# Patient Record
Sex: MALE | Race: WHITE | NOT HISPANIC OR LATINO | Employment: UNEMPLOYED | URBAN - METROPOLITAN AREA
[De-identification: names, ages, dates, MRNs, and addresses within clinical notes are randomized per-mention and may not be internally consistent; named-entity substitution may affect disease eponyms.]

---

## 2019-04-24 ENCOUNTER — APPOINTMENT (OUTPATIENT)
Dept: CT IMAGING | Facility: CLINIC | Age: 34
End: 2019-04-24
Attending: EMERGENCY MEDICINE
Payer: MEDICAID

## 2019-04-24 ENCOUNTER — APPOINTMENT (OUTPATIENT)
Dept: ULTRASOUND IMAGING | Facility: CLINIC | Age: 34
End: 2019-04-24
Attending: EMERGENCY MEDICINE
Payer: MEDICAID

## 2019-04-24 ENCOUNTER — APPOINTMENT (OUTPATIENT)
Dept: CARDIOLOGY | Facility: CLINIC | Age: 34
End: 2019-04-24
Attending: INTERNAL MEDICINE
Payer: MEDICAID

## 2019-04-24 ENCOUNTER — APPOINTMENT (OUTPATIENT)
Dept: GENERAL RADIOLOGY | Facility: CLINIC | Age: 34
End: 2019-04-24
Attending: EMERGENCY MEDICINE
Payer: MEDICAID

## 2019-04-24 ENCOUNTER — OFFICE VISIT (OUTPATIENT)
Dept: URGENT CARE | Facility: URGENT CARE | Age: 34
End: 2019-04-24
Payer: MEDICAID

## 2019-04-24 ENCOUNTER — HOSPITAL ENCOUNTER (INPATIENT)
Facility: CLINIC | Age: 34
LOS: 1 days | Discharge: ANOTHER HOSPITAL | End: 2019-04-25
Attending: EMERGENCY MEDICINE | Admitting: INTERNAL MEDICINE
Payer: MEDICAID

## 2019-04-24 VITALS
OXYGEN SATURATION: 82 % | WEIGHT: 241.4 LBS | DIASTOLIC BLOOD PRESSURE: 97 MMHG | SYSTOLIC BLOOD PRESSURE: 143 MMHG | HEART RATE: 128 BPM | TEMPERATURE: 97 F

## 2019-04-24 DIAGNOSIS — R09.02 HYPOXIA: Primary | ICD-10-CM

## 2019-04-24 DIAGNOSIS — I50.21 ACUTE SYSTOLIC CONGESTIVE HEART FAILURE (H): ICD-10-CM

## 2019-04-24 DIAGNOSIS — R63.5 ABNORMAL WEIGHT GAIN: ICD-10-CM

## 2019-04-24 DIAGNOSIS — J18.9 PNEUMONIA DUE TO INFECTIOUS ORGANISM, UNSPECIFIED LATERALITY, UNSPECIFIED PART OF LUNG: ICD-10-CM

## 2019-04-24 DIAGNOSIS — R63.5 WEIGHT GAIN: ICD-10-CM

## 2019-04-24 DIAGNOSIS — R60.0 PERIPHERAL EDEMA: ICD-10-CM

## 2019-04-24 DIAGNOSIS — R79.89 ELEVATED TROPONIN: ICD-10-CM

## 2019-04-24 DIAGNOSIS — I51.3 LV (LEFT VENTRICULAR) MURAL THROMBUS: Primary | ICD-10-CM

## 2019-04-24 DIAGNOSIS — R60.9 EDEMA, UNSPECIFIED TYPE: ICD-10-CM

## 2019-04-24 DIAGNOSIS — I50.9 CONGESTIVE HEART FAILURE, UNSPECIFIED HF CHRONICITY, UNSPECIFIED HEART FAILURE TYPE (H): ICD-10-CM

## 2019-04-24 PROBLEM — F15.10 METHAMPHETAMINE USE (H): Status: ACTIVE | Noted: 2019-04-24

## 2019-04-24 PROBLEM — F14.90 COCAINE USE: Status: ACTIVE | Noted: 2019-04-24

## 2019-04-24 PROBLEM — J20.9 ACUTE BRONCHITIS: Status: ACTIVE | Noted: 2019-04-24

## 2019-04-24 PROBLEM — R74.8 ELEVATION OF CARDIAC ENZYMES: Status: ACTIVE | Noted: 2019-04-24

## 2019-04-24 LAB
ALBUMIN SERPL-MCNC: 2.7 G/DL (ref 3.4–5)
ALP SERPL-CCNC: 98 U/L (ref 40–150)
ALT SERPL W P-5'-P-CCNC: 95 U/L (ref 0–70)
ANION GAP SERPL CALCULATED.3IONS-SCNC: 7 MMOL/L (ref 3–14)
AST SERPL W P-5'-P-CCNC: 68 U/L (ref 0–45)
BASOPHILS # BLD AUTO: 0 10E9/L (ref 0–0.2)
BASOPHILS NFR BLD AUTO: 0.3 %
BILIRUB DIRECT SERPL-MCNC: 0.3 MG/DL (ref 0–0.2)
BILIRUB SERPL-MCNC: 0.9 MG/DL (ref 0.2–1.3)
BUN SERPL-MCNC: 19 MG/DL (ref 7–30)
CALCIUM SERPL-MCNC: 8.7 MG/DL (ref 8.5–10.1)
CHLORIDE SERPL-SCNC: 110 MMOL/L (ref 94–109)
CO2 SERPL-SCNC: 24 MMOL/L (ref 20–32)
CREAT SERPL-MCNC: 1 MG/DL (ref 0.66–1.25)
D DIMER PPP FEU-MCNC: 1.9 UG/ML FEU (ref 0–0.5)
DIFFERENTIAL METHOD BLD: ABNORMAL
EOSINOPHIL # BLD AUTO: 0.1 10E9/L (ref 0–0.7)
EOSINOPHIL NFR BLD AUTO: 0.7 %
ERYTHROCYTE [DISTWIDTH] IN BLOOD BY AUTOMATED COUNT: 14.7 % (ref 10–15)
ERYTHROCYTE [DISTWIDTH] IN BLOOD BY AUTOMATED COUNT: 14.9 % (ref 10–15)
GFR SERPL CREATININE-BSD FRML MDRD: >90 ML/MIN/{1.73_M2}
GLUCOSE SERPL-MCNC: 109 MG/DL (ref 70–99)
HCT VFR BLD AUTO: 48.9 % (ref 40–53)
HCT VFR BLD AUTO: 49 % (ref 40–53)
HGB BLD-MCNC: 16.1 G/DL (ref 13.3–17.7)
HGB BLD-MCNC: 16.2 G/DL (ref 13.3–17.7)
IMM GRANULOCYTES # BLD: 0.1 10E9/L (ref 0–0.4)
IMM GRANULOCYTES NFR BLD: 0.4 %
INTERPRETATION ECG - MUSE: NORMAL
LACTATE BLD-SCNC: 1.5 MMOL/L (ref 0.7–2)
LACTATE BLD-SCNC: 1.5 MMOL/L (ref 0.7–2)
LMWH PPP CHRO-ACNC: <0.1 IU/ML
LYMPHOCYTES # BLD AUTO: 2.5 10E9/L (ref 0.8–5.3)
LYMPHOCYTES NFR BLD AUTO: 17.8 %
MCH RBC QN AUTO: 31.8 PG (ref 26.5–33)
MCH RBC QN AUTO: 31.8 PG (ref 26.5–33)
MCHC RBC AUTO-ENTMCNC: 32.9 G/DL (ref 31.5–36.5)
MCHC RBC AUTO-ENTMCNC: 33.1 G/DL (ref 31.5–36.5)
MCV RBC AUTO: 96 FL (ref 78–100)
MCV RBC AUTO: 97 FL (ref 78–100)
MONOCYTES # BLD AUTO: 1 10E9/L (ref 0–1.3)
MONOCYTES NFR BLD AUTO: 7 %
NEUTROPHILS # BLD AUTO: 10.2 10E9/L (ref 1.6–8.3)
NEUTROPHILS NFR BLD AUTO: 73.8 %
NRBC # BLD AUTO: 0 10*3/UL
NRBC BLD AUTO-RTO: 0 /100
NT-PROBNP SERPL-MCNC: 4655 PG/ML (ref 0–450)
PLATELET # BLD AUTO: 204 10E9/L (ref 150–450)
PLATELET # BLD AUTO: 215 10E9/L (ref 150–450)
POTASSIUM SERPL-SCNC: 4.9 MMOL/L (ref 3.4–5.3)
PROT SERPL-MCNC: 6.2 G/DL (ref 6.8–8.8)
RBC # BLD AUTO: 5.06 10E12/L (ref 4.4–5.9)
RBC # BLD AUTO: 5.09 10E12/L (ref 4.4–5.9)
SODIUM SERPL-SCNC: 141 MMOL/L (ref 133–144)
TROPONIN I SERPL-MCNC: 2.8 UG/L (ref 0–0.04)
WBC # BLD AUTO: 13.9 10E9/L (ref 4–11)
WBC # BLD AUTO: 15.1 10E9/L (ref 4–11)

## 2019-04-24 PROCEDURE — 25000125 ZZHC RX 250: Performed by: EMERGENCY MEDICINE

## 2019-04-24 PROCEDURE — 25000128 H RX IP 250 OP 636: Performed by: EMERGENCY MEDICINE

## 2019-04-24 PROCEDURE — 85520 HEPARIN ASSAY: CPT | Performed by: INTERNAL MEDICINE

## 2019-04-24 PROCEDURE — 83605 ASSAY OF LACTIC ACID: CPT | Performed by: INTERNAL MEDICINE

## 2019-04-24 PROCEDURE — 25000128 H RX IP 250 OP 636: Performed by: INTERNAL MEDICINE

## 2019-04-24 PROCEDURE — 71046 X-RAY EXAM CHEST 2 VIEWS: CPT

## 2019-04-24 PROCEDURE — 99223 1ST HOSP IP/OBS HIGH 75: CPT | Mod: AI | Performed by: INTERNAL MEDICINE

## 2019-04-24 PROCEDURE — 80048 BASIC METABOLIC PNL TOTAL CA: CPT | Performed by: EMERGENCY MEDICINE

## 2019-04-24 PROCEDURE — 84484 ASSAY OF TROPONIN QUANT: CPT | Performed by: EMERGENCY MEDICINE

## 2019-04-24 PROCEDURE — 85025 COMPLETE CBC W/AUTO DIFF WBC: CPT | Performed by: EMERGENCY MEDICINE

## 2019-04-24 PROCEDURE — 93306 TTE W/DOPPLER COMPLETE: CPT | Mod: 26 | Performed by: INTERNAL MEDICINE

## 2019-04-24 PROCEDURE — 96361 HYDRATE IV INFUSION ADD-ON: CPT

## 2019-04-24 PROCEDURE — 83880 ASSAY OF NATRIURETIC PEPTIDE: CPT | Performed by: EMERGENCY MEDICINE

## 2019-04-24 PROCEDURE — 25000132 ZZH RX MED GY IP 250 OP 250 PS 637: Performed by: EMERGENCY MEDICINE

## 2019-04-24 PROCEDURE — 93005 ELECTROCARDIOGRAM TRACING: CPT

## 2019-04-24 PROCEDURE — 25000128 H RX IP 250 OP 636

## 2019-04-24 PROCEDURE — 83605 ASSAY OF LACTIC ACID: CPT | Performed by: EMERGENCY MEDICINE

## 2019-04-24 PROCEDURE — 85379 FIBRIN DEGRADATION QUANT: CPT | Performed by: EMERGENCY MEDICINE

## 2019-04-24 PROCEDURE — 85027 COMPLETE CBC AUTOMATED: CPT | Performed by: INTERNAL MEDICINE

## 2019-04-24 PROCEDURE — 99285 EMERGENCY DEPT VISIT HI MDM: CPT | Mod: 25

## 2019-04-24 PROCEDURE — 96376 TX/PRO/DX INJ SAME DRUG ADON: CPT

## 2019-04-24 PROCEDURE — 71260 CT THORAX DX C+: CPT

## 2019-04-24 PROCEDURE — 80076 HEPATIC FUNCTION PANEL: CPT | Performed by: EMERGENCY MEDICINE

## 2019-04-24 PROCEDURE — 36415 COLL VENOUS BLD VENIPUNCTURE: CPT | Performed by: INTERNAL MEDICINE

## 2019-04-24 PROCEDURE — 25000132 ZZH RX MED GY IP 250 OP 250 PS 637: Performed by: INTERNAL MEDICINE

## 2019-04-24 PROCEDURE — 93306 TTE W/DOPPLER COMPLETE: CPT

## 2019-04-24 PROCEDURE — 21000001 ZZH R&B HEART CARE

## 2019-04-24 PROCEDURE — 93970 EXTREMITY STUDY: CPT

## 2019-04-24 PROCEDURE — 96365 THER/PROPH/DIAG IV INF INIT: CPT | Mod: 76

## 2019-04-24 PROCEDURE — 87040 BLOOD CULTURE FOR BACTERIA: CPT | Performed by: EMERGENCY MEDICINE

## 2019-04-24 PROCEDURE — 84484 ASSAY OF TROPONIN QUANT: CPT | Performed by: INTERNAL MEDICINE

## 2019-04-24 PROCEDURE — 99221 1ST HOSP IP/OBS SF/LOW 40: CPT | Mod: 25 | Performed by: INTERNAL MEDICINE

## 2019-04-24 RX ORDER — LISINOPRIL 5 MG/1
5 TABLET ORAL DAILY
Status: DISCONTINUED | OUTPATIENT
Start: 2019-04-25 | End: 2019-04-24

## 2019-04-24 RX ORDER — MULTIVITAMIN,THERAPEUTIC
1 TABLET ORAL DAILY
Status: ON HOLD | COMMUNITY
End: 2019-04-25

## 2019-04-24 RX ORDER — CARVEDILOL 3.12 MG/1
3.12 TABLET ORAL 2 TIMES DAILY WITH MEALS
Status: DISCONTINUED | OUTPATIENT
Start: 2019-04-24 | End: 2019-04-24

## 2019-04-24 RX ORDER — LISINOPRIL 5 MG/1
5 TABLET ORAL 2 TIMES DAILY
Status: DISCONTINUED | OUTPATIENT
Start: 2019-04-24 | End: 2019-04-25

## 2019-04-24 RX ORDER — DOCUSATE SODIUM 100 MG/1
100 CAPSULE, LIQUID FILLED ORAL 2 TIMES DAILY
Status: DISCONTINUED | OUTPATIENT
Start: 2019-04-24 | End: 2019-04-25 | Stop reason: HOSPADM

## 2019-04-24 RX ORDER — IOPAMIDOL 755 MG/ML
79 INJECTION, SOLUTION INTRAVASCULAR ONCE
Status: COMPLETED | OUTPATIENT
Start: 2019-04-24 | End: 2019-04-24

## 2019-04-24 RX ORDER — POLYETHYLENE GLYCOL 3350 17 G/17G
17 POWDER, FOR SOLUTION ORAL DAILY PRN
Status: DISCONTINUED | OUTPATIENT
Start: 2019-04-24 | End: 2019-04-25 | Stop reason: HOSPADM

## 2019-04-24 RX ORDER — LORAZEPAM 2 MG/ML
0.5 INJECTION INTRAMUSCULAR EVERY 4 HOURS PRN
Status: DISCONTINUED | OUTPATIENT
Start: 2019-04-24 | End: 2019-04-25 | Stop reason: HOSPADM

## 2019-04-24 RX ORDER — ONDANSETRON 2 MG/ML
4 INJECTION INTRAMUSCULAR; INTRAVENOUS EVERY 6 HOURS PRN
Status: DISCONTINUED | OUTPATIENT
Start: 2019-04-24 | End: 2019-04-25 | Stop reason: HOSPADM

## 2019-04-24 RX ORDER — AZITHROMYCIN 250 MG/1
250 TABLET, FILM COATED ORAL DAILY
Status: ON HOLD | COMMUNITY
End: 2019-04-25

## 2019-04-24 RX ORDER — NALOXONE HYDROCHLORIDE 0.4 MG/ML
.1-.4 INJECTION, SOLUTION INTRAMUSCULAR; INTRAVENOUS; SUBCUTANEOUS
Status: DISCONTINUED | OUTPATIENT
Start: 2019-04-24 | End: 2019-04-25 | Stop reason: HOSPADM

## 2019-04-24 RX ORDER — LISINOPRIL 2.5 MG/1
2.5 TABLET ORAL ONCE
Status: COMPLETED | OUTPATIENT
Start: 2019-04-24 | End: 2019-04-24

## 2019-04-24 RX ORDER — MORPHINE SULFATE 2 MG/ML
1-2 INJECTION, SOLUTION INTRAMUSCULAR; INTRAVENOUS
Status: DISCONTINUED | OUTPATIENT
Start: 2019-04-24 | End: 2019-04-25 | Stop reason: HOSPADM

## 2019-04-24 RX ORDER — CARVEDILOL 3.12 MG/1
3.12 TABLET ORAL 2 TIMES DAILY WITH MEALS
Status: DISCONTINUED | OUTPATIENT
Start: 2019-04-25 | End: 2019-04-24

## 2019-04-24 RX ORDER — CARVEDILOL 6.25 MG/1
6.25 TABLET ORAL 2 TIMES DAILY WITH MEALS
Status: DISCONTINUED | OUTPATIENT
Start: 2019-04-24 | End: 2019-04-25

## 2019-04-24 RX ORDER — ASPIRIN 325 MG
325 TABLET ORAL ONCE
Status: COMPLETED | OUTPATIENT
Start: 2019-04-24 | End: 2019-04-24

## 2019-04-24 RX ORDER — FUROSEMIDE 10 MG/ML
20 INJECTION INTRAMUSCULAR; INTRAVENOUS
Status: DISCONTINUED | OUTPATIENT
Start: 2019-04-24 | End: 2019-04-25 | Stop reason: HOSPADM

## 2019-04-24 RX ORDER — ONDANSETRON 4 MG/1
4 TABLET, ORALLY DISINTEGRATING ORAL EVERY 6 HOURS PRN
Status: DISCONTINUED | OUTPATIENT
Start: 2019-04-24 | End: 2019-04-25 | Stop reason: HOSPADM

## 2019-04-24 RX ADMIN — Medication 5600 UNITS: at 16:27

## 2019-04-24 RX ADMIN — Medication 4600 UNITS: at 23:50

## 2019-04-24 RX ADMIN — ASPIRIN 325 MG ORAL TABLET 325 MG: 325 PILL ORAL at 15:29

## 2019-04-24 RX ADMIN — HEPARIN SODIUM 1100 UNITS/HR: 10000 INJECTION, SOLUTION INTRAVENOUS at 16:27

## 2019-04-24 RX ADMIN — FUROSEMIDE 20 MG: 10 INJECTION, SOLUTION INTRAVENOUS at 17:52

## 2019-04-24 RX ADMIN — LISINOPRIL 2.5 MG: 2.5 TABLET ORAL at 17:52

## 2019-04-24 RX ADMIN — IOPAMIDOL 79 ML: 755 INJECTION, SOLUTION INTRAVENOUS at 14:43

## 2019-04-24 RX ADMIN — Medication 1 MG: at 21:01

## 2019-04-24 RX ADMIN — CARVEDILOL 6.25 MG: 6.25 TABLET, FILM COATED ORAL at 22:56

## 2019-04-24 RX ADMIN — SODIUM CHLORIDE 100 ML: 9 INJECTION, SOLUTION INTRAVENOUS at 14:43

## 2019-04-24 RX ADMIN — CARVEDILOL 3.12 MG: 3.12 TABLET, FILM COATED ORAL at 17:52

## 2019-04-24 RX ADMIN — LISINOPRIL 5 MG: 5 TABLET ORAL at 21:43

## 2019-04-24 RX ADMIN — LORAZEPAM 0.5 MG: 2 INJECTION INTRAMUSCULAR; INTRAVENOUS at 22:51

## 2019-04-24 RX ADMIN — SODIUM CHLORIDE 1000 ML: 9 INJECTION, SOLUTION INTRAVENOUS at 13:32

## 2019-04-24 ASSESSMENT — MIFFLIN-ST. JEOR
SCORE: 2102.92
SCORE: 2102.92
SCORE: 2092.94

## 2019-04-24 ASSESSMENT — ACTIVITIES OF DAILY LIVING (ADL)
RETIRED_EATING: 0-->INDEPENDENT
SWALLOWING: 0-->SWALLOWS FOODS/LIQUIDS WITHOUT DIFFICULTY
BATHING: 0-->INDEPENDENT
TOILETING: 0-->INDEPENDENT
ADLS_ACUITY_SCORE: 10
COGNITION: 0 - NO COGNITION ISSUES REPORTED
TRANSFERRING: 0-->INDEPENDENT
DRESS: 0-->INDEPENDENT
AMBULATION: 0-->INDEPENDENT
RETIRED_COMMUNICATION: 0-->UNDERSTANDS/COMMUNICATES WITHOUT DIFFICULTY
FALL_HISTORY_WITHIN_LAST_SIX_MONTHS: NO

## 2019-04-24 ASSESSMENT — ENCOUNTER SYMPTOMS
SHORTNESS OF BREATH: 1
COUGH: 1

## 2019-04-24 NOTE — ED NOTES
DATE:  4/24/2019   TIME OF RECEIPT FROM LAB: 4632  LAB TEST:  troponin  LAB VALUE:  2.801  RESULTS GIVEN WITH READ-BACK TO (PROVIDER): Dr Monsalve  TIME LAB VALUE REPORTED TO PROVIDER:   4280

## 2019-04-24 NOTE — PHARMACY-ADMISSION MEDICATION HISTORY
Admission medication history interview status for the 4/24/2019  admission is complete. See EPIC admission navigator for prior to admission medications     Medication history source reliability:Good    Actions taken by pharmacist (provider contacted, etc):None     Additional medication history information not noted on PTA med list :None    Medication reconciliation/reorder completed by provider prior to medication history? No    Time spent in this activity: 5 min    Prior to Admission medications    Medication Sig Last Dose Taking? Auth Provider   azithromycin (ZITHROMAX) 250 MG tablet Take 250 mg by mouth daily Last dose dose today . Therapy completed. 4/24/2019 at last dose Yes Unknown, Entered By History   multivitamin, therapeutic (THERA-VIT) TABS tablet Take 1 tablet by mouth daily  Yes Unknown, Entered By History

## 2019-04-24 NOTE — ED NOTES
"[facility]  ED Nurse Handoff Report    ED Chief complaint: Leg Swelling (Cough, SOB, and swelling in his legs/abd. [facility]. Took abx, worsening. )      ED Diagnosis:   Final diagnoses:   Congestive heart failure, unspecified HF chronicity, unspecified heart failure type (H)   Abnormal weight gain - 20 lb   Elevated troponin   Peripheral edema       Code Status: Full Code    Allergies:   Allergies   Allergen Reactions     Amoxicillin        Activity level - Baseline/Home:  Independent    Activity Level - Current:   Independent     Needed?: No    Isolation: No  Infection: Not Applicable  Bariatric?: No    Vital Signs:   Vitals:    04/24/19 1415 04/24/19 1430 04/24/19 1500 04/24/19 1554   BP: (!) 117/93 99/74 (!) 118/91    Pulse: 124 129 122    Resp: 16 18 15    Temp:       TempSrc:       SpO2: 96% 97% 98%    Weight:    109.3 kg (241 lb)   Height:           Cardiac Rhythm: ,   Cardiac  Cardiac Rhythm: Sinus tachycardia    Pain level: 0-10 Pain Scale: 0    Is this patient confused?: No   Does this patient have a guardian?  No         If yes, is there guardianship documents in the Epic \"Code/ACP\" activity?  N/A         Guardian Notified?  N/A  [place] - Suicide Severity Rating Scale Completed?  Yes  If yes, what color did the patient score?  White    Patient Report: Initial Complaint: Ubaldo Velez is a 33 year old male who presents to the emergency department for evaluation of leg swelling.    Focused Assessment: Cardiac: ST  Resp: SOB, cough  Neuro: A&Ox4  Musculoskeletal: Rib pain   Tests Performed: Labs and imaging  Abnormal Results: Ddimer 1.9, Troponin 2.801, see rest of labs and imaging.   Treatments provided: NS 1000ml bolus, ASA 325mg,     Family Comments: wife at bedside    OBS brochure/video discussed/provided to patient/family: No              Name of person given brochure if not patient: na              Relationship to patient: na    ED " Medications:   Medications   0.9% sodium chloride BOLUS (0 mLs Intravenous Stopped 4/24/19 1445)   Saline Flush (100 mLs Intravenous Given 4/24/19 1443)   iopamidol (ISOVUE-370) solution 79 mL (79 mLs Intravenous Given 4/24/19 1443)   aspirin (ASA) tablet 325 mg (325 mg Oral Given 4/24/19 1529)       Drips infusing?:  No    For the majority of the shift this patient was Green.   Interventions performed were meds, testing.    Severe Sepsis OR Septic Shock Diagnosis Present: No    To be done/followed up on inpatient unit:  continue care, give meds internal MD prescribed.     ED NURSE PHONE NUMBER: *[phone]

## 2019-04-24 NOTE — ED PROVIDER NOTES
History     Chief Complaint:  Leg Swelling    HPI   Ubaldo Velez is a 33 year old male who presents to the emergency department for evaluation of leg swelling. The patient reports he has experienced shortness of breath for around 6 weeks, and he further developed a cough that had been interfering with his sleep around 2 weeks ago. He notes he also developed sudden onset chills and diaphoresis (at the same time 2 weeks ago), worse at night, around the same time, and these symptoms have persisted to today which is why he presented. He indicates was [facility], diagnosed with pneumonia, and discharged home on antibiotics. He states he had one episode of back pain in the left mid back after coughing with a severe episode of coughing, and he does have chest pain associated with coughing only. No chest pain now at rest. No exertional chest pain, but he does have exertional dyspnea. The patient reports he presents today after new onset leg swelling several days ago, accompanied by a 20 pound increase (baseline 220, today 240), as well as worsening of his cough despite the antibiotics. The patient denies any recorded fever. He notes he previously drank heavily, around 10 beers per day, for several years, but the past 6-8 months has maintained himself at around 5 beers per week. No hard liquor. He confirms intermittent cocaine and methamphetamine use (nothing IV), as well as daily unprescribed Adderall use; he denies any IV drug use. The significant other remarks both she and her son have been ill with cough for the last month or so, but are improved. The patient denies any personal or family history of heart disease or blood clotting, as well as any recent surgery, travel, or hormone use.    PE/DVT Risk Factors:  The patient denies a personal or family history of PE, DVT, or other clotting disorders. The patient denies any recent travel, surgery, or other prolonged immobilization. The patient  "denies tobacco use or hormone use.    Cardiac Risk Factors:  The patient denies a history of diabetes, hypertension, high cholesterol, known cardiac disease or current smoking.    Allergies:  Amoxicillin     Medications:    Adderall    Past Medical History:    ADHD    Past Surgical History:    Keloid shoulder, right  Hand surgery, left  [place] teeth extraction    Family History:    COPD    Social History:  Presents with significant other.  Former smoker.  Positive for alcohol use.   Positive for cocaine use.  Positive for methamphetamine use.  Marital Status:  Single [1]    Review of Systems   Respiratory: Positive for cough and shortness of breath.    Cardiovascular: Positive for leg swelling. Negative for chest pain.   All other systems reviewed and are negative.    Physical Exam     Patient Vitals for the past 24 hrs:   BP Temp Temp src Pulse Heart Rate Resp SpO2 Height Weight   04/24/19 1554 -- -- -- -- -- -- -- -- 109.3 kg (241 lb)   04/24/19 1500 (!) 118/91 -- -- 122 122 15 98 % -- --   04/24/19 1430 99/74 -- -- 129 -- 18 97 % -- --   04/24/19 1415 (!) 117/93 -- -- 124 -- 16 96 % -- --   04/24/19 1330 (!) 113/97 -- -- 121 -- 18 97 % -- --   04/24/19 1218 130/90 97.6  F (36.4  C) Oral 125 -- 16 99 % 1.88 m (6' 2\") 109.3 kg (241 lb)     Physical Exam  Physical Exam   General:  Sitting on bed with significant other at bedside.   HENT:  No obvious trauma to head  Right Ear:  External ear normal.   Left Ear:  External ear normal.   Nose:  Nose normal.   Eyes:  Conjunctivae and EOM are normal. Pupils are equal, round, and reactive.   Neck: Normal range of motion. Neck supple. No tracheal deviation present.   CV:  Normal heart sounds. No murmur heard.  Pulm/Chest: Effort normal and breath sounds normal. Bibasilar coarse breath sounds.  Abd: Soft. No distension. There is no tenderness. There is no rigidity, no rebound and no guarding.   M/S: Normal range of motion. Evidence of peripheral edema, +2 B/L LE.  Neuro: " Alert. GCS 15.  Skin: Skin is warm and dry. No rash noted. Not diaphoretic.   Psych: Normal mood and affect. Behavior is normal.     Emergency Department Course   ECG:  Time: 1229  Vent. Rate 125 bpm. MN interval 122. QRS duration 94. QT/QTc 328/473. P-R-T axis 50 -30 50.  Sinus tachycardia.  Possible left atrial enlargement.  Left axis deviation.  Possible anterior infarct, age undetermined.  Abnormal ECG.  Read time: 1410    Imaging:  Radiographic findings were communicated with the patient who voiced understanding of the findings.    XR Chest 2 views:   Cardiomegaly, probable pleural effusions, extensive bilateral interstitial and alveolar infiltrate.   As per radiology.     US Lower Extremity Venous Duplex Bilateral:  No evidence of  right or left lower extremity deep venous thrombosis. As per radiology.    CT Chest w/ IV contrast - PE protocol:   1. No CT evidence of pulmonary embolism.  2. Bilateral ill-defined, somewhat nodular upper and lower lobe perihilar pulmonary opacities. These are nonspecific but may represent  inflammatory disease.  3. Mediastinal and bilateral hilar adenopathy.  4. Small bilateral pleural effusions, right greater than left.  5. Hepatic fatty infiltration. As per radiology.     Laboratory:  CBC: WBC: 13.9 (H), HGB: 16.2, PLT: 204  BMP: Glucose 109 (H), Chloride 110 (H), o/w WNL (Creatinine: 1.00)  Hepatic Panel: Bilirubin Direct 0.3 (H), Albumin 2.7 (L), Protein Total 6.2 (L), ALT 95 (H), AST 68 (H), o/w WNL    BNP: 4655 (H)    D dimer: 1.9 (H)    Lactic acid: 1.5    1328 Troponin I: 2.801 (HH)    Blood culture x2 pending.    Interventions:  1332 NS 1L IV BOLUS  1529 Aspirin 325 mg PO  Heparin bolus and drip, pharmacy to dose, IV    Emergency Department Course:  Nursing notes and vitals reviewed. 1424 I performed an exam of the patient as documented above.     EKG obtained in the ED, see results above.     IV inserted. Medicine administered as documented above. Blood drawn. This was  sent to the lab for further testing, results above.    The patient was sent for a XR Chest, CT Chest, US LE while in the emergency department, findings above.     1545 I spoke with Dr. Abbott, hospitalist, who agreed to admit the patient.    1548 I rechecked the patient and discussed the results of his workup thus far.     Findings and plan explained to the Patient who consents to admission. Discussed the patient with Dr. Abbott, who will admit the patient to an inpatient [facility] bed for further monitoring, evaluation, and treatment.    I personally reviewed the laboratory results with the Patient and answered all related questions prior to admission.    Impression & Plan    Medical Decision Making:  Ubaldo Velez is a very pleasant 33 year old year old patient who presents to the emergency department with concern of shortness of breath essentially for the past 6 weeks.  Over the past 2 weeks he felt his symptoms worsen.  [facility] and told he had pneumonia last week.  He was placed on a Z-Tino, but that did not help his symptoms.  The patient never measured his temperature at home but does report he did have some dyspnea initially.  He is afebrile here.  I considered influenza but this is unlikely at this time. He did not get the flu shot this year.    The screening EKG does show normal sinus rhythm.  There is no significant ST elevation to suggest STEMI.  The patient has had significant weight gain of 20 pounds, unintentional, notable in the peripheral lower extremities and abdomen.  Screening chest x-ray showed evidence of failure and cardiomegaly.  The patient has no history of this nor any known cardiac disease.  Additional labs were obtained and the troponin is found to be elevated.  Serial troponins will be of benefit to determine if this is trending up/down or his baseline.  D-dimer is high so bilateral lower extremity ultrasounds and a CT of the chest performed.  There is no evidence of  DVT or pulmonary embolism.  I reviewed all the imaging studies with the patient including the lymphadenopathy and this could represent an infectious reactive source or lymphoma.  There is notable hilar adenopathy and evidence of bilateral infiltrates.  Lactate is normal, but he does have a trace leukocytosis.  2 blood cultures were obtained.  I considered initiating antibiotics and reviewed this with the hospitalist and after doing so he agreed against initiating antibiotics at this time since patient is afebrile.  The 2 blood cultures are pending.    The patient requires continued evaluation and treatment in the hospital, cardiac monitoring and cardiology consultation for likely diagnosis of new cardiomyopathy.  The differential of this is broad and will require additional work-up.  An echocardiogram would certainly be of benefit.  The patient was provided one aspirin in case this represents a non-STEMI.  I considered initiating anticoagulation and reviewed this with the hospitalist and after doing so he agreed with initiating anticoagulation.  He has no chest pain.  I spoke to the hospitalist,  who has agreed to admit the patient for continued evaluation and treatment.    Diagnosis:    ICD-10-CM   1. Congestive heart failure, unspecified HF chronicity, unspecified heart failure type (H) I50.9   2. Abnormal weight gain R63.5   3. Elevated troponin R74.8   4. Peripheral edema R60.9       Disposition:  Admitted to Dr. Abbott.    I, Chino Mayers, am serving as a scribe on 4/24/2019 at 1:57 PM to personally document services performed by Reggie Monsalve, * based on my observations and the provider's statements to me.     Chino Mayers  4/24/2019    EMERGENCY DEPARTMENT       Reggie Monsalve,   04/24/19 1608

## 2019-04-24 NOTE — PROVIDER NOTIFICATION
Echo tech did echo in ER. Not read by cardiology and probably wont be read tonight because of timing. Echo tech relayed concerns to RN that the EF appeared very low and that he thought he saw clot material inside the heart. Text page sent to Dr Abbott to make him aware.

## 2019-04-24 NOTE — PLAN OF CARE
Pt A/Ox4, IV has hep infusing, VSS ex heart rate is tachy. No c/o pain. Up SBA-ind to the bathroom. Strict I&O. PT is ST. On 2L O2 regardless of O2 sats per order.    Regi Lemus, RN on 4/24/2019 at 6:56 PM

## 2019-04-24 NOTE — H&P
[facility]    History and Physical  Hospitalist       Date of Admission:  4/24/2019    Assessment & Plan   Ubaldo Velez is a 33 year old male who smokes and may have borderline HTN, and ADHC and substance abuse, presents with cough for 8 weeks, and SOB for 2 weeks:    Summary:    Active Problems:    Acute systolic congestive heart failure -- EKG with no anterior R wave, Cardiomegaly on CXR, Pro-BNP 4655 -- may have had an MI 8 weeks ago with onset of symptoms, ?possible coronary spasm from Meth use then.     -- echo   -- start Metoprolol       Elevation of cardiac enzymes   -- will start IV heparin for possible ongoing cardiac ischemia      Methamphetamine use (H)-- last used 8 weeks ago, which is when his symptoms started      Cocaine use -- remote      ADHD (attention deficit hyperactivity disorder)   -- was on Addera      Acute bronchitis -- green sputum, improved with Azithromycin   -- took Azithromycin from 4/20 to 4/24 (finished today)        Elevated LFT's -- possibly from ETOH    Plan:  Admit to [facility] (cardiac telemetry)     Addendum -- Echo done urgently at 4:30 PM, not to be read until tomorrow, but tech mentioned preliminary EF about 10% and thrombus present.  Is on IV heparin already, and aggressively treating Systolic CHF as well -- will wait for the final cardiology read tomorrow.     DVT Prophylaxis: IV heparin  Code Status: Full Code    Disposition: Expected discharge in 2-3 days    Christophe Valladares MD  Pager: [phone]  Cell Phone:  [phone]       Primary Care Physician   Physician No Ref-Primary    Chief Complaint   Cough, SOB    History is obtained from Patient    History of Present Illness   33 year old male who smokes and may have borderline HTN, and ADHC and substance abuse -- used Cocaine in the remote past, does meth -- last used 8 weeks ago and symptoms seemed to start then, drinks 8 to 10 beers a day but about 6 weeks ago cut way back to about 1 beer a week  "-- who noticed a cough 8 weeks ago, gradually got worse, and then SOB starting about 2 weeks ago, noticed legs swelling.  Went to clinic on 4/20 and was prescribed a Z-pack for pneumonia, and reports his green sputum at that time as turned clear since then, but continued SBO, and orthopnea.  He denies any hx of heart disease, and no chest pain.  He stopped smoking cigarettes 1.5 weeks ago.      In ER today initial Heart rate is 128, /97, O2 sat 82 but then up to 98 without oxygen -- but felt much better with 2 LPM O2, and CXR with Cardiomegaly with bilateral effusion, and interstial infiltrates.  Did have bilateral leg ultrasound with no DVT, and D-dimer was 1.9 so had Chest CT small bilateral pleural effusions -- Right > left, and some \"ill-defined nodularity\", but no Pulm Emboli.  Pro-BNP is 4655, Trop is 2.801, and AST 68 and ALT 95.  WBC 15.1 with hgb 16.1.        Past Medical History    I have reviewed this patient's medical history and updated it with pertinent information if needed.   Past Medical History:   Diagnosis Date     ADHD      Cocaine use      Methamphetamine use (H)        Past Surgical History   I have reviewed this patient's surgical history and updated it with pertinent information if needed.  Past Surgical History:   Procedure Laterality Date     EXTRACTION(S) DENTAL      [place] teeth     HAND SURGERY Left     Laceration left index finger       Prior to Admission Medications   None     Allergies   Allergies   Allergen Reactions     Amoxicillin        Social History   I have reviewed this patient's social history and updated it with pertinent information if needed. Ubaldo Velez  reports that he has been smoking.  He has a 15.00 pack-year smoking history. He has never used smokeless tobacco. He reports that he drinks alcohol. He reports that he has current or past drug history. Drugs: Methamphetamines and Cocaine.    Family History   I have reviewed this patient's family history and " updated it with pertinent information if needed.   Family History   Problem Relation Age of Onset     Chronic Obstructive Pulmonary Disease Father      Multiple Sclerosis Maternal Aunt        Review of Systems   The 10 point Review of Systems is negative other than noted in the HPI or here.     # Pain Assessment:  Current Pain Score 4/24/2019   Patient currently in pain? -   Pain score (0-10) 0   Ubaldo dyer pain level was assessed and he currently denies pain.        Physical Exam   Temp: 97.6  F (36.4  C) Temp src: Oral BP: (!) 118/91 Pulse: 122 Heart Rate: 121 Resp: 23 SpO2: 95 % O2 Device: None (Room air)    Vital Signs with Ranges  Temp:  [97  F (36.1  C)-97.6  F (36.4  C)] 97.6  F (36.4  C)  Pulse:  [121-129] 122  Heart Rate:  [117-122] 121  Resp:  [12-24] 23  BP: ()/(74-97) 118/91  SpO2:  [82 %-99 %] 95 %  241 lbs 0 oz    Constitutional: Awake, alert, cooperative, no apparent distress.  Eyes: Conjunctiva and pupils examined and normal.  HEENT: Moist mucous membranes, normal dentition.  Respiratory: Crackles at base bilaterally, no wheezing or rhonchi, and breathing more comfortably on 2 LPM nasal canula O2.    Cardiovascular: Regular rate and rhythm, normal S1 and S2, and no murmur noted, no carotid bruits.  1+ bilateral ankle edema.   GI: Soft, non-distended, non-tender, normal bowel sounds.  Lymph/Hematologic: No anterior cervical, supraclavicular or axillary adenopathy.  Skin: No rashes, no cyanosis.   Musculoskeletal: No joint swelling, erythema or tenderness.  Neurologic: Cranial nerves 2-12 intact, no focal weakness or numbness  Psychiatric: Alert, Ox3, no obvious anxiety or depression.    Data   Data reviewed today:  I personally reviewed the EKG tracing showing Sinus tach with rate of 125, no R wave in V1 to V4, inverted Tin V6.  Recent Labs   Lab 04/24/19  1328   WBC 13.9*   HGB 16.2   MCV 96         POTASSIUM 4.9   CHLORIDE 110*   CO2 24   BUN 19   CR 1.00   ANIONGAP 7   ARUN 8.7   GLC  109*   ALBUMIN 2.7*   PROTTOTAL 6.2*   BILITOTAL 0.9   ALKPHOS 98   ALT 95*   AST 68*   TROPI 2.801*       Imaging:  Recent Results (from the past 24 hour(s))   XR Chest 2 Views    Narrative    XR CHEST 2 VW   4/24/2019 1:50 PM     HISTORY: Shortness of breath    COMPARISON: None.    FINDINGS: The heart is enlarged. There is blunting of the costophrenic  angles. There are areas of patchy opacity in both lungs compatible  with interstitial and alveolar infiltrate. . The pulmonary vasculature  is normal.  The bones and soft tissues are unremarkable.      Impression    IMPRESSION: Cardiomegaly, probable pleural effusions, extensive  bilateral interstitial and alveolar infiltrate.        JOAN KRISHNAN MD   US Lower Extremity Venous Duplex Bilateral    Narrative    US LOWER EXTREMITY VENOUS DUPLEX BILATERAL  4/24/2019 2:18 PM    HISTORY:  leg swelling    TECHNIQUE:  Venous Doppler US including color flow and Doppler  waveform analysis.    FINDINGS: No thrombus was observed and there was normal  compressibility, phasic flow, and augmentation.       Impression    IMPRESSION: No evidence of  right or left lower extremity deep venous  thrombosis.    RADU DELUNA MD   Chest CT, IV contrast only - PE protocol    Narrative    CT CHEST PULMONARY EMBOLISM WITH CONTRAST April 24, 2019 2:56 PM     HISTORY: Pulmonary embolus suspected, intermediate probability,  positive D-dimer. Dyspnea, possible cardiomyopathy, no history.  Elevated D-dimer. Hypoxia.    CONTRAST DOSE:  79mL Isovue-370.     TECHNIQUE: Radiation dose for this scan was reduced using automated  exposure control, adjustment of the mA and/or kV according to patient  size, or iterative reconstruction technique.    FINDINGS: There is a good contrast bolus within the pulmonary  arteries. No pulmonary arterial filling defect is demonstrated to  indicate pulmonary embolism. There is no contrast within the aorta  precluding evaluation for aortic dissection. Mediastinal and  bilateral  hilar mild adenopathy is noted. Right cardiac enlargement is noted.  Patchy somewhat nodular infiltrates are noted in perihilar  distribution, greatest bilaterally in the lower lobes and right  middle/upper lobes. Small bilateral pleural effusions are noted, right  greater than left. Hepatic fatty infiltration is noted. Deformity  related to right rib healed posterior fractures.      Impression    IMPRESSION:  1. No CT evidence of pulmonary embolism.  2. Bilateral ill-defined, somewhat nodular upper and lower lobe  perihilar pulmonary opacities. These are nonspecific but may represent  inflammatory disease.  3. Mediastinal and bilateral hilar adenopathy.  4. Small bilateral pleural effusions, right greater than left.  5. Hepatic fatty infiltration.    RADU DELUNA MD

## 2019-04-25 ENCOUNTER — HOSPITAL ENCOUNTER (INPATIENT)
Facility: CLINIC | Age: 34
LOS: 12 days | Discharge: HOME | End: 2019-05-07
Attending: INTERNAL MEDICINE | Admitting: INTERNAL MEDICINE
Payer: MEDICAID

## 2019-04-25 ENCOUNTER — APPOINTMENT (OUTPATIENT)
Dept: GENERAL RADIOLOGY | Facility: CLINIC | Age: 34
End: 2019-04-25
Attending: INTERNAL MEDICINE
Payer: MEDICAID

## 2019-04-25 VITALS
SYSTOLIC BLOOD PRESSURE: 125 MMHG | HEIGHT: 74 IN | TEMPERATURE: 98.7 F | WEIGHT: 236.2 LBS | OXYGEN SATURATION: 97 % | RESPIRATION RATE: 12 BRPM | BODY MASS INDEX: 30.31 KG/M2 | HEART RATE: 122 BPM | DIASTOLIC BLOOD PRESSURE: 90 MMHG

## 2019-04-25 DIAGNOSIS — I51.3 LV (LEFT VENTRICULAR) MURAL THROMBUS: ICD-10-CM

## 2019-04-25 DIAGNOSIS — I50.21 ACUTE SYSTOLIC CONGESTIVE HEART FAILURE (H): ICD-10-CM

## 2019-04-25 DIAGNOSIS — I50.23 ACUTE ON CHRONIC HFREF (HEART FAILURE WITH REDUCED EJECTION FRACTION) (H): ICD-10-CM

## 2019-04-25 DIAGNOSIS — Z98.890 S/P CORONARY ANGIOGRAM: Primary | ICD-10-CM

## 2019-04-25 LAB
ALBUMIN UR-MCNC: NEGATIVE MG/DL
AMPHETAMINES UR QL SCN: NEGATIVE
ANION GAP SERPL CALCULATED.3IONS-SCNC: 10 MMOL/L (ref 3–14)
ANION GAP SERPL CALCULATED.3IONS-SCNC: 11 MMOL/L (ref 3–14)
ANION GAP SERPL CALCULATED.3IONS-SCNC: 8 MMOL/L (ref 3–14)
APPEARANCE UR: CLEAR
BARBITURATES UR QL: NEGATIVE
BASE EXCESS BLDV CALC-SCNC: 6.7 MMOL/L
BASOPHILS # BLD AUTO: 0.1 10E9/L (ref 0–0.2)
BASOPHILS NFR BLD AUTO: 0.4 %
BENZODIAZ UR QL: NEGATIVE
BILIRUB UR QL STRIP: NEGATIVE
BUN SERPL-MCNC: 21 MG/DL (ref 7–30)
BUN SERPL-MCNC: 21 MG/DL (ref 7–30)
BUN SERPL-MCNC: 25 MG/DL (ref 7–30)
CALCIUM SERPL-MCNC: 8 MG/DL (ref 8.5–10.1)
CALCIUM SERPL-MCNC: 8.7 MG/DL (ref 8.5–10.1)
CALCIUM SERPL-MCNC: 8.7 MG/DL (ref 8.5–10.1)
CANNABINOIDS UR QL SCN: NEGATIVE
CHLORIDE SERPL-SCNC: 103 MMOL/L (ref 94–109)
CHLORIDE SERPL-SCNC: 105 MMOL/L (ref 94–109)
CHLORIDE SERPL-SCNC: 97 MMOL/L (ref 94–109)
CO2 SERPL-SCNC: 25 MMOL/L (ref 20–32)
CO2 SERPL-SCNC: 26 MMOL/L (ref 20–32)
CO2 SERPL-SCNC: 30 MMOL/L (ref 20–32)
COCAINE UR QL: NEGATIVE
COLOR UR AUTO: ABNORMAL
CREAT SERPL-MCNC: 1.05 MG/DL (ref 0.66–1.25)
CREAT SERPL-MCNC: 1.13 MG/DL (ref 0.66–1.25)
CREAT SERPL-MCNC: 1.36 MG/DL (ref 0.66–1.25)
DIFFERENTIAL METHOD BLD: ABNORMAL
EOSINOPHIL # BLD AUTO: 0.1 10E9/L (ref 0–0.7)
EOSINOPHIL NFR BLD AUTO: 0.4 %
ERYTHROCYTE [DISTWIDTH] IN BLOOD BY AUTOMATED COUNT: 14.7 % (ref 10–15)
ERYTHROCYTE [DISTWIDTH] IN BLOOD BY AUTOMATED COUNT: 14.7 % (ref 10–15)
ETHANOL UR QL SCN: NEGATIVE
FLUAV+FLUBV RNA SPEC QL NAA+PROBE: NEGATIVE
FLUAV+FLUBV RNA SPEC QL NAA+PROBE: NEGATIVE
GFR SERPL CREATININE-BSD FRML MDRD: 67 ML/MIN/{1.73_M2}
GFR SERPL CREATININE-BSD FRML MDRD: 84 ML/MIN/{1.73_M2}
GFR SERPL CREATININE-BSD FRML MDRD: >90 ML/MIN/{1.73_M2}
GLUCOSE BLDC GLUCOMTR-MCNC: 267 MG/DL (ref 70–99)
GLUCOSE SERPL-MCNC: 111 MG/DL (ref 70–99)
GLUCOSE SERPL-MCNC: 250 MG/DL (ref 70–99)
GLUCOSE SERPL-MCNC: 97 MG/DL (ref 70–99)
GLUCOSE UR STRIP-MCNC: NEGATIVE MG/DL
HBA1C MFR BLD: 5.5 % (ref 0–5.6)
HCO3 BLDV-SCNC: 32 MMOL/L (ref 21–28)
HCT VFR BLD AUTO: 47.6 % (ref 40–53)
HCT VFR BLD AUTO: 51.1 % (ref 40–53)
HGB BLD-MCNC: 15.6 G/DL (ref 13.3–17.7)
HGB BLD-MCNC: 16.1 G/DL (ref 13.3–17.7)
HGB UR QL STRIP: NEGATIVE
IMM GRANULOCYTES # BLD: 0.1 10E9/L (ref 0–0.4)
IMM GRANULOCYTES NFR BLD: 0.8 %
KETONES UR STRIP-MCNC: NEGATIVE MG/DL
LACTATE BLD-SCNC: 2 MMOL/L (ref 0.7–2)
LEUKOCYTE ESTERASE UR QL STRIP: ABNORMAL
LMWH PPP CHRO-ACNC: 0.19 IU/ML
LMWH PPP CHRO-ACNC: <0.1 IU/ML
LYMPHOCYTES # BLD AUTO: 2.9 10E9/L (ref 0.8–5.3)
LYMPHOCYTES NFR BLD AUTO: 16.8 %
MAGNESIUM SERPL-MCNC: 1.5 MG/DL (ref 1.6–2.3)
MAGNESIUM SERPL-MCNC: 1.6 MG/DL (ref 1.6–2.3)
MCH RBC QN AUTO: 30.7 PG (ref 26.5–33)
MCH RBC QN AUTO: 31.3 PG (ref 26.5–33)
MCHC RBC AUTO-ENTMCNC: 31.5 G/DL (ref 31.5–36.5)
MCHC RBC AUTO-ENTMCNC: 32.8 G/DL (ref 31.5–36.5)
MCV RBC AUTO: 95 FL (ref 78–100)
MCV RBC AUTO: 97 FL (ref 78–100)
MONOCYTES # BLD AUTO: 1.2 10E9/L (ref 0–1.3)
MONOCYTES NFR BLD AUTO: 6.7 %
MRSA DNA SPEC QL NAA+PROBE: NEGATIVE
NEUTROPHILS # BLD AUTO: 12.9 10E9/L (ref 1.6–8.3)
NEUTROPHILS NFR BLD AUTO: 74.9 %
NITRATE UR QL: NEGATIVE
NRBC # BLD AUTO: 0 10*3/UL
NRBC BLD AUTO-RTO: 0 /100
O2/TOTAL GAS SETTING VFR VENT: ABNORMAL %
OPIATES UR QL SCN: NEGATIVE
OXYHGB MFR BLDV: 54 %
PCO2 BLDV: 45 MM HG (ref 40–50)
PCP UR QL SCN: NEGATIVE
PH BLDV: 7.46 PH (ref 7.32–7.43)
PH UR STRIP: 7 PH (ref 5–7)
PHOSPHATE SERPL-MCNC: 4.8 MG/DL (ref 2.5–4.5)
PLATELET # BLD AUTO: 205 10E9/L (ref 150–450)
PLATELET # BLD AUTO: 243 10E9/L (ref 150–450)
PO2 BLDV: 31 MM HG (ref 25–47)
POTASSIUM SERPL-SCNC: 3.8 MMOL/L (ref 3.4–5.3)
POTASSIUM SERPL-SCNC: 4.4 MMOL/L (ref 3.4–5.3)
POTASSIUM SERPL-SCNC: 5.5 MMOL/L (ref 3.4–5.3)
PROCALCITONIN SERPL-MCNC: 0.05 NG/ML
RBC # BLD AUTO: 4.99 10E12/L (ref 4.4–5.9)
RBC # BLD AUTO: 5.25 10E12/L (ref 4.4–5.9)
RBC #/AREA URNS AUTO: 4 /HPF (ref 0–2)
RSV RNA SPEC NAA+PROBE: NEGATIVE
SODIUM SERPL-SCNC: 138 MMOL/L (ref 133–144)
SODIUM SERPL-SCNC: 138 MMOL/L (ref 133–144)
SODIUM SERPL-SCNC: 139 MMOL/L (ref 133–144)
SOURCE: ABNORMAL
SP GR UR STRIP: 1 (ref 1–1.03)
SPECIMEN SOURCE: NORMAL
SPECIMEN SOURCE: NORMAL
TROPONIN I SERPL-MCNC: 2.17 UG/L (ref 0–0.04)
TROPONIN I SERPL-MCNC: 2.36 UG/L (ref 0–0.04)
TSH SERPL DL<=0.005 MIU/L-ACNC: 1.55 MU/L (ref 0.4–4)
UROBILINOGEN UR STRIP-MCNC: NORMAL MG/DL (ref 0–2)
WBC # BLD AUTO: 17.3 10E9/L (ref 4–11)
WBC # BLD AUTO: 17.5 10E9/L (ref 4–11)
WBC #/AREA URNS AUTO: 114 /HPF (ref 0–5)

## 2019-04-25 PROCEDURE — 20000004 ZZH R&B ICU UMMC

## 2019-04-25 PROCEDURE — 80048 BASIC METABOLIC PNL TOTAL CA: CPT | Performed by: STUDENT IN AN ORGANIZED HEALTH CARE EDUCATION/TRAINING PROGRAM

## 2019-04-25 PROCEDURE — 36415 COLL VENOUS BLD VENIPUNCTURE: CPT | Performed by: STUDENT IN AN ORGANIZED HEALTH CARE EDUCATION/TRAINING PROGRAM

## 2019-04-25 PROCEDURE — 87631 RESP VIRUS 3-5 TARGETS: CPT | Performed by: STUDENT IN AN ORGANIZED HEALTH CARE EDUCATION/TRAINING PROGRAM

## 2019-04-25 PROCEDURE — 85027 COMPLETE CBC AUTOMATED: CPT | Performed by: STUDENT IN AN ORGANIZED HEALTH CARE EDUCATION/TRAINING PROGRAM

## 2019-04-25 PROCEDURE — 71045 X-RAY EXAM CHEST 1 VIEW: CPT

## 2019-04-25 PROCEDURE — 85520 HEPARIN ASSAY: CPT | Performed by: INTERNAL MEDICINE

## 2019-04-25 PROCEDURE — 25000128 H RX IP 250 OP 636: Performed by: INTERNAL MEDICINE

## 2019-04-25 PROCEDURE — 80320 DRUG SCREEN QUANTALCOHOLS: CPT | Performed by: STUDENT IN AN ORGANIZED HEALTH CARE EDUCATION/TRAINING PROGRAM

## 2019-04-25 PROCEDURE — 83735 ASSAY OF MAGNESIUM: CPT | Performed by: INTERNAL MEDICINE

## 2019-04-25 PROCEDURE — 80048 BASIC METABOLIC PNL TOTAL CA: CPT | Performed by: INTERNAL MEDICINE

## 2019-04-25 PROCEDURE — 84443 ASSAY THYROID STIM HORMONE: CPT | Performed by: STUDENT IN AN ORGANIZED HEALTH CARE EDUCATION/TRAINING PROGRAM

## 2019-04-25 PROCEDURE — 93005 ELECTROCARDIOGRAM TRACING: CPT

## 2019-04-25 PROCEDURE — 80307 DRUG TEST PRSMV CHEM ANLYZR: CPT | Performed by: STUDENT IN AN ORGANIZED HEALTH CARE EDUCATION/TRAINING PROGRAM

## 2019-04-25 PROCEDURE — 84145 PROCALCITONIN (PCT): CPT | Performed by: STUDENT IN AN ORGANIZED HEALTH CARE EDUCATION/TRAINING PROGRAM

## 2019-04-25 PROCEDURE — C1751 CATH, INF, PER/CENT/MIDLINE: HCPCS

## 2019-04-25 PROCEDURE — 99233 SBSQ HOSP IP/OBS HIGH 50: CPT | Performed by: INTERNAL MEDICINE

## 2019-04-25 PROCEDURE — 83735 ASSAY OF MAGNESIUM: CPT | Performed by: SURGERY

## 2019-04-25 PROCEDURE — 87086 URINE CULTURE/COLONY COUNT: CPT | Performed by: INTERNAL MEDICINE

## 2019-04-25 PROCEDURE — 25000125 ZZHC RX 250: Performed by: INTERNAL MEDICINE

## 2019-04-25 PROCEDURE — 25000125 ZZHC RX 250: Performed by: STUDENT IN AN ORGANIZED HEALTH CARE EDUCATION/TRAINING PROGRAM

## 2019-04-25 PROCEDURE — 99239 HOSP IP/OBS DSCHRG MGMT >30: CPT | Performed by: INTERNAL MEDICINE

## 2019-04-25 PROCEDURE — 25000132 ZZH RX MED GY IP 250 OP 250 PS 637: Performed by: STUDENT IN AN ORGANIZED HEALTH CARE EDUCATION/TRAINING PROGRAM

## 2019-04-25 PROCEDURE — 84100 ASSAY OF PHOSPHORUS: CPT | Performed by: INTERNAL MEDICINE

## 2019-04-25 PROCEDURE — 25000128 H RX IP 250 OP 636: Performed by: STUDENT IN AN ORGANIZED HEALTH CARE EDUCATION/TRAINING PROGRAM

## 2019-04-25 PROCEDURE — 85027 COMPLETE CBC AUTOMATED: CPT | Performed by: INTERNAL MEDICINE

## 2019-04-25 PROCEDURE — 87640 STAPH A DNA AMP PROBE: CPT | Performed by: STUDENT IN AN ORGANIZED HEALTH CARE EDUCATION/TRAINING PROGRAM

## 2019-04-25 PROCEDURE — 40000196 ZZH STATISTIC RAPCV CVP MONITORING

## 2019-04-25 PROCEDURE — 82805 BLOOD GASES W/O2 SATURATION: CPT | Performed by: STUDENT IN AN ORGANIZED HEALTH CARE EDUCATION/TRAINING PROGRAM

## 2019-04-25 PROCEDURE — 85025 COMPLETE CBC W/AUTO DIFF WBC: CPT | Performed by: STUDENT IN AN ORGANIZED HEALTH CARE EDUCATION/TRAINING PROGRAM

## 2019-04-25 PROCEDURE — 93010 ELECTROCARDIOGRAM REPORT: CPT | Performed by: INTERNAL MEDICINE

## 2019-04-25 PROCEDURE — 83605 ASSAY OF LACTIC ACID: CPT | Performed by: STUDENT IN AN ORGANIZED HEALTH CARE EDUCATION/TRAINING PROGRAM

## 2019-04-25 PROCEDURE — 36415 COLL VENOUS BLD VENIPUNCTURE: CPT | Performed by: INTERNAL MEDICINE

## 2019-04-25 PROCEDURE — 84484 ASSAY OF TROPONIN QUANT: CPT | Performed by: INTERNAL MEDICINE

## 2019-04-25 PROCEDURE — 25000128 H RX IP 250 OP 636

## 2019-04-25 PROCEDURE — 00000146 ZZHCL STATISTIC GLUCOSE BY METER IP

## 2019-04-25 PROCEDURE — 83036 HEMOGLOBIN GLYCOSYLATED A1C: CPT | Performed by: INTERNAL MEDICINE

## 2019-04-25 PROCEDURE — 87641 MR-STAPH DNA AMP PROBE: CPT | Performed by: STUDENT IN AN ORGANIZED HEALTH CARE EDUCATION/TRAINING PROGRAM

## 2019-04-25 PROCEDURE — 99291 CRITICAL CARE FIRST HOUR: CPT | Mod: 25 | Performed by: INTERNAL MEDICINE

## 2019-04-25 PROCEDURE — 27211417 ZZ H KIT, VPS RHYTHM STYLET

## 2019-04-25 PROCEDURE — 27211389 ZZ H KIT, 5 FR DL BIOFLO OPEN ENDED PICC

## 2019-04-25 PROCEDURE — 40000986 XR CHEST PORT 1 VW

## 2019-04-25 PROCEDURE — 81001 URINALYSIS AUTO W/SCOPE: CPT | Performed by: STUDENT IN AN ORGANIZED HEALTH CARE EDUCATION/TRAINING PROGRAM

## 2019-04-25 PROCEDURE — 83735 ASSAY OF MAGNESIUM: CPT | Performed by: STUDENT IN AN ORGANIZED HEALTH CARE EDUCATION/TRAINING PROGRAM

## 2019-04-25 PROCEDURE — 25800030 ZZH RX IP 258 OP 636: Performed by: STUDENT IN AN ORGANIZED HEALTH CARE EDUCATION/TRAINING PROGRAM

## 2019-04-25 RX ORDER — FUROSEMIDE 10 MG/ML
40 INJECTION INTRAMUSCULAR; INTRAVENOUS
Status: DISCONTINUED | OUTPATIENT
Start: 2019-04-25 | End: 2019-04-25

## 2019-04-25 RX ORDER — NITROGLYCERIN 20 MG/100ML
.07-1.83 INJECTION INTRAVENOUS CONTINUOUS
Status: DISCONTINUED | OUTPATIENT
Start: 2019-04-25 | End: 2019-04-25 | Stop reason: HOSPADM

## 2019-04-25 RX ORDER — NITROGLYCERIN 20 MG/100ML
.07-1.83 INJECTION INTRAVENOUS CONTINUOUS
Status: ON HOLD | DISCHARGE
Start: 2019-04-25 | End: 2019-05-07

## 2019-04-25 RX ORDER — PIPERACILLIN SODIUM, TAZOBACTAM SODIUM 4; .5 G/20ML; G/20ML
4.5 INJECTION, POWDER, LYOPHILIZED, FOR SOLUTION INTRAVENOUS EVERY 6 HOURS
Status: DISCONTINUED | OUTPATIENT
Start: 2019-04-25 | End: 2019-04-27

## 2019-04-25 RX ORDER — HEPARIN SODIUM,PORCINE 10 UNIT/ML
5-10 VIAL (ML) INTRAVENOUS
Status: DISCONTINUED | OUTPATIENT
Start: 2019-04-25 | End: 2019-05-07 | Stop reason: HOSPADM

## 2019-04-25 RX ORDER — POTASSIUM CHLORIDE 29.8 MG/ML
20 INJECTION INTRAVENOUS
Status: DISCONTINUED | OUTPATIENT
Start: 2019-04-25 | End: 2019-05-07 | Stop reason: HOSPADM

## 2019-04-25 RX ORDER — LIDOCAINE 40 MG/G
CREAM TOPICAL
Status: CANCELLED | OUTPATIENT
Start: 2019-04-25

## 2019-04-25 RX ORDER — LIDOCAINE 40 MG/G
CREAM TOPICAL
Status: DISCONTINUED | OUTPATIENT
Start: 2019-04-25 | End: 2019-04-29

## 2019-04-25 RX ORDER — POTASSIUM CHLORIDE 1.5 G/1.58G
20-40 POWDER, FOR SOLUTION ORAL
Status: DISCONTINUED | OUTPATIENT
Start: 2019-04-25 | End: 2019-05-07 | Stop reason: HOSPADM

## 2019-04-25 RX ORDER — LEVOFLOXACIN 5 MG/ML
500 INJECTION, SOLUTION INTRAVENOUS EVERY 24 HOURS
Status: DISCONTINUED | OUTPATIENT
Start: 2019-04-25 | End: 2019-04-25 | Stop reason: HOSPADM

## 2019-04-25 RX ORDER — HEPARIN SODIUM,PORCINE 10 UNIT/ML
2-5 VIAL (ML) INTRAVENOUS
Status: COMPLETED | OUTPATIENT
Start: 2019-04-25 | End: 2019-05-03

## 2019-04-25 RX ORDER — FUROSEMIDE 10 MG/ML
20 INJECTION INTRAMUSCULAR; INTRAVENOUS
Status: ON HOLD | DISCHARGE
Start: 2019-04-25 | End: 2019-05-07

## 2019-04-25 RX ORDER — HEPARIN SODIUM 10000 [USP'U]/100ML
0-3500 INJECTION, SOLUTION INTRAVENOUS CONTINUOUS
Status: DISPENSED | OUTPATIENT
Start: 2019-04-25 | End: 2019-04-28

## 2019-04-25 RX ORDER — LEVOFLOXACIN 5 MG/ML
500 INJECTION, SOLUTION INTRAVENOUS EVERY 24 HOURS
Status: ON HOLD | DISCHARGE
Start: 2019-04-25 | End: 2019-05-07

## 2019-04-25 RX ORDER — POTASSIUM CHLORIDE 750 MG/1
20-40 TABLET, EXTENDED RELEASE ORAL
Status: DISCONTINUED | OUTPATIENT
Start: 2019-04-25 | End: 2019-05-07 | Stop reason: HOSPADM

## 2019-04-25 RX ORDER — ACETAMINOPHEN 325 MG/1
325 TABLET ORAL EVERY 4 HOURS PRN
Status: DISCONTINUED | OUTPATIENT
Start: 2019-04-25 | End: 2019-05-07 | Stop reason: HOSPADM

## 2019-04-25 RX ORDER — NALOXONE HYDROCHLORIDE 0.4 MG/ML
.1-.4 INJECTION, SOLUTION INTRAMUSCULAR; INTRAVENOUS; SUBCUTANEOUS
Status: DISCONTINUED | OUTPATIENT
Start: 2019-04-25 | End: 2019-04-29

## 2019-04-25 RX ORDER — NITROGLYCERIN 20 MG/100ML
.07-1.9 INJECTION INTRAVENOUS CONTINUOUS
Status: DISCONTINUED | OUTPATIENT
Start: 2019-04-25 | End: 2019-04-26

## 2019-04-25 RX ORDER — HEPARIN SODIUM,PORCINE 10 UNIT/ML
5-10 VIAL (ML) INTRAVENOUS EVERY 24 HOURS
Status: DISCONTINUED | OUTPATIENT
Start: 2019-04-25 | End: 2019-05-07 | Stop reason: HOSPADM

## 2019-04-25 RX ORDER — MAGNESIUM SULFATE HEPTAHYDRATE 40 MG/ML
4 INJECTION, SOLUTION INTRAVENOUS EVERY 4 HOURS PRN
Status: DISCONTINUED | OUTPATIENT
Start: 2019-04-25 | End: 2019-05-07 | Stop reason: HOSPADM

## 2019-04-25 RX ORDER — NITROGLYCERIN 20 MG/100ML
INJECTION INTRAVENOUS
Status: COMPLETED
Start: 2019-04-25 | End: 2019-04-25

## 2019-04-25 RX ORDER — POTASSIUM CL/LIDO/0.9 % NACL 10MEQ/0.1L
10 INTRAVENOUS SOLUTION, PIGGYBACK (ML) INTRAVENOUS
Status: DISCONTINUED | OUTPATIENT
Start: 2019-04-25 | End: 2019-05-07 | Stop reason: HOSPADM

## 2019-04-25 RX ORDER — FUROSEMIDE 10 MG/ML
40 INJECTION INTRAMUSCULAR; INTRAVENOUS ONCE
Status: COMPLETED | OUTPATIENT
Start: 2019-04-25 | End: 2019-04-25

## 2019-04-25 RX ORDER — LIDOCAINE 40 MG/G
CREAM TOPICAL
Status: DISCONTINUED | OUTPATIENT
Start: 2019-04-25 | End: 2019-05-04

## 2019-04-25 RX ORDER — POTASSIUM CHLORIDE 7.45 MG/ML
10 INJECTION INTRAVENOUS
Status: DISCONTINUED | OUTPATIENT
Start: 2019-04-25 | End: 2019-05-07 | Stop reason: HOSPADM

## 2019-04-25 RX ADMIN — HEPARIN SODIUM 1350 UNITS/HR: 10000 INJECTION, SOLUTION INTRAVENOUS at 05:20

## 2019-04-25 RX ADMIN — PIPERACILLIN SODIUM,TAZOBACTAM SODIUM 4.5 G: 4; .5 INJECTION, POWDER, FOR SOLUTION INTRAVENOUS at 23:00

## 2019-04-25 RX ADMIN — HEPARIN SODIUM 1800 UNITS/HR: 10000 INJECTION, SOLUTION INTRAVENOUS at 18:42

## 2019-04-25 RX ADMIN — MAGNESIUM SULFATE HEPTAHYDRATE 4 G: 40 INJECTION, SOLUTION INTRAVENOUS at 21:03

## 2019-04-25 RX ADMIN — Medication 4600 UNITS: at 06:39

## 2019-04-25 RX ADMIN — FUROSEMIDE 40 MG: 10 INJECTION, SOLUTION INTRAVENOUS at 17:14

## 2019-04-25 RX ADMIN — LEVOFLOXACIN 500 MG: 5 INJECTION, SOLUTION INTRAVENOUS at 11:32

## 2019-04-25 RX ADMIN — FUROSEMIDE 10 MG/HR: 10 INJECTION, SOLUTION INTRAVENOUS at 17:26

## 2019-04-25 RX ADMIN — LIDOCAINE HYDROCHLORIDE 3.5 ML: 10 INJECTION, SOLUTION EPIDURAL; INFILTRATION; INTRACAUDAL; PERINEURAL at 16:35

## 2019-04-25 RX ADMIN — POTASSIUM CHLORIDE 20 MEQ: 750 TABLET, EXTENDED RELEASE ORAL at 21:03

## 2019-04-25 RX ADMIN — NITROGLYCERIN 0.07 MCG/KG/MIN: 20 INJECTION INTRAVENOUS at 09:07

## 2019-04-25 RX ADMIN — FUROSEMIDE 40 MG: 10 INJECTION, SOLUTION INTRAVENOUS at 08:56

## 2019-04-25 RX ADMIN — PIPERACILLIN SODIUM,TAZOBACTAM SODIUM 4.5 G: 4; .5 INJECTION, POWDER, FOR SOLUTION INTRAVENOUS at 17:11

## 2019-04-25 RX ADMIN — FUROSEMIDE 40 MG: 10 INJECTION, SOLUTION INTRAVENOUS at 15:50

## 2019-04-25 RX ADMIN — SODIUM NITROPRUSSIDE 0.25 MCG/KG/MIN: 25 INJECTION INTRAVENOUS at 17:28

## 2019-04-25 RX ADMIN — SODIUM NITROPRUSSIDE 1.5 MCG/KG/MIN: 25 INJECTION INTRAVENOUS at 23:02

## 2019-04-25 ASSESSMENT — ACTIVITIES OF DAILY LIVING (ADL)
ADLS_ACUITY_SCORE: 10
COGNITION: 0 - NO COGNITION ISSUES REPORTED
BATHING: 0-->INDEPENDENT
DRESS: 0-->INDEPENDENT
TOILETING: 0-->INDEPENDENT
FALL_HISTORY_WITHIN_LAST_SIX_MONTHS: NO
SWALLOWING: 0-->SWALLOWS FOODS/LIQUIDS WITHOUT DIFFICULTY
ADLS_ACUITY_SCORE: 10
ADLS_ACUITY_SCORE: 10
AMBULATION: 0-->INDEPENDENT
RETIRED_COMMUNICATION: 0-->UNDERSTANDS/COMMUNICATES WITHOUT DIFFICULTY
ADLS_ACUITY_SCORE: 10
ADLS_ACUITY_SCORE: 10
TRANSFERRING: 0-->INDEPENDENT
RETIRED_EATING: 0-->INDEPENDENT

## 2019-04-25 ASSESSMENT — MIFFLIN-ST. JEOR
SCORE: 2081.15
SCORE: 2071.69

## 2019-04-25 NOTE — Clinical Note
Potential access sites were evaluated for patency using ultrasound.   The right radial artery and right jugular vein were selected. Access was obtained under with Sonosite guidance using a standard 18 guage needle and Micro Radial  with direct visualization of needle entry.

## 2019-04-25 NOTE — ACP (ADVANCE CARE PLANNING)
SPIRITUAL HEALTH SERVICES Progress Note  [facility]    Initial visit per Advance Directive consult order.  SH gave pt and his S.O. Belleca a copy of the Standard [employer] Choices HCD form and explained it's content and intent.  SH answered questions, and pt voices intent to complete this HCD at a later time (likely when he is not hospitalized).                                                                                                                                           Andrea Headley M.Div., [facility]  Staff   Pager [phone]

## 2019-04-25 NOTE — PROVIDER NOTIFICATION
MD Notification    Notified Person: MD    Notified Person Name: Dr. Abbott     Notification Date/Time: 4/24 1032    Notification Interaction: paged and phone call     Purpose of Notification: Pt feeling very anxious.  He states that as soon as he's about to fall asleep he wakes up abruptly and can't catch his breath. Pt requesting sleep aid, states he hasn't slept in three days. (Melatonin given with no results).     Pt 's, did jump up to 160's but did not sustain.     Orders Received: Order for ativan, morphine, and coreg placed. Start with ativan then proceed to morphine. Give coreg now.    Comments:

## 2019-04-25 NOTE — PROGRESS NOTES
A/O, VSS, O2sats 98% on 2 L nc. Tele ST 100s-120s. BP mostly 120s-130s/80s-90s, nitroglycerin titrated up to 0.37 mcg/kg/min. Denies pain ex mild L rib pain with coughing. Heparin at 1600 units/hr, 10A at 12:30; K 5.5 this AM, given 40 mg IV lasix with very good output, recheck 11:45; receiving RN aware of labs due. Levaquin started. All questions answered. Pt transferred to [facility] via [facility] transport.

## 2019-04-25 NOTE — PROGRESS NOTES
Assessment and Plan:     This is a 33-year-old male who denies any prior cardiac history.  The patient has been admitted with symptoms suggestive of pneumonia for the last 3 to 4 weeks. Positive troponin. Intermittent chest pain.  He has a prior history of cocaine and drug use.  He says he last used methamphetamine about 8 weeks ago.  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  He says now he is gone to completely quit drug use and smoking.  Also has alcohol which he says he is going to quit.      Echocardiogram last night showed severely reduced LV function with EF of 10%.  There is LV clots.  There is also a potential thrombus in the left atrium abutting the interatrial septum although I am not sure that an artifact.  In either case he has a low pulse pressure although systolic pressure is fairly well maintained.  I think he is clamped down with a high SVR state and low stroke volume.  He is tachycardic in the 120s which is likely preserving his endorgan perfusion.  No beta-blockers at this time.  He needs vasodilation.  Given his acuity I prefer intravenous vasodilation rather than oral lisinopril.  As such I have elected to stop both carvedilol and lisinopril this morning.  We will put him on intravenous nitroglycerin, will try to avoid nipride given his NSTEMI just in case he has coronary disease although I doubt, but would have to switch to nitroprusside if I do not have good response with nitroglycerin.  Transfer to CICU.  Intravenous diuretics.      Spoke with the [facility] and will plan on transfer there for long-term care given his profound LV dysfunction.  He will need a coronary angiography and right heart catheterization today with leave in [place].  I explained to patient that he is very sick overall from a cardiac standpoint and could have a poor outcome.  He is okay with getting transferred to [facility].  We are working with the transfer center. Continue IV  "heparin.     Joseph Serna MD        Interval History:   Continues to have shortness of breath this morning.  Patient is restless this morning and continues to remain tachycardic in the 120s.  He says he feels fine but is orthopneic.          Medications:       nitroGLYcerin 50 mg in D5W 250 mL (adult std)         docusate sodium  100 mg Oral BID     furosemide  20 mg Intravenous BID     furosemide  40 mg Intravenous Once            Physical Exam:     Patient Vitals for the past 24 hrs:   BP Temp Temp src Pulse Heart Rate Resp SpO2 Height Weight   04/25/19 0500 (!) 128/93 98.7  F (37.1  C) Oral -- 121 20 94 % -- 107.1 kg (236 lb 3.2 oz)   04/25/19 0131 -- -- -- -- -- 20 -- -- --   04/24/19 2330 -- -- -- -- -- 20 -- -- --   04/24/19 2300 -- -- -- -- -- 20 -- -- --   04/24/19 2252 (!) 129/96 98.2  F (36.8  C) Oral -- 123 20 96 % -- --   04/24/19 2230 128/84 -- -- -- -- 20 -- -- --   04/24/19 2200 132/87 97.8  F (36.6  C) Oral -- -- 20 -- -- --   04/24/19 1936 136/90 97.4  F (36.3  C) Oral -- 120 18 96 % -- --   04/24/19 1750 (!) 142/93 -- -- -- 124 18 96 % -- --   04/24/19 1722 -- -- -- -- -- -- -- 1.88 m (6' 2\") 108.3 kg (238 lb 12.8 oz)   04/24/19 1717 (!) 134/94 -- -- -- 123 20 98 % -- --   04/24/19 1649 -- -- -- -- 121 23 95 % -- --   04/24/19 1642 -- -- -- -- 117 21 98 % -- --   04/24/19 1623 -- -- -- -- 121 19 96 % -- --   04/24/19 1622 -- -- -- -- 120 24 98 % -- --   04/24/19 1602 -- -- -- -- 121 12 98 % -- --   04/24/19 1554 -- -- -- -- -- -- -- -- 109.3 kg (241 lb)   04/24/19 1500 (!) 118/91 -- -- 122 122 15 98 % -- --   04/24/19 1430 99/74 -- -- 129 -- 18 97 % -- --   04/24/19 1415 (!) 117/93 -- -- 124 -- 16 96 % -- --   04/24/19 1330 (!) 113/97 -- -- 121 -- 18 97 % -- --   04/24/19 1218 130/90 97.6  F (36.4  C) Oral 125 -- 16 99 % 1.88 m (6' 2\") 109.3 kg (241 lb)     Vitals:    04/24/19 1218 04/24/19 1554 04/24/19 1722 04/25/19 0500   Weight: 109.3 kg (241 lb) 109.3 kg (241 lb) 108.3 kg (238 lb 12.8 oz) " 107.1 kg (236 lb 3.2 oz)         Intake/Output Summary (Last 24 hours) at 4/25/2019 0827  Last data filed at 4/25/2019 0500  Gross per 24 hour   Intake 740 ml   Output 1700 ml   Net -960 ml       04/20 0700 - 04/25 0659  In: 740 [P.O.:740]  Out: 1700 [Urine:1700]  Net: -960    Exam:  GENERAL APPEARANCE ADULT: Alert, in moderate distress  RESP: lungs clear to auscultation , crackles at both bases  CV: Cardiac sounds are regular but tachycardic with a pansystolic murmur.  There is an S3 gallop.  ABDOMEN: no guarding or rebound  EXTREMITIES: Edema 2+, warm          Data:   LABS (Last four results)  CMP  Recent Labs   Lab 04/25/19  0554 04/24/19  1328    141   POTASSIUM 5.5* 4.9   CHLORIDE 105 110*   CO2 25 24   ANIONGAP 8 7   * 109*   BUN 21 19   CR 1.05 1.00   GFRESTIMATED >90 >90   GFRESTBLACK >90 >90   ARUN 8.7 8.7   PROTTOTAL  --  6.2*   ALBUMIN  --  2.7*   BILITOTAL  --  0.9   ALKPHOS  --  98   AST  --  68*   ALT  --  95*     CBC  Recent Labs   Lab 04/25/19  0554 04/24/19  1758 04/24/19  1328   WBC 17.5* 15.1* 13.9*   RBC 4.99 5.06 5.09   HGB 15.6 16.1 16.2   HCT 47.6 48.9 49.0   MCV 95 97 96   MCH 31.3 31.8 31.8   MCHC 32.8 32.9 33.1   RDW 14.7 14.9 14.7    215 204     INRNo lab results found in last 7 days.  TROPONINS   Lab Results   Component Value Date    TROPI 2.169 () 04/25/2019    TROPI 2.364 () 04/24/2019    TROPI 2.801 () 04/24/2019                                                                                                               Joseph Serna MD

## 2019-04-25 NOTE — CONSULTS
Consult Date:  04/24/2019      REASON FOR CONSULTATION:  I had the opportunity of seeing Mr. Agustin matias at the request of Dr. Christophe Abbott.  Dr. Abbott asked me to see him on an urgent basis due to the new diagnosis of cardiomyopathy with severe left ventricular systolic dysfunction.      My colleague, Dr. Abebe Womack, read this gentleman's echocardiogram tonight.  I have personally reviewed his echocardiogram as well.  I would certainly agree completely with Dr. Womack's reading.  Dr. Womack then asked Dr. Abbott to transfer this patient to [facility].  I am now seeing this patient as a stat consult.      HISTORY OF PRESENT ILLNESS:  Mr. Velez is 33 years old.  He works as a cobb.  Previously he had worked as a  in the [employer] and has been deployed to [place].      About 8-9 weeks ago he started having flu-like symptoms.  He lives with his girlfriend and his young son and both of them have coughs and colds.  This is what he thought he had.  He had a persistent cough with mild shortness of breath.  He had a runny nose as well.  As he smokes at least a pack of cigarettes a day, he initially thought that this was due to a smoker's cough.  However, the cough worsened and it is especially worse at night.  He found he could not lay flat at night.  A few weeks after that both lower limbs started to swell as well.  He denies exertional chest discomfort, but he has left-sided chest pain when he coughs.  Five days ago he went to an urgent care clinic and was prescribed a course of antibiotics as they thought he might have had bronchitis.  However, this resulted in no improvement.  In fact, his symptoms got worse and he presented to the hospital for further evaluation and treatment.      PAST MEDICAL HISTORY:  Negative for diabetes or hypertension.  There is, however, an extensive history of polysubstance abuse.  He tells me that until last year he had been drinking 8-10 beers a night.  He had been doing  this almost his whole life.  He has also been using methamphetamine for a long time; however, he last used it about 8 weeks ago.  Similarly with cocaine, he has been using it for a long time as well, but has not been using it on a regular basis recently.  He does have ADHD and he takes 30 mg of Adderall on a somewhat regular basis.      FAMILY HISTORY:  There is no family history of any heart disease.  The father has COPD.      PHYSICAL EXAMINATION:   GENERAL:  He is in absolutely no acute distress.   VITAL SIGNS:  Heart rate is around 120 and regular.  Blood pressure 142/93.  He is afebrile.   HEENT:  Examination unremarkable.  No clubbing.  No skin lesions seen.  His external jugular vein is distended.   CARDIAC:  His cardiac apex is displaced downwards and laterally.  Auscultation reveals a third heart sound and gallop rhythm at the cardiac apex.   CHEST:  Examination reveals symmetrical expansion.  Breath sounds are bronchovesicular with bilateral soft inspiratory basal crackles.   ABDOMEN:  Soft and nontender.  No hepatosplenomegaly.   EXTREMITIES:  He has bilateral 2+ lower limb edema.  Foot pulses are mildly diminished due to the presence of edema.   CENTRAL NERVOUS SYSTEM EXAMINATION:  He is alert and oriented to time, place and person.      LABORATORY INVESTIGATIONS:  He had a CT of his chest which shows no evidence of pulmonary embolism.  He has small bilateral pleural effusions.  There is mediastinal and bilateral hilar lymphadenopathy and hepatic fatty infiltration.        Ultrasound of his lower limb showed no evidence of a DVT.        His albumin is 2.7.  NT proBNP is 4655.  Troponin is 2.801.  Potassium 4.9, creatinine 1.0.  White cell count is 15.1, otherwise, hemoglobin and platelet count within normal limits.        EKG shows a sinus tachycardia with left atrial enlargement, nonspecific ST-T wave changes and borderline left axis deviation.      Echocardiogram report as per Dr. Womack's excellent  report.      ASSESSMENT:  This gentleman presents with very typical history of systolic heart failure.  This has been developing for several weeks prior.  His echocardiographic findings are definitely against anything of acute onset.  Most likely etiology is polysubstance abuse.      He has responded well to administration of intravenous Lasix.  He is in absolutely no acute distress right now.  His blood pressure is around 140/90.  There is certainly no indication for any urgent transfer to [facility].      He has already been started on intravenous heparin for his left ventricular apical thrombi.  He has been started on diuretics as well and looks like he is having a good response.  In addition, he is tolerating low doses of coreg and lisinopril which have just been started.  If his blood pressure falls precipitously, these latter meds can be held.  I do not think there is anything urgent we need to do tonight.  Obviously in the near future he should undergo cardiac catheterization and perhaps cardiac MR as well.  I am not sure what to make of his bilateral hilar lymphadenopathy and perhaps a sarcoid may be a possible consideration.      His troponin elevation is likely due to his heart failure presentation.  There is no need for urgent coronary angiography tonight.  I suspect Dr. Womack will assume care tomorrow.         JASMIN FLOR MD, FACC             D: 2019   T: 2019   MT: [place]      Name:     CHRISTINE BARRAGAN   MRN:      [id number]        Account:       [id number]   :      1985           Consult Date:  2019      Document: [id number]       cc: Jasmin Flor MD, FACC

## 2019-04-25 NOTE — CONSULTS
SW:  D:  Reviewed chart.  Patient has no insurance.  Call placed and message left for Alex in the financial office to follow up with patient directly.  Referral made to the discharge pharmacy liaison for discharge medication assistance.  P:  Will continue to follow.

## 2019-04-25 NOTE — H&P
Cardiology History and Physical    Patient Name: Ubaldo Velez MRN# [id number]   Age: 33 year old YOB: 1985     Date of Admission: 4/25/2019  Primary care provider: No Ref-Primary, Physician  Date of Service: 4/25/2019  Admitting Team: Kaylie Dhillon         Chief Complaint:   Transfer from OSH for CHF, concern for cardiogenic shock          HPI:   Ubaldo Velez is a 32yo male with PMHx polysubstance abuse (tobacco, alcohol, cocaine, methamphetamines), ADHD who presents as a transfer from OSH for new CHF dx and concern for cardiogenic shock.     He reports flu like illness that started 6-8wks ago: chills, sore throat, shortness of breath, productive cough. His daughter and girlfriend had similar sxs, he thinks his daughter may have picked it up at her pre-school. He increased his fluid consumption due to his sxs and then started developing increased swelling in his legs. He went to an urgent care clinic where he was diagnosed with pneumonia and given a Z-pack. His URI sxs improved but he continued to have swelling in his legs, weight gain ~20 lbs, shortness of breath. Also has noticed cold hands and feet over the past few weeks. Went to [facility] yesterday due to his sxs and was admitted, see below for hospital course.     He currently denies fevers, chills, chest pain, shortness of breath, abdominal pain, n/v, bowel or urinary changes. Did not receive his flu shot this year.     Cardiac hx: no hx of MIs or CAD. No prior angiogram or stress test. No family hx of CAD.     OSH course:  - Afebrile, HR 120s (sinus), /97, SpO2 82% on RA   - Labs: BNP 4655, Troponin 2.8- > 2.169, Lactate 1.5, WBC 13.9, AST 68, ALT 95  - Echo: EF 10% with severe global hypokinesia of LV, multiple (3+) apical mass-like clots in LV, severely decreased RV function, left atrium is severely dilated, 2-3+ MR, 3+ TR, IVC > 2.1cm  - CXR with cardiomegaly, probable pleural effusions, extensive bilateral interstitial and alveolar  infiltrates   - LE doppler negative for DVT  - Chest CT: negative for PE, bilateral ill-defined nodular upper and lower lobe perihilar pulmonary opacities, possible inflammatory disease  - Meds given:   - 4/24: 1L IVF, Aspirin 325mg, Carvedilol 3.125mg and 6.25mg, Lasix 20mg, Heparin gtt, Lisinopril 2.5 and 5mg  - 4/25: Lasix 40mg IV, Levofloxacin IV, Nitroglycerin gtt            Past Medical History:     Past Medical History:   Diagnosis Date     ADHD      Cocaine use      Methamphetamine use (H)             Past Surgical History:     Past Surgical History:   Procedure Laterality Date     EXTRACTION(S) DENTAL      [place] teeth     HAND SURGERY Left     Laceration left index finger            Social History:   - Smokes 1PPD, quit 2wks ago because he wasn't feeling well  - Denied alcohol use to me but chart reviewed notable for 8-10 drinks a day, decreased to 1-2 in recent plast due his sxs  - Hx of meth and cocaine use, last used a few weeks ago          Family History:     Family History   Problem Relation Age of Onset     Chronic Obstructive Pulmonary Disease Father      Multiple Sclerosis Maternal Aunt             Allergies:    No Known Allergies         Medications:     Prior to Admission Medications   Prescriptions Last Dose Informant Patient Reported? Taking?   Nitroglycerin in D5W (NITROGLYCERIN 50 MG IN D5W 250 ML, ADULT STD,) 200-5 MCG/ML-% infusion   No No   Sig: Inject 7.651-200.019 mcg/min into the vein continuous   furosemide (LASIX) 10 MG/ML injection   No No   Sig: Inject 2 mLs (20 mg) into the vein 2 times daily   heparin infusion 25,000 units in 0.45% NaCl 250 mL   No No   Sig: Inject 1,600 Units/hr into the vein continuous   levofloxacin (LEVAQUIN) 500 MG/100ML infusion   No No   Sig: Inject 100 mLs (500 mg) into the vein every 24 hours      Facility-Administered Medications: None             Review of Systems:   A complete, 10 point ROS was performed and is negative other than what is stated in  "the HPI.         Physical Exam:   Blood pressure 129/88, pulse 112, temperature 97.1  F (36.2  C), temperature source Axillary, resp. rate 16, height 1.892 m (6' 2.5\"), weight 105.4 kg (232 lb 5.8 oz), SpO2 99 %.  Estimated Dry Weight - unknown     Gen: NAD, laying in bed comfortably, does get short of breath with speaking   HEENT: EOMI, PERRl, anicteric sclera   NECK: JVD to ear lobes   CV: Tachycardic, S1 S2 nl, no m/r/g  Resp: CTAB, no crackles, wheezing, or rhonchi   Abdomen: soft, non-distended, non-tender, normoactive bowel sounds   Extremities: warm, 2+ edema to knees   Skin: no rashes or bruises on exposed skin, tattoos on arms   Neuro: AAOx3, no focal deficits   Psych: pleasant, cooperative          Data:   Labs and Imaging Reviewed in Chart          Assessment and Plan:   Ubaldo Velez is a 34yo male with PMHx polysubstance abuse (tobacco, alcohol, cocaine, methamphetamines), ADHD who presents as a transfer from OSH for new CHF dx and concern for cardiogenic shock.     # New HFrEF (< 10%)  Presented to OSH with several weeks of URI sxs, LE edema, 20 lbs weight gain. BNP 4.6k, lactate 1.5, troponin 2.8 -> 2.169 (no chest pain, EKG sinus tach). Echo with EF 10%, severe global hypokinesia of LV, multiple (3+) apical mass-like clots in LV, severely decreased RV function, left atrium is severely dilated, 2-3+ MR, 3+ TR. No known CAD hx. No prior angiogram or stress test. Etiology of his heart failure is unclear at this time, he will eventually need workup evaluate for coronary disease. Warm on exam, mentating well, Lactate 2.0, HD stable.   - PICC this afternoon, plan to get VBG w/ SvO2 and CVP monitoring   - Transition from Nitroglycerin to Nitroprusside after PICC placed   - Diuretics: Lasix 80mg IV x1 followed by gtt @ 10mg/hr  - Hold beta blockers or ACE-I/ARB  - Lipid panel, HIV, TSH    # LV Thrombus  - Heparin gtt (high intensity)    # Leukocytosis  WBC 17.5, afebrile. Recent URI sxs improving. Given one " dose of Levaquin at OSH prior to transfer. No clear infectious source identified.  - Zosyn (4/25 -> )  - Added on pro-calcitonin   - Influenza/RSV swab, UA  - BCxs at OSH NGTD, continue to monitor     # Polysubstance abuse (tobacco, alcohol, cocaine, methamphetamines)  - [facility] protocol   - Urine toxicology     Access: PIV, PICC ordered   Diet/IVF: Cardiac diet, 2L fluid restriction  DVT ppx: Heparin gtt  Disposition/Admission Status: Cards 2, ICU  CODE: FULL     Patient was seen and discussed with Dr. Choi.    Tono Tirado MD  Internal Medicine, PGY-2  Cardiology Heart Failure Service

## 2019-04-25 NOTE — DISCHARGE SUMMARY
[facility]    Discharge Summary  Hospitalist    Date of Admission:  4/24/2019  Date of Discharge:  4/25/2019  Discharging Provider: Lizy Lee MD  Date of Service (when I saw the patient): 04/25/19    Discharge Diagnoses   Acute systolic biventricular congestive heart failure  NSTEMI  LV thrombus  Mod- Severe MR  Mod- Severe TR  Mod- Severe Pulmonary hypertension  Possible CAP  Alcohol use  Methamphetamine use  ADHD  Fatty liver disease  Hyperkalemia    History of Present Illness    As per H&P done by Dr Abbott on 04/24/2019:    33 year old male who smokes and may have borderline HTN, and ADHC and substance abuse -- used Cocaine in the remote past, does meth -- last used 8 weeks ago and symptoms seemed to start then, drinks 8 to 10 beers a day but about 6 weeks ago cut way back to about 1 beer a week -- who noticed a cough 8 weeks ago, gradually got worse, and then SOB starting about 2 weeks ago, noticed legs swelling.  Went to clinic on 4/20 and was prescribed a Z-pack for pneumonia, and reports his green sputum at that time as turned clear since then, but continued SBO, and orthopnea.  He denies any hx of heart disease, and no chest pain.  He stopped smoking cigarettes 1.5 weeks ago.     Hospital Course   Ubaldo Velez was admitted on 4/24/2019.  The following problems were addressed during his hospitalization:    He was admitted for evaluation of shortness of breath, and bilateral LE edema; he started having cough and SOB 8 weeks ago, that got progressively worse; he was put on Z-pack for possible PNA; his symptoms persisted and few days ago- he started having lower extremity edema and weight gain of 20 pounds; his CXR showed cardiomegaly, probable pleural effusions, extensive bilateral interstitial and alveolar infiltrate; proBNP elevated at 4655, initial troponin 2.8; EKG showed sinus tachycardia, no new ischemic changes; his CT chest was negative for PE but showed bilateral  ill-defined, somewhat nodular upper and lower lobe perihilar pulmonary opacities that are nonspecific but may represent  inflammatory disease; also mediastinal and bilateral hilar adenopathy and small bilateral pleural effusions, right greater than; US LE - neg for DVT.  An Echo done showed EF of 10% with severe global hypokinesia of the left ventricle.; there are multiple (3+) apical mass-likely clots in LV; severely decreased RVSF; LA severely dilated;  moderate to mod-severe MR; moderately severe TR;  moderate to severe pulmonary hypertension.  He was started on Lasix iv and heparin drip and admitted to [facility]; serial troponins trending down 2.364--2.169; he was initially started on Coreg and Lisinopril; he had good response to iv diuretics -3200cc overnight; he was seen by Cardiology who recommended iv NTG as vasodilator. Given his CT findings concerning for inflammatory disease and leucocytosis- he was started empirically on Levaquin although no fever and his symptoms might be related to his cardiac issue.The cause of his new cardiomyopathy is not clear- drug-induced (meth use recently) vs ischemic vs viral myocarditis vs idiopathic. Cardiology recommended him to be transferred to [facility] for further evaluation and management.         Lizy Lee MD    Significant Results and Procedures   Echocardiogram 04/24/2019    The left ventricle is severely dilated.  There is mild concentric left ventricular hypertrophy.  The visual ejection fraction is estimated at 10%.  There is severe global hypokinesia of the left ventricle.  Multiple (3+) apical mass-likely clots in LV  Severely decreased right ventricular systolic function  The left atrium is severely dilated.  There is moderate to mod-severe (2-3+) mitral regurgitation.  Visually moderate MR by PISA quantification it is moderately severe  There is moderately severe (3+) tricuspid regurgitation.  Right ventricular systolic pressure is elevated, consistent with  moderate to  severe pulmonary hypertension.  IVC diameter >2.1 cm collapsing <50% with sniff suggests a high RA pressure  estimated at 15 mmHg or greater.  Trivial pericardial effusion      Pending Results   These results will be followed up by [facility]  Unresulted Labs Ordered in the Past 30 Days of this Admission     Date and Time Order Name Status Description    4/24/2019 1318 Blood culture Preliminary     4/24/2019 1318 Blood culture Preliminary           Code Status   Full Code       Primary Care Physician   Physician No Ref-Primary    Physical Exam   Temp: 98.7  F (37.1  C) Temp src: Oral BP: (!) 137/91 Pulse: 122 Heart Rate: 111 Resp: 16 SpO2: 97 % O2 Device: Nasal cannula Oxygen Delivery: 2 LPM  Vitals:    04/24/19 1554 04/24/19 1722 04/25/19 0500   Weight: 109.3 kg (241 lb) 108.3 kg (238 lb 12.8 oz) 107.1 kg (236 lb 3.2 oz)     Vital Signs with Ranges  Temp:  [97  F (36.1  C)-98.7  F (37.1  C)] 98.7  F (37.1  C)  Pulse:  [121-129] 122  Heart Rate:  [111-124] 111  Resp:  [12-24] 16  BP: ()/(74-99) 137/91  SpO2:  [82 %-99 %] 97 %  I/O last 3 completed shifts:  In: 740 [P.O.:740]  Out: 1700 [Urine:1700]    Constitutional: Awake, alert, cooperative, no apparent distress.  Eyes: Conjunctiva and pupils examined and normal.  HEENT: Moist mucous membranes, normal dentition.  Respiratory: bilateral air entry, bibasilar crackles, no wheezing  Cardiovascular: S1S2, tachycardia, no murmur noted, no carotid bruits.  1+ bilateral ankle edema.   GI: Soft, non-distended, non-tender, normal bowel sounds.  Lymph/Hematologic: No anterior cervical, supraclavicular or axillary adenopathy.  Skin: No rashes, no cyanosis.   Musculoskeletal: No joint swelling, erythema or tenderness.  Neurologic: Cranial nerves 2-12 intact, no focal weakness or numbness  Psychiatric: Alert, Ox3, no obvious anxiety or depression.      Discharge Disposition   Transferred to [facility]  Condition at discharge: Stable    Consultations This Hospital Stay    PHARMACY TO DOSE HEPARIN  ADVANCE DIRECTIVE IP CONSULT  CORE CLINIC EVALUATION IP CONSULT  CARDIAC REHAB IP CONSULT  CARE COORDINATOR IP CONSULT  NUTRITION SERVICES ADULT IP CONSULT  CARDIOLOGY IP CONSULT  CARDIOLOGY IP CONSULT  CARE TRANSITION RN/SW IP CONSULT  PHARMACY LIAISON FOR MEDICATION COVERAGE CONSULT    Time Spent on this Encounter   I, Lizy Lee, personally saw the patient today and spent greater than 30 minutes discharging this patient.    Discharge Orders      Reason for your hospital stay    New severe systolic CHF  LV thrombus     Additional Discharge Instructions    Transfer to [facility]     Full Code     Oxygen - Nasal cannula    2 Lpm by nasal cannula to keep O2 sats 92% or greater.     Discharge Medications   Current Discharge Medication List      START taking these medications    Details   furosemide (LASIX) 10 MG/ML injection Inject 2 mLs (20 mg) into the vein 2 times daily    Associated Diagnoses: Acute systolic congestive heart failure (H)      heparin infusion 25,000 units in 0.45% NaCl 250 mL Inject 1,600 Units/hr into the vein continuous    Associated Diagnoses: LV (left ventricular) mural thrombus      Nitroglycerin in D5W (NITROGLYCERIN 50 MG IN D5W 250 ML, ADULT STD,) 200-5 MCG/ML-% infusion Inject 7.651-200.019 mcg/min into the vein continuous    Associated Diagnoses: Acute systolic congestive heart failure (H)         STOP taking these medications       azithromycin (ZITHROMAX) 250 MG tablet Comments:   Reason for Stopping:         multivitamin, therapeutic (THERA-VIT) TABS tablet Comments:   Reason for Stopping:             Allergies   No Active Allergies  Data   Most Recent 3 CBC's:  Recent Labs   Lab Test 04/25/19  0554 04/24/19  1758 04/24/19  1328   WBC 17.5* 15.1* 13.9*   HGB 15.6 16.1 16.2   MCV 95 97 96    215 204      Most Recent 3 BMP's:  Recent Labs   Lab Test 04/25/19  0554 04/24/19  1328    141   POTASSIUM 5.5* 4.9   CHLORIDE 105 110*   CO2 25 24   BUN  21 19   CR 1.05 1.00   ANIONGAP 8 7   ARUN 8.7 8.7   * 109*     Most Recent 2 LFT's:  Recent Labs   Lab Test 04/24/19  1328   AST 68*   ALT 95*   ALKPHOS 98   BILITOTAL 0.9     Most Recent INR's and Anticoagulation Dosing History:  Anticoagulation Dose History     There is no flowsheet data to display.        Most Recent 3 Troponin's:  Recent Labs   Lab Test 04/25/19  0554 04/24/19  2221 04/24/19  1328   TROPI 2.169* 2.364* 2.801*     Most Recent Cholesterol Panel:No lab results found.  Most Recent 6 Bacteria Isolates From Any Culture (See EPIC Reports for Culture Details):  Recent Labs   Lab Test 04/24/19  1423 04/24/19  1328   CULT No growth after 14 hours No growth after 15 hours     Most Recent TSH, T4 and A1c Labs:No lab results found.  Results for orders placed or performed during the hospital encounter of 04/24/19   XR Chest 2 Views    Narrative    XR CHEST 2 VW   4/24/2019 1:50 PM     HISTORY: Shortness of breath    COMPARISON: None.    FINDINGS: The heart is enlarged. There is blunting of the costophrenic  angles. There are areas of patchy opacity in both lungs compatible  with interstitial and alveolar infiltrate. . The pulmonary vasculature  is normal.  The bones and soft tissues are unremarkable.      Impression    IMPRESSION: Cardiomegaly, probable pleural effusions, extensive  bilateral interstitial and alveolar infiltrate.        JOAN KRISHNAN MD   US Lower Extremity Venous Duplex Bilateral    Narrative    US LOWER EXTREMITY VENOUS DUPLEX BILATERAL  4/24/2019 2:18 PM    HISTORY:  leg swelling    TECHNIQUE:  Venous Doppler US including color flow and Doppler  waveform analysis.    FINDINGS: No thrombus was observed and there was normal  compressibility, phasic flow, and augmentation.       Impression    IMPRESSION: No evidence of  right or left lower extremity deep venous  thrombosis.    RADU DELUNA MD   Chest CT, IV contrast only - PE protocol    Narrative    CT CHEST PULMONARY EMBOLISM WITH  CONTRAST April 24, 2019 2:56 PM     HISTORY: Pulmonary embolus suspected, intermediate probability,  positive D-dimer. Dyspnea, possible cardiomyopathy, no history.  Elevated D-dimer. Hypoxia.    CONTRAST DOSE:  79mL Isovue-370.     TECHNIQUE: Radiation dose for this scan was reduced using automated  exposure control, adjustment of the mA and/or kV according to patient  size, or iterative reconstruction technique.    FINDINGS: There is a good contrast bolus within the pulmonary  arteries. No pulmonary arterial filling defect is demonstrated to  indicate pulmonary embolism. There is no contrast within the aorta  precluding evaluation for aortic dissection. Mediastinal and bilateral  hilar mild adenopathy is noted. Right cardiac enlargement is noted.  Patchy somewhat nodular infiltrates are noted in perihilar  distribution, greatest bilaterally in the lower lobes and right  middle/upper lobes. Small bilateral pleural effusions are noted, right  greater than left. Hepatic fatty infiltration is noted. Deformity  related to right rib healed posterior fractures.      Impression    IMPRESSION:  1. No CT evidence of pulmonary embolism.  2. Bilateral ill-defined, somewhat nodular upper and lower lobe  perihilar pulmonary opacities. These are nonspecific but may represent  inflammatory disease.  3. Mediastinal and bilateral hilar adenopathy.  4. Small bilateral pleural effusions, right greater than left.  5. Hepatic fatty infiltration.    RADU DELUNA MD

## 2019-04-25 NOTE — PROVIDER NOTIFICATION
MD Notification    Notified Person: MD    Notified Person Name: Dr. Sparks  Notification Date/Time: 4/24 1026     Notification Interaction: text page     Purpose of Notification: Pt feeling very anxious.  He states that as soon as he's about to fall asleep he wakes up abruptly and can't catch his breath. Pt requesting sleep aid, states he hasn't slept in three days. (Melatonin given with no results).     Orders Received: Dr. Abbott (admitting provider) still available to page. Please contact him.     Comments:

## 2019-04-25 NOTE — LETTER
Transition Communication Hand-off for Care Transitions to Next Level of Care Provider    Name: Ubaldo Velez  : 1985  MRN #: [id number]  Primary Care Provider: Physician No Ref-Primary     Primary Clinic: No address on file - New PCP appt on 19 at 4:30 PM.  (see DC orders)     Reason for Hospitalization:  cardiogenic shock  Acute on chronic HFrEF (heart failure with reduced ejection fraction) (H)  CHF (congestive heart failure) (H)  Admit Date/Time: 2019 12:13 PM  Discharge Date: 19  Payor Source: Payor[place] / Plan: [place] / Product Type: Medicaid   Readmission Assessment Measure (BRITTNEY) Risk Score/category: elevated.   Reason for Communication Hand-off Referral: Multiple providers/specialties  Discharge Plan:  Discharge Needs Assessment:  Follow-up specialty is recommended: Yes    Follow-up plan:    Future Appointments   Date Time Provider Department Center   2019 12:30 PM  LAB [facility]   2019  1:00 PM Kristin Morales NP [facility]   Any outstanding tests or procedures:        Referrals     Future Labs/Procedures    Follow-Up with Cardiac Advanced Practice Provider     Comments:    Follow up appointment should be made in 2 weeks after discharge    Medication Therapy Management Referral     Comments:    MTM referral reason            Patient has 5 PTA or Discharge Medications AND one of the following   diagnoses: DM,HF,COPD,AMI DX,PULM HTN       This service is designed to help you get the most from your medications.  A specially trained pharmacist will work closely with you and your doctors  to solve any problems related to your medications and to help you get the   best results from taking them.      The Medication Therapy Management staff will call you to schedule an appointment.        Key Recommendations:  Post hospitalization follow up.   SANGEETHA YOUNGBLOOD RN BSN  Care Coordinator Unit [address] [place] [address]  Phone: [phone]

## 2019-04-25 NOTE — CONSULTS
"Standard CHF consult received for 2gm Na diet education  Chart reviewed  Diet: 2400 mg Na, LSF   Per Cardiology - \"pt is very sick overall from a cardiac standpoint and could have a poor outcome.  He is okay with getting transferred to [facility].  We are working with the transfer center\"  Will hold on diet teaching today - plan to see prior to discharge if pt still here, otherwise, will receive education at [facility]    Anuja Cates RD, LD  Clinical Dietitian - [facility]  Pager - [phone]        "

## 2019-04-25 NOTE — PLAN OF CARE
A&Ox4. VSS on 2L O2. (orders to keep O2 on at 2L at all times) Denies chest pain. Up SBA . Tele is ST, 's, MD notified, coreg ordered. Pt very anxious overnight, ativan and morphine given with little relief. Heparin gtt 1350 units, recheck at 0600. Pt NPO at 0200 for possible angiogram. Will continue to monitor

## 2019-04-26 ENCOUNTER — APPOINTMENT (OUTPATIENT)
Dept: CT IMAGING | Facility: CLINIC | Age: 34
End: 2019-04-26
Attending: INTERNAL MEDICINE
Payer: MEDICAID

## 2019-04-26 LAB
ABO + RH BLD: NORMAL
ABO + RH BLD: NORMAL
ALBUMIN SERPL-MCNC: 2.4 G/DL (ref 3.4–5)
ALBUMIN SERPL-MCNC: 2.6 G/DL (ref 3.4–5)
ALP SERPL-CCNC: 81 U/L (ref 40–150)
ALP SERPL-CCNC: 84 U/L (ref 40–150)
ALT SERPL W P-5'-P-CCNC: 54 U/L (ref 0–70)
ALT SERPL W P-5'-P-CCNC: 60 U/L (ref 0–70)
ANION GAP SERPL CALCULATED.3IONS-SCNC: 10 MMOL/L (ref 3–14)
ANION GAP SERPL CALCULATED.3IONS-SCNC: 14 MMOL/L (ref 3–14)
ANION GAP SERPL CALCULATED.3IONS-SCNC: 6 MMOL/L (ref 3–14)
AST SERPL W P-5'-P-CCNC: 48 U/L (ref 0–45)
AST SERPL W P-5'-P-CCNC: 50 U/L (ref 0–45)
BACTERIA SPEC CULT: NO GROWTH
BASE EXCESS BLDV CALC-SCNC: 7 MMOL/L
BASE EXCESS BLDV CALC-SCNC: 7.2 MMOL/L
BASE EXCESS BLDV CALC-SCNC: 9.1 MMOL/L
BASE EXCESS BLDV CALC-SCNC: 9.2 MMOL/L
BILIRUB SERPL-MCNC: 0.6 MG/DL (ref 0.2–1.3)
BILIRUB SERPL-MCNC: 0.7 MG/DL (ref 0.2–1.3)
BLD GP AB SCN SERPL QL: NORMAL
BLOOD BANK CMNT PATIENT-IMP: NORMAL
BUN SERPL-MCNC: 24 MG/DL (ref 7–30)
BUN SERPL-MCNC: 27 MG/DL (ref 7–30)
BUN SERPL-MCNC: 30 MG/DL (ref 7–30)
CALCIUM SERPL-MCNC: 8.4 MG/DL (ref 8.5–10.1)
CALCIUM SERPL-MCNC: 8.5 MG/DL (ref 8.5–10.1)
CALCIUM SERPL-MCNC: 8.6 MG/DL (ref 8.5–10.1)
CHLORIDE SERPL-SCNC: 97 MMOL/L (ref 94–109)
CHLORIDE SERPL-SCNC: 97 MMOL/L (ref 94–109)
CHLORIDE SERPL-SCNC: 98 MMOL/L (ref 94–109)
CHOLEST SERPL-MCNC: 80 MG/DL
CO2 SERPL-SCNC: 26 MMOL/L (ref 20–32)
CO2 SERPL-SCNC: 29 MMOL/L (ref 20–32)
CO2 SERPL-SCNC: 31 MMOL/L (ref 20–32)
CREAT SERPL-MCNC: 1.28 MG/DL (ref 0.66–1.25)
CREAT SERPL-MCNC: 1.35 MG/DL (ref 0.66–1.25)
CREAT SERPL-MCNC: 1.45 MG/DL (ref 0.66–1.25)
ERYTHROCYTE [DISTWIDTH] IN BLOOD BY AUTOMATED COUNT: 14.5 % (ref 10–15)
FERRITIN SERPL-MCNC: 141 NG/ML (ref 26–388)
GFR SERPL CREATININE-BSD FRML MDRD: 62 ML/MIN/{1.73_M2}
GFR SERPL CREATININE-BSD FRML MDRD: 68 ML/MIN/{1.73_M2}
GFR SERPL CREATININE-BSD FRML MDRD: 72 ML/MIN/{1.73_M2}
GLUCOSE BLDC GLUCOMTR-MCNC: 123 MG/DL (ref 70–99)
GLUCOSE SERPL-MCNC: 110 MG/DL (ref 70–99)
GLUCOSE SERPL-MCNC: 123 MG/DL (ref 70–99)
GLUCOSE SERPL-MCNC: 153 MG/DL (ref 70–99)
HCO3 BLDV-SCNC: 31 MMOL/L (ref 21–28)
HCO3 BLDV-SCNC: 33 MMOL/L (ref 21–28)
HCO3 BLDV-SCNC: 35 MMOL/L (ref 21–28)
HCO3 BLDV-SCNC: 36 MMOL/L (ref 21–28)
HCT VFR BLD AUTO: 50.9 % (ref 40–53)
HDLC SERPL-MCNC: 38 MG/DL
HGB BLD-MCNC: 16.5 G/DL (ref 13.3–17.7)
INTERPRETATION ECG - MUSE: NORMAL
IRON SATN MFR SERPL: 14 % (ref 15–46)
IRON SERPL-MCNC: 62 UG/DL (ref 35–180)
LACTATE BLD-SCNC: 0.9 MMOL/L (ref 0.7–2)
LACTATE BLD-SCNC: 2 MMOL/L (ref 0.7–2)
LDLC SERPL CALC-MCNC: 29 MG/DL
LMWH PPP CHRO-ACNC: 0.23 IU/ML
LMWH PPP CHRO-ACNC: 0.53 IU/ML
Lab: NORMAL
MAGNESIUM SERPL-MCNC: 2.2 MG/DL (ref 1.6–2.3)
MAGNESIUM SERPL-MCNC: 2.4 MG/DL (ref 1.6–2.3)
MAGNESIUM SERPL-MCNC: 4.1 MG/DL (ref 1.6–2.3)
MCH RBC QN AUTO: 30.4 PG (ref 26.5–33)
MCHC RBC AUTO-ENTMCNC: 32.4 G/DL (ref 31.5–36.5)
MCV RBC AUTO: 94 FL (ref 78–100)
NONHDLC SERPL-MCNC: 43 MG/DL
O2/TOTAL GAS SETTING VFR VENT: 21 %
O2/TOTAL GAS SETTING VFR VENT: ABNORMAL %
OXYHGB MFR BLDV: 55 %
OXYHGB MFR BLDV: 62 %
OXYHGB MFR BLDV: 70 %
OXYHGB MFR BLDV: 90 %
PCO2 BLDV: 42 MM HG (ref 40–50)
PCO2 BLDV: 47 MM HG (ref 40–50)
PCO2 BLDV: 50 MM HG (ref 40–50)
PCO2 BLDV: 52 MM HG (ref 40–50)
PH BLDV: 7.44 PH (ref 7.32–7.43)
PH BLDV: 7.45 PH (ref 7.32–7.43)
PH BLDV: 7.46 PH (ref 7.32–7.43)
PH BLDV: 7.48 PH (ref 7.32–7.43)
PHOSPHATE SERPL-MCNC: 4.3 MG/DL (ref 2.5–4.5)
PLATELET # BLD AUTO: 207 10E9/L (ref 150–450)
PO2 BLDV: 31 MM HG (ref 25–47)
PO2 BLDV: 35 MM HG (ref 25–47)
PO2 BLDV: 40 MM HG (ref 25–47)
PO2 BLDV: 60 MM HG (ref 25–47)
POTASSIUM SERPL-SCNC: 3.6 MMOL/L (ref 3.4–5.3)
POTASSIUM SERPL-SCNC: 3.7 MMOL/L (ref 3.4–5.3)
POTASSIUM SERPL-SCNC: 3.8 MMOL/L (ref 3.4–5.3)
POTASSIUM SERPL-SCNC: 3.8 MMOL/L (ref 3.4–5.3)
PROT SERPL-MCNC: 5.9 G/DL (ref 6.8–8.8)
PROT SERPL-MCNC: 6 G/DL (ref 6.8–8.8)
RBC # BLD AUTO: 5.43 10E12/L (ref 4.4–5.9)
SODIUM SERPL-SCNC: 136 MMOL/L (ref 133–144)
SODIUM SERPL-SCNC: 136 MMOL/L (ref 133–144)
SODIUM SERPL-SCNC: 137 MMOL/L (ref 133–144)
SPECIMEN EXP DATE BLD: NORMAL
SPECIMEN SOURCE: NORMAL
TIBC SERPL-MCNC: 450 UG/DL (ref 240–430)
TRANSFERRIN SERPL-MCNC: 305 MG/DL (ref 210–360)
TRIGL SERPL-MCNC: 66 MG/DL
WBC # BLD AUTO: 14.9 10E9/L (ref 4–11)

## 2019-04-26 PROCEDURE — 25000132 ZZH RX MED GY IP 250 OP 250 PS 637: Performed by: SURGERY

## 2019-04-26 PROCEDURE — 87536 HIV-1 QUANT&REVRSE TRNSCRPJ: CPT | Performed by: STUDENT IN AN ORGANIZED HEALTH CARE EDUCATION/TRAINING PROGRAM

## 2019-04-26 PROCEDURE — 82728 ASSAY OF FERRITIN: CPT | Performed by: STUDENT IN AN ORGANIZED HEALTH CARE EDUCATION/TRAINING PROGRAM

## 2019-04-26 PROCEDURE — 84100 ASSAY OF PHOSPHORUS: CPT | Performed by: STUDENT IN AN ORGANIZED HEALTH CARE EDUCATION/TRAINING PROGRAM

## 2019-04-26 PROCEDURE — 00000146 ZZHCL STATISTIC GLUCOSE BY METER IP

## 2019-04-26 PROCEDURE — 83605 ASSAY OF LACTIC ACID: CPT | Performed by: STUDENT IN AN ORGANIZED HEALTH CARE EDUCATION/TRAINING PROGRAM

## 2019-04-26 PROCEDURE — 82805 BLOOD GASES W/O2 SATURATION: CPT | Performed by: INTERNAL MEDICINE

## 2019-04-26 PROCEDURE — 84132 ASSAY OF SERUM POTASSIUM: CPT | Performed by: INTERNAL MEDICINE

## 2019-04-26 PROCEDURE — 20000004 ZZH R&B ICU UMMC

## 2019-04-26 PROCEDURE — 25000132 ZZH RX MED GY IP 250 OP 250 PS 637: Performed by: STUDENT IN AN ORGANIZED HEALTH CARE EDUCATION/TRAINING PROGRAM

## 2019-04-26 PROCEDURE — 84466 ASSAY OF TRANSFERRIN: CPT | Performed by: STUDENT IN AN ORGANIZED HEALTH CARE EDUCATION/TRAINING PROGRAM

## 2019-04-26 PROCEDURE — 85520 HEPARIN ASSAY: CPT | Performed by: INTERNAL MEDICINE

## 2019-04-26 PROCEDURE — 25000125 ZZHC RX 250: Performed by: INTERNAL MEDICINE

## 2019-04-26 PROCEDURE — 82805 BLOOD GASES W/O2 SATURATION: CPT | Performed by: STUDENT IN AN ORGANIZED HEALTH CARE EDUCATION/TRAINING PROGRAM

## 2019-04-26 PROCEDURE — 80048 BASIC METABOLIC PNL TOTAL CA: CPT | Performed by: STUDENT IN AN ORGANIZED HEALTH CARE EDUCATION/TRAINING PROGRAM

## 2019-04-26 PROCEDURE — 83735 ASSAY OF MAGNESIUM: CPT | Performed by: STUDENT IN AN ORGANIZED HEALTH CARE EDUCATION/TRAINING PROGRAM

## 2019-04-26 PROCEDURE — 83540 ASSAY OF IRON: CPT | Performed by: STUDENT IN AN ORGANIZED HEALTH CARE EDUCATION/TRAINING PROGRAM

## 2019-04-26 PROCEDURE — 83605 ASSAY OF LACTIC ACID: CPT | Performed by: INTERNAL MEDICINE

## 2019-04-26 PROCEDURE — 80053 COMPREHEN METABOLIC PANEL: CPT | Performed by: STUDENT IN AN ORGANIZED HEALTH CARE EDUCATION/TRAINING PROGRAM

## 2019-04-26 PROCEDURE — 86900 BLOOD TYPING SEROLOGIC ABO: CPT | Performed by: STUDENT IN AN ORGANIZED HEALTH CARE EDUCATION/TRAINING PROGRAM

## 2019-04-26 PROCEDURE — 85520 HEPARIN ASSAY: CPT | Performed by: STUDENT IN AN ORGANIZED HEALTH CARE EDUCATION/TRAINING PROGRAM

## 2019-04-26 PROCEDURE — 25800030 ZZH RX IP 258 OP 636: Performed by: INTERNAL MEDICINE

## 2019-04-26 PROCEDURE — 25800030 ZZH RX IP 258 OP 636: Performed by: STUDENT IN AN ORGANIZED HEALTH CARE EDUCATION/TRAINING PROGRAM

## 2019-04-26 PROCEDURE — 83550 IRON BINDING TEST: CPT | Performed by: STUDENT IN AN ORGANIZED HEALTH CARE EDUCATION/TRAINING PROGRAM

## 2019-04-26 PROCEDURE — 80061 LIPID PANEL: CPT | Performed by: STUDENT IN AN ORGANIZED HEALTH CARE EDUCATION/TRAINING PROGRAM

## 2019-04-26 PROCEDURE — 99291 CRITICAL CARE FIRST HOUR: CPT | Mod: GC | Performed by: INTERNAL MEDICINE

## 2019-04-26 PROCEDURE — 25000128 H RX IP 250 OP 636: Performed by: STUDENT IN AN ORGANIZED HEALTH CARE EDUCATION/TRAINING PROGRAM

## 2019-04-26 PROCEDURE — 70486 CT MAXILLOFACIAL W/O DYE: CPT

## 2019-04-26 PROCEDURE — 86901 BLOOD TYPING SEROLOGIC RH(D): CPT | Performed by: STUDENT IN AN ORGANIZED HEALTH CARE EDUCATION/TRAINING PROGRAM

## 2019-04-26 PROCEDURE — 25000125 ZZHC RX 250: Performed by: STUDENT IN AN ORGANIZED HEALTH CARE EDUCATION/TRAINING PROGRAM

## 2019-04-26 PROCEDURE — 85027 COMPLETE CBC AUTOMATED: CPT | Performed by: STUDENT IN AN ORGANIZED HEALTH CARE EDUCATION/TRAINING PROGRAM

## 2019-04-26 PROCEDURE — 86850 RBC ANTIBODY SCREEN: CPT | Performed by: STUDENT IN AN ORGANIZED HEALTH CARE EDUCATION/TRAINING PROGRAM

## 2019-04-26 RX ORDER — DIGOXIN 125 MCG
125 TABLET ORAL DAILY
Status: DISCONTINUED | OUTPATIENT
Start: 2019-04-26 | End: 2019-05-02

## 2019-04-26 RX ADMIN — HEPARIN SODIUM 2100 UNITS/HR: 10000 INJECTION, SOLUTION INTRAVENOUS at 06:20

## 2019-04-26 RX ADMIN — POTASSIUM CHLORIDE 20 MEQ: 750 TABLET, EXTENDED RELEASE ORAL at 01:11

## 2019-04-26 RX ADMIN — SODIUM NITROPRUSSIDE 1 MCG/KG/MIN: 25 INJECTION INTRAVENOUS at 04:51

## 2019-04-26 RX ADMIN — PIPERACILLIN SODIUM,TAZOBACTAM SODIUM 4.5 G: 4; .5 INJECTION, POWDER, FOR SOLUTION INTRAVENOUS at 15:50

## 2019-04-26 RX ADMIN — POTASSIUM CHLORIDE 20 MEQ: 750 TABLET, EXTENDED RELEASE ORAL at 15:50

## 2019-04-26 RX ADMIN — Medication 3.12 MG: at 20:42

## 2019-04-26 RX ADMIN — PIPERACILLIN SODIUM,TAZOBACTAM SODIUM 4.5 G: 4; .5 INJECTION, POWDER, FOR SOLUTION INTRAVENOUS at 22:39

## 2019-04-26 RX ADMIN — PIPERACILLIN SODIUM,TAZOBACTAM SODIUM 4.5 G: 4; .5 INJECTION, POWDER, FOR SOLUTION INTRAVENOUS at 03:59

## 2019-04-26 RX ADMIN — DIGOXIN 125 MCG: 125 TABLET ORAL at 10:37

## 2019-04-26 RX ADMIN — POTASSIUM CHLORIDE 20 MEQ: 750 TABLET, EXTENDED RELEASE ORAL at 10:37

## 2019-04-26 RX ADMIN — SODIUM NITROPRUSSIDE 0.25 MCG/KG/MIN: 25 INJECTION INTRAVENOUS at 10:38

## 2019-04-26 RX ADMIN — PIPERACILLIN SODIUM,TAZOBACTAM SODIUM 4.5 G: 4; .5 INJECTION, POWDER, FOR SOLUTION INTRAVENOUS at 10:36

## 2019-04-26 RX ADMIN — Medication 3.12 MG: at 18:04

## 2019-04-26 RX ADMIN — POTASSIUM CHLORIDE 20 MEQ: 750 TABLET, EXTENDED RELEASE ORAL at 05:01

## 2019-04-26 RX ADMIN — Medication 3.12 MG: at 11:05

## 2019-04-26 RX ADMIN — Medication 10 ML: at 18:04

## 2019-04-26 RX ADMIN — SODIUM NITROPRUSSIDE 1.25 MCG/KG/MIN: 25 INJECTION INTRAVENOUS at 21:21

## 2019-04-26 RX ADMIN — HEPARIN SODIUM 2100 UNITS/HR: 10000 INJECTION, SOLUTION INTRAVENOUS at 19:15

## 2019-04-26 ASSESSMENT — ACTIVITIES OF DAILY LIVING (ADL)
ADLS_ACUITY_SCORE: 10

## 2019-04-26 ASSESSMENT — MIFFLIN-ST. JEOR: SCORE: 2000.69

## 2019-04-26 NOTE — PROGRESS NOTES
Cardiology - Cards 2 service    Ubaldo Velez is a 32yo male with PMHx polysubstance abuse (tobacco, alcohol, cocaine, methamphetamines), ADHD who presents as a transfer from OSH for new CHF dx and concern for cardiogenic shock.     Doing well this am.     Interval events: Diuresed well on lasix gtt. On nipride. PICC in, mixed venous 90 was 54.     Vital signs: afebrile, , /80; , oxygenating 100%.  Temp:  [97.1  F (36.2  C)-98.7  F (37.1  C)] 98.7  F (37.1  C)  Pulse:  [112-125] 114  Heart Rate:  [] 101  Resp:  [12-23] 18  BP: ()/(40-99) 95/53  SpO2:  [86 %-99 %] 94 %  Tele: no events    I/O last 3 completed shifts:  In: 1562.64 [P.O.:1260; I.V.:302.64]  Out: 6525 [Urine:6525]   Net 5.3 L yesterday, net 5 L.     Hemodynamic drips: Nipride 1.25. Held lasix gtt this am, but was on 10. Heparin gtt.   Selected medications: Zosyn.     Physical examination:  Vitals:    04/25/19 1229 04/26/19 0600   Weight: 105.4 kg (232 lb 5.8 oz) 98.3 kg (216 lb 11.4 oz)     Gen: NAD, laying in bed comfortably, does get short of breath with speaking   HEENT: EOMI, PERRl, anicteric sclera   NECK: JVD to ear lobes   CV: Tachycardic, S1 S2 nl, no m/r/g  Resp: CTAB, no crackles, wheezing, or rhonchi   Abdomen: soft, non-distended, non-tender, normoactive bowel sounds   Extremities: warm, 2+ edema to knees   Skin: no rashes or bruises on exposed skin, tattoos on arms   Neuro: AAOx3, no focal deficits   Psych: pleasant, cooperative     Labs were reviewed.  BMP  Recent Labs   Lab 04/26/19  0352 04/26/19  0027 04/25/19 2008 04/25/19  1330    136 138 139   POTASSIUM 3.6 3.8 3.8 4.4   CHLORIDE 97 98 97 103   ARUN 8.5 8.6 8.0* 8.7   CO2 26 31 30 26   BUN 27 30 25 21   CR 1.35* 1.45* 1.36* 1.13   * 123* 250* 97     LFTs  Recent Labs   Lab 04/26/19  0352 04/24/19  1328   ALKPHOS 84 98   AST 50* 68*   ALT 60 95*   BILITOTAL 0.7 0.9   PROTTOTAL 6.0* 6.2*   ALBUMIN 2.6* 2.7*      CBC  Recent Labs   Lab  04/26/19  0352 04/25/19  1330 04/25/19  0554 04/24/19  1758   WBC 14.9* 17.3* 17.5* 15.1*   RBC 5.43 5.25 4.99 5.06   HGB 16.5 16.1 15.6 16.1   HCT 50.9 51.1 47.6 48.9   MCV 94 97 95 97   MCH 30.4 30.7 31.3 31.8   MCHC 32.4 31.5 32.8 32.9   RDW 14.5 14.7 14.7 14.9    205 243 215     INRNo lab results found in last 7 days.    Radiology IMPRESSION:   1. Interval placement of right upper extremity PICC with tip  projecting over the superior cavoatrial junction.  2. Continued cardiomegaly with slightly increased extensive pulmonary  edema.      IMPRESSION & PLAN  Ubaldo Velez is a 32yo male with PMHx polysubstance abuse (tobacco, alcohol, cocaine, methamphetamines), ADHD who presents as a transfer from OSH for new CHF dx and concern for cardiogenic shock.      # New HFrEF (< 10%)  Presented to OSH with several weeks of URI sxs, LE edema, 20 lbs weight gain. BNP 4.6k, lactate 1.5, troponin 2.8 -> 2.169 (no chest pain, EKG sinus tach). Echo with EF 10%, severe global hypokinesia of LV, multiple (3+) apical mass-like clots in LV, severely decreased RV function, left atrium is severely dilated, 2-3+ MR, 3+ TR. No known CAD hx. No prior angiogram or stress test. Etiology of his heart failure is unclear at this time, he will eventually need workup evaluate for coronary disease. Warm on exam, mentating well, Lactate 2.0, HD stable.   - PICC mixed venous.  - Nitroprusside PICC placed, switch to captopril today.   - digoxin ordered.   - Diuretics: holding for now. Diuresing well.   - Hold beta blockers or ACE-I/ARB.  - Lipid panel (LA 2), HIV (pending), TSH (normal). UA + leuk esterase, drug screen neg.  Iron studies.      # LV Thrombus  - Heparin gtt (high intensity)     # Leukocytosis  WBC 17.5,  WBC 15, afebrile. Recent URI sxs improving. Given one dose of Levaquin at OSH prior to transfer. No clear infectious source identified.  - Zosyn (4/25 -> )  - pro-calcitonin - 0.05.   - Influenza/RSV swab, UA all neg  - BCxs  at OSH NGTD, continue to monitor   - dental CT and consult.     # Polysubstance abuse (tobacco, alcohol, cocaine, methamphetamines)  - [facility] protocol   - Urine toxicology neg     Access: PIV, PICC ordered   Diet/IVF: Cardiac diet, 2L fluid restriction  DVT ppx: Heparin gtt  Disposition/Admission Status: Cards 2, ICU  CODE: FULL     Thank you for allowing me to care for this patient. This has been discussed with the attending physician.    Alberta Adams MD  Cardiology Fellow, PGY-5

## 2019-04-26 NOTE — PLAN OF CARE
Admitted/transferred from:  [facility] at approximately 1240  Reason for admission/transfer: Advanced heart failure therapy  Patient status upon admission/transfer:  stable, on ntiroglycerin gtt and heparin gtt  Interventions: Draw labs as ordered, update MD/pt family on pt status as necessary, titrate gtt as tolerated.  Plan: Place PICC at bedside, gave 80 mg lasix in addition to starting lasix gtt for aggressive diuresis, switch nitroglycerin to nitroprusside gtt once PICC OK to use. Titrate nipride for MAP goal of 65-75. 2L FR, 2g low Na diet. Plan for R heart cath tomorrow, NPO at 0000.   2 RN skin assessment: completed by Mag Alexandre, RN and Pricilla Centeno RN.   Result of skin assessment and interventions/actions: Tooth abscess, MD aware, existant IV's documented.   Height, weight, drug calc weight: done  Patient belongings: Sent home/ labeled.   MDRO education (if applicable):

## 2019-04-26 NOTE — PLAN OF CARE
Newly diagnosed heart failure, cardiogenic shock    Neuro: alert and oriented, up ad david in room, denies pain, afebrile  Cardiac: 's overnight. MAP goal 65-75 maintained with Nipride drip. Diuresing well with lasix drip, MD notified pt down 5 liters in 12 hours. CVP 13, transduced off PICC.  Resp: Required 4 L NC overnight while asleep to maintain sats > 92%, states SOB has improved  GI/: NPO for right heart cath. MD aware of blood sugars, no insulin orders at this time, HgbA1C drawn. Urine pale, clear yellow. Following electrolytes closely.    Plan for right heart cath today, continue medical management of heart failure and shock. Pt updated on plan of care.     Winnie Farah RN on 4/26/2019 at 5:22 AM

## 2019-04-26 NOTE — PLAN OF CARE
Pt is alert and oriented x4. Vital signs stable, O2 saturations WDL weaned down to 2L NC. Nipride titrated to 1.5 mcg; MAPs 70-75. Lasix gtt discontinued; pt had very large urine output, CVP now at 9 and pt net negative 4.5 L so far. CT done in anticipation of dentistry consult for Monday. Pt's family at bedside; writer updated pt and significant other regarding plans to start with PO afterload reducers and to wean off nipride. Writer also provided some education regarding heart failure, medications, diet modulation and fluid restriction.

## 2019-04-26 NOTE — PROGRESS NOTES
"CLINICAL NUTRITION SERVICES - BRIEF NOTE    Received admission nutrition risk screen for \"stageable pressure injuries or large/non-healing wound.\" Per chart review and discussion w/ bedside RN, pt does not have any wounds/pressure injuries - screen entered in error.     RD will follow per LOS protocol or if re-consulted.     Jenna Wilson RD, LD, [facility]  CVICU Dietitian  Pager: [phone]    "

## 2019-04-26 NOTE — PROVIDER NOTIFICATION
D: Newly diagnosed HF, cardiogenic shock  I: Lasix gtt at 10mg/hr  A: Resident notified, pt down 5 L since admit yesterday afternoon, creat up. P:Continue  dieresis and monitor morning BNP    Winnie Farah RN on 4/26/2019 at 1:16 AM

## 2019-04-27 LAB
ALBUMIN SERPL-MCNC: 2.4 G/DL (ref 3.4–5)
ALP SERPL-CCNC: 74 U/L (ref 40–150)
ALT SERPL W P-5'-P-CCNC: 49 U/L (ref 0–70)
ANION GAP SERPL CALCULATED.3IONS-SCNC: 5 MMOL/L (ref 3–14)
ANION GAP SERPL CALCULATED.3IONS-SCNC: 8 MMOL/L (ref 3–14)
AST SERPL W P-5'-P-CCNC: 41 U/L (ref 0–45)
BASE EXCESS BLDV CALC-SCNC: 3.1 MMOL/L
BASE EXCESS BLDV CALC-SCNC: 4.3 MMOL/L
BASE EXCESS BLDV CALC-SCNC: 5 MMOL/L
BILIRUB SERPL-MCNC: 0.6 MG/DL (ref 0.2–1.3)
BUN SERPL-MCNC: 13 MG/DL (ref 7–30)
BUN SERPL-MCNC: 19 MG/DL (ref 7–30)
CALCIUM SERPL-MCNC: 8.4 MG/DL (ref 8.5–10.1)
CALCIUM SERPL-MCNC: 8.4 MG/DL (ref 8.5–10.1)
CHLORIDE SERPL-SCNC: 101 MMOL/L (ref 94–109)
CHLORIDE SERPL-SCNC: 104 MMOL/L (ref 94–109)
CO2 SERPL-SCNC: 26 MMOL/L (ref 20–32)
CO2 SERPL-SCNC: 26 MMOL/L (ref 20–32)
CREAT SERPL-MCNC: 0.94 MG/DL (ref 0.66–1.25)
CREAT SERPL-MCNC: 1.12 MG/DL (ref 0.66–1.25)
ERYTHROCYTE [DISTWIDTH] IN BLOOD BY AUTOMATED COUNT: 14.3 % (ref 10–15)
GFR SERPL CREATININE-BSD FRML MDRD: 85 ML/MIN/{1.73_M2}
GFR SERPL CREATININE-BSD FRML MDRD: >90 ML/MIN/{1.73_M2}
GLUCOSE SERPL-MCNC: 101 MG/DL (ref 70–99)
GLUCOSE SERPL-MCNC: 133 MG/DL (ref 70–99)
HCO3 BLDV-SCNC: 29 MMOL/L (ref 21–28)
HCO3 BLDV-SCNC: 29 MMOL/L (ref 21–28)
HCO3 BLDV-SCNC: 30 MMOL/L (ref 21–28)
HCT VFR BLD AUTO: 49.7 % (ref 40–53)
HGB BLD-MCNC: 15.7 G/DL (ref 13.3–17.7)
HIV1 RNA # PLAS NAA DL=20: NORMAL {COPIES}/ML
HIV1 RNA SERPL NAA+PROBE-LOG#: NORMAL {LOG_COPIES}/ML
LMWH PPP CHRO-ACNC: 0.33 IU/ML
MAGNESIUM SERPL-MCNC: 2.1 MG/DL (ref 1.6–2.3)
MAGNESIUM SERPL-MCNC: 2.2 MG/DL (ref 1.6–2.3)
MCH RBC QN AUTO: 30.4 PG (ref 26.5–33)
MCHC RBC AUTO-ENTMCNC: 31.6 G/DL (ref 31.5–36.5)
MCV RBC AUTO: 96 FL (ref 78–100)
O2/TOTAL GAS SETTING VFR VENT: 21 %
OXYHGB MFR BLDV: 59 %
OXYHGB MFR BLDV: 61 %
OXYHGB MFR BLDV: 71 %
PCO2 BLDV: 43 MM HG (ref 40–50)
PCO2 BLDV: 45 MM HG (ref 40–50)
PCO2 BLDV: 46 MM HG (ref 40–50)
PH BLDV: 7.41 PH (ref 7.32–7.43)
PH BLDV: 7.43 PH (ref 7.32–7.43)
PH BLDV: 7.44 PH (ref 7.32–7.43)
PLATELET # BLD AUTO: 202 10E9/L (ref 150–450)
PO2 BLDV: 34 MM HG (ref 25–47)
PO2 BLDV: 35 MM HG (ref 25–47)
PO2 BLDV: 39 MM HG (ref 25–47)
POTASSIUM BLD-SCNC: 3.4 MMOL/L (ref 3.4–5.3)
POTASSIUM SERPL-SCNC: 3.9 MMOL/L (ref 3.4–5.3)
POTASSIUM SERPL-SCNC: 4.2 MMOL/L (ref 3.4–5.3)
POTASSIUM SERPL-SCNC: 4.2 MMOL/L (ref 3.4–5.3)
PROT SERPL-MCNC: 5.8 G/DL (ref 6.8–8.8)
RBC # BLD AUTO: 5.17 10E12/L (ref 4.4–5.9)
SODIUM SERPL-SCNC: 135 MMOL/L (ref 133–144)
SODIUM SERPL-SCNC: 136 MMOL/L (ref 133–144)
WBC # BLD AUTO: 15.4 10E9/L (ref 4–11)

## 2019-04-27 PROCEDURE — 40000196 ZZH STATISTIC RAPCV CVP MONITORING

## 2019-04-27 PROCEDURE — 80053 COMPREHEN METABOLIC PANEL: CPT | Performed by: STUDENT IN AN ORGANIZED HEALTH CARE EDUCATION/TRAINING PROGRAM

## 2019-04-27 PROCEDURE — 25800030 ZZH RX IP 258 OP 636: Performed by: STUDENT IN AN ORGANIZED HEALTH CARE EDUCATION/TRAINING PROGRAM

## 2019-04-27 PROCEDURE — 80048 BASIC METABOLIC PNL TOTAL CA: CPT | Performed by: STUDENT IN AN ORGANIZED HEALTH CARE EDUCATION/TRAINING PROGRAM

## 2019-04-27 PROCEDURE — 85520 HEPARIN ASSAY: CPT | Performed by: STUDENT IN AN ORGANIZED HEALTH CARE EDUCATION/TRAINING PROGRAM

## 2019-04-27 PROCEDURE — 20000004 ZZH R&B ICU UMMC

## 2019-04-27 PROCEDURE — 25000125 ZZHC RX 250: Performed by: STUDENT IN AN ORGANIZED HEALTH CARE EDUCATION/TRAINING PROGRAM

## 2019-04-27 PROCEDURE — 25000132 ZZH RX MED GY IP 250 OP 250 PS 637: Performed by: SURGERY

## 2019-04-27 PROCEDURE — 85027 COMPLETE CBC AUTOMATED: CPT | Performed by: STUDENT IN AN ORGANIZED HEALTH CARE EDUCATION/TRAINING PROGRAM

## 2019-04-27 PROCEDURE — 84132 ASSAY OF SERUM POTASSIUM: CPT

## 2019-04-27 PROCEDURE — 82805 BLOOD GASES W/O2 SATURATION: CPT | Performed by: INTERNAL MEDICINE

## 2019-04-27 PROCEDURE — 99291 CRITICAL CARE FIRST HOUR: CPT | Mod: GC | Performed by: INTERNAL MEDICINE

## 2019-04-27 PROCEDURE — 83735 ASSAY OF MAGNESIUM: CPT | Performed by: STUDENT IN AN ORGANIZED HEALTH CARE EDUCATION/TRAINING PROGRAM

## 2019-04-27 PROCEDURE — 25000132 ZZH RX MED GY IP 250 OP 250 PS 637: Performed by: STUDENT IN AN ORGANIZED HEALTH CARE EDUCATION/TRAINING PROGRAM

## 2019-04-27 PROCEDURE — 25000128 H RX IP 250 OP 636: Performed by: STUDENT IN AN ORGANIZED HEALTH CARE EDUCATION/TRAINING PROGRAM

## 2019-04-27 PROCEDURE — 82805 BLOOD GASES W/O2 SATURATION: CPT | Performed by: STUDENT IN AN ORGANIZED HEALTH CARE EDUCATION/TRAINING PROGRAM

## 2019-04-27 PROCEDURE — 84132 ASSAY OF SERUM POTASSIUM: CPT | Performed by: INTERNAL MEDICINE

## 2019-04-27 RX ORDER — CAPTOPRIL 12.5 MG/1
12.5 TABLET ORAL EVERY 8 HOURS SCHEDULED
Status: DISCONTINUED | OUTPATIENT
Start: 2019-04-27 | End: 2019-04-27

## 2019-04-27 RX ORDER — CAPTOPRIL 12.5 MG/1
12.5 TABLET ORAL ONCE
Status: COMPLETED | OUTPATIENT
Start: 2019-04-27 | End: 2019-04-27

## 2019-04-27 RX ORDER — CAPTOPRIL 12.5 MG/1
25 TABLET ORAL EVERY 8 HOURS SCHEDULED
Status: DISCONTINUED | OUTPATIENT
Start: 2019-04-27 | End: 2019-04-28

## 2019-04-27 RX ORDER — FOLIC ACID 1 MG/1
1 TABLET ORAL DAILY
Status: DISCONTINUED | OUTPATIENT
Start: 2019-04-27 | End: 2019-05-07 | Stop reason: HOSPADM

## 2019-04-27 RX ORDER — LANOLIN ALCOHOL/MO/W.PET/CERES
100 CREAM (GRAM) TOPICAL DAILY
Status: DISCONTINUED | OUTPATIENT
Start: 2019-04-27 | End: 2019-05-07 | Stop reason: HOSPADM

## 2019-04-27 RX ADMIN — AMOXICILLIN AND CLAVULANATE POTASSIUM 1 TABLET: 875; 125 TABLET, FILM COATED ORAL at 11:35

## 2019-04-27 RX ADMIN — HEPARIN SODIUM 2100 UNITS/HR: 10000 INJECTION, SOLUTION INTRAVENOUS at 09:12

## 2019-04-27 RX ADMIN — PIPERACILLIN SODIUM,TAZOBACTAM SODIUM 4.5 G: 4; .5 INJECTION, POWDER, FOR SOLUTION INTRAVENOUS at 04:48

## 2019-04-27 RX ADMIN — HEPARIN SODIUM 2100 UNITS/HR: 10000 INJECTION, SOLUTION INTRAVENOUS at 20:38

## 2019-04-27 RX ADMIN — CAPTOPRIL 12.5 MG: 12.5 TABLET ORAL at 09:12

## 2019-04-27 RX ADMIN — POTASSIUM CHLORIDE 20 MEQ: 29.8 INJECTION, SOLUTION INTRAVENOUS at 01:40

## 2019-04-27 RX ADMIN — CAPTOPRIL 12.5 MG: 12.5 TABLET ORAL at 16:53

## 2019-04-27 RX ADMIN — SODIUM NITROPRUSSIDE 1.5 MCG/KG/MIN: 25 INJECTION INTRAVENOUS at 12:48

## 2019-04-27 RX ADMIN — CAPTOPRIL 25 MG: 12.5 TABLET ORAL at 21:59

## 2019-04-27 RX ADMIN — SODIUM NITROPRUSSIDE 1 MCG/KG/MIN: 25 INJECTION INTRAVENOUS at 20:34

## 2019-04-27 RX ADMIN — Medication 5 MG: at 20:32

## 2019-04-27 RX ADMIN — FOLIC ACID 1 MG: 1 TABLET ORAL at 11:35

## 2019-04-27 RX ADMIN — Medication 10 ML: at 16:53

## 2019-04-27 RX ADMIN — AMOXICILLIN AND CLAVULANATE POTASSIUM 1 TABLET: 875; 125 TABLET, FILM COATED ORAL at 20:32

## 2019-04-27 RX ADMIN — CAPTOPRIL 12.5 MG: 12.5 TABLET ORAL at 14:15

## 2019-04-27 RX ADMIN — Medication 5 MG: at 01:40

## 2019-04-27 RX ADMIN — Medication 100 MG: at 11:35

## 2019-04-27 RX ADMIN — POTASSIUM CHLORIDE 20 MEQ: 750 TABLET, EXTENDED RELEASE ORAL at 18:56

## 2019-04-27 RX ADMIN — DIGOXIN 125 MCG: 125 TABLET ORAL at 09:12

## 2019-04-27 ASSESSMENT — MIFFLIN-ST. JEOR: SCORE: 2093.69

## 2019-04-27 ASSESSMENT — ACTIVITIES OF DAILY LIVING (ADL)
ADLS_ACUITY_SCORE: 10
ADLS_ACUITY_SCORE: 10
ADLS_ACUITY_SCORE: 12

## 2019-04-27 NOTE — PLAN OF CARE
Pt alert and oriented x4. CIWA score of zero. Room air when awake, 2L NC when sleeping for intermittent desaturations. Lung sounds clear. Sinus tachycardia 110s-120s. Nipride at 1mcg for majority of the night, titrated up to 1.25 at 0500 for MAPs>90.CVP 12. Pt continues to have significant urine output. 1 large BM overnight. Educated pt about maintaining within sodium restriction.

## 2019-04-27 NOTE — CONSULTS
Dental Service Consultation        Ubaldo Velez MRN# [id number]   YOB: 1985 Age: 33 year old   Date of Admission: 4/25/2019     Reason for consult: I was asked by Dr. Alberta Adams to evaluate this patient for tooth abscess.           Assessment and Plan:   Assessment: Large PARL noted with #18 (LL 2nd molar) on Dental CT.  #18 retained root tips with gingival erythema surrounding.  No noted facial swelling.  All mandibular dentition besides #18 WNL response to percussion vitality test.    Plan:  #18 retained root tips would require extraction prior to any cardiac surgery for optimal bacterial isolation.  Patient can be seen as an outpatient at the [facility] in [facility] ([phone]), or, if unable to be discharged or moved from [place], would require [place] OR.             Chief Complaint:   Tooth abscess LL         History of Present Illness:   This patient is a 33 year old male [place] [facility]              Past Medical History:     Past Medical History:   Diagnosis Date     ADHD      Cocaine use      Methamphetamine use (H)              Past Surgical History:     Past Surgical History:   Procedure Laterality Date     EXTRACTION(S) DENTAL      [place] teeth     HAND SURGERY Left     Laceration left index finger               Social History:     Social History     Tobacco Use     Smoking status: Current Every Day Smoker     Packs/day: 1.00     Years: 15.00     Pack years: 15.00     Smokeless tobacco: Never Used     Tobacco comment: Quit 1.5 weeks ago   Substance Use Topics     Alcohol use: Yes     Comment: 8-10 beers a day, now 1 beer a week.             Family History:     Family History   Problem Relation Age of Onset     Chronic Obstructive Pulmonary Disease Father      Multiple Sclerosis Maternal Aunt              Immunizations:     There is no immunization history on file for this patient.          Allergies:   No Known Allergies           Medications:     Current Facility-Administered Medications Ordered in Epic   Medication Dose Route Frequency Last Rate Last Dose     acetaminophen (TYLENOL) tablet 325 mg  325 mg Oral Q4H PRN         amoxicillin-clavulanate (AUGMENTIN) 875-125 MG per tablet 1 tablet  1 tablet Oral Q12H ANITA   1 tablet at 04/27/19 1135     captopril (CAPOTEN) tablet 25 mg  25 mg Oral Q8H ANITA         digoxin (LANOXIN) tablet 125 mcg  125 mcg Oral Daily   125 mcg at 04/27/19 0912     folic acid (FOLVITE) tablet 1 mg  1 mg Oral Daily   1 mg at 04/27/19 1135     heparin  drip 25,000 units in 0.45% NaCl 250 mL (see additional administration details for dose)  0-3,500 Units/hr Intravenous Continuous 21 mL/hr at 04/27/19 1700 2,100 Units/hr at 04/27/19 1700     heparin bolus from infusion pump   Intravenous Q6H PRN   3,000 Units at 04/26/19 0510     heparin lock flush 10 UNIT/ML injection 2-5 mL  2-5 mL Intracatheter Once PRN         heparin lock flush 10 UNIT/ML injection 5-10 mL  5-10 mL Intracatheter Q24H   10 mL at 04/27/19 1653     heparin lock flush 10 UNIT/ML injection 5-10 mL  5-10 mL Intracatheter Q1H PRN         lidocaine (LMX4) cream   Topical Once PRN         lidocaine (LMX4) cream   Topical Q1H PRN         lidocaine 1 % 0.1-1 mL  0.1-1 mL Other Q1H PRN         magnesium sulfate 2 g in NS intermittent infusion (Phar[employer]ium or [facility] Cmpd)  2 g Intravenous Daily PRN         magnesium sulfate 4 g in 100 mL sterile water (premade)  4 g Intravenous Q4H PRN   4 g at 04/25/19 2103     melatonin tablet 5 mg  5 mg Oral At Bedtime         naloxone (NARCAN) injection 0.1-0.4 mg  0.1-0.4 mg Intravenous Q2 Min PRN         nitroPRUsside (NIPRIDE) 100 mg, sodium thiosulfate 1,000 mg in D5W 62.5 mL IV infusion (conc: 1.6mg/mL)  0.25-5 mcg/kg/min Intravenous Continuous 3.7 mL/hr at 04/27/19 1700 1 mcg/kg/min at 04/27/19 1700     Patient is already receiving anticoagulation with heparin, enoxaparin (LOVENOX), warfarin (COUMADIN)  or other  anticoagulant medication   Does not apply Continuous PRN         potassium chloride (KLOR-CON) Packet 20-40 mEq  20-40 mEq Oral or Feeding Tube Q2H PRN         potassium chloride 10 mEq in 100 mL intermittent infusion with 10 mg lidocaine  10 mEq Intravenous Q1H PRN         potassium chloride 10 mEq in 100 mL sterile water intermittent infusion (premix)  10 mEq Intravenous Q1H PRN         potassium chloride 20 mEq in 50 mL intermittent infusion  20 mEq Intravenous Q1H PRN   20 mEq at 04/27/19 0140     potassium chloride ER (K-DUR/KLOR-CON M) CR tablet 20-40 mEq  20-40 mEq Oral Q2H PRN   20 mEq at 04/26/19 1550     sodium chloride (PF) 0.9% PF flush 10-20 mL  10-20 mL Intracatheter q1 min prn         sodium chloride (PF) 0.9% PF flush 5-50 mL  5-50 mL Intracatheter Once PRN         vitamin B1 (THIAMINE) tablet 100 mg  100 mg Oral Daily   100 mg at 04/27/19 1135     No current Epic-ordered outpatient medications on file.             Review of Systems:   The 10 point Review of Systems is negative other than noted in the HPI            Physical Exam:   Vitals were reviewed  Temp: 98.3  F (36.8  C) Temp src: Oral BP: 101/75   Heart Rate: 111 Resp: 18 SpO2: 95 % O2 Device: None (Room air)      Head and neck exam: WNL    Intraoral exam: #18 retained root tips       Data:   Radiographic interpretation: Dental CT w/o contrast taken on 4/27/2019  Osseous pathology: none  Pulpal Pathology: none  Periodontal Pathology: none  Caries: none  Odontogenic pathology: #18 unrestorable retained root tips with PARL    The patient was discussed with: Dr. Alberta Peacock, DDS  PGY2  Pager: [phone]

## 2019-04-27 NOTE — PROGRESS NOTES
Cardiology Progress Note  Ubaldo Velez MRN: [id number]  Age: 33 year old, : 19            Changes Today:     - Increase Captopril to 12.5mg q8h, wean Nitroprusside. Follow up HD in afternoon and possible uptitrate Captopril   - Dental consult pending for tooth abscess, HD stable, does not appear septic   - Discontinue Zosyn, start Augmentin   - Thiamine 100mg, Folic Acid 1mg daily         Assessment and Plan:     Ubaldo Velez is a 32yo male with PMHx polysubstance abuse (tobacco, alcohol, cocaine, methamphetamines), ADHD who presented as a transfer from OSH for new CHF dx and concern for cardiogenic shock.      # New HFrEF (< 10%)  Presented to OSH with several weeks of URI sxs, LE edema, 20 lbs weight gain. BNP 4.6k, lactate 1.5, troponin 2.8 -> 2.169 (no chest pain, EKG sinus tach). Echo with EF 10%, severe global hypokinesia of LV, multiple (3+) apical mass-like clots in LV, severely decreased RV function, left atrium is severely dilated, 2-3+ MR, 3+ TR. No known CAD hx. No prior angiogram or stress test. Etiology of his heart failure is unclear at this time (likely polysubstance use), he will eventually need workup to evaluate for coronary disease. Warm on exam, mentating well, Lactate nl, HD stable.   - PICC, mixed venous  - Nitroprusside via PICC, wean  - Captopril 12.5mg q8h  - Digoxin 125mcg   - Diuretics: holding for now.   - Hold beta blockers or ACE-I/ARB.  - Lipid panel (LA 2), HIV (pending), TSH (normal). UA + leuk esterase (culture negative), drug screen neg.  Iron studies nl.      # LV Thrombus  - Heparin gtt (high intensity)  - Discuss oral anticoagulation tomorrow      # Leukocytosis  WBC 17.5 on admission, trending down, afebrile. Recent URI sxs improving. Given one dose of Levaquin at OSH prior to transfer. Pro-calcitonin 0.05. Did c/o tooth pain, CT dental with left mandibular second molar periapical abscess.   - Zosyn ( -> ),  Augmentin (4/27 -> )  - Influenza/RSV swab, UA all neg  - BCxs at OSH NGTD, continue to monitor   - Dental consult re abscess, appreciate recs      # Polysubstance abuse (tobacco, alcohol, cocaine, methamphetamines)  - Urine toxicology neg  - Thiamine 100mg, Folic Acid 1mg daily      Access: PIV, PICC ordered   Diet/IVF: Cardiac diet, 2L fluid restriction  DVT ppx: Heparin gtt  Disposition/Admission Status: Cards 2, ICU  CODE: FULL      Patient was discussed with staff attending, Dr. Anderson.    Tono Tirado MD  Internal Medicine, PGY-2  Cardiology Heart Failure Service             Subjective     No acute events overnight. Seen this morning, he has no new concerns. Denies fevers, chills, chest pain, shortness of breath, abdominal pain, n/v, bowel or urinary changes.           Objective     Vitals:  Temp:  [97.7  F (36.5  C)-98.6  F (37  C)] 97.7  F (36.5  C)  Heart Rate:  [100-124] 107  Resp:  [15-20] 16  BP: ()/() 102/63  SpO2:  [88 %-100 %] 97 %  MAP: 80-90s    I/O: Net -4.7 L over past 24hrs    CVP 12    Vitals:    04/25/19 1229 04/26/19 0600 04/27/19 0600   Weight: 105.4 kg (232 lb 5.8 oz) 98.3 kg (216 lb 11.4 oz) 107.6 kg (237 lb 3.4 oz)       Gen: NAD, laying in bed comfortably, does get short of breath with speaking   HEENT: EOMI, PERRl, anicteric sclera   NECK: JVD ~10cm  CV: Tachycardic, S1 S2 nl, no m/r/g  Resp: CTAB, no crackles, wheezing, or rhonchi   Abdomen: soft, non-distended, non-tender, normoactive bowel sounds   Extremities: warm, 1+ edema, improving  Skin: no rashes or bruises on exposed skin, tattoos on arms   Neuro: AAOx3, no focal deficits   Psych: pleasant, cooperative             Data:      Labs reviewed.           Medications     Medications    captopril  6.25 mg Oral TID     digoxin  125 mcg Oral Daily     heparin lock flush  5-10 mL Intracatheter Q24H     piperacillin-tazobactam  4.5 g Intravenous Q6H       HEParin 2,100 Units/hr (04/27/19 0700)     nitroPRUsside  (NIPRIDE) IV infusion ADULT MAX CONC 1.25 mcg/kg/min (04/27/19 0700)     - MEDICATION INSTRUCTIONS -

## 2019-04-28 LAB
ANION GAP SERPL CALCULATED.3IONS-SCNC: 4 MMOL/L (ref 3–14)
ANION GAP SERPL CALCULATED.3IONS-SCNC: 8 MMOL/L (ref 3–14)
BASE EXCESS BLDV CALC-SCNC: 0.3 MMOL/L
BASE EXCESS BLDV CALC-SCNC: 1.4 MMOL/L
BASE EXCESS BLDV CALC-SCNC: 1.8 MMOL/L
BASE EXCESS BLDV CALC-SCNC: 2.3 MMOL/L
BUN SERPL-MCNC: 14 MG/DL (ref 7–30)
BUN SERPL-MCNC: 16 MG/DL (ref 7–30)
CALCIUM SERPL-MCNC: 8.4 MG/DL (ref 8.5–10.1)
CALCIUM SERPL-MCNC: 8.4 MG/DL (ref 8.5–10.1)
CHLORIDE SERPL-SCNC: 105 MMOL/L (ref 94–109)
CHLORIDE SERPL-SCNC: 106 MMOL/L (ref 94–109)
CO2 SERPL-SCNC: 25 MMOL/L (ref 20–32)
CO2 SERPL-SCNC: 26 MMOL/L (ref 20–32)
CREAT SERPL-MCNC: 0.89 MG/DL (ref 0.66–1.25)
CREAT SERPL-MCNC: 0.94 MG/DL (ref 0.66–1.25)
ERYTHROCYTE [DISTWIDTH] IN BLOOD BY AUTOMATED COUNT: 14.4 % (ref 10–15)
GFR SERPL CREATININE-BSD FRML MDRD: >90 ML/MIN/{1.73_M2}
GFR SERPL CREATININE-BSD FRML MDRD: >90 ML/MIN/{1.73_M2}
GLUCOSE SERPL-MCNC: 105 MG/DL (ref 70–99)
GLUCOSE SERPL-MCNC: 105 MG/DL (ref 70–99)
HCO3 BLDV-SCNC: 26 MMOL/L (ref 21–28)
HCO3 BLDV-SCNC: 26 MMOL/L (ref 21–28)
HCO3 BLDV-SCNC: 27 MMOL/L (ref 21–28)
HCO3 BLDV-SCNC: 28 MMOL/L (ref 21–28)
HCT VFR BLD AUTO: 49.2 % (ref 40–53)
HGB BLD-MCNC: 15.8 G/DL (ref 13.3–17.7)
LMWH PPP CHRO-ACNC: 0.34 IU/ML
MAGNESIUM SERPL-MCNC: 1.8 MG/DL (ref 1.6–2.3)
MAGNESIUM SERPL-MCNC: 2.1 MG/DL (ref 1.6–2.3)
MCH RBC QN AUTO: 30.4 PG (ref 26.5–33)
MCHC RBC AUTO-ENTMCNC: 32.1 G/DL (ref 31.5–36.5)
MCV RBC AUTO: 95 FL (ref 78–100)
O2/TOTAL GAS SETTING VFR VENT: 21 %
OXYHGB MFR BLDV: 49 %
OXYHGB MFR BLDV: 55 %
OXYHGB MFR BLDV: 58 %
OXYHGB MFR BLDV: 63 %
PCO2 BLDV: 42 MM HG (ref 40–50)
PCO2 BLDV: 45 MM HG (ref 40–50)
PCO2 BLDV: 45 MM HG (ref 40–50)
PCO2 BLDV: 46 MM HG (ref 40–50)
PH BLDV: 7.37 PH (ref 7.32–7.43)
PH BLDV: 7.39 PH (ref 7.32–7.43)
PH BLDV: 7.39 PH (ref 7.32–7.43)
PH BLDV: 7.41 PH (ref 7.32–7.43)
PLATELET # BLD AUTO: 183 10E9/L (ref 150–450)
PO2 BLDV: 28 MM HG (ref 25–47)
PO2 BLDV: 30 MM HG (ref 25–47)
PO2 BLDV: 33 MM HG (ref 25–47)
PO2 BLDV: 36 MM HG (ref 25–47)
POTASSIUM SERPL-SCNC: 4 MMOL/L (ref 3.4–5.3)
POTASSIUM SERPL-SCNC: 4.2 MMOL/L (ref 3.4–5.3)
RBC # BLD AUTO: 5.19 10E12/L (ref 4.4–5.9)
SODIUM SERPL-SCNC: 135 MMOL/L (ref 133–144)
SODIUM SERPL-SCNC: 138 MMOL/L (ref 133–144)
WBC # BLD AUTO: 12.9 10E9/L (ref 4–11)

## 2019-04-28 PROCEDURE — 85027 COMPLETE CBC AUTOMATED: CPT | Performed by: STUDENT IN AN ORGANIZED HEALTH CARE EDUCATION/TRAINING PROGRAM

## 2019-04-28 PROCEDURE — 80048 BASIC METABOLIC PNL TOTAL CA: CPT | Performed by: STUDENT IN AN ORGANIZED HEALTH CARE EDUCATION/TRAINING PROGRAM

## 2019-04-28 PROCEDURE — 25000132 ZZH RX MED GY IP 250 OP 250 PS 637: Performed by: STUDENT IN AN ORGANIZED HEALTH CARE EDUCATION/TRAINING PROGRAM

## 2019-04-28 PROCEDURE — 25000128 H RX IP 250 OP 636: Performed by: STUDENT IN AN ORGANIZED HEALTH CARE EDUCATION/TRAINING PROGRAM

## 2019-04-28 PROCEDURE — 40000196 ZZH STATISTIC RAPCV CVP MONITORING

## 2019-04-28 PROCEDURE — 25000132 ZZH RX MED GY IP 250 OP 250 PS 637: Performed by: INTERNAL MEDICINE

## 2019-04-28 PROCEDURE — 25000125 ZZHC RX 250: Performed by: STUDENT IN AN ORGANIZED HEALTH CARE EDUCATION/TRAINING PROGRAM

## 2019-04-28 PROCEDURE — 82805 BLOOD GASES W/O2 SATURATION: CPT | Performed by: INTERNAL MEDICINE

## 2019-04-28 PROCEDURE — 25000132 ZZH RX MED GY IP 250 OP 250 PS 637: Performed by: SURGERY

## 2019-04-28 PROCEDURE — 25800030 ZZH RX IP 258 OP 636: Performed by: STUDENT IN AN ORGANIZED HEALTH CARE EDUCATION/TRAINING PROGRAM

## 2019-04-28 PROCEDURE — 20000004 ZZH R&B ICU UMMC

## 2019-04-28 PROCEDURE — 82805 BLOOD GASES W/O2 SATURATION: CPT | Performed by: STUDENT IN AN ORGANIZED HEALTH CARE EDUCATION/TRAINING PROGRAM

## 2019-04-28 PROCEDURE — 99233 SBSQ HOSP IP/OBS HIGH 50: CPT | Mod: GC | Performed by: INTERNAL MEDICINE

## 2019-04-28 PROCEDURE — 85520 HEPARIN ASSAY: CPT | Performed by: STUDENT IN AN ORGANIZED HEALTH CARE EDUCATION/TRAINING PROGRAM

## 2019-04-28 PROCEDURE — 83735 ASSAY OF MAGNESIUM: CPT | Performed by: STUDENT IN AN ORGANIZED HEALTH CARE EDUCATION/TRAINING PROGRAM

## 2019-04-28 RX ORDER — CAPTOPRIL 50 MG/1
50 TABLET ORAL EVERY 8 HOURS SCHEDULED
Status: DISCONTINUED | OUTPATIENT
Start: 2019-04-28 | End: 2019-04-29

## 2019-04-28 RX ORDER — HYDRALAZINE HYDROCHLORIDE 10 MG/1
10 TABLET, FILM COATED ORAL EVERY 8 HOURS SCHEDULED
Status: DISCONTINUED | OUTPATIENT
Start: 2019-04-28 | End: 2019-04-28

## 2019-04-28 RX ORDER — HYDRALAZINE HYDROCHLORIDE 25 MG/1
25 TABLET, FILM COATED ORAL EVERY 8 HOURS SCHEDULED
Status: DISCONTINUED | OUTPATIENT
Start: 2019-04-29 | End: 2019-04-29

## 2019-04-28 RX ORDER — CAPTOPRIL 12.5 MG/1
25 TABLET ORAL ONCE
Status: COMPLETED | OUTPATIENT
Start: 2019-04-28 | End: 2019-04-28

## 2019-04-28 RX ADMIN — ACETAMINOPHEN 325 MG: 325 TABLET, FILM COATED ORAL at 07:52

## 2019-04-28 RX ADMIN — Medication 10 ML: at 18:44

## 2019-04-28 RX ADMIN — DIGOXIN 125 MCG: 125 TABLET ORAL at 07:51

## 2019-04-28 RX ADMIN — CAPTOPRIL 50 MG: 12.5 TABLET ORAL at 14:11

## 2019-04-28 RX ADMIN — HEPARIN SODIUM 2100 UNITS/HR: 10000 INJECTION, SOLUTION INTRAVENOUS at 09:22

## 2019-04-28 RX ADMIN — FOLIC ACID 1 MG: 1 TABLET ORAL at 07:52

## 2019-04-28 RX ADMIN — Medication 100 MG: at 07:52

## 2019-04-28 RX ADMIN — CAPTOPRIL 25 MG: 12.5 TABLET ORAL at 07:52

## 2019-04-28 RX ADMIN — ACETAMINOPHEN 325 MG: 325 TABLET, FILM COATED ORAL at 01:42

## 2019-04-28 RX ADMIN — Medication 2 G: at 05:38

## 2019-04-28 RX ADMIN — HYDRALAZINE HYDROCHLORIDE 10 MG: 10 TABLET, FILM COATED ORAL at 11:06

## 2019-04-28 RX ADMIN — AMOXICILLIN AND CLAVULANATE POTASSIUM 1 TABLET: 875; 125 TABLET, FILM COATED ORAL at 20:19

## 2019-04-28 RX ADMIN — HYDRALAZINE HYDROCHLORIDE 10 MG: 10 TABLET, FILM COATED ORAL at 22:19

## 2019-04-28 RX ADMIN — POTASSIUM CHLORIDE 20 MEQ: 750 TABLET, EXTENDED RELEASE ORAL at 20:19

## 2019-04-28 RX ADMIN — SODIUM NITROPRUSSIDE 0.75 MCG/KG/MIN: 25 INJECTION INTRAVENOUS at 10:17

## 2019-04-28 RX ADMIN — Medication 5 MG: at 20:19

## 2019-04-28 RX ADMIN — HEPARIN SODIUM 2100 UNITS/HR: 10000 INJECTION, SOLUTION INTRAVENOUS at 20:30

## 2019-04-28 RX ADMIN — CAPTOPRIL 25 MG: 12.5 TABLET ORAL at 05:38

## 2019-04-28 RX ADMIN — CAPTOPRIL 50 MG: 12.5 TABLET ORAL at 22:19

## 2019-04-28 RX ADMIN — AMOXICILLIN AND CLAVULANATE POTASSIUM 1 TABLET: 875; 125 TABLET, FILM COATED ORAL at 07:52

## 2019-04-28 RX ADMIN — HYDRALAZINE HYDROCHLORIDE 10 MG: 10 TABLET, FILM COATED ORAL at 14:11

## 2019-04-28 ASSESSMENT — ACTIVITIES OF DAILY LIVING (ADL)
ADLS_ACUITY_SCORE: 10

## 2019-04-28 ASSESSMENT — MIFFLIN-ST. JEOR: SCORE: 2092.69

## 2019-04-28 NOTE — PLAN OF CARE
Neuro - A/O x 4, Afebrile, Tylenol given for headache.  Cardiac - SR/ST , Mag replaced. Nipride gtt at 1.5. SVO2 from PICC was 59, CI 1.5, MD aware. Keep MAPs less than 90.  Respiratory - Room air, diminished lung sounds.  GI - 2.4g Na, 2L FR. Pt had a BM last evening.   - Voiding adequately.  Plan - Continue to monitor and notify MD of questions or concerns.

## 2019-04-28 NOTE — PLAN OF CARE
Pt is tolerating nipride wean today. Nipride is now at 0.25 mcg; CO/CI 4.2/1.8. Pt had an episode of cyanosis on his fingertips during ambulation today; MD updated. Plan is to continue with nipride wean with hopes of discontinuing nipride by tonight and to have an angiogram tomorrow. Pt will be NPO as of midnight; will also stop heparin drip then to avoid risk of bleeding. Pt's urine output was WDL; is now net negative 900 so far. Will continue to monitor.

## 2019-04-28 NOTE — PROGRESS NOTES
CLINICAL NUTRITION SERVICES - BRIEF NOTE    RD received consult 4/28 per pt/family request - regarding heart healthy diet education    Diet: Low Sat Fat, <2400 mg Na, 2000 mL FR    Nutrition Hx:  Pt states he consumes a large volume of food at each meal. 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 A typical day may consist of 8 eggs for breakfast, 4 servings of lasagna at lunch, and a whole bag (4 servings) of Caesar salad at dinner.     Interventions  Nutrition Education: Provided Education on a Heart Healthy Diet and Restricting Fluids. Encouraged pt to consume lean proteins, whole grains, low-fat dairy, low sodium foods, and adequate servings of fruits and vegetables. Provided examples of lean proteins, low sodium foods, and whole grains. Discussed sources of fluid and tips for tracking fluid intake. Encouraged ot to reach out with any questions related to diet prior to discharge.     Monitoring    RD will follow per protocol or if consulted before.     Sulema Loco, RD, LD  Wknd pgr [phone]

## 2019-04-28 NOTE — PROGRESS NOTES
Cardiology Progress Note  Ubaldo Velez MRN: [id number]  Age: 33 year old, : 19            Changes Today:     - Increase Captopril to 50mg q8h  - Start Hydralazine 10mg q8h   - Wean Nipride  - NPO at [facility] for Right and Left Heart Cath tomorrow  - Hold Heparin gtt at [facility]         Assessment and Plan:     Ubaldo Velez is a 34yo male with PMHx polysubstance abuse (tobacco, alcohol, cocaine, methamphetamines), ADHD who presented as a transfer from OSH for new CHF dx and concern for cardiogenic shock.      # New HFrEF (< 10%)  Presented to OSH with several weeks of URI sxs, LE edema, 20 lbs weight gain. BNP 4.6k, lactate 1.5, troponin 2.8 -> 2.169 (no chest pain, EKG sinus tach). Echo with EF 10%, severe global hypokinesia of LV, multiple (3+) apical mass-like clots in LV, severely decreased RV function, left atrium is severely dilated, 2-3+ MR, 3+ TR. No known CAD hx. No prior angiogram or stress test. Etiology of his heart failure is unclear at this time (likely polysubstance use), he will eventually need workup to evaluate for coronary disease. Warm on exam, mentating well, Lactate nl, HD stable.   - PICC, mixed venous  - Nitroprusside via PICC, wean  - Captopril 50mg q8h  - Hydralazine 10mg q8h   - Digoxin 125mcg   - Diuretics: holding for now.   - Hold beta blockers or ACE-I/ARB.  - Lipid panel (LA 2), HIV (negative), TSH (normal). UA + leuk esterase (culture negative), drug screen neg.  Iron studies nl.   - NPO at [facility] for Right and Left Heart Cath tomorrow      # LV Thrombus  - Heparin gtt (high intensity), hold at [facility]      # Leukocytosis  WBC 17.5 on admission, trending down, afebrile. Recent URI sxs improving. Given one dose of Levaquin at OSH prior to transfer. Pro-calcitonin 0.05. Did c/o tooth pain, CT dental with left mandibular second molar periapical abscess.   - Zosyn ( -> ), Augmentin ( -> 5/3)  - Influenza/RSV swab, UA all neg  - BCxs at OSH  NGTD, continue to monitor   - Dental consult re abscess, outpatient follow up to pull tooth.       # Polysubstance abuse (tobacco, alcohol, cocaine, methamphetamines)  - Urine toxicology neg  - Thiamine 100mg, Folic Acid 1mg daily      Access: PIV, PICC ordered   Diet/IVF: Cardiac diet, 2L fluid restriction, NPO at MN   DVT ppx: Heparin gtt, hold at [facility]  Disposition/Admission Status: Cards 2, ICU  CODE: FULL      Patient was discussed with staff attending, Dr. Anderson.     Tono Tirado MD  Internal Medicine, PGY-2  Cardiology Heart Failure Service             Subjective     No acute events overnight. Seen this morning, he feels well. Had a headache yesterday evening that improved with Tylenol. No other concerns. Denies fevers, chills, chest pain, shortness of breath, abdominal pain, n/v, bowel or urinary changes.           Objective     Vitals:  Temp:  [96.6  F (35.9  C)-98.4  F (36.9  C)] 96.8  F (36  C)  Heart Rate:  [] 105  Resp:  [14-18] 14  BP: ()/() 113/67  SpO2:  [91 %-98 %] 97 %  MAP: 70-80s    I/O: Net -1.7cc over past 24hrs    CVP 12, CO 3.7, CI 1.5, SVR 1200, SvO2 59%    Vitals:    04/26/19 0600 04/27/19 0600 04/28/19 0016   Weight: 98.3 kg (216 lb 11.4 oz) 107.6 kg (237 lb 3.4 oz) 107.5 kg (236 lb 15.9 oz)       Gen: NAD, laying in bed comfortably  HEENT: EOMI, PERRl, anicteric sclera   NECK: JVD not seen   CV: Tachycardic, S1 S2 nl, no m/r/g  Resp: CTAB, no crackles, wheezing, or rhonchi   Abdomen: soft, non-distended, non-tender, normoactive bowel sounds   Extremities: warm, trace edema, improved   Skin: no rashes or bruises on exposed skin, tattoos on arms   Neuro: AAOx3, no focal deficits   Psych: pleasant, cooperative           Data:      Labs reviewed.           Medications     Medications    amoxicillin-clavulanate  1 tablet Oral Q12H ANITA     captopril  25 mg Oral Once     captopril  50 mg Oral Q8H ANITA     digoxin  125 mcg Oral Daily     folic acid  1 mg Oral Daily      heparin lock flush  5-10 mL Intracatheter Q24H     melatonin  5 mg Oral At Bedtime     vitamin B1  100 mg Oral Daily       HEParin 2,100 Units/hr (04/28/19 0700)     nitroPRUsside (NIPRIDE) IV infusion ADULT MAX CONC 1.5 mcg/kg/min (04/28/19 0700)     - MEDICATION INSTRUCTIONS -

## 2019-04-29 ENCOUNTER — DOCUMENTATION ONLY (OUTPATIENT)
Dept: DENTISTRY | Facility: CLINIC | Age: 34
End: 2019-04-29

## 2019-04-29 LAB
ANION GAP SERPL CALCULATED.3IONS-SCNC: 6 MMOL/L (ref 3–14)
ANION GAP SERPL CALCULATED.3IONS-SCNC: 7 MMOL/L (ref 3–14)
BASE DEFICIT BLDV-SCNC: 1.1 MMOL/L
BASE EXCESS BLDV CALC-SCNC: 0.4 MMOL/L
BUN SERPL-MCNC: 22 MG/DL (ref 7–30)
BUN SERPL-MCNC: 22 MG/DL (ref 7–30)
CALCIUM SERPL-MCNC: 7.9 MG/DL (ref 8.5–10.1)
CALCIUM SERPL-MCNC: 8.7 MG/DL (ref 8.5–10.1)
CHLORIDE SERPL-SCNC: 105 MMOL/L (ref 94–109)
CHLORIDE SERPL-SCNC: 108 MMOL/L (ref 94–109)
CO2 SERPL-SCNC: 23 MMOL/L (ref 20–32)
CO2 SERPL-SCNC: 24 MMOL/L (ref 20–32)
CREAT SERPL-MCNC: 0.86 MG/DL (ref 0.66–1.25)
CREAT SERPL-MCNC: 1.03 MG/DL (ref 0.66–1.25)
ERYTHROCYTE [DISTWIDTH] IN BLOOD BY AUTOMATED COUNT: 14.5 % (ref 10–15)
ERYTHROCYTE [DISTWIDTH] IN BLOOD BY AUTOMATED COUNT: 14.6 % (ref 10–15)
GFR SERPL CREATININE-BSD FRML MDRD: >90 ML/MIN/{1.73_M2}
GFR SERPL CREATININE-BSD FRML MDRD: >90 ML/MIN/{1.73_M2}
GLUCOSE SERPL-MCNC: 106 MG/DL (ref 70–99)
GLUCOSE SERPL-MCNC: 139 MG/DL (ref 70–99)
HCO3 BLDV-SCNC: 24 MMOL/L (ref 21–28)
HCO3 BLDV-SCNC: 26 MMOL/L (ref 21–28)
HCT VFR BLD AUTO: 47.1 % (ref 40–53)
HCT VFR BLD AUTO: 50.4 % (ref 40–53)
HGB BLD-MCNC: 14.8 G/DL (ref 13.3–17.7)
HGB BLD-MCNC: 16.2 G/DL (ref 13.3–17.7)
INR PPP: 0.98 (ref 0.86–1.14)
LMWH PPP CHRO-ACNC: 0.11 IU/ML
LMWH PPP CHRO-ACNC: <0.1 IU/ML
MAGNESIUM SERPL-MCNC: 1.9 MG/DL (ref 1.6–2.3)
MAGNESIUM SERPL-MCNC: 2.3 MG/DL (ref 1.6–2.3)
MCH RBC QN AUTO: 30.3 PG (ref 26.5–33)
MCH RBC QN AUTO: 30.6 PG (ref 26.5–33)
MCHC RBC AUTO-ENTMCNC: 31.4 G/DL (ref 31.5–36.5)
MCHC RBC AUTO-ENTMCNC: 32.1 G/DL (ref 31.5–36.5)
MCV RBC AUTO: 94 FL (ref 78–100)
MCV RBC AUTO: 97 FL (ref 78–100)
O2/TOTAL GAS SETTING VFR VENT: 21 %
O2/TOTAL GAS SETTING VFR VENT: 21 %
OXYHGB MFR BLDV: 53 %
OXYHGB MFR BLDV: 69 %
PCO2 BLDV: 38 MM HG (ref 40–50)
PCO2 BLDV: 43 MM HG (ref 40–50)
PH BLDV: 7.39 PH (ref 7.32–7.43)
PH BLDV: 7.4 PH (ref 7.32–7.43)
PLATELET # BLD AUTO: 166 10E9/L (ref 150–450)
PLATELET # BLD AUTO: 180 10E9/L (ref 150–450)
PO2 BLDV: 28 MM HG (ref 25–47)
PO2 BLDV: 37 MM HG (ref 25–47)
POTASSIUM SERPL-SCNC: 4 MMOL/L (ref 3.4–5.3)
POTASSIUM SERPL-SCNC: 4.5 MMOL/L (ref 3.4–5.3)
RBC # BLD AUTO: 4.84 10E12/L (ref 4.4–5.9)
RBC # BLD AUTO: 5.34 10E12/L (ref 4.4–5.9)
SODIUM SERPL-SCNC: 135 MMOL/L (ref 133–144)
SODIUM SERPL-SCNC: 138 MMOL/L (ref 133–144)
WBC # BLD AUTO: 10.3 10E9/L (ref 4–11)
WBC # BLD AUTO: 10.4 10E9/L (ref 4–11)

## 2019-04-29 PROCEDURE — 93456 R HRT CORONARY ARTERY ANGIO: CPT | Mod: 26 | Performed by: INTERNAL MEDICINE

## 2019-04-29 PROCEDURE — 25000132 ZZH RX MED GY IP 250 OP 250 PS 637: Performed by: INTERNAL MEDICINE

## 2019-04-29 PROCEDURE — 25000128 H RX IP 250 OP 636: Performed by: INTERNAL MEDICINE

## 2019-04-29 PROCEDURE — C1887 CATHETER, GUIDING: HCPCS | Performed by: INTERNAL MEDICINE

## 2019-04-29 PROCEDURE — 85520 HEPARIN ASSAY: CPT | Performed by: INTERNAL MEDICINE

## 2019-04-29 PROCEDURE — 99152 MOD SED SAME PHYS/QHP 5/>YRS: CPT | Performed by: INTERNAL MEDICINE

## 2019-04-29 PROCEDURE — 80048 BASIC METABOLIC PNL TOTAL CA: CPT | Performed by: STUDENT IN AN ORGANIZED HEALTH CARE EDUCATION/TRAINING PROGRAM

## 2019-04-29 PROCEDURE — 82805 BLOOD GASES W/O2 SATURATION: CPT | Performed by: INTERNAL MEDICINE

## 2019-04-29 PROCEDURE — 27210794 ZZH OR GENERAL SUPPLY STERILE: Performed by: INTERNAL MEDICINE

## 2019-04-29 PROCEDURE — C1894 INTRO/SHEATH, NON-LASER: HCPCS | Performed by: INTERNAL MEDICINE

## 2019-04-29 PROCEDURE — 25000128 H RX IP 250 OP 636: Performed by: STUDENT IN AN ORGANIZED HEALTH CARE EDUCATION/TRAINING PROGRAM

## 2019-04-29 PROCEDURE — 25000132 ZZH RX MED GY IP 250 OP 250 PS 637: Performed by: STUDENT IN AN ORGANIZED HEALTH CARE EDUCATION/TRAINING PROGRAM

## 2019-04-29 PROCEDURE — 85610 PROTHROMBIN TIME: CPT | Performed by: INTERNAL MEDICINE

## 2019-04-29 PROCEDURE — 99233 SBSQ HOSP IP/OBS HIGH 50: CPT | Mod: 25 | Performed by: INTERNAL MEDICINE

## 2019-04-29 PROCEDURE — 4A023N6 MEASUREMENT OF CARDIAC SAMPLING AND PRESSURE, RIGHT HEART, PERCUTANEOUS APPROACH: ICD-10-PCS | Performed by: INTERNAL MEDICINE

## 2019-04-29 PROCEDURE — 93456 R HRT CORONARY ARTERY ANGIO: CPT | Performed by: INTERNAL MEDICINE

## 2019-04-29 PROCEDURE — 21400000 ZZH R&B CCU UMMC

## 2019-04-29 PROCEDURE — 85027 COMPLETE CBC AUTOMATED: CPT | Performed by: INTERNAL MEDICINE

## 2019-04-29 PROCEDURE — 25800030 ZZH RX IP 258 OP 636: Performed by: STUDENT IN AN ORGANIZED HEALTH CARE EDUCATION/TRAINING PROGRAM

## 2019-04-29 PROCEDURE — B2111ZZ FLUOROSCOPY OF MULTIPLE CORONARY ARTERIES USING LOW OSMOLAR CONTRAST: ICD-10-PCS | Performed by: INTERNAL MEDICINE

## 2019-04-29 PROCEDURE — 85027 COMPLETE CBC AUTOMATED: CPT | Performed by: STUDENT IN AN ORGANIZED HEALTH CARE EDUCATION/TRAINING PROGRAM

## 2019-04-29 PROCEDURE — 25000125 ZZHC RX 250: Performed by: INTERNAL MEDICINE

## 2019-04-29 PROCEDURE — 25000125 ZZHC RX 250: Performed by: STUDENT IN AN ORGANIZED HEALTH CARE EDUCATION/TRAINING PROGRAM

## 2019-04-29 PROCEDURE — 83735 ASSAY OF MAGNESIUM: CPT | Performed by: STUDENT IN AN ORGANIZED HEALTH CARE EDUCATION/TRAINING PROGRAM

## 2019-04-29 PROCEDURE — 85520 HEPARIN ASSAY: CPT | Performed by: STUDENT IN AN ORGANIZED HEALTH CARE EDUCATION/TRAINING PROGRAM

## 2019-04-29 PROCEDURE — 25000132 ZZH RX MED GY IP 250 OP 250 PS 637: Performed by: SURGERY

## 2019-04-29 PROCEDURE — 40000196 ZZH STATISTIC RAPCV CVP MONITORING

## 2019-04-29 RX ORDER — NICARDIPINE HYDROCHLORIDE 2.5 MG/ML
INJECTION INTRAVENOUS
Status: DISCONTINUED | OUTPATIENT
Start: 2019-04-29 | End: 2019-04-29 | Stop reason: HOSPADM

## 2019-04-29 RX ORDER — IOPAMIDOL 755 MG/ML
INJECTION, SOLUTION INTRAVASCULAR
Status: DISCONTINUED | OUTPATIENT
Start: 2019-04-29 | End: 2019-04-29 | Stop reason: HOSPADM

## 2019-04-29 RX ORDER — HEPARIN SODIUM 10000 [USP'U]/100ML
0-3500 INJECTION, SOLUTION INTRAVENOUS CONTINUOUS
Status: DISCONTINUED | OUTPATIENT
Start: 2019-04-29 | End: 2019-05-07

## 2019-04-29 RX ORDER — NALOXONE HYDROCHLORIDE 0.4 MG/ML
.1-.4 INJECTION, SOLUTION INTRAMUSCULAR; INTRAVENOUS; SUBCUTANEOUS
Status: DISCONTINUED | OUTPATIENT
Start: 2019-04-29 | End: 2019-04-29

## 2019-04-29 RX ORDER — WARFARIN SODIUM 5 MG/1
5 TABLET ORAL
Status: COMPLETED | OUTPATIENT
Start: 2019-04-29 | End: 2019-04-29

## 2019-04-29 RX ORDER — LISINOPRIL 10 MG/1
10 TABLET ORAL DAILY
Status: DISCONTINUED | OUTPATIENT
Start: 2019-04-29 | End: 2019-04-29

## 2019-04-29 RX ORDER — LIDOCAINE 40 MG/G
CREAM TOPICAL
Status: DISCONTINUED | OUTPATIENT
Start: 2019-04-29 | End: 2019-05-07 | Stop reason: HOSPADM

## 2019-04-29 RX ORDER — FENTANYL CITRATE 50 UG/ML
INJECTION, SOLUTION INTRAMUSCULAR; INTRAVENOUS
Status: DISCONTINUED | OUTPATIENT
Start: 2019-04-29 | End: 2019-04-29 | Stop reason: HOSPADM

## 2019-04-29 RX ORDER — LISINOPRIL 10 MG/1
10 TABLET ORAL 2 TIMES DAILY
Status: DISCONTINUED | OUTPATIENT
Start: 2019-04-30 | End: 2019-05-07 | Stop reason: HOSPADM

## 2019-04-29 RX ORDER — HEPARIN SODIUM 1000 [USP'U]/ML
INJECTION, SOLUTION INTRAVENOUS; SUBCUTANEOUS
Status: DISCONTINUED | OUTPATIENT
Start: 2019-04-29 | End: 2019-04-29 | Stop reason: HOSPADM

## 2019-04-29 RX ORDER — FENTANYL CITRATE 50 UG/ML
25-50 INJECTION, SOLUTION INTRAMUSCULAR; INTRAVENOUS
Status: ACTIVE | OUTPATIENT
Start: 2019-04-29 | End: 2019-04-30

## 2019-04-29 RX ORDER — SPIRONOLACTONE 25 MG
12.5 TABLET ORAL DAILY
Status: DISCONTINUED | OUTPATIENT
Start: 2019-04-29 | End: 2019-05-07 | Stop reason: HOSPADM

## 2019-04-29 RX ORDER — FLUMAZENIL 0.1 MG/ML
0.2 INJECTION, SOLUTION INTRAVENOUS
Status: ACTIVE | OUTPATIENT
Start: 2019-04-29 | End: 2019-04-30

## 2019-04-29 RX ORDER — NALOXONE HYDROCHLORIDE 0.4 MG/ML
.2-.4 INJECTION, SOLUTION INTRAMUSCULAR; INTRAVENOUS; SUBCUTANEOUS
Status: ACTIVE | OUTPATIENT
Start: 2019-04-29 | End: 2019-04-30

## 2019-04-29 RX ORDER — NITROGLYCERIN 5 MG/ML
VIAL (ML) INTRAVENOUS
Status: DISCONTINUED | OUTPATIENT
Start: 2019-04-29 | End: 2019-04-29 | Stop reason: HOSPADM

## 2019-04-29 RX ORDER — ATROPINE SULFATE 0.1 MG/ML
0.5 INJECTION INTRAVENOUS EVERY 5 MIN PRN
Status: ACTIVE | OUTPATIENT
Start: 2019-04-29 | End: 2019-04-30

## 2019-04-29 RX ADMIN — HEPARIN SODIUM 2100 UNITS/HR: 10000 INJECTION, SOLUTION INTRAVENOUS at 23:53

## 2019-04-29 RX ADMIN — POTASSIUM CHLORIDE 20 MEQ: 750 TABLET, EXTENDED RELEASE ORAL at 06:00

## 2019-04-29 RX ADMIN — DIGOXIN 125 MCG: 125 TABLET ORAL at 07:38

## 2019-04-29 RX ADMIN — HYDRALAZINE HYDROCHLORIDE 25 MG: 25 TABLET ORAL at 06:00

## 2019-04-29 RX ADMIN — WARFARIN SODIUM 5 MG: 5 TABLET ORAL at 20:58

## 2019-04-29 RX ADMIN — LISINOPRIL 10 MG: 10 TABLET ORAL at 14:45

## 2019-04-29 RX ADMIN — Medication 5 MG: at 20:58

## 2019-04-29 RX ADMIN — SODIUM NITROPRUSSIDE 0.25 MCG/KG/MIN: 25 INJECTION INTRAVENOUS at 03:13

## 2019-04-29 RX ADMIN — Medication 12.5 MG: at 00:23

## 2019-04-29 RX ADMIN — Medication 12.5 MG: at 10:41

## 2019-04-29 RX ADMIN — ASPIRIN 325 MG: 325 TABLET, DELAYED RELEASE ORAL at 09:07

## 2019-04-29 RX ADMIN — Medication 10 ML: at 16:46

## 2019-04-29 RX ADMIN — FOLIC ACID 1 MG: 1 TABLET ORAL at 07:38

## 2019-04-29 RX ADMIN — CAPTOPRIL 50 MG: 12.5 TABLET ORAL at 06:00

## 2019-04-29 RX ADMIN — HEPARIN SODIUM 1800 UNITS/HR: 10000 INJECTION, SOLUTION INTRAVENOUS at 16:43

## 2019-04-29 RX ADMIN — AMOXICILLIN AND CLAVULANATE POTASSIUM 1 TABLET: 875; 125 TABLET, FILM COATED ORAL at 07:38

## 2019-04-29 RX ADMIN — Medication 100 MG: at 07:38

## 2019-04-29 RX ADMIN — Medication 2 G: at 04:23

## 2019-04-29 RX ADMIN — AMOXICILLIN AND CLAVULANATE POTASSIUM 1 TABLET: 875; 125 TABLET, FILM COATED ORAL at 20:58

## 2019-04-29 ASSESSMENT — ACTIVITIES OF DAILY LIVING (ADL)
ADLS_ACUITY_SCORE: 10

## 2019-04-29 ASSESSMENT — MIFFLIN-ST. JEOR: SCORE: 2082.69

## 2019-04-29 NOTE — PHARMACY-ANTICOAGULATION SERVICE
Pharmacy Warfarin Consult Note     Pharmacy has been consulted to manage this patient s warfarin therapy.  Indication: Other - specify in comments(LV Thrombus)  Therapy Goal: INR 2-3  Provider/Team: Cards 2  Warfarin Prior to Admission: No  Significant drug interactions: (1) Heparin infusion: increased risk of bleeding (2) Augmentin: increased INR, increased risk of bleeding  Recent documented change in oral intake/nutrition: No  Dose Comments: LFTs WNL    INR   Date Value Ref Range Status   04/29/2019 0.98 0.86 - 1.14 Final       Recommend warfarin 5 mg today.  Pharmacy will monitor Ubaldo Velez daily and order warfarin doses to achieve specified goal.      Please contact pharmacy as soon as possible if the warfarin needs to be held for a procedure or if the warfarin goals change.      Radha Naylor, PharmD  April 29, 2019

## 2019-04-29 NOTE — PROGRESS NOTES
Dental Service Consultation        Ubaldo Velez MRN# [id number]   YOB: 1985 Age: 33 year old   Date of Admission: (Not on file)     Reason for consult: I was asked by Unit [facility] to evaluate this patient for tooth abscess in lower left           Assessment and Plan:   Assessment: non restorable tooth 18, extraction req'd    Plan: No urgent/immedate care required at this time  Extraction of tooth 18 to be done in an outpatient dental clinic setting  Call at least one week in advance to be seen at [facility] dental at [phone]             Chief Complaint:   Broken Lower left molar, no pain, been broken and stable for last 4 years when last dentist claimed to watch it only and not extract broken tooth    History is obtained from the patient         History of Present Illness:   This patient is a 33 year old male who presents to 4E cardiology or admitted to 4E with heart failure and possible future LVAD surgery               Past Medical History:     Past Medical History:   Diagnosis Date     ADHD      Cocaine use      Methamphetamine use (H)              Past Surgical History:     Past Surgical History:   Procedure Laterality Date     EXTRACTION(S) DENTAL      [place] teeth     HAND SURGERY Left     Laceration left index finger               Social History:     Social History     Tobacco Use     Smoking status: Current Every Day Smoker     Packs/day: 1.00     Years: 15.00     Pack years: 15.00     Smokeless tobacco: Never Used     Tobacco comment: Quit 1.5 weeks ago   Substance Use Topics     Alcohol use: Yes     Comment: 8-10 beers a day, now 1 beer a week.             Family History:     Family History   Problem Relation Age of Onset     Chronic Obstructive Pulmonary Disease Father      Multiple Sclerosis Maternal Aunt              Immunizations:     There is no immunization history on file for this patient.          Allergies:   No Known Allergies          Medications:     Current Facility-Administered  Medications Ordered in Epic   Medication Dose Route Frequency Last Rate Last Dose     acetaminophen (TYLENOL) tablet 325 mg  325 mg Oral Q4H PRN   325 mg at 04/28/19 0752     amoxicillin-clavulanate (AUGMENTIN) 875-125 MG per tablet 1 tablet  1 tablet Oral Q12H ANITA   1 tablet at 04/29/19 0738     digoxin (LANOXIN) tablet 125 mcg  125 mcg Oral Daily   125 mcg at 04/29/19 0738     folic acid (FOLVITE) tablet 1 mg  1 mg Oral Daily   1 mg at 04/29/19 0738     heparin bolus from infusion pump   Intravenous Q6H PRN   3,000 Units at 04/26/19 0510     heparin lock flush 10 UNIT/ML injection 2-5 mL  2-5 mL Intracatheter Once PRN         heparin lock flush 10 UNIT/ML injection 5-10 mL  5-10 mL Intracatheter Q24H   10 mL at 04/28/19 1844     heparin lock flush 10 UNIT/ML injection 5-10 mL  5-10 mL Intracatheter Q1H PRN         HOLD: metformin and metformin containing medications   Does not apply HOLD         lidocaine (LMX4) cream   Topical Once PRN         lidocaine (LMX4) cream   Topical Q1H PRN         lidocaine 1 % 0.1-1 mL  0.1-1 mL Other Q1H PRN         lisinopril (PRINIVIL/ZESTRIL) tablet 10 mg  10 mg Oral Daily         magnesium sulfate 2 g in NS intermittent infusion (PharMEDium or [facility] Cmpd)  2 g Intravenous Daily PRN   2 g at 04/29/19 0423     magnesium sulfate 4 g in 100 mL sterile water (premade)  4 g Intravenous Q4H PRN   4 g at 04/25/19 2103     melatonin tablet 5 mg  5 mg Oral At Bedtime   5 mg at 04/28/19 2019     naloxone (NARCAN) injection 0.1-0.4 mg  0.1-0.4 mg Intravenous Q2 Min PRN         nitroPRUsside (NIPRIDE) 100 mg, sodium thiosulfate 1,000 mg in D5W 62.5 mL IV infusion (conc: 1.6mg/mL)  0.25-5 mcg/kg/min Intravenous Continuous   Stopped at 04/29/19 0724     Patient is already receiving anticoagulation with heparin, enoxaparin (LOVENOX), warfarin (COUMADIN)  or other anticoagulant medication   Does not apply Continuous PRN         potassium chloride (KLOR-CON) Packet 20-40 mEq  20-40 mEq Oral or  Feeding Tube Q2H PRN         potassium chloride 10 mEq in 100 mL intermittent infusion with 10 mg lidocaine  10 mEq Intravenous Q1H PRN         potassium chloride 10 mEq in 100 mL sterile water intermittent infusion (premix)  10 mEq Intravenous Q1H PRN         potassium chloride 20 mEq in 50 mL intermittent infusion  20 mEq Intravenous Q1H PRN   20 mEq at 04/27/19 0140     potassium chloride ER (K-DUR/KLOR-CON M) CR tablet 20-40 mEq  20-40 mEq Oral Q2H PRN   20 mEq at 04/29/19 0600     sodium chloride (PF) 0.9% PF flush 10-20 mL  10-20 mL Intracatheter q1 min prn         sodium chloride (PF) 0.9% PF flush 5-50 mL  5-50 mL Intracatheter Once PRN         spironolactone (ALDACTONE) half-tab 12.5 mg  12.5 mg Oral Daily         vitamin B1 (THIAMINE) tablet 100 mg  100 mg Oral Daily   100 mg at 04/29/19 0738     No current Epic-ordered outpatient medications on file.             Review of Systems:   The 10 point Review of Systems is negative other than noted in the HPI            Physical Exam:   Vitals were reviewed Yes                       Head and neck exam: no lymphadenopathy, no swelling , no facial swellilng    Intraoral exam: no pus, FOM soft and non tender, no fistula, no erythma       Data:   Radiographic interpretation: Orthopantomogram taken on 4/26/19 : dental CT  Osseous pathology: apical luscency at apex of 18   Pulpal Pathology:non remarkable  Periodontal Pathology:non remarkable  Caries:large caries destroying clinical crown of 18  Odontogenic pathology:non remarkable    The patient was discussed with: RN for this patient 4/29/19 9:30 am      ADILIA Linares DMD  Pager: [phone]

## 2019-04-29 NOTE — PROGRESS NOTES
Cardiology - Cards 2 service    Ubaldo Velez is a 34 yo male with PMHx polysubstance abuse (tobacco, alcohol, cocaine, methamphetamines), ADHD who presented as a transfer from OSH for new CHF dx and concern for cardiogenic shock.     Interval events: On and off nipride overnight, now off. Given captopril this am. Hydralazine 25 mg as well.    MAP mid 80s.  Temp:  [97.5  F (36.4  C)-98.4  F (36.9  C)] 97.5  F (36.4  C)  Heart Rate:  [] 112  Resp:  [14-18] 14  BP: ()/() 97/76  SpO2:  [92 %-100 %] 94 %  Tele: No events.    Hemodynamics:  CVP 11, CI 1.5, SVR 2000.       I/O last 3 completed shifts:  In: 1697.08 [P.O.:1160; I.V.:537.08]  Out: 3375 [Urine:3375]    Hemodynamic drips: Nipride off.   Selected medications: captopril 50 TID, dig 125, hydralazine 25 q8.     Physical examination:  Vitals:    04/25/19 1229 04/26/19 0600 04/27/19 0600 04/28/19 0016   Weight: 105.4 kg (232 lb 5.8 oz) 98.3 kg (216 lb 11.4 oz) 107.6 kg (237 lb 3.4 oz) 107.5 kg (236 lb 15.9 oz)    04/29/19 0400   Weight: 106.5 kg (234 lb 12.6 oz)     Gen: NAD, laying in bed comfortably  HEENT: EOMI, PERRl, anicteric sclera   NECK: JVD not seen   CV: Tachycardic, S1 S2 nl, no m/r/g  Resp: CTAB, no crackles, wheezing, or rhonchi   Abdomen: soft, non-distended, non-tender, normoactive bowel sounds   Extremities: warm, trace edema, improved   Skin: no rashes or bruises on exposed skin, tattoos on arms   Neuro: AAOx3, no focal deficits   Psych: pleasant, cooperative     Labs were reviewed.  BMP  Recent Labs   Lab 04/29/19  0315 04/28/19  1634 04/28/19  0323 04/27/19  1658    135 138 135   POTASSIUM 4.0 4.0 4.2 3.9   CHLORIDE 108 106 105 104   ARUN 7.9* 8.4* 8.4* 8.4*   CO2 23 25 26 26   BUN 22 16 14 13   CR 0.86 0.94 0.89 0.94   * 105* 105* 133*     LFTs  Recent Labs   Lab 04/27/19  0447 04/26/19  1503 04/26/19  0352 04/24/19  1328   ALKPHOS 74 81 84 98   AST 41 48* 50* 68*   ALT 49 54 60 95*   BILITOTAL 0.6 0.6 0.7 0.9    PROTTOTAL 5.8* 5.9* 6.0* 6.2*   ALBUMIN 2.4* 2.4* 2.6* 2.7*      CBC  Recent Labs   Lab 04/29/19  0315 04/28/19  0323 04/27/19  0447 04/26/19  0352   WBC 10.4 12.9* 15.4* 14.9*   RBC 4.84 5.19 5.17 5.43   HGB 14.8 15.8 15.7 16.5   HCT 47.1 49.2 49.7 50.9   MCV 97 95 96 94   MCH 30.6 30.4 30.4 30.4   MCHC 31.4* 32.1 31.6 32.4   RDW 14.5 14.4 14.3 14.5    183 202 207     INRNo lab results found in last 7 days.    Radiology none        IMPRESSION & PLAN  Ubaldo Velez is a 34yo male with PMHx polysubstance abuse (tobacco, alcohol, cocaine, methamphetamines), ADHD who presented as a transfer from OSH for new CHF dx and concern for cardiogenic shock.      # New HFrEF (< 10%)  Presented to OSH with several weeks of URI sxs, LE edema, 20 lbs weight gain. BNP 4.6k, lactate 1.5, troponin 2.8 -> 2.169 (no chest pain, EKG sinus tach). Echo with EF 10%, severe global hypokinesia of LV, multiple (3+) apical mass-like clots in LV, severely decreased RV function, left atrium is severely dilated, 2-3+ MR, 3+ TR. No known CAD hx. No prior angiogram or stress test. Etiology of his heart failure is unclear at this time (likely polysubstance use), he will eventually need workup to evaluate for coronary disease. Warm on exam, mentating well, Lactate nl, HD stable.   - PICC, mixed venous 53.   - lisinopril 10 mg daily.   - Hydralazine 25mg q8h - switch to lisinopril and spiranolactone 12.5 daily.   - Digoxin 125mcg   - Diuretics: holding for now.   - Hold beta blockers or ACE-I/ARB.  - Lipid panel (LA 2), HIV (negative), TSH (normal). UA + leuk esterase (culture negative), drug screen neg.  Iron studies nl.   - Right and Left Heart Cath       # LV Thrombus  - Heparin gtt (high intensity), hold at [place]      # Leukocytosis  WBC 17.5 on admission, trending down, afebrile. Recent URI sxs improving. Given one dose of Levaquin at OSH prior to transfer. Pro-calcitonin 0.05. Did c/o tooth pain, CT dental with left mandibular second molar  periapical abscess.   - Zosyn (4/25 -> 4/27), Augmentin (4/27 -> 5/3)  - Influenza/RSV swab, UA all neg  - BCxs at OSH NGTD, continue to monitor   - Dental consult re abscess, outpatient follow up to pull tooth.       # Polysubstance abuse (tobacco, alcohol, cocaine, methamphetamines)  - Urine toxicology neg  - Thiamine 100mg, Folic Acid 1mg daily     Thank you for allowing me to care for this patient. This has been discussed with the attending physician.    Alberta Adams MD  Cardiology Fellow, PGY-5

## 2019-04-29 NOTE — PLAN OF CARE
Nipride gtt was stopped this morning. Pt was started on spironolactone and lisinopril today. Pt was taken to cath lab for angiogram; no occlusive thombus was observed. CO/CI 5.7/2.4. Pt was restarted on heparin gtt; anticipate switch to warfarin. R radial and RIJ sites WDL. TR band was discontinued; no bleeding noted. Pt's BP has remained stable, MAPs in the 80s, which is okay per MD Adams. Pt will receive lisinopril dose this evening. Pt was assisted with ambulation; provided education on lifestyle changes and what to expect with follow ups at outpatient visits.

## 2019-04-29 NOTE — PLAN OF CARE
Neuro - A/O x 4, Afebrile.   Cardiac - SR/ST , Mag, K replaced. Nipride gtt at 0.25. SVO2 from PICC was 55, CI 1.4, MD aware. Hydralazine increased to 25mg. Keep MAPs less than 90.  Respiratory - Room air, diminished lung sounds.  GI - NPO since midnight. Pt had a BM last evening.   - Voiding adequately.  Plan - RHC today. Continue to monitor and notify MD of questions or concerns.

## 2019-04-30 LAB
ANION GAP SERPL CALCULATED.3IONS-SCNC: 10 MMOL/L (ref 3–14)
ANION GAP SERPL CALCULATED.3IONS-SCNC: 12 MMOL/L (ref 3–14)
BACTERIA SPEC CULT: NO GROWTH
BACTERIA SPEC CULT: NO GROWTH
BUN SERPL-MCNC: 29 MG/DL (ref 7–30)
BUN SERPL-MCNC: 34 MG/DL (ref 7–30)
CALCIUM SERPL-MCNC: 8.9 MG/DL (ref 8.5–10.1)
CALCIUM SERPL-MCNC: 9.2 MG/DL (ref 8.5–10.1)
CHLORIDE SERPL-SCNC: 103 MMOL/L (ref 94–109)
CHLORIDE SERPL-SCNC: 105 MMOL/L (ref 94–109)
CO2 SERPL-SCNC: 22 MMOL/L (ref 20–32)
CO2 SERPL-SCNC: 26 MMOL/L (ref 20–32)
CREAT SERPL-MCNC: 1.01 MG/DL (ref 0.66–1.25)
CREAT SERPL-MCNC: 1.03 MG/DL (ref 0.66–1.25)
ERYTHROCYTE [DISTWIDTH] IN BLOOD BY AUTOMATED COUNT: 14.6 % (ref 10–15)
GFR SERPL CREATININE-BSD FRML MDRD: >90 ML/MIN/{1.73_M2}
GFR SERPL CREATININE-BSD FRML MDRD: >90 ML/MIN/{1.73_M2}
GLUCOSE SERPL-MCNC: 106 MG/DL (ref 70–99)
GLUCOSE SERPL-MCNC: 121 MG/DL (ref 70–99)
HCT VFR BLD AUTO: 51.8 % (ref 40–53)
HGB BLD-MCNC: 16.3 G/DL (ref 13.3–17.7)
INR PPP: 1.05 (ref 0.86–1.14)
LMWH PPP CHRO-ACNC: 0.33 IU/ML
Lab: NORMAL
Lab: NORMAL
MAGNESIUM SERPL-MCNC: 2.2 MG/DL (ref 1.6–2.3)
MAGNESIUM SERPL-MCNC: 2.3 MG/DL (ref 1.6–2.3)
MCH RBC QN AUTO: 30.4 PG (ref 26.5–33)
MCHC RBC AUTO-ENTMCNC: 31.5 G/DL (ref 31.5–36.5)
MCV RBC AUTO: 97 FL (ref 78–100)
PLATELET # BLD AUTO: 190 10E9/L (ref 150–450)
POTASSIUM SERPL-SCNC: 4.2 MMOL/L (ref 3.4–5.3)
POTASSIUM SERPL-SCNC: 4.7 MMOL/L (ref 3.4–5.3)
RBC # BLD AUTO: 5.37 10E12/L (ref 4.4–5.9)
SODIUM SERPL-SCNC: 136 MMOL/L (ref 133–144)
SODIUM SERPL-SCNC: 140 MMOL/L (ref 133–144)
SPECIMEN SOURCE: NORMAL
SPECIMEN SOURCE: NORMAL
WBC # BLD AUTO: 10.5 10E9/L (ref 4–11)

## 2019-04-30 PROCEDURE — 40000802 ZZH SITE CHECK

## 2019-04-30 PROCEDURE — 21400000 ZZH R&B CCU UMMC

## 2019-04-30 PROCEDURE — 40000141 ZZH STATISTIC PERIPHERAL IV START W/O US GUIDANCE

## 2019-04-30 PROCEDURE — 99232 SBSQ HOSP IP/OBS MODERATE 35: CPT | Mod: GC | Performed by: INTERNAL MEDICINE

## 2019-04-30 PROCEDURE — 25000132 ZZH RX MED GY IP 250 OP 250 PS 637: Performed by: SURGERY

## 2019-04-30 PROCEDURE — 36592 COLLECT BLOOD FROM PICC: CPT | Performed by: INTERNAL MEDICINE

## 2019-04-30 PROCEDURE — 25000128 H RX IP 250 OP 636: Performed by: INTERNAL MEDICINE

## 2019-04-30 PROCEDURE — 25000132 ZZH RX MED GY IP 250 OP 250 PS 637: Performed by: INTERNAL MEDICINE

## 2019-04-30 PROCEDURE — 25000132 ZZH RX MED GY IP 250 OP 250 PS 637: Performed by: STUDENT IN AN ORGANIZED HEALTH CARE EDUCATION/TRAINING PROGRAM

## 2019-04-30 PROCEDURE — 80048 BASIC METABOLIC PNL TOTAL CA: CPT | Performed by: INTERNAL MEDICINE

## 2019-04-30 PROCEDURE — 83735 ASSAY OF MAGNESIUM: CPT | Performed by: INTERNAL MEDICINE

## 2019-04-30 PROCEDURE — 85610 PROTHROMBIN TIME: CPT | Performed by: INTERNAL MEDICINE

## 2019-04-30 PROCEDURE — 99222 1ST HOSP IP/OBS MODERATE 55: CPT | Performed by: PSYCHIATRY & NEUROLOGY

## 2019-04-30 PROCEDURE — 85027 COMPLETE CBC AUTOMATED: CPT | Performed by: INTERNAL MEDICINE

## 2019-04-30 PROCEDURE — 85520 HEPARIN ASSAY: CPT | Performed by: INTERNAL MEDICINE

## 2019-04-30 PROCEDURE — 25000128 H RX IP 250 OP 636: Performed by: STUDENT IN AN ORGANIZED HEALTH CARE EDUCATION/TRAINING PROGRAM

## 2019-04-30 RX ORDER — WARFARIN SODIUM 5 MG/1
5 TABLET ORAL
Status: COMPLETED | OUTPATIENT
Start: 2019-04-30 | End: 2019-04-30

## 2019-04-30 RX ORDER — LISINOPRIL 10 MG/1
10 TABLET ORAL ONCE
Status: COMPLETED | OUTPATIENT
Start: 2019-04-30 | End: 2019-04-30

## 2019-04-30 RX ADMIN — FOLIC ACID 1 MG: 1 TABLET ORAL at 09:16

## 2019-04-30 RX ADMIN — Medication 100 MG: at 09:16

## 2019-04-30 RX ADMIN — LISINOPRIL 10 MG: 10 TABLET ORAL at 20:25

## 2019-04-30 RX ADMIN — AMOXICILLIN AND CLAVULANATE POTASSIUM 1 TABLET: 875; 125 TABLET, FILM COATED ORAL at 20:25

## 2019-04-30 RX ADMIN — LISINOPRIL 10 MG: 10 TABLET ORAL at 09:16

## 2019-04-30 RX ADMIN — HEPARIN SODIUM 2100 UNITS/HR: 10000 INJECTION, SOLUTION INTRAVENOUS at 22:06

## 2019-04-30 RX ADMIN — WARFARIN SODIUM 5 MG: 5 TABLET ORAL at 18:14

## 2019-04-30 RX ADMIN — AMOXICILLIN AND CLAVULANATE POTASSIUM 1 TABLET: 875; 125 TABLET, FILM COATED ORAL at 09:16

## 2019-04-30 RX ADMIN — Medication 12.5 MG: at 09:16

## 2019-04-30 RX ADMIN — LISINOPRIL 10 MG: 10 TABLET ORAL at 01:00

## 2019-04-30 RX ADMIN — Medication 5 MG: at 20:26

## 2019-04-30 RX ADMIN — DIGOXIN 125 MCG: 125 TABLET ORAL at 09:16

## 2019-04-30 RX ADMIN — Medication 10 ML: at 16:21

## 2019-04-30 RX ADMIN — HEPARIN SODIUM 2100 UNITS/HR: 10000 INJECTION, SOLUTION INTRAVENOUS at 11:00

## 2019-04-30 ASSESSMENT — ACTIVITIES OF DAILY LIVING (ADL)
ADLS_ACUITY_SCORE: 10

## 2019-04-30 ASSESSMENT — MIFFLIN-ST. JEOR: SCORE: 1968.2

## 2019-04-30 NOTE — PLAN OF CARE
Transfer  Transferred from: 4E  Via: wheel chair  Reason for transfer: Appropriate for 6C  Family: Aware of transfer  Belongings: Sent with pt  Chart: Sent with pt  Medications: Meds from bin sent with pt  Report called from: PRERNA Posada  Skin assessment: 2 RN skin assessment completed w/ PRERNA GARCIA

## 2019-04-30 NOTE — CONSULTS
Social Work Services Consult Note:     SW consulted for chemical dependency.  Cards 2 team to place an inpatient chem dep consult for Jane CRYTSAL to see while pt is admitted.  If CD  is unable to complete this need, unit SW to see pt for Rule 25 resources.  Please re-consult if other needs arise.    RODNEY Gloria  6C Unit   Phone: [phone]  Pager: [phone]  Unit: [phone]

## 2019-04-30 NOTE — PLAN OF CARE
VSS. Sinus Tach. A&O x4. No c/o dyspnea at rest or on exertion. No c/o pain. Heparin gtt infusing through PIV- 10a therapeutic this morning. Voiding without difficulty. Fair appetite. Slept off and on throughout the day. Up ad david in room and hallway. Continue with POC.

## 2019-04-30 NOTE — PROGRESS NOTES
Cardiology - Cards 2 service    Ubaldo Velez is a 34 yo male with PMHx polysubstance abuse (tobacco, alcohol, cocaine, methamphetamines), ADHD who presented as a transfer from OSH for new CHF dx and concern for cardiogenic shock.     Interval events: Put patient on lisinopril 10 yesterday, spironolactone. Will put on 10 BID, spironolactone.     MAP mid 80s.  Temp:  [97  F (36.1  C)-98.7  F (37.1  C)] 98.4  F (36.9  C)  Pulse:  [112-123] 112  Heart Rate:  [] 103  Resp:  [16-18] 18  BP: (101-136)/(62-98) 111/78  SpO2:  [94 %-100 %] 100 %  Tele: No events.      I/O last 3 completed shifts:  In: 991.56 [P.O.:640; I.V.:351.56]  Out: 2700 [Urine:2350; Emesis/NG output:350]    Hemodynamic drips: Nipride off.   Selected medications: lisinopril 10 BID, dig 125, spiranolactone 12.5 mg daily, heparin gtt, warfarin.     Physical examination:  Vitals:    04/26/19 0600 04/27/19 0600 04/28/19 0016 04/29/19 0400   Weight: 98.3 kg (216 lb 11.4 oz) 107.6 kg (237 lb 3.4 oz) 107.5 kg (236 lb 15.9 oz) 106.5 kg (234 lb 12.6 oz)    04/30/19 0000   Weight: 95.8 kg (211 lb 4.8 oz)     Gen: NAD, laying in bed comfortably  HEENT: EOMI, PERRl, anicteric sclera   NECK: JVD not seen   CV: Tachycardic, S1 S2 nl, no m/r/g  Resp: CTAB, no crackles, wheezing, or rhonchi   Abdomen: soft, non-distended, non-tender, normoactive bowel sounds   Extremities: warm, trace edema, improved   Skin: no rashes or bruises on exposed skin, tattoos on arms   Neuro: AAOx3, no focal deficits   Psych: pleasant, cooperative     Labs were reviewed.  BMP  Recent Labs   Lab 04/30/19  0601 04/29/19  1534 04/29/19  0315 04/28/19  1634    135 138 135   POTASSIUM 4.2 4.5 4.0 4.0   CHLORIDE 103 105 108 106   ARUN 8.9 8.7 7.9* 8.4*   CO2 26 24 23 25   BUN 29 22 22 16   CR 1.01 1.03 0.86 0.94   * 106* 139* 105*     LFTs  Recent Labs   Lab 04/27/19  0447 04/26/19  1503 04/26/19  0352 04/24/19  1328   ALKPHOS 74 81 84 98   AST 41 48* 50* 68*   ALT 49 54 60 95*    BILITOTAL 0.6 0.6 0.7 0.9   PROTTOTAL 5.8* 5.9* 6.0* 6.2*   ALBUMIN 2.4* 2.4* 2.6* 2.7*      CBC  Recent Labs   Lab 04/30/19  0601 04/29/19  1635 04/29/19  0315 04/28/19  0323   WBC 10.5 10.3 10.4 12.9*   RBC 5.37 5.34 4.84 5.19   HGB 16.3 16.2 14.8 15.8   HCT 51.8 50.4 47.1 49.2   MCV 97 94 97 95   MCH 30.4 30.3 30.6 30.4   MCHC 31.5 32.1 31.4* 32.1   RDW 14.6 14.6 14.5 14.4    180 166 183     INR  Recent Labs   Lab 04/30/19  0601 04/29/19  1635   INR 1.05 0.98       Radiology none        IMPRESSION & PLAN  Ubaldo Velez is a 32yo male with PMHx polysubstance abuse (tobacco, alcohol, cocaine, methamphetamines), ADHD who presented as a transfer from OSH for new CHF dx and concern for cardiogenic shock.      # New HFrEF (< 10%)  Presented to OSH with several weeks of URI sxs, LE edema, 20 lbs weight gain. BNP 4.6k, lactate 1.5, troponin 2.8 -> 2.169 (no chest pain, EKG sinus tach). Echo with EF 10%, severe global hypokinesia of LV, multiple (3+) apical mass-like clots in LV, severely decreased RV function, left atrium is severely dilated, 2-3+ MR, 3+ TR. No known CAD hx. No prior angiogram or stress test. Etiology of his heart failure is unclear at this time (likely polysubstance use), he will eventually need workup to evaluate for coronary disease. Warm on exam, mentating well, Lactate nl, HD stable.   - PICC, mixed venous 53.   - lisinopril 10 mg BID.   - spiranolactone 12.5 daily.   - Digoxin 125mcg   - Diuretics: holding for now.   - Hold beta blockers or ACE-I/ARB.  - Lipid panel (LA 2), HIV (negative), TSH (normal). UA + leuk esterase (culture negative), drug screen neg.  Iron studies nl.   - Right and Left Heart Cath done.      # LV Thrombus  - Heparin gtt (high intensity), warfarin dosing.      # Leukocytosis  WBC 17.5 on admission, trending down, afebrile. Recent URI sxs improving. Given one dose of Levaquin at OSH prior to transfer. Pro-calcitonin 0.05. Did c/o tooth pain, CT dental with left  mandibular second molar periapical abscess.   - Zosyn (4/25 -> 4/27), Augmentin (4/27 -> 5/3)  - Influenza/RSV swab, UA all neg  - BCxs at OSH NGTD, continue to monitor   - Dental consult re abscess, outpatient follow up to pull tooth.       # Polysubstance abuse (tobacco, alcohol, cocaine, methamphetamines)  - Urine toxicology neg  - Thiamine 100mg, Folic Acid 1mg daily     Thank you for allowing me to care for this patient. This has been discussed with the attending physician.    Alberta Adams MD  Cardiology Fellow, PGY-5

## 2019-04-30 NOTE — PLAN OF CARE
D/I: Patient on unit 4E Cardiovascular ICU w/ CHF exacerbation  Patient A/Ox4, BRAULIO rivera, standby assist. JEREMIAH. Baseline ongoing tachycardia 100-120s. BPs stable, MAPs >65, afebrile. Room air, LS coarse, diminished. Denies SOB. 2G sodium / 2L fluid restriction diet. 1 BM tonight, voids independently without difficulty. X2 lumen right PICC line, x2 PIV SL. Heparin @ 1800. Xa recheck sent at 22:00 today.   A: Stable   P: Patient transferred to [facility] at 23:30 today. Continue to monitor pt closely. Notify MD of significant changes.

## 2019-04-30 NOTE — CONSULTS
Consult Date:  04/30/2019      REQUESTING SOURCE:  Cardiology 2 team.      IDENTIFYING DATA:  This 33-year-old man is seen today for psychiatric and chemical dependency consultation regarding his history of heavy drinking associated with use of amphetamines and cocaine.      HISTORY OF PRESENT ILLNESS:  Mr. Velez has presented with acute systolic congestive heart failure.  He had weeks to months of progressive dyspnea as well as fluid retention, etc.  He has found to abuse amphetamines on a regular basis; he said he was prescribed Adderall while in the service, though he did not have much in the way of testing for ADHD.  They have not been prescribed since then, but he obtains them from a friend and normally takes 30 mg per day of Adderall.  Denies misuse of the Adderall, but does admit to occasionally using them intranasally.  He also normally drinks about a 6-pack of beer per day, going up to over a 12-pack per day over the weekends, particularly if he is socializing.  Occasional use of cocaine at social functions.  His partner of 6 years is critical of his alcohol use, he says primarily because he sits out in his truck drinking beer and smoking cigarettes, rather than interacting with their 4-year-old son.  No other consequences of drug or alcohol use since the DUI, at least until he presented with his current heart problems.     He has cut way back on alcohol intake over a month ago, also stopped using Adderall, and more recently cigarettes. No alcohol withdrawal during this hospitalization.      PAST PSYCHIATRIC/CHEMICAL USE HISTORY:  He denies any problems with depression, anxiety, mood swings or other psychiatric symptoms.  No prior use of psychotropic agents or psychotherapy.   Details of his chemical use history are reviewed above. Also, while in the service at around age 19, he was charged a DUI and following this was monitored with urine tox screens for 1 year following a brief outpatient treatment.   "He continued to use alcohol during that period of time.     PAST MEDICAL HISTORY:  Reports being healthy except for his drug and alcohol use problems.      SURGERY:  He has had dental extractions and repair of a left index finger laceration.      ALLERGIES:  NO KNOWN ALLERGIES TO MEDICATIONS.       OUTPATIENT PSYCHIATRIC MEDICATIONS:  Negative.      REVIEW OF SYSTEMS:  A 10-point review of systems today is negative except for some slight shortness of breath and exertional dyspnea.      FAMILY HISTORY:  He said both of his parents were drug addicts and alcoholics, but they are both abstinent long-term and his father remains active in Alcoholics Anonymous.  He denies any depression, bipolar illness, or schizophrenia in the family.      SOCIAL HISTORY:  He has a younger and older brother and says that his parents split up when he was quite young.  Due to his mother's drug and alcohol use, he ended up in care of his paternal grandmother, but after she passed away he ended up in foster care, I believe by around age 5.  He did graduate high school and went to trade school in "[employer] and then was 3 years in the [employer] with an honorable discharge.  He and his partner moved to [place] from [place] to be closer to her family 3 years ago.  They have a 4-year-old son and his wife has just recently started working.  He has been busy with his own [employer] company.      MENTAL STATUS EXAMINATION:  Sitting up in the hospital bed, is well groomed, pleasant, cooperative. Speech fluent. There is no rigidity of the extremities.  Associations tight.  Mood is \"okay.\"  Affect congruent.  Thought process logical, linear.  Thought content negative for suicidal thoughts or delusions.  Oriented x3.  Recent and remote memory, attention span and concentration are intact.  Fund of knowledge, use of language appropriate.  Insight and judgment fair.        VITAL SIGNS:  Blood pressure 117/89, pulse 113, temperature 97.7.      DIAGNOSIS:  " History of ADHD; diagnosis unlikely.                          Alcohol, cocaine, nicotine and amphetamine use disorders.      IMPRESSION:  He has clearly been using excessive alcohol on an ongoing basis for many years, with minimal insight into how out of usual range his intake is. Also abusing Adderall which he obtains from a friend, and occasional use of cocaine.  He suffered an MI several months ago likely due to acute vasospasm brought on by the stimulants or cocaine.  He does have some insight into the fact that he needs to stop drinking and using drugs, but he does minimize the impact of his alcohol use.  I had an opportunity to speak with his mother about their plans and she agreed that he needs to have some help in that regard, but she minimizes his need for longer term followup for sobriety.  He obviously we will, at minimum, need outpatient chemical dependency treatment with robust aftercare.  Plans for his sobriety should also be reviewed with his significant other.      RECOMMENDATIONS:  He should be set up for a chemical dependency evaluation through [facility] ([phone]) following discharge.  The most convenient location for them would be at [facility]. I did provide him with the contact information.    Page me at [phone] as needed.         MADHU FARRAR MD             D: 2019   T: 2019   MT: ZACHARY      Name:     CHRISTINE BARRAGAN   MRN:      [id number]        Account:       [id number]   :      1985           Consult Date:  2019      Document: [id number]

## 2019-04-30 NOTE — PLAN OF CARE
Temp: 98.4  F (36.9  C) Temp src: Axillary BP: 111/78 Pulse: 112 Heart Rate: 103 Resp: 18 SpO2: 100 % O2 Device: None (Room air)      D: New CHF. Hx polysubstance abuse (tobacco, ETOH, cocaine, methamphetamines), ADHD, dental decay.    I/A: Monitored vitals and assessed pt status. A&O x4. ST. MAPs , crosscover notified, 10mg Lisinopril given. Sats > 95% on RA. Denies pain, SOB, nausea. Heparin gtt at 2100 units/hr, 4000 unit bolus per protocol. 10A recheck this am. Tolerating 2g Na/ 2L FR. Voiding adequate amount. Up SBA/independent. Sleeping between cares.    P: Maintain MAPs < 90. Plan for PLC coumadin teaching. Continue to monitor and follow POC. Notify Cards 2 with changes.

## 2019-04-30 NOTE — CONSULTS
Patient seen and chart reviewed. Note dictated (initial)   The patient was told to call [facility]; [phone] for information regarding chemical dependency treatment options (or can be set up by  with patient's input).   page me at [phone] as needed      OTHER RESOURCES:   AA/NA and Sponsors are excellent resources for support and you can find one at any support group meeting.   http://www.aastpaul.org (then click meetings) This for the [facility] AA meetings  http://a[place].org/meetings/   This is for the [facility] AA meetings  http://www.nam[place].us (then click find a  Meeting) This is for [facility] NA   SMART Recovery - self management for addiction recovery:  www.Stipple.org  Pathways ~ A Health Crisis Resource & Support Center:  990.930.8349.  https://prescribetoprevent.org/patient-education/videos/  http://www.harmreduction.org  [facility] 309-872-5398  Support Group:  AA/NA and Sponsor/support.  National [place] on Mental Illness (www.[place].lisa.org): 153.205.9894 or 022-711-6319.  Alcoholics Anonymous (www.alcoholics-anonymous.org): Check your phone book for your local chapter.  Suicide Awareness Voices of Education (SAVE) (www.save.org): 567-062-ZIHM (7283)  National Suicide Prevention Line (www.mentalhealthmn.org): 330-425-FHME (2783)  Mental Health Consumer/Survivor Network of [place] (www.mhcsn.net): 709.359.3977 or 491-952-6077  Mental Health Association of [place] (www.mentalhealth.org): 847.184.8693 or 854-449-7600   Substance Abuse and Mental Health Services (www.samhsa.gov)     [place] Recovery [facility] (MRC)  [facility] connects people seeking recovery to resources that help foster and sustain long-term recovery.  Whether you are seeking resources for treatment, transportation, housing, job training, education, health care or other pathways to recovery, [facility] is a great place to start.  [phone].  Www.[facility].org

## 2019-05-01 LAB
ANION GAP SERPL CALCULATED.3IONS-SCNC: 11 MMOL/L (ref 3–14)
ANION GAP SERPL CALCULATED.3IONS-SCNC: 8 MMOL/L (ref 3–14)
BUN SERPL-MCNC: 38 MG/DL (ref 7–30)
BUN SERPL-MCNC: 41 MG/DL (ref 7–30)
CALCIUM SERPL-MCNC: 9 MG/DL (ref 8.5–10.1)
CALCIUM SERPL-MCNC: 9.1 MG/DL (ref 8.5–10.1)
CHLORIDE SERPL-SCNC: 102 MMOL/L (ref 94–109)
CHLORIDE SERPL-SCNC: 103 MMOL/L (ref 94–109)
CO2 SERPL-SCNC: 20 MMOL/L (ref 20–32)
CO2 SERPL-SCNC: 26 MMOL/L (ref 20–32)
CREAT SERPL-MCNC: 1.06 MG/DL (ref 0.66–1.25)
CREAT SERPL-MCNC: 1.16 MG/DL (ref 0.66–1.25)
DIGOXIN SERPL-MCNC: 0.3 UG/L (ref 0.5–2)
ERYTHROCYTE [DISTWIDTH] IN BLOOD BY AUTOMATED COUNT: 14.3 % (ref 10–15)
GFR SERPL CREATININE-BSD FRML MDRD: 81 ML/MIN/{1.73_M2}
GFR SERPL CREATININE-BSD FRML MDRD: >90 ML/MIN/{1.73_M2}
GLUCOSE SERPL-MCNC: 89 MG/DL (ref 70–99)
GLUCOSE SERPL-MCNC: 95 MG/DL (ref 70–99)
HCT VFR BLD AUTO: 54.6 % (ref 40–53)
HGB BLD-MCNC: 17.3 G/DL (ref 13.3–17.7)
INR PPP: 0.98 (ref 0.86–1.14)
LMWH PPP CHRO-ACNC: 0.47 IU/ML
MAGNESIUM SERPL-MCNC: 2.4 MG/DL (ref 1.6–2.3)
MAGNESIUM SERPL-MCNC: 2.6 MG/DL (ref 1.6–2.3)
MCH RBC QN AUTO: 30.6 PG (ref 26.5–33)
MCHC RBC AUTO-ENTMCNC: 31.7 G/DL (ref 31.5–36.5)
MCV RBC AUTO: 97 FL (ref 78–100)
PLATELET # BLD AUTO: 186 10E9/L (ref 150–450)
POTASSIUM SERPL-SCNC: 4.5 MMOL/L (ref 3.4–5.3)
POTASSIUM SERPL-SCNC: 4.7 MMOL/L (ref 3.4–5.3)
RBC # BLD AUTO: 5.66 10E12/L (ref 4.4–5.9)
SODIUM SERPL-SCNC: 135 MMOL/L (ref 133–144)
SODIUM SERPL-SCNC: 136 MMOL/L (ref 133–144)
WBC # BLD AUTO: 10.7 10E9/L (ref 4–11)

## 2019-05-01 PROCEDURE — 21400000 ZZH R&B CCU UMMC

## 2019-05-01 PROCEDURE — 25000132 ZZH RX MED GY IP 250 OP 250 PS 637: Performed by: INTERNAL MEDICINE

## 2019-05-01 PROCEDURE — 85027 COMPLETE CBC AUTOMATED: CPT | Performed by: INTERNAL MEDICINE

## 2019-05-01 PROCEDURE — 80162 ASSAY OF DIGOXIN TOTAL: CPT | Performed by: INTERNAL MEDICINE

## 2019-05-01 PROCEDURE — 25000128 H RX IP 250 OP 636: Performed by: STUDENT IN AN ORGANIZED HEALTH CARE EDUCATION/TRAINING PROGRAM

## 2019-05-01 PROCEDURE — 85610 PROTHROMBIN TIME: CPT | Performed by: INTERNAL MEDICINE

## 2019-05-01 PROCEDURE — 83735 ASSAY OF MAGNESIUM: CPT | Performed by: INTERNAL MEDICINE

## 2019-05-01 PROCEDURE — 36592 COLLECT BLOOD FROM PICC: CPT | Performed by: INTERNAL MEDICINE

## 2019-05-01 PROCEDURE — 25000128 H RX IP 250 OP 636: Performed by: INTERNAL MEDICINE

## 2019-05-01 PROCEDURE — 80048 BASIC METABOLIC PNL TOTAL CA: CPT | Performed by: INTERNAL MEDICINE

## 2019-05-01 PROCEDURE — 40000802 ZZH SITE CHECK

## 2019-05-01 PROCEDURE — 25000132 ZZH RX MED GY IP 250 OP 250 PS 637: Performed by: SURGERY

## 2019-05-01 PROCEDURE — 25000132 ZZH RX MED GY IP 250 OP 250 PS 637: Performed by: STUDENT IN AN ORGANIZED HEALTH CARE EDUCATION/TRAINING PROGRAM

## 2019-05-01 PROCEDURE — 85520 HEPARIN ASSAY: CPT | Performed by: INTERNAL MEDICINE

## 2019-05-01 PROCEDURE — 99232 SBSQ HOSP IP/OBS MODERATE 35: CPT | Mod: GC | Performed by: INTERNAL MEDICINE

## 2019-05-01 RX ORDER — HYDRALAZINE HYDROCHLORIDE 10 MG/1
10 TABLET, FILM COATED ORAL
Status: DISCONTINUED | OUTPATIENT
Start: 2019-05-01 | End: 2019-05-02

## 2019-05-01 RX ORDER — WARFARIN SODIUM 7.5 MG/1
7.5 TABLET ORAL
Status: COMPLETED | OUTPATIENT
Start: 2019-05-01 | End: 2019-05-01

## 2019-05-01 RX ADMIN — HEPARIN SODIUM 2100 UNITS/HR: 10000 INJECTION, SOLUTION INTRAVENOUS at 20:20

## 2019-05-01 RX ADMIN — Medication 100 MG: at 09:15

## 2019-05-01 RX ADMIN — HYDRALAZINE HYDROCHLORIDE 10 MG: 10 TABLET, FILM COATED ORAL at 19:22

## 2019-05-01 RX ADMIN — FOLIC ACID 1 MG: 1 TABLET ORAL at 09:15

## 2019-05-01 RX ADMIN — WARFARIN SODIUM 7.5 MG: 7.5 TABLET ORAL at 19:22

## 2019-05-01 RX ADMIN — LISINOPRIL 10 MG: 10 TABLET ORAL at 19:22

## 2019-05-01 RX ADMIN — HEPARIN SODIUM 2100 UNITS/HR: 10000 INJECTION, SOLUTION INTRAVENOUS at 09:08

## 2019-05-01 RX ADMIN — LISINOPRIL 10 MG: 10 TABLET ORAL at 09:15

## 2019-05-01 RX ADMIN — Medication 12.5 MG: at 09:15

## 2019-05-01 RX ADMIN — DIGOXIN 125 MCG: 125 TABLET ORAL at 09:15

## 2019-05-01 RX ADMIN — AMOXICILLIN AND CLAVULANATE POTASSIUM 1 TABLET: 875; 125 TABLET, FILM COATED ORAL at 19:22

## 2019-05-01 RX ADMIN — Medication 5 ML: at 19:23

## 2019-05-01 RX ADMIN — HYDRALAZINE HYDROCHLORIDE 10 MG: 10 TABLET, FILM COATED ORAL at 13:56

## 2019-05-01 RX ADMIN — AMOXICILLIN AND CLAVULANATE POTASSIUM 1 TABLET: 875; 125 TABLET, FILM COATED ORAL at 09:15

## 2019-05-01 RX ADMIN — Medication 5 MG: at 19:22

## 2019-05-01 ASSESSMENT — ACTIVITIES OF DAILY LIVING (ADL)
ADLS_ACUITY_SCORE: 12

## 2019-05-01 ASSESSMENT — MIFFLIN-ST. JEOR: SCORE: 1948.7

## 2019-05-01 NOTE — PROGRESS NOTES
Social Work Services Progress Note    Hospital Day: 6  Date of Initial Social Work Evaluation:  Not completed  Collaborated with:  Pt, Jane Holm [facility]    Data:  Pt is a 33 year-old man admitted to . SW consulted for substance use resources.    Intervention:  [facility] contacted SW in response to referral placed yesterday. [facility] confirmed that she will meet with pt tomorrow 5/2 at 10 a.m. And asked SW to provide pt with forms to get started.    SW followed up with pt and provided forms. Pt voiced understanding and knows [facility] will come see him tomorrow at 10:00. Pt also says his girlfriend put in an application for MA and has been told he qualifies, so he is hopeful that his insurance concerns will be resolved soon.    Assessment:  Pt remains hospitalized at this time, will see [facility] tomorrow.    Plan:    Anticipated Disposition:  TBD    Barriers to d/c plan:  Medical readiness, dispo plan    Follow Up:  SW will continue to remain available for discharge planning, other resources and support PRN.    Kim Larsen, NAVID, [facility]   Pager: [phone]

## 2019-05-01 NOTE — PLAN OF CARE
VSS. A&Ox4. Up ad david in hallway and room- ambulating frequently. Received education on coumadin from patient learning center. Heparin 10a level therapeutic. No c/o pain. Continue with POC.

## 2019-05-01 NOTE — CONSULTS
05/01/19 1014 Claudette Goins, PRERNA       Patient seen at bedside for Warfarin class.         No education note entered.

## 2019-05-01 NOTE — PLAN OF CARE
Temp: 98.6  F (37  C) Temp src: Oral BP: 100/71 Pulse: 113 Heart Rate: 101 Resp: 16 SpO2: 98 % O2 Device: None (Room air)       D: New CHF. Hx polysubstance abuse (tobacco, ETOH, cocaine, methamphetamines), ADHD, dental decay.     I/A: Monitored vitals and assessed pt status. A&O x4. VSS see flowsheet. ST. RA. Denies pain, SOB, nausea. Heparin gtt at 2100 units/hr. 10A recheck this am. Tolerating 2g Na/ 2L FR. Voiding adequate amount. Up independently. Sleeping between cares.     P: Maintain MAPs < 90. Plan for PLC coumadin teaching. Continue to monitor and follow POC. Notify Cards 2 with changes.

## 2019-05-01 NOTE — PROGRESS NOTES
Cardiology - Cards 2 service    Ubaldo Velez is a 34 yo male with PMHx polysubstance abuse (tobacco, alcohol, cocaine, methamphetamines), ADHD who presented as a transfer from OSH for new CHF dx and concern for cardiogenic shock.     Interval events: Doing well this am.     MAP mid 80s.  Temp:  [97.7  F (36.5  C)-98.6  F (37  C)] 98.6  F (37  C)  Pulse:  [102-113] 113  Heart Rate:  [101-119] 101  Resp:  [16-18] 16  BP: (100-125)/(71-88) 100/71  SpO2:  [96 %-98 %] 98 %  Tele: No events.      I/O last 3 completed shifts:  In: 1967 [P.O.:1560; I.V.:407]  Out: 1875 [Urine:1875]    Hemodynamic drips: Nipride off.   Selected medications: lisinopril 10 BID, dig 125, spiranolactone 12.5 mg daily, heparin gtt, warfarin.     Physical examination:  Vitals:    04/27/19 0600 04/28/19 0016 04/29/19 0400 04/30/19 0000   Weight: 107.6 kg (237 lb 3.4 oz) 107.5 kg (236 lb 15.9 oz) 106.5 kg (234 lb 12.6 oz) 95.8 kg (211 lb 4.8 oz)    05/01/19 0000   Weight: 93.9 kg (207 lb)     Gen: NAD, laying in bed comfortably  HEENT: EOMI, PERRl, anicteric sclera   NECK: JVD not seen   CV: Tachycardic, S1 S2 nl, no m/r/g  Resp: CTAB, no crackles, wheezing, or rhonchi   Abdomen: soft, non-distended, non-tender, normoactive bowel sounds   Extremities: warm, trace edema, improved   Skin: no rashes or bruises on exposed skin, tattoos on arms   Neuro: AAOx3, no focal deficits   Psych: pleasant, cooperative     Labs were reviewed.  BMP  Recent Labs   Lab 05/01/19  0523 04/30/19  1905 04/30/19  0601 04/29/19  1534    136 140 135   POTASSIUM 4.5 4.7 4.2 4.5   CHLORIDE 102 105 103 105   ARUN 9.1 9.2 8.9 8.7   CO2 26 22 26 24   BUN 38* 34* 29 22   CR 1.06 1.03 1.01 1.03   GLC 89 121* 106* 106*     LFTs  Recent Labs   Lab 04/27/19  0447 04/26/19  1503 04/26/19  0352 04/24/19  1328   ALKPHOS 74 81 84 98   AST 41 48* 50* 68*   ALT 49 54 60 95*   BILITOTAL 0.6 0.6 0.7 0.9   PROTTOTAL 5.8* 5.9* 6.0* 6.2*   ALBUMIN 2.4* 2.4* 2.6* 2.7*      CBC  Recent Labs    Lab 05/01/19  0523 04/30/19  0601 04/29/19  1635 04/29/19  0315   WBC 10.7 10.5 10.3 10.4   RBC 5.66 5.37 5.34 4.84   HGB 17.3 16.3 16.2 14.8   HCT 54.6* 51.8 50.4 47.1   MCV 97 97 94 97   MCH 30.6 30.4 30.3 30.6   MCHC 31.7 31.5 32.1 31.4*   RDW 14.3 14.6 14.6 14.5    190 180 166     INR  Recent Labs   Lab 05/01/19  0523 04/30/19  0601 04/29/19  1635   INR 0.98 1.05 0.98       Radiology none        IMPRESSION & PLAN  Ubaldo Velez is a 32yo male with PMHx polysubstance abuse (tobacco, alcohol, cocaine, methamphetamines), ADHD who presented as a transfer from OSH for new CHF dx and concern for cardiogenic shock.      # New HFrEF (< 10%)  Presented to OSH with several weeks of URI sxs, LE edema, 20 lbs weight gain. BNP 4.6k, lactate 1.5, troponin 2.8 -> 2.169 (no chest pain, EKG sinus tach). Echo with EF 10%, severe global hypokinesia of LV, multiple (3+) apical mass-like clots in LV, severely decreased RV function, left atrium is severely dilated, 2-3+ MR, 3+ TR. No known CAD hx. No prior angiogram or stress test. Etiology of his heart failure is unclear at this time (likely polysubstance use), he will eventually need workup to evaluate for coronary disease. Warm on exam, mentating well, Lactate nl, HD stable.   - lisinopril 10 mg BID.   - added hydralazine 10 mg TID today (no increase in lisinopril given K rising).   - spiranolactone 12.5 daily.   - Digoxin 125mcg   - Diuretics: holding for now.   - Hold beta blockers or ACE-I/ARB.  - Lipid panel (LA 2), HIV (negative), TSH (normal). UA + leuk esterase (culture negative), drug screen neg.  Iron studies nl.   - Right and Left Heart Cath done.      # LV Thrombus  - Heparin gtt (high intensity), warfarin dosing.      # Leukocytosis  WBC 17.5 on admission, trending down, afebrile. Recent URI sxs improving. Given one dose of Levaquin at OSH prior to transfer. Pro-calcitonin 0.05. Did c/o tooth pain, CT dental with left mandibular second molar periapical  abscess.   - Zosyn (4/25 -> 4/27), Augmentin (4/27 -> 5/3)  - Influenza/RSV swab, UA all neg  - BCxs at OSH NGTD, continue to monitor   - Dental consult re abscess, outpatient follow up to pull tooth.       # Polysubstance abuse (tobacco, alcohol, cocaine, methamphetamines)  - Urine toxicology neg  - Thiamine 100mg, Folic Acid 1mg daily   - chemical dependency consult.    Thank you for allowing me to care for this patient. This has been discussed with the attending physician.    Alberta Adams MD  Cardiology Fellow, PGY-5

## 2019-05-02 LAB
ANION GAP SERPL CALCULATED.3IONS-SCNC: 9 MMOL/L (ref 3–14)
BUN SERPL-MCNC: 37 MG/DL (ref 7–30)
CALCIUM SERPL-MCNC: 9.3 MG/DL (ref 8.5–10.1)
CHLORIDE SERPL-SCNC: 103 MMOL/L (ref 94–109)
CO2 SERPL-SCNC: 23 MMOL/L (ref 20–32)
CREAT SERPL-MCNC: 1.11 MG/DL (ref 0.66–1.25)
ERYTHROCYTE [DISTWIDTH] IN BLOOD BY AUTOMATED COUNT: 14.4 % (ref 10–15)
GFR SERPL CREATININE-BSD FRML MDRD: 86 ML/MIN/{1.73_M2}
GLUCOSE SERPL-MCNC: 103 MG/DL (ref 70–99)
HCT VFR BLD AUTO: 54 % (ref 40–53)
HGB BLD-MCNC: 17.2 G/DL (ref 13.3–17.7)
INR PPP: 1.02 (ref 0.86–1.14)
LMWH PPP CHRO-ACNC: 0.6 IU/ML
MAGNESIUM SERPL-MCNC: 2.4 MG/DL (ref 1.6–2.3)
MCH RBC QN AUTO: 30.1 PG (ref 26.5–33)
MCHC RBC AUTO-ENTMCNC: 31.9 G/DL (ref 31.5–36.5)
MCV RBC AUTO: 94 FL (ref 78–100)
PLATELET # BLD AUTO: 197 10E9/L (ref 150–450)
POTASSIUM SERPL-SCNC: 4.6 MMOL/L (ref 3.4–5.3)
RBC # BLD AUTO: 5.72 10E12/L (ref 4.4–5.9)
SODIUM SERPL-SCNC: 135 MMOL/L (ref 133–144)
WBC # BLD AUTO: 10.7 10E9/L (ref 4–11)

## 2019-05-02 PROCEDURE — 85610 PROTHROMBIN TIME: CPT | Performed by: INTERNAL MEDICINE

## 2019-05-02 PROCEDURE — 25000132 ZZH RX MED GY IP 250 OP 250 PS 637: Performed by: INTERNAL MEDICINE

## 2019-05-02 PROCEDURE — 40000802 ZZH SITE CHECK

## 2019-05-02 PROCEDURE — 21400000 ZZH R&B CCU UMMC

## 2019-05-02 PROCEDURE — 80048 BASIC METABOLIC PNL TOTAL CA: CPT | Performed by: INTERNAL MEDICINE

## 2019-05-02 PROCEDURE — 25000128 H RX IP 250 OP 636: Performed by: STUDENT IN AN ORGANIZED HEALTH CARE EDUCATION/TRAINING PROGRAM

## 2019-05-02 PROCEDURE — 25000128 H RX IP 250 OP 636: Performed by: INTERNAL MEDICINE

## 2019-05-02 PROCEDURE — 83735 ASSAY OF MAGNESIUM: CPT | Performed by: INTERNAL MEDICINE

## 2019-05-02 PROCEDURE — 85027 COMPLETE CBC AUTOMATED: CPT | Performed by: INTERNAL MEDICINE

## 2019-05-02 PROCEDURE — 40000007 ZZH STATISTIC ADULT CD FACE TO FACE-NO CHRG

## 2019-05-02 PROCEDURE — 25000132 ZZH RX MED GY IP 250 OP 250 PS 637: Performed by: STUDENT IN AN ORGANIZED HEALTH CARE EDUCATION/TRAINING PROGRAM

## 2019-05-02 PROCEDURE — 99232 SBSQ HOSP IP/OBS MODERATE 35: CPT | Mod: GC | Performed by: INTERNAL MEDICINE

## 2019-05-02 PROCEDURE — 25000132 ZZH RX MED GY IP 250 OP 250 PS 637: Performed by: SURGERY

## 2019-05-02 PROCEDURE — 85520 HEPARIN ASSAY: CPT | Performed by: INTERNAL MEDICINE

## 2019-05-02 RX ORDER — HYDRALAZINE HYDROCHLORIDE 25 MG/1
25 TABLET, FILM COATED ORAL
Status: DISCONTINUED | OUTPATIENT
Start: 2019-05-02 | End: 2019-05-07 | Stop reason: HOSPADM

## 2019-05-02 RX ORDER — DIGOXIN 125 MCG
250 TABLET ORAL DAILY
Status: DISCONTINUED | OUTPATIENT
Start: 2019-05-03 | End: 2019-05-07 | Stop reason: HOSPADM

## 2019-05-02 RX ADMIN — LISINOPRIL 10 MG: 10 TABLET ORAL at 08:23

## 2019-05-02 RX ADMIN — HYDRALAZINE HYDROCHLORIDE 25 MG: 25 TABLET ORAL at 17:11

## 2019-05-02 RX ADMIN — HYDRALAZINE HYDROCHLORIDE 25 MG: 25 TABLET ORAL at 12:12

## 2019-05-02 RX ADMIN — FOLIC ACID 1 MG: 1 TABLET ORAL at 08:24

## 2019-05-02 RX ADMIN — Medication 12.5 MG: at 08:23

## 2019-05-02 RX ADMIN — HEPARIN SODIUM 2100 UNITS/HR: 10000 INJECTION, SOLUTION INTRAVENOUS at 20:16

## 2019-05-02 RX ADMIN — HYDRALAZINE HYDROCHLORIDE 10 MG: 10 TABLET, FILM COATED ORAL at 08:24

## 2019-05-02 RX ADMIN — DIGOXIN 125 MCG: 125 TABLET ORAL at 08:22

## 2019-05-02 RX ADMIN — LISINOPRIL 10 MG: 10 TABLET ORAL at 20:14

## 2019-05-02 RX ADMIN — WARFARIN SODIUM 12.5 MG: 10 TABLET ORAL at 17:11

## 2019-05-02 RX ADMIN — AMOXICILLIN AND CLAVULANATE POTASSIUM 1 TABLET: 875; 125 TABLET, FILM COATED ORAL at 08:23

## 2019-05-02 RX ADMIN — Medication 10 ML: at 17:11

## 2019-05-02 RX ADMIN — HEPARIN SODIUM 2100 UNITS/HR: 10000 INJECTION, SOLUTION INTRAVENOUS at 09:53

## 2019-05-02 RX ADMIN — Medication 5 MG: at 20:14

## 2019-05-02 RX ADMIN — Medication 100 MG: at 08:22

## 2019-05-02 RX ADMIN — AMOXICILLIN AND CLAVULANATE POTASSIUM 1 TABLET: 875; 125 TABLET, FILM COATED ORAL at 20:14

## 2019-05-02 ASSESSMENT — MIFFLIN-ST. JEOR: SCORE: 1938.26

## 2019-05-02 ASSESSMENT — ACTIVITIES OF DAILY LIVING (ADL)
ADLS_ACUITY_SCORE: 12

## 2019-05-02 NOTE — PROGRESS NOTES
CLINICAL NUTRITION SERVICES    Reviewed nutrition risk factors due to LOS. Pt is tolerating diet and eating adequately per nursing documentation (good appetite, consistently consuming 100% of meals). Per pt, reports an excellent appetite. States he eats a lot of food at baseline but is eating differently here in the hospital. Pt had questions about low-sodium diet, fluid restriction, and consistent vitamin K intake (see nutrition education note from today, 5/2). Reviewed wt hx: 100.2 kg (4/17/17), 101.2 kg (2/25/19), 105.4 kg (4/25/19, admit wt), 98.3 kg (4/26/19), 92.9 kg (5/2/19) - Pt has been diuresed this admission. Suspect wt changes due to changes in fluid status and diuresis.       INTERVENTIONS:  Implementation:  Placed room service order to allow pt to order snacks. Also, placed room service order to allow pt to go over his sodium restriction at meals as long as he does not exceed his daily sodium restriction.    Follow Up / Monitoring:   Will continue to follow pt via rounds.    Jess Silva, MS, RD, LD, [facility]   6C Pgr: [phone]

## 2019-05-02 NOTE — PROGRESS NOTES
"CLINICAL NUTRITION SERVICES    Reason for Assessment:  Low-sodium (2 g/day) nutrition education and consistent vitamin K intake with coumadin, as per discussion with pt and family    Diet History:  Pt reports no history of receiving low-sodium nutrition education prior to this admission. Pt reports no prior history of receiving nutrition education on consistent vitamin K intake with coumadin. Pt and family have questions regarding sodium restriction and vitamin K intake.    Per RD on 4/28, \"Pt states he consumes a large volume of food at each meal. 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 A typical day may consist of 8 eggs for breakfast, 4 servings of lasagna at lunch, and a whole bag (4 servings) of Caesar salad at dinner.\" See nutrition note 4/28 regarding details of nutrition education.     Nutrition Diagnosis:  Food- and nutrition-related knowledge deficit r/t knowledge deficit of low-sodium diet and consistent vitamin K intake with coumadin AEB pt with questions on low-sodium diet and vitamin K-containing foods/beverages    Nutrition Prescription/Recs:  Continue low-sodium diet, consistent vitamin K intake. Fluid restriction as per team.      Interventions:  Nutrition Education  1. Provided verbal instruction on a heart-healthy diet with emphasis on low-sodium meal planning and fluid restriction. Provided verbal instruction on the importance of consistent vitamin K intake with coumadin.    2. Provided the following handouts: How to Read Nutrition Labels, Low-Sodium Foods and Drinks, Tips for a Low-Sodium Diet, Seasoning Your Foods Without Adding Salt, and Managing Fluid Restriction. Provided handouts from the AND regarding Vitamin K content of foods, Vitamin K and Medications, and Sodium content of foods.    Goals:    1. Pt will verbalize at least five high sodium foods and the importance of avoiding added salt to foods for cooking or seasoning foods.   2. Pt " will verbalize importance of consistent vitamin K intake on coumadin and pt will list at least two high vitamin K foods.    Follow-up:    Patient to ask any further nutrition-related questions before discharge. In addition, pt may request outpatient RD appointment.     Jess Silva MS, RD, LD, [facility]   6C Pgr: [phone]

## 2019-05-02 NOTE — PROGRESS NOTES
Rule 25 Assessment  Background Information   1. Date of Assessment Request  2. Date of Assessment  5/2/2019 3. Date Service Authorized     4.   CRYSTAL Valencia  5.  Phone Number   [phone] 6. Referent  [facility] 7. Assessment Site  [address] [place] [address]     8. Client Name   Ubaldo Velez 9. Date of Birth  1985 Age  33 year old 10. Gender  male  11. PMI/ Insurance No.     12. Client's Primary Language:  English 13. Do you require special accommodations, such as an  or assistance with written material? No   14. Current Address: [facility] [address]  [place] [address]   15. Client Phone Numbers: [phone] (home)      16. Tell me what has happened to bring you here today.    Per H&P:  have seen and examined the patient with the house staff on April 25, 2019 and agree with the outlined assessment and plan.     Briefly the patient is a 34-year-old male with a past medical history of polysubstance abuse who presents with cardiogenic shock.  He has resting sinus tachycardia, lactic acidosis, abnormal renal function and is volume overloaded on exam.     We are rounding out his work-up for systolic heart failure however I strongly suspect this is due to methamphetamine and alcohol.  He unfortunately has a large LV thrombus as well.  We will obtain HIV and thyroid studies and perform an angiogram early next week for completeness sake.  I have discussed with him at length today that he is not a candidate for advanced therapies.  Our hope is that with medical management he will stabilize and be able to recover his LV function.      I have emphasized that in order to be a candidate for advanced therapies at a later date he would need complete chemical dependence treatment as well as have negative tox screens for over 6 months and establish complete compliance with in the clinic.  He expressed understanding of this.     17. Have you had other rule 25 assessments?      No    DIMENSION I - Acute Intoxication /Withdrawal Potential   1. Chemical use most recent 12 months outside a facility and other significant use history (client self-report)              X = Primary Drug Used   Age of First Use Most Recent Pattern of Use and Duration   Need enough information to show pattern (both frequency and amounts) and to show tolerance for each chemical that has a diagnosis   Date of last use and time, if needed   Withdrawal Potential? Requiring special care Method of use  (oral, smoked, snort, IV, etc)      Alcohol     14 Everyday since 15/16  Anywhere from 1 beer to 12 pack per day.   Weekends - 1 day per weekend drink to intoxication.    1-2 weeks before admission cut way back and stopped due to feeling ill.    Mid 04/2019 No Oral      Marijuana/  Hashish   16 Everyday smoker  Haven't smoked since 2007 2007 No Smoke      Cocaine/Crack     [age] 3 to 4 lines in last 6 months    Pt reports TAMANNA a few months ago at a wedding.    Denies a history of abusing it.     Sometimes a binge of 1-2 days on weekend.  A few months ago No Snort      Meth/  Amphetamines   16 Adderall:  21 diagnosed with ADHD   Didn't have a script for awhile, bought from friend.     Was prescribed for a while, didn't renew script, bought off street  Most 30 mg per day - what he was prescribed.     Meth:  Last year - 4 times.   Snort it.   History of abusing it around 17 - everyday for about a year.  04/23/2019 No Oral  Snort      Heroin     26 Used for a 1-2 years  Always smoked - daily use.  Age 29 No Smoke      Other Opiates/  Synthetics   27 Vicodin, Perc, Lowtab   Age 29 No Oral      Inhalants     No use          Benzodiazepines     No use          Hallucinogens     No use          Barbiturates/  Sedatives/  Hypnotics No use          Over-the-Counter Drugs   No use          Other     No use          Nicotine     14 Cig - a pack per day   Stopped sometime in April due to not feeling well. 04/2019 No Smoke     2. Do  you use greater amounts of alcohol/other drugs to feel intoxicated or achieve the desired effect?  No.  Or use the same amount and get less of an effect?  No.  Example: The patient denied having any tolerance with alcohol and/or drugs over this past year.    3A. Have you ever been to detox?     No    3B. When was the first time?     The patient denied ever having a detoxification admission.    3C. How many times since then?     The patient denied ever having a detoxification admission.    3D. Date of most recent detox:     The patient denied ever having a detoxification admission.    4.  Withdrawal symptoms: Have you had any of the following withdrawal symptoms?  Past 12 months Recent (past 30 days)   None None     's Visual Observations and Symptoms: No visible withdrawal symptoms at this time    Based on the above information, is withdrawal likely to require attention as part of treatment participation?  No    Dimension I Ratings   Acute intoxication/Withdrawal potential - The placing authority must use the criteria in Dimension I to determine a client s acute intoxication and withdrawal potential.    RISK DESCRIPTIONS - Severity ratin Client displays full functioning with good ability to tolerate and cope with withdrawal discomfort. No signs or symptoms of intoxication or withdrawal or resolving signs or symptoms.    REASONS SEVERITY WAS ASSIGNED (What about the amount of the person s use and date of most recent use and history of withdrawal problems suggests the potential of withdrawal symptoms requiring professional assistance? )     Patient reports using alcohol and amphetamines on a daily or almost daily basis. Patient reports occasional use of cocaine. Patient reports smoking a pack per day. Patient reports he cut down on all substances within the past month due to feeling ill. Patient denied withdrawal symptoms in past 12 months and also in past 30 days. Patient denied any lifetime detox  admissions.  Patient was not given a UA on admission, unknown if he would have been positive for any substances.        DIMENSION II - Biomedical Complications and Conditions   1a. Do you have any current health/medical conditions?(Include any infectious diseases, allergies, or chronic or acute pain, history of chronic conditions)       Yes.   Illnesses/Medical Conditions you are receiving care for:   Past Medical History:   Diagnosis Date     ADHD      Cocaine use      Methamphetamine use (H)      In the hospital for congestive heart failure.     1b. On a scale of mild, moderate to severe please specify the severity of the patient's diabetes and/or neuropathy.    The patient denied having a history of being diagnosed with diabetes or neuropathy.    2. Do you have a health care provider? When was your most recent appointment? What concerns were identified?     The patient does not have a PCP at this time.  [facility]/Denise Landers in [place]  Last appt: End of 01/2019 for a skin issues    3. If indicated by answers to items 1 or 2: How do you deal with these concerns? Is that working for you? If you are not receiving care for this problem, why not?    Patient is currently receiving services for Congestive Heart Failure.     4A. List current medication(s) including over-the-counter or herbal supplements--including pain management:     Current Facility-Administered Medications   Medication     acetaminophen (TYLENOL) tablet 325 mg     amoxicillin-clavulanate (AUGMENTIN) 875-125 MG per tablet 1 tablet     digoxin (LANOXIN) tablet 125 mcg     folic acid (FOLVITE) tablet 1 mg     heparin  drip 25,000 units in 0.45% NaCl 250 mL (see additional administration details for dose)     heparin bolus from infusion pump     heparin lock flush 10 UNIT/ML injection 2-5 mL     heparin lock flush 10 UNIT/ML injection 5-10 mL     heparin lock flush 10 UNIT/ML injection 5-10 mL     hydrALAZINE (APRESOLINE) tablet 10 mg     lidocaine (LMX4)  cream     lidocaine (LMX4) cream     lidocaine 1 % 1 mL     lisinopril (PRINIVIL/ZESTRIL) tablet 10 mg     magnesium sulfate 2 g in NS intermittent infusion ([facility] or [facility] Cmpd)     magnesium sulfate 4 g in 100 mL sterile water (premade)     melatonin tablet 5 mg     Patient is already receiving anticoagulation with heparin, enoxaparin (LOVENOX), warfarin (COUMADIN)  or other anticoagulant medication     potassium chloride (KLOR-CON) Packet 20-40 mEq     potassium chloride 10 mEq in 100 mL intermittent infusion with 10 mg lidocaine     potassium chloride 10 mEq in 100 mL sterile water intermittent infusion (premix)     potassium chloride 20 mEq in 50 mL intermittent infusion     potassium chloride ER (K-DUR/KLOR-CON M) CR tablet 20-40 mEq     sodium chloride (PF) 0.9% PF flush 10-20 mL     sodium chloride (PF) 0.9% PF flush 3 mL     sodium chloride (PF) 0.9% PF flush 3 mL     sodium chloride (PF) 0.9% PF flush 5-50 mL     spironolactone (ALDACTONE) half-tab 12.5 mg     vitamin B1 (THIAMINE) tablet 100 mg     Warfarin Therapy Reminder (Check START DATE - warfarin may be starting in the FUTURE)     4B. Do you follow current medical recommendations/take medications as prescribed?     The patient denied taking any prescription or over the counter medications at this time.    4C. When did you last take your medication?     The patient denied taking any prescription or over the counter medications at this time.    4D. Do you need a referral to have a follow up with a primary care physician?    Yes, physical exam    5. Has a health care provider/healer ever recommended that you reduce or quit alcohol/drug use?     Yes    6. Are you pregnant?     NA, because the patient is male    7. Have you had any injuries, assaults/violence towards you, accidents, health related issues, overdose(s) or hospitalizations related to your use of alcohol or other drugs:     No    8. Do you have any specific physical needs/accommodations?  No    Dimension II Ratings   Biomedical Conditions and Complications - The placing authority must use the criteria in Dimension II to determine a client s biomedical conditions and complications.   RISK DESCRIPTIONS - Severity ratin Client tolerates and vikash with physical discomfort and is able to get the services that the client needs.    REASONS SEVERITY WAS ASSIGNED (What physical/medical problems does this person have that would inhibit his or her ability to participate in treatment? What issues does he or she have that require assistance to address?)    Patient denies having a PCP at this time. Pt reports he had gone to the doctor a few times earlier this year when he first started to have symptoms and thought he was ill. Patient denied being prescribed medications for biomedical concerns before his hospital admission. Patient denied any previous hospitalizations due to substance use in the past. Patient reports he is able to obtain medical services as needed.        DIMENSION III - Emotional, Behavioral, Cognitive Conditions and Complications   1. (Optional) Tell me what it was like growing up in your family. (substance use, mental health, discipline, abuse, support)     Siblings: 2, pt is in the middle - all boys    MH: No mental health issues.   CHERRY: Mom used meth and other substances. Sober since    Dad had substance use issues as well, mostly clean and sober since. Denies any other family substance abuse issues.     Dad and mom .     Support/Discipline: Felt supported growing up, lived with grandparents and then foster families, didn't live with mom until age 11/13      2. When was the last time that you had significant problems...  A. with feeling very trapped, lonely, sad, blue, depressed or hopeless  about the future? Never    B. with sleep trouble, such as bad dreams, sleeping restlessly, or falling  asleep during the day? Past Month - hospital     C. with feeling very anxious,  nervous, tense, scared, panicked, or like  something bad was going to happen? Never    D. with becoming very distressed and upset when something reminded  you of the past? 1+ years ago    E. with thinking about ending your life or committing suicide? Never    3. When was the last time that you did the following things two or more times?  A. Lied or conned to get things you wanted or to avoid having to do  something? 2 - 12 months ago    B. Had a hard time paying attention at school, work, or home? 2 - 12 months ago    C. Had a hard time listening to instructions at school, work, or home? 2 - 12 months ago    D. Were a bully or threatened other people? 1+ years ago    E. Started physical fights with other people? Never    Note: These questions are from the Global Appraisal of Individual Needs--Short Screener. Any item marked  past month  or  2 to 12 months ago  will be scored with a severity rating of at least 2.     For each item that has occurred in the past month or past year ask follow up questions to determine how often the person has felt this way or has the behavior occurred? How recently? How has it affected their daily living? And, whether they were using or in withdrawal at the time?    2B.) Pt reports struggling with sleep in hospital.   3B.) Pt reports ADHD, sometimes struggles if he isn't interested.   3C.) Pt reports ADHD, sometimes struggles if he isn't interested.     4A. If the person has answered item 2E with  in the past year  or  the past month , ask about frequency and history of suicide in the family or someone close and whether they were under the influence.     The patient denied any family member or someone close to the patient had ever completed suicide.    Any history of suicide in your family? Or someone close to you?     The patient denied any family member or someone close to the patient had ever completed suicide.    4B. If the person answered item 2E  in the past month  ask  about  intent, plan, means and access and any other follow-up information  to determine imminent risk. Document any actions taken to intervene  on any identified imminent risk.      The patient denied having any suicide ideation within the past month.    5A. Have you ever been diagnosed with a mental health problem?     No      5B. Are you receiving care for any mental health issues? If yes, what is the focus of that care or treatment?  Are you satisfied with the service? Most recent appointment?  How has it been helpful?     The patient denied having any current or past mental health issues that had required treatment.    6. Have you been prescribed medications for emotional/psychological problems?     The patient denied having any history of being prescribed psychotropic medications for mental health issues.    7. Does your MH provider know about your use?     The patient does not currently have any mental health providers.    8A. Have you ever been verbally, emotionally, physically or sexually abused?      The patient denied having any history of being verbally, emotionally, physically or sexually abused.     Follow up questions to learn current risk, continuing emotional impact.      The patient denied having any history of being verbally, emotionally, physically or sexually abused.    8B. Have you received counseling for abuse?      The patient denied having any history of being verbally, emotionally, physically or sexually abused.    9. Have you ever experienced or been part of a group that experienced community violence, historical trauma, rape or assault?     No    10A. :    Yes.  10B. Exposure to combat: yes.  In      11. Do you have problems with any of the following things in your daily life?    No      Note: If the person has any of the above problems, follow up with items 12, 13, and 14. If none of the issues in item 11 are a problem for the person, skip to item 15.    The patient denied  having any history of having problems with headaches, dizziness, problem solving, concentration, performing job/school work, remembering, in relationships with others, reading, writing, calculation, fights, being fired or arrests in his daily life.    12. Have you been diagnosed with traumatic brain injury or Alzheimer s?  No    13. If the answer to #12 is no, ask the following questions:    Have you ever hit your head or been hit on the head? No    Were you ever seen in the Emergency Room, hospital or by a doctor because of an injury to your head? No    Have you had any significant illness that affected your brain (brain tumor, meningitis, West Nile Virus, stroke or seizure, heart attack, near drowning or near suffocation)? No    14. If the answer to #12 is yes, ask if any of the problems identified in #11 occurred since the head injury or loss of oxygen. The patient had never had a head injury or a loss of oxygen.    15A. Highest grade of school completed:     Associate degree/vocational certificate    15B. Do you have a learning disability? Yes - ADHD    15C. Did you ever have tutoring in Math or English? No    15D. Have you ever been diagnosed with Fetal Alcohol Effects or Fetal Alcohol Syndrome? No    16. If yes to item 15 B, C, or D: How has this affected your use or been affected by your use?     The patient denied having any history of a learning disability, tutoring in math or English or being diagnosed with Fetal Alcohol Effects or Fetal Alcohol Syndrome.    Dimension III Ratings   Emotional/Behavioral/Cognitive - The placing authority must use the criteria in Dimension III to determine a client s emotional, behavioral, and cognitive conditions and complications.   RISK DESCRIPTIONS - Severity ratin Client has impulse control and coping skills. Client presents a mild to moderate risk of harm to self or others or displays symptoms of emotional, behavioral or cognitive problems. Client has a mental  health diagnosis and is stable. Client functions adequately in significant life areas.    REASONS SEVERITY WAS ASSIGNED - What current issues might with thinking, feelings or behavior pose barriers to participation in a treatment program? What coping skills or other assets does the person have to offset those issues? Are these problems that can be initially accommodated by a treatment provider? If not, what specialized skills or attributes must a provider have?    Patient reports substance use issues run in his family as both parents had issues with drugs/alcohol but are both sober now. Patient reports due to their use, he and his two brothers lived with his grandparents and foster families until his mom could take care of them. Patient denies that his siblings have any CHERRY/MH issues. Patient was in the [employer] and reports he saw combat. Patient denied every being diagnosed with any mental health issues and denies ever receiving services for mental health concerns. Pt denies any past suicide attempts, denies any past or present SI/SIB.         DIMENSION IV - Readiness for Change   1. You ve told me what brought you here today. (first section) What do you think the problem really is?     Patient reports he came to the hospital due to medical reasons.     2. Tell me how things are going. Ask enough questions to determine whether the person has use related problems or assets that can be built upon in the following areas: Family/friends/relationships; Legal; Financial; Emotional; Educational; Recreational/ leisure; Vocational/employment; Living arrangements (DSM)      Friends/Family/Relationships: Going well   Living situation: Good, live in in-laws basement   Legal: None   Financial: Fine   Emotional: Good     3. What activities have you engaged in when using alcohol/other drugs that could be hazardous to you or others (i.e. driving a car/motorcycle/boat, operating machinery, unsafe sex, sharing needles for drugs or  "tattoos, etc     The patient denied engaging in any of the above dangerous activities when using alcohol and/or drugs.    4. How much time do you spend getting, using or getting over using alcohol or drugs? (DSM)     Patient was unable to answer.    5. Reasons for drinking/drug use (Use the space below to record answers. It may not be necessary to ask each item.)  Like the feeling Yes   Trying to forget problems Yes   To cope with stress Yes   To relieve physical pain No   To cope with anxiety Yes   To cope with depression No   To relax or unwind Yes   Makes it easier to talk with people No   Partner encourages use No   Most friends drink or use No   To cope with family problems No   Afraid of withdrawal symptoms/to feel better No   Other (specify)  No     A. What concerns other people about your alcohol or drug use/Has anyone told you that you use too much? What did they say? (DSM)     Patient reports that no one has expressed concern over his use.     B. What did you think about that/ do you think you have a problem with alcohol or drug use?     \"Yes, but no.\"  Patient clarified by stating that he understands drinking everyday is an addiction, but that he hasn't had any negative consequences to his use.     6. What changes are you willing to make? What substance are you willing to stop using? How are you going to do that? Have you tried that before? What interfered with your success with that goal?      His plan and goal is to abstain from non-prescribed all mood altering chemicals.     7. What would be helpful to you in making this change?     \"physical health issues\"     Dimension IV Ratings   Readiness for Change - The placing authority must use the criteria in Dimension IV to determine a client s readiness for change.   RISK DESCRIPTIONS - Severity ratin Client displays verbal compliance, but lacks consistent behaviors; has low motivation for change; and is passively involved in treatment.    REASONS " SEVERITY WAS ASSIGNED - (What information did the person provide that supports your assessment of his or her readiness to change? How aware is the person of problems caused by continued use? How willing is she or he to make changes? What does the person feel would be helpful? What has the person been able to do without help?)      Patient somewhat admits to having a problem with substance use. Patient reports that no one has expressed concerns about his use in the past. Patient reports doctors have expressed that his use may have caused/exacerbated his current heart problems. Patient appears to lack insight into his use and the negative consequences from it. Patient appears in the precontemplation/contemplation stage of change based on his verbal reports and behaviors. Patient is willing to enter [facility] for treatment of his substance abuse.        DIMENSION V - Relapse, Continued Use, and Continued Problem Potential   1A. In what ways have you tried to control, cut-down or quit your use? If you have had periods of sobriety, how did you accomplish that? What was helpful? What happened to prevent you from continuing your sobriety? (DSM)     Longest period of sobriety: No periods of sobriety  What was helpful: None    1B. What were the circumstances of your most recent relapse with mood altering chemicals?    Denies    2. Have you experienced cravings? If yes, ask follow up questions to determine if the person recognizes triggers and if the person has had any success in dealing with them.     The patient reported having some infrequent cravings to use mood altering chemicals.    3. Have you been treated for alcohol/other drug abuse/dependence? No  Patient reports he had to take classes while in the [employer], but he continued to drink during that time. Patient reports the class was 1x a week.     4. Support group participation: Have you/do you attend support group meetings to reduce/stop your alcohol/drug use? How  recently? What was your experience? Are you willing to restart? If the person has not participated, is he or she willing?     The patient denied having any history of attending 12-step or other support group meetings.    5. What would assist you in staying sober/straight?     Patient reports his physical health concerns are his driving force for sobriety.     Dimension V Ratings   Relapse/Continued Use/Continued problem potential - The placing authority must use the criteria in Dimension V to determine a client s relapse, continued use, and continued problem potential.   RISK DESCRIPTIONS - Severity rating: 3 Client has poor recognition and understanding of relapse and recidivism issues and displays moderately high vulnerability for further substance use or mental health problems. Client has few coping skills and rarely applies coping skills.    REASONS SEVERITY WAS ASSIGNED - (What information did the person provide that indicates his or her understanding of relapse issues? What about the person s experience indicates how prone he or she is to relapse? What coping skills does the person have that decrease relapse potential?)      The patient appears to lack sober coping skills and he lacks long-term sober maintenance skills.  The patient would benefit from continuing to develop his sober coping skills and long-term sober maintenance skills.  The patient has chronic medical problems which could be a barrier to his recovery.  The patient lacks awareness regarding the disease model of addiction.  The patient lacks a sober peer support network.         DIMENSION VI - Recovery Environment   1. Are you employed/attending school? Tell me about that.     Patient reports he is an   Hasn't been able to work since he has been in the hospital.   Does carpentry.   Patient reports the typically has steady work.    2A. Describe a typical day; evening for you. Work, school, social, leisure, volunteer,  "spiritual practices. Include time spent obtaining, using, recovering from drugs or alcohol. (DSM)     Patient did not answer.    Please describe what leisure activities have been associated with your substance abuse:     The patient denied having any leisure activities which had been associated with his substance abuse.    2B. How often do you spend more time than you planned using or use more than you planned? (DSM)     \"Never really planned it out\"    3. How important is using to your social connections? Do many of your family or friends use?     Pt reports it is not important.     4A. Are you currently in a significant relationship?     Yes.  4B. How long? 7 years            Please describe your significant other's use of mood altering chemicals? Drinks socially, no problem.     4C. Sexual Orientation:     Heterosexual    5A. Who do you live with?      Live with girlfriend,  girlfriends parents and his son     5B. Tell me about their alcohol/drug use and mental health issues.     Denies any MH or CHERRY concerns.     5C. Are you concerned for your safety there? No    5D. Are you concerned about the safety of anyone else who lives with you? No    6A. Do you have children who live with you?     Yes.  (Ask follow-up questions to determine the person's relationship and responsibility, both legal and care giving; and what arrangements are made for supervision for the children when the person is not available.) 3.5 years old     6B. Do you have children who do not live with you?     No    7A. Who supports you in making changes in your alcohol or drug use? What are they willing to do to support you? Who is upset or angry about you making changes in your alcohol or drug use? How big a problem is this for you?      Everyone - all family and friends.     7B. This table is provided to record information about the person s relationships and available support It is not necessary to ask each item; only to get a comprehensive " picture of their support system.  How often can you count on the following people when you need someone?   Partner / Spouse Always supportive   Parent(s)/Aunt(s)/Uncle(s)/Grandparents Always supportive   Sibling(s)/Cousin(s) Always supportive   Child(maria de jesus) Always supportive   Other relative(s) Usually supportive   Friend(s)/neighbor(s) Usually supportive   Child(maria de jesus) s father(s)/mother(s) Always supportive   Support group member(s) The patient denied having any current involvement with 12-step or other support group meetings.   Community of desirae members The patient denied having any current involvement with community desirae members.   /counselor/therapist/healer The patient denied having any current involvement with a , counselor, therapist or healer.   Other (specify) No     8A. What is your current living situation?     Live with girlfriend's parents     8B. What is your long term plan for where you will be living?     Try to buy a place or rent own place with girlfriend and son    8C. Tell me about your living environment/neighborhood? Ask enough follow up questions to determine safety, criminal activity, availability of alcohol and drugs, supportive or antagonistic to the person making changes.      It's safe     9. Criminal justice history: Gather current/recent history and any significant history related to substance use--Arrests? Convictions? Circumstances? Alcohol or drug involvement? Sentences? Still on probation or parole? Expectations of the court? Current court order? Any sex offenses - lifetime? What level? (DSM)    Distrubution of schedule 2 narcotic in [employer] - cocaine and ecstasy - 2009  2 DWIs    - 2008 while in [employer]    - 2004     Commitment: No  Registered Sex Offender: No    10. What obstacles exist to participating in treatment? (Time off work, childcare, funding, transportation, pending [facility] time, living situation)     The patient denied having any obstacles for  participating in substance abuse treatment.    Dimension VI Ratings   Recovery environment - The placing authority must use the criteria in Dimension VI to determine a client s recovery environment.   RISK DESCRIPTIONS - Severity ratin Client has passive social  or family and significant other are not interested in the client's recovery. The client is engaged in structured meaningful activity.    REASONS SEVERITY WAS ASSIGNED - (What support does the person have for making changes? What structure/stability does the person have in his or her daily life that will increase the likelihood that changes can be sustained? What problems exist in the person s environment that will jeopardize getting/staying clean and sober?)     Patient is employed and has full time work. Patient lives with his girlfriend, son and girlfriend's parents. Pt reports he has been with his girlfriend for 7 years, and they have a 3.5 year old son together.  Pt reports that he and his girlfriend hope to move out with their son this year. Patient reports his family, girlfriend and friends are supportive of him. Pt has daily structured, meaningful activity. Patient reports past legals, but denies any current legal concerns.        Client Choice/Exceptions   Would you like services specific to language, age, gender, culture, [facility] preference, race, ethnicity, sexual orientation or disability?  No    What particular treatment choices and options would you like to have? None    Do you have a preference for a particular treatment program? [facility]    Criteria for Diagnosis     Criteria for Diagnosis  DSM-5 Criteria for Substance Use Disorder  Instructions: Determine whether the client currently meets the criteria for Substance Use Disorder using the diagnostic criteria in the DSM-V [id number].[phone]. Current means during the most recent 12 months outside a facility that controls access to substances    Category of Substance Severity  (ICD-10 Code / DSM 5 Code)     Alcohol Use Disorder Severe  (10.20) (303.90)   Cannabis Use Disorder The patient does not meet the criteria for a Cannabis use disorder.   Hallucinogen Use Disorder The patient does not meet the criteria for a Hallucinogen use disorder.   Inhalant Use Disorder The patient does not meet the criteria for an Inhalant use disorder.   Opioid Use Disorder The patient does not meet the criteria for an Opioid use disorder.   Sedative, Hypnotic, or Anxiolytic Use Disorder The patient does not meet the criteria for a Sedative/Hypnotic use disorder.   Stimulant Related Disorder Mild  (F14.10) (305.60) Cocaine  Severe   (F15.20) (304.40) Amphetamine type substance   Tobacco Use Disorder Severe   (F17.200) (305.1)    Other (or unknown) Substance Use Disorder The patient does not meet the criteria for a Other (or unknown) Substance use disorder.       Collateral Contact Summary   Number of contacts made: 2    Contact with referring person:  No    If court related records were reviewed, summarize here: No court records had been reviewed at the time of this documentation.    Information from collateral contacts supported/largely agreed with information from the client and associated risk ratings.      Rule 25 Assessment Summary and Plan   's Recommendation    1) Abstain from alcohol and all illicit drugs and mood altering drugs unless prescribed by a healthcare giver familiar with their addiction.  2) Enter and complete treatment at [facility] program or similar program and follow counselor recommendations.  3) Arrange for sober supportive living upon discharge.  4) Develop a sober supportive network.  5) Continue to receive appropriate medical and psychiatric services as needed.       Collateral Contacts     Name:    EMR   Relationship:    Med Records   Phone Number:    none Releases:    Yes          Chief Complaint:   Transfer from [facility] for CHF, concern for cardiogenic shock            HPI:   Ubaldo Velez is a 32yo male with PMHx polysubstance abuse (tobacco, alcohol, cocaine, methamphetamines), ADHD who presents as a transfer from OSH for new CHF dx and concern for cardiogenic shock.      He reports flu like illness that started 6-8wks ago: chills, sore throat, shortness of breath, productive cough. His daughter and girlfriend had similar sxs, he thinks his daughter may have picked it up at her pre-school. He increased his fluid consumption due to his sxs and then started developing increased swelling in his legs. He went to an urgent care clinic where he was diagnosed with pneumonia and given a Z-pack. His URI sxs improved but he continued to have swelling in his legs, weight gain ~20 lbs, shortness of breath. Also has noticed cold hands and feet over the past few weeks. Went to [facility] yesterday due to his sxs and was admitted, see below for hospital course.      He currently denies fevers, chills, chest pain, shortness of breath, abdominal pain, n/v, bowel or urinary changes. Did not receive his flu shot this year.      Cardiac hx: no hx of MIs or CAD. No prior angiogram or stress test. No family hx of CAD.        Psych note:  HISTORY OF PRESENT ILLNESS:  Mr. Velez has presented with acute systolic congestive heart failure.  He had weeks to months of progressive dyspnea as well as fluid retention, etc.  He has found to abuse amphetamines on a regular basis; he said he was prescribed Adderall while in the service, though he did not have much in the way of testing for ADHD.  They have not been prescribed since then, but he obtains them from a friend and normally takes 30 mg per day of Adderall.  Denies misuse of the Adderall, but does admit to occasionally using them intranasally.  He also normally drinks about a 6-pack of beer per day, going up to over a 12-pack per day over the weekends, particularly if he is socializing.  Occasional use of cocaine at social functions.  His  "partner of 6 years is critical of his alcohol use, he says primarily because he sits out in his truck drinking beer and smoking cigarettes, rather than interacting with their 4-year-old son.  No other consequences of drug or alcohol use since the DUI, at least until he presented with his current heart problems.     He has cut way back on alcohol intake over a month ago, also stopped using Adderall, and more recently cigarettes. No alcohol withdrawal during this hospitalization.      PAST PSYCHIATRIC/CHEMICAL USE HISTORY:  He denies any problems with depression, anxiety, mood swings or other psychiatric symptoms.  No prior use of psychotropic agents or psychotherapy.   Details of his chemical use history are reviewed above. Also, while in the service at around age 19, he was charged a DUI and following this was monitored with urine tox screens for 1 year following a brief outpatient treatment.  He continued to use alcohol during that period of time.     PAST MEDICAL HISTORY:  Reports being healthy except for his drug and alcohol use problems.      SURGERY:  He has had dental extractions and repair of a left index finger laceration.      ALLERGIES:  NO KNOWN ALLERGIES TO MEDICATIONS.       OUTPATIENT PSYCHIATRIC MEDICATIONS:  Negative.      REVIEW OF SYSTEMS:  A 10-point review of systems today is negative except for some slight shortness of breath and exertional dyspnea.      FAMILY HISTORY:  He said both of his parents were drug addicts and alcoholics, but they are both abstinent long-term and his father remains active in Alcoholics Anonymous.  He denies any depression, bipolar illness, or schizophrenia in the family.       Collateral Contacts     Name:    Hoa GUEVARA    Relationship:    Girlfriend   Phone Number:    [phone]   Releases:    Yes     Patient's girlfriend reported she is worried he will do well for a while, then go back to his old ways. She reports he is always looking \"for something\". She is " concenred that with his lifestyle changing with work, eating habits and now substance use, he will find something else negative to fill the void. She did not give specifics on how his use has impacted her or concerned her. She does support him doing outpatient treatment.   azeb Contacts      A problematic pattern of alcohol/drug use leading to clinically significant impairment or distress, as manifested by at least two of the following, occurring within a 12-month period:    1.) Alcohol/drug is often taken in larger amounts or over a longer period than was intended.  4.) Craving, or a strong desire or urge to use alcohol/drug  5.) Recurrent alcohol/drug use resulting in a failure to fulfill major role obligations at work, school or home.  8.) Recurrent alcohol/drug use in situations in which it is physically hazardous.  10.) Tolerance, as defined by either of the following: A need for markedly increased amounts of alcohol/drug to achieve intoxication or desired effect. and A markedly diminished effect with continued use of the same amount of alcohol/drug.  11.) Withdrawal, as manifested by either of the following: The characteristic withdrawal syndrome for alcohol/drug (refer to Criteria A and B of the criteria set for alcohol/drug withdrawal).      Specify if: In early remission:  After full criteria for alcohol/drug use disorder were previously met, none of the criteria for alcohol/drug use disorder have been met for at least 3 months but for less than 12 months (with the exception that Criterion A4,  Craving or a strong desire or urge to use alcohol/drug  may be met).     In sustained remission:   After full criteria for alcohol use disorder were previously met, none of the criteria for alcohol/drug use disorder have been met at any time during a period of 12 months or longer (with the exception that Criterion A4,  Craving or strong desire or urge to use alcohol/drug  may be met).   Specify if:   This  additional specifier is used if the individual is in an environment where access to alcohol is restricted.    Mild: Presence of 2-3 symptoms  Moderate: Presence of 4-5 symptoms  Severe: Presence of 6 or more symptoms

## 2019-05-02 NOTE — PROGRESS NOTES
Cardiology Progress Note  Ubaldo Velez MRN: [id number]  Age: 33 year old, : [birth year]            Changes Today:     - Continue warfarin, bridge with Heparin gtt, goal INR 2-3  - Increase Hydralazine to 25mg TID  - Increase Digoxin to 250mcg, check level next week          Assessment and Plan:     Ubaldo Velez is a 34yo male with PMHx polysubstance abuse (tobacco, alcohol, cocaine, methamphetamines), ADHD who presented as a transfer from OSH for new CHF dx and concern for cardiogenic shock.      # New HFrEF (< 10%)  Presented to OSH with several weeks of URI sxs, LE edema, 20 lbs weight gain. BNP 4.6k, lactate 1.5, troponin 2.8 -> 2.169 (no chest pain, EKG sinus tach). Echo with EF 10%, severe global hypokinesia of LV, multiple (3+) apical mass-like clots in LV, severely decreased RV function, left atrium is severely dilated, 2-3+ MR, 3+ TR. No known CAD hx. No prior angiogram or stress test. Etiology of his heart failure is unclear at this time (likely polysubstance use), he will eventually need workup to evaluate for coronary disease. Warm on exam, mentating well, Lactate nl, HD stable.   - lisinopril 10 mg BID.   - Increase Hydralazine to 25mg TID  - Increase Digoxin to 250mcg, check level next week  (was 0.3 on )  - Spiranolactone 12.5 daily.   - Diuretics: holding for now.   - Hold beta blockers   - Lipid panel (LA 2), HIV (negative), TSH (normal). UA + leuk esterase (culture negative), drug screen neg.  Iron studies nl.   - Right and Left Heart Cath done. No CAD.       # LV Thrombus  - Heparin gtt (high intensity), warfarin dosing.      # Leukocytosis  WBC 17.5 on admission, trending down, afebrile. Recent URI sxs improving. Given one dose of Levaquin at OSH prior to transfer. Pro-calcitonin 0.05. Did c/o tooth pain, CT dental with left mandibular second molar periapical abscess.   - Zosyn ( -> ), Augmentin ( -> 5/3)  - Influenza/RSV swab, UA all  neg  - BCxs at OSH NGTD, continue to monitor   - Dental consult re abscess, outpatient follow up to pull tooth.       # Polysubstance abuse (tobacco, alcohol, cocaine, methamphetamines)  - Urine toxicology neg  - Thiamine 100mg, Folic Acid 1mg daily   - chemical dependency consult.     Patient was discussed with staff attending, Dr. Anderson.    Tono Tirado MD  PGY-2 Internal Medicine  Cardiology Service            Subjective     No acute events overnight. Seen this morning, he feels well and has no new concerns. Denies fevers, chills, chest pain, shortness of breath, abdominal pain, n/v, bowel or urinary changes.           Objective     Vitals:  Temp:  [97  F (36.1  C)-98  F (36.7  C)] 97.6  F (36.4  C)  Pulse:  [115] 115  Heart Rate:  [104-112] 110  Resp:  [14-16] 14  BP: (100-118)/(48-77) 100/68  SpO2:  [96 %-99 %] 96 %  MAP: 80s    Tele: sinus tachycardia    I/O: Net even     Vitals:    04/30/19 0000 05/01/19 0000 05/02/19 0500   Weight: 95.8 kg (211 lb 4.8 oz) 93.9 kg (207 lb) 92.9 kg (204 lb 11.2 oz)       Gen: NAD, laying in bed comfortably  HEENT: EOMI, PERRl, anicteric sclera   NECK: JVD not seen   CV: Tachycardic, S1 S2 nl, no m/r/g  Resp: CTAB, no crackles, wheezing, or rhonchi   Abdomen: soft, non-distended, non-tender, normoactive bowel sounds   Extremities: warm, trace edema, improved   Skin: no rashes or bruises on exposed skin, tattoos on arms   Neuro: AAOx3, no focal deficits   Psych: pleasant, cooperative             Data:      Labs reviewed.         Medications     Medications    amoxicillin-clavulanate  1 tablet Oral Q12H ANITA     digoxin  125 mcg Oral Daily     folic acid  1 mg Oral Daily     heparin lock flush  5-10 mL Intracatheter Q24H     hydrALAZINE  10 mg Oral TID     lisinopril  10 mg Oral BID     melatonin  5 mg Oral At Bedtime     sodium chloride (PF)  3 mL Intracatheter Q8H     spironolactone  12.5 mg Oral Daily     vitamin B1  100 mg Oral Daily       HEParin 2,100 Units/hr  (05/02/19 0714)     - MEDICATION INSTRUCTIONS -       Warfarin Therapy Reminder

## 2019-05-02 NOTE — PROGRESS NOTES
[facility]  [address] [place] [address]        ADULT CD ASSESSMENT ADDENDUM      Patient Name: Ubaldo Velez  Cell Phone:   Home: [phone] (home)    Mobile:   Telephone Information:   Mobile [phone]       Email:  The patient is not willing to share his/her e-mail address.  Emergency Contact: DMITRY Mckinney   Tel: [phone]    The patient reported being:  Living with a partner    With which race do you identify? White    Initial Screening Questions     1. Are you currently having severe withdrawal symptoms that are putting yourself or others in danger?  No    2. Are you currently having severe medical problems that require immediate attention?    Yes, explain: heart failure, admitted at [facility]    3. Are you currently having severe emotional or behavioral problems that are putting yourself or others at risk of harm?  No    4. Do you have sufficient reading skills that will enable you to understand written materials, including the program rules and client rights materials?  Yes     Family History and other additional information     Who raised you? (parents, grandparents, adoptive parents, step-parents, etc.)    Both Parents  Grandparents  Foster families through Christianity    Please tell me what it was like growing up in your family. (please include any history of substance abuse, mental health issues, emotional/physical/sexual abuse, forms of discipline, and support)     Siblings: 2, pt is in the middle - all boys     MH: No mental health issues.   CHERRY: Mom used meth and other substances. Sober since 1987   Dad had substance use issues as well, mostly clean and sober since. Denies any other family substance abuse issues.      Dad and mom .      Support/Discipline: Felt supported growing up, lived grandparents, then with foster families and didn't live with mom until age 11/13      Do you have any children or Stepchildren? Yes, explain: 3.5 year old  "son    Are you being investigated by Child Protection Services? No    Do you have a child protection worker, probation office or ?  No    How would you describe your current finances?  Doing okay    If you are having problems, (unpaid bills, bankruptcy, IRS problems) please explain:  No    If working or a student are you able to function appropriately in that setting? Yes     Describe your preferred learning style:  by hands-on practice and by watching someone else demonstrate    What are your some of your personal strengths?  \"listen, quick learner\"     Do you currently participate in community desirae activities, such as attending Jain, temple, Anabaptism or Alevism services?  The patient denied currently being involved in any community desirae activities.    How does your spirituality impact your recovery?  Open to higher power/medication, but doesn't practice     Do you currently self-administer your medications?  Yes    Have you ever had to lie to people important to you about how much you bray?   No   Have you ever felt the need to bet more and more money?   No   Have you ever attempted treatment for a gambling problem?   No   Have you ever touched or fondled someone else inappropriately or forced them to have sex with you against their will?   No   Are you or have you ever been a registered sex offender?   No   Is there any history of sexual abuse in your family? No   Have you ever felt obsessed by your sexual behavior, such as having sex with many partners, masturbating often, using pornography often?   No     Have you ever received therapy or stayed in the hospital for mental health problems?   No     Have you ever hurt yourself, such as cutting, burning or hitting yourself?   No     Have you ever purged, binged or restricted yourself as a way to control your weight?   No     Are you on a special diet?   Yes, explain: now he is      Do you have any concerns regarding your nutritional status?   " No     Have you had any appetite changes in the last 3 months?   Yes, explain: more hungry now on restriction    Have you had weight loss or weight gain of more than 10 lbs in the last 3 months?   If patient gained or lost more than 10 lbs, then refer to program RN / attending Physician for assessment.   Yes, explain: 30lbs of water on body due to heart not working.     Was the patient informed of BMI?   No   Have you engaged in any risk-taking behavior that would put you at risk for exposure to blood-borne or sexually transmitted diseases?   No   Do you have any dental problems?   Yes, Patient referred to go to their dentist.    Have you ever lived through any trauma or stressful life events?   Yes, explain: childhood was stressful    In the past month, have you had any of the following symptoms related to the trauma listed above? (dreams, intense memories, flashbacks, physical reactions, etc.)   No   Have you ever believed people were spying on you, or that someone was plotting against you or trying to hurt you?   No   Have you ever believed someone was reading your mind or could hear your thoughts or that you could actually read someone's mind or hear what another person was thinking?   No   Have you ever believed that someone of some force outside of yourself was putting thoughts into your mind or made you act in a way that was not your usual self?  Have you ever though you were possessed?   No   Have you ever believed you were being sent special messages through the TV, radio or newspaper?   No   Have you ever heard things other people couldn't hear, such as voices or other noises?   No   Have you ever had visions when you were awake?  Or have you ever seen things other people couldn't see?   No   Do you have a valid 's license?    Yes     PHQ-9, GIANA-7 and Suicide Risk Assessment   PHQ-9 on 5/2/2019 GIANA-7 on 5/2/2019   The patient's PHQ-9 score was see psych notes   The patient's GIANA-7 score was see psych  notes       [place]erry-Suicide Severity Rating Scale   Suicide Ideation   1.) Have you ever wished you were dead or that you could go to sleep and not wake up?     Lifetime:  No   Past Month:  No     2.) Have you actually had any thoughts of killing yourself?   Lifetime:  No   Past Month:  No     3.) Have you been thinking about how you might do this?     Lifetime:  No   Past Month:  No     4.) Have you had these thoughts and had some intention of acting on them?     Lifetime:  No   Past Month:  No     5.) Have you started to work out the details of how to kill yourself?   Lifetime:  No   Past Month:  No     6.) Do you intend to carry out this plan?      Lifetime:  No   Past Month:  No     Intensity of Ideation   Intensity of ideation (1 being least severe, 5 being most severe):     Lifetime:  The patient denied ever having any suicidal thoughts in life.   Past Month:  The patient denied ever having any suicidal thoughts in life.     How often do you have these thoughts?  The patient denied ever having any suicidal thoughts in life.     When you have the thoughts how long do they last?  The patient denied ever having any suicidal thoughts in life.     Can you stop thinking about killing yourself or wanting to die if you want to?  The patient denied ever having any suicidal thoughts in life.     Are there things - anyone or anything (i.e. family, Church, pain of death) that stopped you from wanting to die or acting on thoughts of suicide?  Does not apply     What sort of reasons did you have for thinking about wanting to die or killing yourself (ie end pain, stop how you were feeling, get attention or reaction, revenge)?  Does not apply     Suicidal Behavior   (Suicide Attempt) - Have you made a suicide attempt?     Lifetime:  The patient had never made a suicide attempt.   Past Month:  The patient had never made a suicide attempt.     Have you engaged in self-harm (non-suicidal self-injury)?  The patient denied  having any history of engaging in self-harm (non-suicidal self-injury).     (Interrupted Attempt) - Has there been a time when you started to do something to end your life but someone or something stopped you before you actually did anything?  No     (Aborted or Self-Interrupted Attempt) - Has there been a time when you started to do something to try to end your life but you stopped yourself before you actually did anything?  No     (Preparatory Acts of Behavior) - Have you taken any steps towards making suicide attempt or preparing to kill yourself (such as collecting pills, getting a gun, giving valuables away or writing a suicide note)?  No     Actual Lethality/Medical Damage:  The patient denied ever making a suicidal attempt.       2008  [employer]  Used with permission by Aminata Rolon, PhD.       Guide to C-SSRS Risk Ratings   NO IDEATION:  with no active thoughts IDEATION: with a wish to die. IDEATION: with active thoughts. Risk Ratings   If Yes No No 0 - Very Low Risk   If NA Yes No 1 - Low Risk   If NA Yes Yes 2 - Low/moderate risk   IDEATION: associated thoughts of methods without intent or plan INTENT: Intent to follow through on suicide PLAN: Plan to follow through on suicide Risk Ratings cont...   If Yes No No 3 - Moderate Risk   If Yes Yes No 4 - High Risk   If Yes Yes Yes 5 - High Risk   The patient's ADDITIONAL RISK FACTORS and lack of PROTECTIVE FACTORS may increase their overall suicide risk ratings.     Additional Risk Factors:    Significant history of physical illness or chronic medical problems     Significant history of trauma and/or abuse issues     History of impulsive or aggressive behaviors   Protective Factors:    Having people in his/her life that would prevent the patient from considering a suicide attempt (i.e. young children, spouse, parents, etc.)     An absence of mental health issues or stable and well treated mental health issues     Having  "easy access to supportive family members     Having a good community support network     Having restricted access to highly lethal means of suicide     Risk Status   Past month:0. - Very Low Risk:  Evaluation Counselors:  Document in Epic / SBAR to counselor \"Very Low Risk\".      Treatment Counselors:  Reassess upon admission as applicable, assess weekly in progress notes under Dimension 3 and summarize in Discharge / Treatment summary under Dimension 3.    Past 24 hours:0. - Very Low Risk:  Evaluation Counselors:  Document in Epic / SBAR to counselor \"Very Low Risk\".      Treatment Counselors:  Reassess upon admission as applicable, assess weekly in progress notes under Dimension 3 and summarize in Discharge / Treatment summary under Dimension 3.   Additional information to support suicide risk rating: There was no additional information to provide at this time.     Mental Health Status   Physical Appearance/Attire: Attire appropriate to age/situation   Hygiene: well groomed   Eye Contact: at examiner   Speech Rate:  regular   Speech Volume: regular   Speech Quality: fluid and lively   Cognitive/Perceptual:  reality based   Cognition: memory intact    Judgment: intact and able to concentrate   Insight: intact and able to concentrate   Orientation:  time, place, person and situation   Thought: logical  and circumstantial   Hallucinations:  none   General Behavioral Tone: cooperative   Psychomotor Activity: no problem noted   Gait:  no problem   Mood: normal and appropriate   Affect: congruence/appropriate   Counselor Notes: NA     Criteria for Diagnosis: DSM-5 Criteria for Substance Use Disorders      Alcohol Use Disorder Severe - 303.90 (F10.20)  Amphetamine Use Disorder Severe - 304.40 (F15.20)  Cocaine Use Disorder Mild - 305.60 (F14.10)  Tobacco Use Disorder Severe - 305.10 (F17.200)    Level of Care   I.) Intoxication and Withdrawal: 0   II.) Biomedical:  1   III.) Emotional and Behavioral:  1   IV.) Readiness " to Change:  2   V.) Relapse Potential: 3   VI.) Recovery Environmental: 1     Initial Problem List     The patient lacks relapse prevention skills  The patient has poor coping skills  The patient lacks a sober peer support network  The patient has a significant history of trauma and/or abuse issues    Patient/Client is willing to follow treatment recommendations.  Yes    Counselor: Jane Holm [facility]    Vulnerable Adult Checklist for OUTPATIENTS     1.  Do you have a physical, emotional or mental infirmity or dysfunction?       No    2.  Does this issue impair your ability to provide for your own care without help, including providing yourself with food, shelter, clothing, healthcare or supervision?       No    3.  Because of this issue, I need assistance to protect myself from maltreatment by others.      No    Based on the above information:    This person is not a functional Vulnerable Adult according to [place] Statute 626.5572 subdivision 21.

## 2019-05-02 NOTE — CONSULTS
"5/2/2019    Writer met with pt to complete a CD assessment. Pt completed assessment, willing to do IOP treatment at [facility]. Patient appears to have downplayed/minimized his use, as his chart indicates his heart failure is exacerbated by his use of chemicals. Patient does use alcohol daily, and reports that is a problem but because he hasn't had \"negative consequences\" with his use previously he never thought at quitting. Patient reports this current hospitalization is a wake up call for him. Patient reports he wants to live and is willing to do what is necessary at this time. Patient signed a release for his girlfriend as well. Writer will hold off on making the referral until his insurance is worked out, as he reports he is working with a financial counselor through  to get [facility]. Patient reports he hopes to have information today.     Jane Holm, [facility]  [phone]  "

## 2019-05-02 NOTE — PLAN OF CARE
"D: Pt transferred from OSH for new CHF dx and concern for cardiogenic shock  Hx: polysubstance abuse (tobacco, alcohol, cocaine, methamphetamines), ADHD    I: Monitored vitals and assessed pt status.   Changed:  -continue warfarin, bridging with high intensity heparin gtt. Goal INR 2-3. INR 1.02 this AM. 12.5 mg warfarin given tonight.    -increased hydralazine to 25 mg TID (from 10 mg TID) and increased digoxin from 125 mcg to 250 mcg daily.    -pt pick/tore ingrown toe nail on left 1st toe. Cleansed with normal saline. Applied bacitracin and primapore. Cards II aware. Tono okay with this plan and will assess in morning.     Running: Heparin gtt @ 2100 units/hr infusing into PIV  Double lumen PICC heparin locked. Blood return on both lumens. Gray lumen sluggish.    PRN: denies pain    A: A0x4. VSS on RA. Afebrile. ST on tele. Urinating adequately into bedside urinal.   Up independently  2g Na diet with 2L FR  Vital signs:  Temp: 98.5  F (36.9  C) Temp src: Oral BP: 114/74   Heart Rate: 110 Resp: 18 SpO2: 99 % O2 Device: None (Room air) Oxygen Delivery: 2 LPM Height: 188 cm (6' 2\") Weight: 92.9 kg (204 lb 11.2 oz)  Estimated body mass index is 26.28 kg/m  as calculated from the following:    Height as of this encounter: 1.88 m (6' 2\").    Weight as of this encounter: 92.9 kg (204 lb 11.2 oz).    P: Continue to monitor Pt status and report changes to Cards II team.      "

## 2019-05-03 LAB
ANION GAP SERPL CALCULATED.3IONS-SCNC: 6 MMOL/L (ref 3–14)
BUN SERPL-MCNC: 25 MG/DL (ref 7–30)
CALCIUM SERPL-MCNC: 9.1 MG/DL (ref 8.5–10.1)
CHLORIDE SERPL-SCNC: 102 MMOL/L (ref 94–109)
CO2 SERPL-SCNC: 26 MMOL/L (ref 20–32)
CREAT SERPL-MCNC: 0.98 MG/DL (ref 0.66–1.25)
ERYTHROCYTE [DISTWIDTH] IN BLOOD BY AUTOMATED COUNT: 14.2 % (ref 10–15)
GFR SERPL CREATININE-BSD FRML MDRD: >90 ML/MIN/{1.73_M2}
GLUCOSE SERPL-MCNC: 121 MG/DL (ref 70–99)
HCT VFR BLD AUTO: 53.1 % (ref 40–53)
HGB BLD-MCNC: 16.7 G/DL (ref 13.3–17.7)
INR PPP: 1.2 (ref 0.86–1.14)
LMWH PPP CHRO-ACNC: 0.64 IU/ML
LMWH PPP CHRO-ACNC: 0.85 IU/ML
MAGNESIUM SERPL-MCNC: 2.3 MG/DL (ref 1.6–2.3)
MCH RBC QN AUTO: 30.4 PG (ref 26.5–33)
MCHC RBC AUTO-ENTMCNC: 31.5 G/DL (ref 31.5–36.5)
MCV RBC AUTO: 97 FL (ref 78–100)
PLATELET # BLD AUTO: 178 10E9/L (ref 150–450)
POTASSIUM SERPL-SCNC: 4.4 MMOL/L (ref 3.4–5.3)
RBC # BLD AUTO: 5.49 10E12/L (ref 4.4–5.9)
SODIUM SERPL-SCNC: 134 MMOL/L (ref 133–144)
WBC # BLD AUTO: 8.9 10E9/L (ref 4–11)

## 2019-05-03 PROCEDURE — 85520 HEPARIN ASSAY: CPT | Performed by: INTERNAL MEDICINE

## 2019-05-03 PROCEDURE — 21400000 ZZH R&B CCU UMMC

## 2019-05-03 PROCEDURE — 83735 ASSAY OF MAGNESIUM: CPT | Performed by: INTERNAL MEDICINE

## 2019-05-03 PROCEDURE — 80048 BASIC METABOLIC PNL TOTAL CA: CPT | Performed by: INTERNAL MEDICINE

## 2019-05-03 PROCEDURE — 99232 SBSQ HOSP IP/OBS MODERATE 35: CPT | Mod: GC | Performed by: INTERNAL MEDICINE

## 2019-05-03 PROCEDURE — 36415 COLL VENOUS BLD VENIPUNCTURE: CPT

## 2019-05-03 PROCEDURE — 36592 COLLECT BLOOD FROM PICC: CPT | Performed by: INTERNAL MEDICINE

## 2019-05-03 PROCEDURE — 40000802 ZZH SITE CHECK

## 2019-05-03 PROCEDURE — 85027 COMPLETE CBC AUTOMATED: CPT | Performed by: INTERNAL MEDICINE

## 2019-05-03 PROCEDURE — 25000132 ZZH RX MED GY IP 250 OP 250 PS 637: Performed by: INTERNAL MEDICINE

## 2019-05-03 PROCEDURE — 25000128 H RX IP 250 OP 636: Performed by: STUDENT IN AN ORGANIZED HEALTH CARE EDUCATION/TRAINING PROGRAM

## 2019-05-03 PROCEDURE — 85610 PROTHROMBIN TIME: CPT | Performed by: INTERNAL MEDICINE

## 2019-05-03 PROCEDURE — 25000132 ZZH RX MED GY IP 250 OP 250 PS 637: Performed by: STUDENT IN AN ORGANIZED HEALTH CARE EDUCATION/TRAINING PROGRAM

## 2019-05-03 PROCEDURE — 85520 HEPARIN ASSAY: CPT

## 2019-05-03 PROCEDURE — 25000128 H RX IP 250 OP 636: Performed by: INTERNAL MEDICINE

## 2019-05-03 RX ORDER — HEPARIN SODIUM (PORCINE) LOCK FLUSH IV SOLN 100 UNIT/ML 100 UNIT/ML
5 SOLUTION INTRAVENOUS
Status: DISCONTINUED | OUTPATIENT
Start: 2019-05-03 | End: 2019-05-07 | Stop reason: HOSPADM

## 2019-05-03 RX ORDER — DIPHENHYDRAMINE HCL 50 MG
50 CAPSULE ORAL
Status: DISCONTINUED | OUTPATIENT
Start: 2019-05-03 | End: 2019-05-07 | Stop reason: HOSPADM

## 2019-05-03 RX ADMIN — FOLIC ACID 1 MG: 1 TABLET ORAL at 07:53

## 2019-05-03 RX ADMIN — Medication 5 ML: at 05:31

## 2019-05-03 RX ADMIN — ALTEPLASE 2 MG: 2.2 INJECTION, POWDER, LYOPHILIZED, FOR SOLUTION INTRAVENOUS at 22:36

## 2019-05-03 RX ADMIN — AMOXICILLIN AND CLAVULANATE POTASSIUM 1 TABLET: 875; 125 TABLET, FILM COATED ORAL at 19:08

## 2019-05-03 RX ADMIN — AMOXICILLIN AND CLAVULANATE POTASSIUM 1 TABLET: 875; 125 TABLET, FILM COATED ORAL at 07:52

## 2019-05-03 RX ADMIN — Medication 5 MG: at 19:08

## 2019-05-03 RX ADMIN — DIPHENHYDRAMINE HYDROCHLORIDE 50 MG: 50 CAPSULE ORAL at 20:43

## 2019-05-03 RX ADMIN — HYDRALAZINE HYDROCHLORIDE 25 MG: 25 TABLET ORAL at 07:53

## 2019-05-03 RX ADMIN — WARFARIN SODIUM 12.5 MG: 10 TABLET ORAL at 19:16

## 2019-05-03 RX ADMIN — HEPARIN SODIUM 2100 UNITS/HR: 10000 INJECTION, SOLUTION INTRAVENOUS at 09:32

## 2019-05-03 RX ADMIN — HYDRALAZINE HYDROCHLORIDE 25 MG: 25 TABLET ORAL at 12:28

## 2019-05-03 RX ADMIN — HYDRALAZINE HYDROCHLORIDE 25 MG: 25 TABLET ORAL at 19:08

## 2019-05-03 RX ADMIN — LISINOPRIL 10 MG: 10 TABLET ORAL at 07:53

## 2019-05-03 RX ADMIN — Medication 12.5 MG: at 07:53

## 2019-05-03 RX ADMIN — LISINOPRIL 10 MG: 10 TABLET ORAL at 20:43

## 2019-05-03 RX ADMIN — Medication 5 ML: at 19:57

## 2019-05-03 RX ADMIN — Medication 100 MG: at 07:52

## 2019-05-03 RX ADMIN — HEPARIN SODIUM 2100 UNITS/HR: 10000 INJECTION, SOLUTION INTRAVENOUS at 19:54

## 2019-05-03 RX ADMIN — ALTEPLASE 2 MG: 2.2 INJECTION, POWDER, LYOPHILIZED, FOR SOLUTION INTRAVENOUS at 22:41

## 2019-05-03 RX ADMIN — DIGOXIN 250 MCG: 125 TABLET ORAL at 07:53

## 2019-05-03 ASSESSMENT — MIFFLIN-ST. JEOR: SCORE: 1952.32

## 2019-05-03 ASSESSMENT — ACTIVITIES OF DAILY LIVING (ADL)
ADLS_ACUITY_SCORE: 12

## 2019-05-03 NOTE — PLAN OF CARE
D/I/A. Pt is here due to New HF + Cardiogenic shock.  Pt is stable.on RA. ST. Denied pain. Good UO. Keeping a strict I and O's. Up ad david. On Heparin gtt for LV thrombus.   P. Continue to monitor. Discharge to home once INR is therapeutic.

## 2019-05-03 NOTE — PLAN OF CARE
D: Patient was admitted for LE edema and 20lbs weight gain and LV apical mass-like clots on heparin gtt @2100units/hr.   I/A: Patient is known to have Polysubstance issues and will eventually need workup to evaluate for coronary disease.  Patient's HR is ST in 110s and on room air ad david.  Patient is on warfarin bridge (received a dose of 12.5mg warfarin) with the INR goal 2-3. This am INR was 1.20, patient was disappointed about the result and was hoping to discharge today.  Patient's mother here and is planing to leave to [place], [place] tomorrow and would like to talk to the team.  Mother's name is Miracle Monsalve Ph #[phone] and relayed to the team about the patient's mother desire to speak to the team before she leaves out of town tomorrow.  Patient is on 2G Sodium Diet with 2L FR and is being compliant.  Voiding copious amount via urinal and ad david.    P: Will continue to monitor and notify MD of status changes.  Awaiting INR to goal of 2-3 prior to discharging home.

## 2019-05-03 NOTE — PROGRESS NOTES
Care Coordinator Progress Note    Admission Date/Time:  4/25/2019  Attending MD:  Maddie Choi    Data  Chart reviewed, discussed with interdisciplinary team.   Patient was admitted for: S/P coronary angiogram.    Assessment  Concerns with insurance coverage for discharge needs: None, per financial counseling pt's [place] Medicaid is now active.  Current Living Situation: Patient lives with family. Pt states he is independent with his cares.   Support System: Supportive and Involved family  Services Involved: None currently  Transportation at Discharge: Family or friend will provide  Transportation to Medical Appointments: Self  Barriers to Discharge: Medical plan of care, subtherapeutic INR    Coordination of Care and Referrals: Provided patient/family with options for outpatient INR and warfarin monitoring and establishing PCP. Pt states he would like to have this arranged at the [facility] near his home.      Intervention  Arrangements made with [facility] (Ph: [phone], F: [phone]) for INR and Warfarin Monitoring (Goal= 2-3). Indication for Anticoagulation: LV thrombus . Expected duration of therapy: 6 months with follow up. Dr. Caterina Smith to follow. Appointment made on 5/8/2019 at 4:30pm for establishment of care, post hospitalization follow up and INR lab draw with Dr. Caterina Smith.     Plan  Anticipated Discharge Date: 2-3 days  Anticipated Discharge Plan: Discharge to home with clinic follow up.   CC will continue to monitor patient's medical condition and progress towards discharge.  Abby Singer RN BSN  6C Unit Care Coordinator  Phone number: [phone]  Pager: [phone]

## 2019-05-03 NOTE — PROGRESS NOTES
Cardiology Progress Note  Ubaldo Velez MRN: [id number]  Age: 33 year old, : [birth year]    I personally saw and examined this patient with housestaff, reviewed imaging and laboratory studies, confirmed physical examination and discussed results and plan with patient .        Changes Today:     - Continue warfarin, bridge with Heparin gtt, goal INR 2-3  - Will touch base re insurance coverage for Apixaban         Assessment and Plan:     Ubaldo Velez is a 32yo male with PMHx polysubstance abuse (tobacco, alcohol, cocaine, methamphetamines), ADHD who presented as a transfer from OSH for new CHF dx and concern for cardiogenic shock.      # New HFrEF (< 10%)  Presented to OSH with several weeks of URI sxs, LE edema, 20 lbs weight gain. BNP 4.6k, lactate 1.5, troponin 2.8 -> 2.169 (no chest pain, EKG sinus tach). Echo with EF 10%, severe global hypokinesia of LV, multiple (3+) apical mass-like clots in LV, severely decreased RV function, left atrium is severely dilated, 2-3+ MR, 3+ TR. No known CAD hx. No prior angiogram or stress test. Etiology of his heart failure is unclear at this time (likely polysubstance use), he will eventually need workup to evaluate for coronary disease. Warm on exam, mentating well, Lactate nl, HD stable.   - lisinopril 10 mg BID.   - Hydralazine to 25mg TID  - Digoxin 250mcg, check level next week  (was 0.3 on )  - Spiranolactone 12.5 daily.   - Diuretics: holding for now.   - Hold beta blockers   - Lipid panel (LA 2), HIV (negative), TSH (normal). UA + leuk esterase (culture negative), drug screen neg.  Iron studies nl.   - Right and Left Heart Cath done. No CAD.       # LV Thrombus  - Heparin gtt (high intensity), warfarin dosing.      # Leukocytosis  WBC 17.5 on admission, trending down, afebrile. Recent URI sxs improving. Given one dose of Levaquin at OSH prior to transfer. Pro-calcitonin 0.05. Did c/o tooth pain, CT dental with left  mandibular second molar periapical abscess.   - Zosyn (4/25 -> 4/27), Augmentin (4/27 -> 5/3)  - Influenza/RSV swab, UA all neg  - BCxs at OSH NGTD  - Dental consult re abscess, outpatient follow up to pull tooth.       # Polysubstance abuse (tobacco, alcohol, cocaine, methamphetamines)  - Urine toxicology neg  - Thiamine 100mg, Folic Acid 1mg daily   - chemical dependency following, he was given resources on outpatient follow up, he does not feel he needs inpatient treatment for he chem dep.     Patient was discussed with staff attending, Dr. Anderson.     Tono Tirado MD  PGY-2 Internal Medicine  Cardiology Service            Subjective     No acute events overnight.           Objective     Vitals:  Temp:  [97.5  F (36.4  C)-98.5  F (36.9  C)] 98.4  F (36.9  C)  Heart Rate:  [102-118] 102  Resp:  [14-18] 16  BP: ()/(63-81) 93/67  SpO2:  [97 %-100 %] 97 %  MAP: 70-80s    I/O: Net -2.0L over past 24hrs       Vitals:    05/01/19 0000 05/02/19 0500 05/03/19 0043   Weight: 93.9 kg (207 lb) 92.9 kg (204 lb 11.2 oz) 94.3 kg (207 lb 12.8 oz)       Gen: NAD, laying in bed comfortably  HEENT: EOMI, PERRl, anicteric sclera   NECK: JVD not seen   CV: Tachycardic, S1 S2 nl, no m/r/g  Resp: CTAB, no crackles, wheezing, or rhonchi   Abdomen: soft, non-distended, non-tender, normoactive bowel sounds   Extremities: warm, trace edema, improved   Skin: no rashes or bruises on exposed skin, tattoos on arms   Neuro: AAOx3, no focal deficits   Psych: pleasant, cooperative             Data:      Labs reviewed.          Medications     Medications    amoxicillin-clavulanate  1 tablet Oral Q12H ANITA     digoxin  250 mcg Oral Daily     folic acid  1 mg Oral Daily     heparin lock flush  5-10 mL Intracatheter Q24H     hydrALAZINE  25 mg Oral TID     lisinopril  10 mg Oral BID     melatonin  5 mg Oral At Bedtime     sodium chloride (PF)  3 mL Intracatheter Q8H     spironolactone  12.5 mg Oral Daily     vitamin B1  100 mg Oral  Daily       HEParin 2,100 Units/hr (05/02/19 2016)     - MEDICATION INSTRUCTIONS -       Warfarin Therapy Reminder

## 2019-05-03 NOTE — DISCHARGE INSTRUCTIONS
Discharge RN: Please fax discharge orders, discharge summary and latest labs to [facility] (Fax: [phone])

## 2019-05-03 NOTE — CONSULTS
"33 year old male presenting with systolic heart failure. CORE consult requested. Ubaldo is currently admitted with heart failure    Ubaldo was provided with a CHF book.  I reviewed the importance of daily weights, 2 gm sodium diet, 2L fluid restriction and compliance with medications upon hospital discharge.  CORE contact phone numbers provided and patient is encouraged to call with any questions or concerns, including any weight gain or loss of 2 or more pounds in 24 hours or 5 or more pounds in 1 week.      Appointment made in CORE clinic on  at 1pm.  I will follow-up with the patient at that time, sooner if needed.  Thank you for the consult.    Carrie Tanner RN BSN  Cardiology Care Coordinator - Heart Failure/ [facility]  [facility]   Questions and [phone]   First press #1 for the [place] and then press #3 for \"Medical Advise\" to reach us Cardiology Nurses.      "

## 2019-05-04 LAB
ANION GAP SERPL CALCULATED.3IONS-SCNC: 7 MMOL/L (ref 3–14)
BUN SERPL-MCNC: 22 MG/DL (ref 7–30)
CALCIUM SERPL-MCNC: 9.4 MG/DL (ref 8.5–10.1)
CHLORIDE SERPL-SCNC: 100 MMOL/L (ref 94–109)
CO2 SERPL-SCNC: 25 MMOL/L (ref 20–32)
CREAT SERPL-MCNC: 1.06 MG/DL (ref 0.66–1.25)
ERYTHROCYTE [DISTWIDTH] IN BLOOD BY AUTOMATED COUNT: 14.4 % (ref 10–15)
GFR SERPL CREATININE-BSD FRML MDRD: >90 ML/MIN/{1.73_M2}
GLUCOSE SERPL-MCNC: 85 MG/DL (ref 70–99)
HCT VFR BLD AUTO: 54.7 % (ref 40–53)
HGB BLD-MCNC: 17.2 G/DL (ref 13.3–17.7)
INR PPP: 1.33 (ref 0.86–1.14)
LMWH PPP CHRO-ACNC: 0.56 IU/ML
MAGNESIUM SERPL-MCNC: 2.2 MG/DL (ref 1.6–2.3)
MCH RBC QN AUTO: 29.8 PG (ref 26.5–33)
MCHC RBC AUTO-ENTMCNC: 31.4 G/DL (ref 31.5–36.5)
MCV RBC AUTO: 95 FL (ref 78–100)
PLATELET # BLD AUTO: 173 10E9/L (ref 150–450)
POTASSIUM SERPL-SCNC: 4.8 MMOL/L (ref 3.4–5.3)
RBC # BLD AUTO: 5.78 10E12/L (ref 4.4–5.9)
SODIUM SERPL-SCNC: 133 MMOL/L (ref 133–144)
WBC # BLD AUTO: 8.7 10E9/L (ref 4–11)

## 2019-05-04 PROCEDURE — 25000128 H RX IP 250 OP 636: Performed by: STUDENT IN AN ORGANIZED HEALTH CARE EDUCATION/TRAINING PROGRAM

## 2019-05-04 PROCEDURE — 36415 COLL VENOUS BLD VENIPUNCTURE: CPT | Performed by: INTERNAL MEDICINE

## 2019-05-04 PROCEDURE — 99232 SBSQ HOSP IP/OBS MODERATE 35: CPT | Mod: GC | Performed by: INTERNAL MEDICINE

## 2019-05-04 PROCEDURE — 25000132 ZZH RX MED GY IP 250 OP 250 PS 637: Performed by: STUDENT IN AN ORGANIZED HEALTH CARE EDUCATION/TRAINING PROGRAM

## 2019-05-04 PROCEDURE — 25000128 H RX IP 250 OP 636: Performed by: INTERNAL MEDICINE

## 2019-05-04 PROCEDURE — 85520 HEPARIN ASSAY: CPT | Performed by: INTERNAL MEDICINE

## 2019-05-04 PROCEDURE — 83735 ASSAY OF MAGNESIUM: CPT | Performed by: INTERNAL MEDICINE

## 2019-05-04 PROCEDURE — 85610 PROTHROMBIN TIME: CPT | Performed by: INTERNAL MEDICINE

## 2019-05-04 PROCEDURE — 21400000 ZZH R&B CCU UMMC

## 2019-05-04 PROCEDURE — 25000132 ZZH RX MED GY IP 250 OP 250 PS 637: Performed by: INTERNAL MEDICINE

## 2019-05-04 PROCEDURE — 85027 COMPLETE CBC AUTOMATED: CPT | Performed by: INTERNAL MEDICINE

## 2019-05-04 PROCEDURE — 80048 BASIC METABOLIC PNL TOTAL CA: CPT | Performed by: INTERNAL MEDICINE

## 2019-05-04 RX ADMIN — DIPHENHYDRAMINE HYDROCHLORIDE 50 MG: 50 CAPSULE ORAL at 21:10

## 2019-05-04 RX ADMIN — FOLIC ACID 1 MG: 1 TABLET ORAL at 07:52

## 2019-05-04 RX ADMIN — HYDRALAZINE HYDROCHLORIDE 25 MG: 25 TABLET ORAL at 07:52

## 2019-05-04 RX ADMIN — Medication 5 MG: at 20:53

## 2019-05-04 RX ADMIN — DIGOXIN 250 MCG: 125 TABLET ORAL at 07:52

## 2019-05-04 RX ADMIN — Medication 10 ML: at 01:42

## 2019-05-04 RX ADMIN — LISINOPRIL 10 MG: 10 TABLET ORAL at 07:51

## 2019-05-04 RX ADMIN — HEPARIN SODIUM 2000 UNITS/HR: 10000 INJECTION, SOLUTION INTRAVENOUS at 22:28

## 2019-05-04 RX ADMIN — LISINOPRIL 10 MG: 10 TABLET ORAL at 20:53

## 2019-05-04 RX ADMIN — HYDRALAZINE HYDROCHLORIDE 25 MG: 25 TABLET ORAL at 11:32

## 2019-05-04 RX ADMIN — WARFARIN SODIUM 12.5 MG: 10 TABLET ORAL at 17:39

## 2019-05-04 RX ADMIN — AMOXICILLIN AND CLAVULANATE POTASSIUM 1 TABLET: 875; 125 TABLET, FILM COATED ORAL at 20:53

## 2019-05-04 RX ADMIN — AMOXICILLIN AND CLAVULANATE POTASSIUM 1 TABLET: 875; 125 TABLET, FILM COATED ORAL at 07:52

## 2019-05-04 RX ADMIN — Medication 100 MG: at 07:53

## 2019-05-04 RX ADMIN — Medication 12.5 MG: at 07:52

## 2019-05-04 RX ADMIN — HEPARIN SODIUM 2000 UNITS/HR: 10000 INJECTION, SOLUTION INTRAVENOUS at 09:52

## 2019-05-04 RX ADMIN — Medication 10 ML: at 17:02

## 2019-05-04 RX ADMIN — HYDRALAZINE HYDROCHLORIDE 25 MG: 25 TABLET ORAL at 17:39

## 2019-05-04 ASSESSMENT — ACTIVITIES OF DAILY LIVING (ADL)
ADLS_ACUITY_SCORE: 12

## 2019-05-04 ASSESSMENT — MIFFLIN-ST. JEOR: SCORE: 1946.43

## 2019-05-04 NOTE — PLAN OF CARE
"BP 97/68 (BP Location: Left arm)   Pulse 95   Temp 98.2  F (36.8  C) (Oral)   Resp 16   Ht 1.88 m (6' 2\")   Wt 93.7 kg (206 lb 8 oz)   SpO2 97%   BMI 26.51 kg/m      Reason for admission: New heart failure likely secondary to substance abuse and multiple LV thrombus.  Per patient has been clean for 6 mos.  Vitals: VSS.    Activity: Up independently.  Pain: Denies.  Neuro: AO x 4.   Cardiac: In SR/ST.  Respiratory: WDL.  GI/: Voiding spontaneously.  Diet: 2 gm sodium and 2L fluid restriction.   Lines: R PICC hep locked and PIV infusing heparin at 2000 units/hr  Skin/Wounds:  WDL.  Plan: Continue on heparin gtt until INR therapeutic.  Heparin level therapeutic early this AM.      Continue to monitor and follow POC    Madan Cesar RN on 5/4/2019 at 6:00 AM           "

## 2019-05-04 NOTE — PLAN OF CARE
Shift summary    Pt admitted with new heart failure likely secondary to substance abuse and multiple left ventricul thrombus . Pt has been clean and sober 6 mo,per report from previous RN. Pt is alert and oriented. Pt up ab david. Pt has been in SR/ST, other VSS. Pt receiving heparin gtt at 2000 unit/ hr, next 10 a at 0230. Pt has DL PICC HL and PIV with heparin infusion. Pt denies any c/o pain. Pt lungs clear. Pt eating and drinking. Pt showered and left foot soaked with Hibiclens d/t big toe nail off and bacitracin applied and redressed. POC: Pt to continue on heparin gtt until INR therapuetic

## 2019-05-04 NOTE — PLAN OF CARE
Reason for admission: New heart failure likely secondary to substance abuse and multiple LV thrombus.  Per patient has been clean for 6 mos.  Vitals: VSS.    Activity: Up independently.  Pain: Denies.  Neuro: AO x 4.   Cardiac: In SR/ST.  Respiratory: WDL.  GI/: Voiding spontaneously.  Diet: 2 gm sodium and 2L fluid restriction.   Lines: R PICC hep locked and PIV infusing heparin at 2000 units/hr  Skin/Wounds:  WDL.  Plan: Continue on heparin gtt until INR therapeutic.  Heparin level therapeutic early this AM.     Continue to monitor and follow POC

## 2019-05-04 NOTE — PROGRESS NOTES
Cardiology Progress Note  Ubaldo Velez MRN: [id number]  Age: 33 year old, : 18    I personally saw and examined this patient with resident and agree with observations and plan.  I anticipate discharge Monday        Changes Today:     - Continue warfarin, bridge with Heparin gtt, goal INR 2-3        Assessment and Plan:     Ubaldo Velez is a 32yo male with PMHx polysubstance abuse (tobacco, alcohol, cocaine, methamphetamines), ADHD who presented as a transfer from OSH for new CHF dx and concern for cardiogenic shock.      # New HFrEF (< 10%)  Presented to OSH with several weeks of URI sxs, LE edema, 20 lbs weight gain. BNP 4.6k, lactate 1.5, troponin 2.8 -> 2.169 (no chest pain, EKG sinus tach). Echo with EF 10%, severe global hypokinesia of LV, multiple (3+) apical mass-like clots in LV, severely decreased RV function, left atrium is severely dilated, 2-3+ MR, 3+ TR. No known CAD hx. No prior angiogram or stress test. Etiology of his heart failure is unclear at this time (likely polysubstance use), he will eventually need workup to evaluate for coronary disease. Warm on exam, mentating well, Lactate nl, HD stable.   - lisinopril 10 mg BID.   - Hydralazine to 25mg TID  - Digoxin 250mcg, check level next week  (was 0.3 on )  - Spiranolactone 12.5 daily.   - Diuretics: holding for now.   - Hold beta blockers   - Lipid panel (LA 2), HIV (negative), TSH (normal). UA + leuk esterase (culture negative), drug screen neg.  Iron studies nl.   - Right and Left Heart Cath done. No CAD.       # LV Thrombus  - Heparin gtt (high intensity), warfarin dosing.   - Insurance does not cover Apixaban     # Leukocytosis  WBC 17.5 on admission, trending down, afebrile. Recent URI sxs improving. Given one dose of Levaquin at OSH prior to transfer. Pro-calcitonin 0.05. Did c/o tooth pain, CT dental with left mandibular second molar periapical abscess.   - Zosyn ( -> ),  Augmentin (4/27 -> 5/3)  - Influenza/RSV swab, UA all neg  - BCxs at OSH NGTD  - Dental consult re abscess, outpatient follow up to pull tooth.       # Polysubstance abuse (tobacco, alcohol, cocaine, methamphetamines)  - Urine toxicology neg  - Thiamine 100mg, Folic Acid 1mg daily   - chemical dependency following, he was given resources on outpatient follow up, he does not feel he needs inpatient treatment for he chem dep.     Patient was discussed with staff attending, Dr. Gallo.     Tono Tirado MD  PGY-2 Internal Medicine  Cardiology Service              Subjective     No acute events overnight. Seen this morning. He feels well, has no new concerns.           Objective     Vitals:  Temp:  [97.6  F (36.4  C)-98.5  F (36.9  C)] 98.2  F (36.8  C)  Pulse:  [] 95  Heart Rate:  [] 94  Resp:  [16-20] 16  BP: ()/(67-73) 97/68  SpO2:  [97 %-99 %] 97 %  MAP: 70-80s    I/O: Net -1.5L over past 24hrs     Vitals:    05/02/19 0500 05/03/19 0043 05/04/19 0052   Weight: 92.9 kg (204 lb 11.2 oz) 94.3 kg (207 lb 12.8 oz) 93.7 kg (206 lb 8 oz)       Gen: NAD, laying in bed comfortably  HEENT: EOMI, PERRl, anicteric sclera   NECK: JVD not seen   CV: Tachycardic, S1 S2 nl, no m/r/g  Resp: CTAB, no crackles, wheezing, or rhonchi   Abdomen: soft, non-distended, non-tender, normoactive bowel sounds   Extremities: warm, trace edema, improved   Skin: no rashes or bruises on exposed skin, tattoos on arms   Neuro: AAOx3, no focal deficits   Psych: pleasant, cooperative           Data:      Labs reviewed.           Medications     Medications    amoxicillin-clavulanate  1 tablet Oral Q12H ANITA     digoxin  250 mcg Oral Daily     folic acid  1 mg Oral Daily     heparin lock flush  5-10 mL Intracatheter Q24H     hydrALAZINE  25 mg Oral TID     lisinopril  10 mg Oral BID     melatonin  5 mg Oral At Bedtime     sodium chloride (PF)  3 mL Intracatheter Q8H     spironolactone  12.5 mg Oral Daily     vitamin B1  100 mg  Oral Daily       HEParin 2,000 Units/hr (05/04/19 0665)     - MEDICATION INSTRUCTIONS -       Warfarin Therapy Reminder

## 2019-05-05 LAB
ANION GAP SERPL CALCULATED.3IONS-SCNC: 7 MMOL/L (ref 3–14)
BUN SERPL-MCNC: 36 MG/DL (ref 7–30)
CALCIUM SERPL-MCNC: 9.4 MG/DL (ref 8.5–10.1)
CHLORIDE SERPL-SCNC: 100 MMOL/L (ref 94–109)
CO2 SERPL-SCNC: 24 MMOL/L (ref 20–32)
CREAT SERPL-MCNC: 1.08 MG/DL (ref 0.66–1.25)
ERYTHROCYTE [DISTWIDTH] IN BLOOD BY AUTOMATED COUNT: 14 % (ref 10–15)
GFR SERPL CREATININE-BSD FRML MDRD: 89 ML/MIN/{1.73_M2}
GLUCOSE SERPL-MCNC: 80 MG/DL (ref 70–99)
HCT VFR BLD AUTO: 55.3 % (ref 40–53)
HGB BLD-MCNC: 17.4 G/DL (ref 13.3–17.7)
INR PPP: 1.6 (ref 0.86–1.14)
INR PPP: NORMAL (ref 0.86–1.14)
LMWH PPP CHRO-ACNC: 0.69 IU/ML
MAGNESIUM SERPL-MCNC: 2.2 MG/DL (ref 1.6–2.3)
MCH RBC QN AUTO: 30 PG (ref 26.5–33)
MCHC RBC AUTO-ENTMCNC: 31.5 G/DL (ref 31.5–36.5)
MCV RBC AUTO: 95 FL (ref 78–100)
PLATELET # BLD AUTO: 167 10E9/L (ref 150–450)
POTASSIUM SERPL-SCNC: 4.8 MMOL/L (ref 3.4–5.3)
RBC # BLD AUTO: 5.8 10E12/L (ref 4.4–5.9)
SODIUM SERPL-SCNC: 131 MMOL/L (ref 133–144)
WBC # BLD AUTO: 7.4 10E9/L (ref 4–11)

## 2019-05-05 PROCEDURE — 83735 ASSAY OF MAGNESIUM: CPT | Performed by: INTERNAL MEDICINE

## 2019-05-05 PROCEDURE — 21400000 ZZH R&B CCU UMMC

## 2019-05-05 PROCEDURE — 25000132 ZZH RX MED GY IP 250 OP 250 PS 637: Performed by: STUDENT IN AN ORGANIZED HEALTH CARE EDUCATION/TRAINING PROGRAM

## 2019-05-05 PROCEDURE — 85520 HEPARIN ASSAY: CPT | Performed by: INTERNAL MEDICINE

## 2019-05-05 PROCEDURE — 99232 SBSQ HOSP IP/OBS MODERATE 35: CPT | Mod: GC | Performed by: INTERNAL MEDICINE

## 2019-05-05 PROCEDURE — 85027 COMPLETE CBC AUTOMATED: CPT | Performed by: INTERNAL MEDICINE

## 2019-05-05 PROCEDURE — 25000128 H RX IP 250 OP 636: Performed by: INTERNAL MEDICINE

## 2019-05-05 PROCEDURE — 40000558 ZZH STATISTIC CVC DRESSING CHANGE

## 2019-05-05 PROCEDURE — 25000132 ZZH RX MED GY IP 250 OP 250 PS 637: Performed by: INTERNAL MEDICINE

## 2019-05-05 PROCEDURE — 80048 BASIC METABOLIC PNL TOTAL CA: CPT | Performed by: INTERNAL MEDICINE

## 2019-05-05 PROCEDURE — 85610 PROTHROMBIN TIME: CPT | Performed by: INTERNAL MEDICINE

## 2019-05-05 PROCEDURE — 36415 COLL VENOUS BLD VENIPUNCTURE: CPT | Performed by: INTERNAL MEDICINE

## 2019-05-05 RX ORDER — WARFARIN SODIUM 7.5 MG/1
15 TABLET ORAL
Status: COMPLETED | OUTPATIENT
Start: 2019-05-05 | End: 2019-05-05

## 2019-05-05 RX ADMIN — LISINOPRIL 10 MG: 10 TABLET ORAL at 20:25

## 2019-05-05 RX ADMIN — HYDRALAZINE HYDROCHLORIDE 25 MG: 25 TABLET ORAL at 11:42

## 2019-05-05 RX ADMIN — DIPHENHYDRAMINE HYDROCHLORIDE 50 MG: 50 CAPSULE ORAL at 20:29

## 2019-05-05 RX ADMIN — HEPARIN SODIUM 2000 UNITS/HR: 10000 INJECTION, SOLUTION INTRAVENOUS at 11:44

## 2019-05-05 RX ADMIN — Medication 100 MG: at 08:21

## 2019-05-05 RX ADMIN — FOLIC ACID 1 MG: 1 TABLET ORAL at 08:20

## 2019-05-05 RX ADMIN — Medication 5 MG: at 20:25

## 2019-05-05 RX ADMIN — HYDRALAZINE HYDROCHLORIDE 25 MG: 25 TABLET ORAL at 08:20

## 2019-05-05 RX ADMIN — LISINOPRIL 10 MG: 10 TABLET ORAL at 08:21

## 2019-05-05 RX ADMIN — Medication 12.5 MG: at 08:19

## 2019-05-05 RX ADMIN — HYDRALAZINE HYDROCHLORIDE 25 MG: 25 TABLET ORAL at 17:29

## 2019-05-05 RX ADMIN — DIGOXIN 250 MCG: 125 TABLET ORAL at 08:20

## 2019-05-05 RX ADMIN — WARFARIN SODIUM 15 MG: 7.5 TABLET ORAL at 17:29

## 2019-05-05 ASSESSMENT — ACTIVITIES OF DAILY LIVING (ADL)
ADLS_ACUITY_SCORE: 12
ADLS_ACUITY_SCORE: 10
ADLS_ACUITY_SCORE: 10
ADLS_ACUITY_SCORE: 12

## 2019-05-05 ASSESSMENT — MIFFLIN-ST. JEOR: SCORE: 1949.15

## 2019-05-05 NOTE — PLAN OF CARE
"Reason for admission: New heart failure likely secondary to substance abuse and multiple LV thrombus.  Per patient has been clean for 6 mos.  Vitals: BP 99/59 (BP Location: Left arm)   Pulse 93   Temp 95  F (35  C) (Axillary)   Resp 18   Ht 1.88 m (6' 2\")   Wt 93.9 kg (207 lb 1.6 oz)   SpO2 100%   BMI 26.59 kg/m     Activity: Up independently.  Pain: Denies.  Neuro: AO x 4.   Cardiac: In SR/ST--telemetry dc'd per order.  Respiratory: WDL.  GI/: Voiding spontaneously.  Diet: 2 gm sodium and 2L fluid restriction.   Lines: R PICC dc'd and PIV infusing heparin at 2000 units/hr  Skin/Wounds:  WDL.  Plan: Continue on heparin gtt until INR therapeutic.  Heparin level therapeutic today.     Continue to monitor and follow POC      "

## 2019-05-05 NOTE — PROGRESS NOTES
Cardiology Progress Note  Ubaldo Velez MRN: [id number]  Age: 33 year old, : 18      I personally reviewed the patient's interim history, available interim laboratories and imaging studies.  I confirmed the physical findings and observations of the trainee with whom I saw and discussed the patient.  I agree with the finding and observations elaborated and agree with plan.  Al Gallo,  The patient remains hospitalized on an intensive in-patient service because his medical assistance does not cover bridge subcutaneous anticoagulation.        Changes Today:     - Continue warfarin, bridge with Heparin gtt, goal INR 2-3        Assessment and Plan:     Ubaldo Velez is a 34yo male with PMHx polysubstance abuse (tobacco, alcohol, cocaine, methamphetamines), ADHD who presented as a transfer from OSH for new CHF dx and concern for cardiogenic shock.      # New HFrEF (< 10%)  Presented to OSH with several weeks of URI sxs, LE edema, 20 lbs weight gain. BNP 4.6k, lactate 1.5, troponin 2.8 -> 2.169 (no chest pain, EKG sinus tach). Echo with EF 10%, severe global hypokinesia of LV, multiple (3+) apical mass-like clots in LV, severely decreased RV function, left atrium is severely dilated, 2-3+ MR, 3+ TR. No known CAD hx. No prior angiogram or stress test. Etiology of his heart failure is unclear at this time (likely polysubstance use), he will eventually need workup to evaluate for coronary disease. Warm on exam, mentating well, Lactate nl, HD stable.   - lisinopril 10 mg BID.   - Hydralazine to 25mg TID  - Digoxin 250mcg, check level next week  (was 0.3 on )  - Spiranolactone 12.5 daily.   - Diuretics: holding for now.   - Hold beta blockers   - Lipid panel (LA 2), HIV (negative), TSH (normal). UA + leuk esterase (culture negative), drug screen neg.  Iron studies nl.   - Right and Left Heart Cath done. No CAD.       # LV Thrombus  - Heparin gtt (high intensity),  warfarin dosing.   - Insurance does not cover Apixaban     # Leukocytosis  WBC 17.5 on admission, trending down, afebrile. Recent URI sxs improving. Given one dose of Levaquin at OSH prior to transfer. Pro-calcitonin 0.05. Did c/o tooth pain, CT dental with left mandibular second molar periapical abscess.   - Zosyn (4/25 -> 4/27), Augmentin (4/27 -> 5/3)  - Influenza/RSV swab, UA all neg  - BCxs at OSH NGTD  - Dental consult re abscess, outpatient follow up to pull tooth.       # Polysubstance abuse (tobacco, alcohol, cocaine, methamphetamines)  - Urine toxicology neg  - Thiamine 100mg, Folic Acid 1mg daily   - chemical dependency following, he was given resources on outpatient follow up, he does not feel he needs inpatient treatment for he chem dep.     Patient was discussed with staff attending, Dr. Gallo.     Tono Tirado MD  PGY-2 Internal Medicine  Cardiology Service             Subjective     No acute events overnight. Seen this morning. He feels well, has no new concerns.            Objective     Vitals:  Temp:  [97.6  F (36.4  C)-98.5  F (36.9  C)] 97.9  F (36.6  C)  Heart Rate:  [] 105  Resp:  [16-18] 16  BP: (103-115)/(64-79) 103/64  SpO2:  [96 %-100 %] 100 %  MAP: 70-80s    I/O: Net -1.2L over past 24hrs      Vitals:    05/03/19 0043 05/04/19 0052 05/05/19 0136   Weight: 94.3 kg (207 lb 12.8 oz) 93.7 kg (206 lb 8 oz) 93.9 kg (207 lb 1.6 oz)       Gen: NAD, laying in bed comfortably  HEENT: EOMI, PERRl, anicteric sclera   NECK: JVD not seen   CV: Tachycardic, S1 S2 nl, no m/r/g  Resp: CTAB, no crackles, wheezing, or rhonchi   Abdomen: soft, non-distended, non-tender, normoactive bowel sounds   Extremities: warm, trace edema, improved   Skin: no rashes or bruises on exposed skin, tattoos on arms   Neuro: AAOx3, no focal deficits   Psych: pleasant, cooperative             Data:      Labs reviewed.          Medications     Medications    digoxin  250 mcg Oral Daily     folic acid  1 mg Oral  Daily     heparin lock flush  5-10 mL Intracatheter Q24H     hydrALAZINE  25 mg Oral TID     lisinopril  10 mg Oral BID     melatonin  5 mg Oral At Bedtime     sodium chloride (PF)  3 mL Intracatheter Q8H     spironolactone  12.5 mg Oral Daily     vitamin B1  100 mg Oral Daily       HEParin 2,000 Units/hr (05/05/19 2396)     - MEDICATION INSTRUCTIONS -       Warfarin Therapy Reminder

## 2019-05-05 NOTE — PLAN OF CARE
"/64 (BP Location: Left arm)   Pulse 95   Temp 97.9  F (36.6  C) (Axillary)   Resp 16   Ht 1.88 m (6' 2\")   Wt 93.9 kg (207 lb 1.6 oz)   SpO2 100%   BMI 26.59 kg/m      Reason for admission: New heart failure likely secondary to substance abuse and multiple LV thrombus.  Per patient has been clean for 6 mos.  Vitals: VSS.    Activity: Up independently.  Pain: Denies.  Neuro: AO x 4.   Cardiac: In SR/ST.  Respiratory: WDL.  GI/: Voiding spontaneously.  Diet: 2 gm sodium and 2L fluid restriction.   Lines: R PICC hep locked and PIV infusing heparin at 2000 units/hr  Skin/Wounds:  WDL.  Plan: Continue on heparin gtt until INR therapeutic.    Continue monitoring and follow POC.    Madan Cesar RN on 5/5/2019 at 6:02 AM        "

## 2019-05-05 NOTE — PLAN OF CARE
[id number] shift summary  Pt admitted for new diagnosis of heart failure likely d/t poly substance abuse. Pt has been chemical free for 6 months. Pt is doing very well. Pt has been up independently, eating,drinking and voiding. Pt denies any c/o pain or SOB. Pt's lungs clear. Pt no longer on telemetry, VSS. Pt continues on heparin gtt at 2000 units an hour for multiple cardiac clots . Pt's INR 1.6. Pt received 15 mg of coumadin tonight. Pt's left big toenail gone. Left foot soaked and cleansed and bacitracin applied. Pt has transfer orders to go to non monitored unit if bed available, pt would prefer to stay. POC: Pt to continue on heparin gtt until INR therapeutic.

## 2019-05-06 LAB
ANION GAP SERPL CALCULATED.3IONS-SCNC: 8 MMOL/L (ref 3–14)
BUN SERPL-MCNC: 30 MG/DL (ref 7–30)
CALCIUM SERPL-MCNC: 9.3 MG/DL (ref 8.5–10.1)
CHLORIDE SERPL-SCNC: 100 MMOL/L (ref 94–109)
CO2 SERPL-SCNC: 20 MMOL/L (ref 20–32)
CREAT SERPL-MCNC: 0.95 MG/DL (ref 0.66–1.25)
ERYTHROCYTE [DISTWIDTH] IN BLOOD BY AUTOMATED COUNT: 14.1 % (ref 10–15)
GFR SERPL CREATININE-BSD FRML MDRD: >90 ML/MIN/{1.73_M2}
GLUCOSE SERPL-MCNC: 92 MG/DL (ref 70–99)
HCT VFR BLD AUTO: 55.9 % (ref 40–53)
HGB BLD-MCNC: 17.4 G/DL (ref 13.3–17.7)
INR PPP: 1.87 (ref 0.86–1.14)
INR PPP: 2.01 (ref 0.86–1.14)
LMWH PPP CHRO-ACNC: 0.51 IU/ML
MAGNESIUM SERPL-MCNC: 2.3 MG/DL (ref 1.6–2.3)
MCH RBC QN AUTO: 30.5 PG (ref 26.5–33)
MCHC RBC AUTO-ENTMCNC: 31.1 G/DL (ref 31.5–36.5)
MCV RBC AUTO: 98 FL (ref 78–100)
PLATELET # BLD AUTO: 165 10E9/L (ref 150–450)
POTASSIUM SERPL-SCNC: 4.8 MMOL/L (ref 3.4–5.3)
RBC # BLD AUTO: 5.71 10E12/L (ref 4.4–5.9)
SODIUM SERPL-SCNC: 129 MMOL/L (ref 133–144)
WBC # BLD AUTO: 7.4 10E9/L (ref 4–11)

## 2019-05-06 PROCEDURE — 85520 HEPARIN ASSAY: CPT | Performed by: INTERNAL MEDICINE

## 2019-05-06 PROCEDURE — 25000128 H RX IP 250 OP 636: Performed by: INTERNAL MEDICINE

## 2019-05-06 PROCEDURE — 25000132 ZZH RX MED GY IP 250 OP 250 PS 637: Performed by: INTERNAL MEDICINE

## 2019-05-06 PROCEDURE — 21400000 ZZH R&B CCU UMMC

## 2019-05-06 PROCEDURE — 85027 COMPLETE CBC AUTOMATED: CPT | Performed by: INTERNAL MEDICINE

## 2019-05-06 PROCEDURE — 36415 COLL VENOUS BLD VENIPUNCTURE: CPT | Performed by: INTERNAL MEDICINE

## 2019-05-06 PROCEDURE — 25000132 ZZH RX MED GY IP 250 OP 250 PS 637: Performed by: STUDENT IN AN ORGANIZED HEALTH CARE EDUCATION/TRAINING PROGRAM

## 2019-05-06 PROCEDURE — 99232 SBSQ HOSP IP/OBS MODERATE 35: CPT | Mod: GC | Performed by: INTERNAL MEDICINE

## 2019-05-06 PROCEDURE — 85610 PROTHROMBIN TIME: CPT | Performed by: INTERNAL MEDICINE

## 2019-05-06 PROCEDURE — 83735 ASSAY OF MAGNESIUM: CPT | Performed by: INTERNAL MEDICINE

## 2019-05-06 PROCEDURE — 80048 BASIC METABOLIC PNL TOTAL CA: CPT | Performed by: INTERNAL MEDICINE

## 2019-05-06 RX ORDER — WARFARIN SODIUM 7.5 MG/1
15 TABLET ORAL
Status: COMPLETED | OUTPATIENT
Start: 2019-05-06 | End: 2019-05-06

## 2019-05-06 RX ADMIN — DIGOXIN 250 MCG: 125 TABLET ORAL at 08:05

## 2019-05-06 RX ADMIN — HEPARIN SODIUM 2000 UNITS/HR: 10000 INJECTION, SOLUTION INTRAVENOUS at 02:07

## 2019-05-06 RX ADMIN — FOLIC ACID 1 MG: 1 TABLET ORAL at 08:05

## 2019-05-06 RX ADMIN — HYDRALAZINE HYDROCHLORIDE 25 MG: 25 TABLET ORAL at 19:39

## 2019-05-06 RX ADMIN — HYDRALAZINE HYDROCHLORIDE 25 MG: 25 TABLET ORAL at 13:53

## 2019-05-06 RX ADMIN — Medication 100 MG: at 08:05

## 2019-05-06 RX ADMIN — HEPARIN SODIUM 2000 UNITS/HR: 10000 INJECTION, SOLUTION INTRAVENOUS at 14:55

## 2019-05-06 RX ADMIN — LISINOPRIL 10 MG: 10 TABLET ORAL at 08:05

## 2019-05-06 RX ADMIN — LISINOPRIL 10 MG: 10 TABLET ORAL at 21:53

## 2019-05-06 RX ADMIN — Medication 12.5 MG: at 08:05

## 2019-05-06 RX ADMIN — Medication 5 MG: at 21:53

## 2019-05-06 RX ADMIN — HYDRALAZINE HYDROCHLORIDE 25 MG: 25 TABLET ORAL at 08:05

## 2019-05-06 RX ADMIN — DIPHENHYDRAMINE HYDROCHLORIDE 50 MG: 50 CAPSULE ORAL at 21:57

## 2019-05-06 RX ADMIN — WARFARIN SODIUM 15 MG: 7.5 TABLET ORAL at 19:40

## 2019-05-06 ASSESSMENT — ACTIVITIES OF DAILY LIVING (ADL)
ADLS_ACUITY_SCORE: 10

## 2019-05-06 ASSESSMENT — MIFFLIN-ST. JEOR: SCORE: 1941.44

## 2019-05-06 NOTE — PROGRESS NOTES
Cardiology Progress Note  Ubaldo Velez MRN: [id number]  Age: 33 year old, : 18      I personally reviewed the patient's interim history, available interim laboratories and imaging studies.  I confirmed the physical findings and observations of the trainee with whom I saw and discussed the patient.  I agree with the finding and observations elaborated and agree with plan.  Al Gallo,      Changes Today:     - Continue warfarin, bridge with Heparin gtt, goal INR 2-3        Assessment and Plan:     Ubaldo Velez is a 34yo male with PMHx polysubstance abuse (tobacco, alcohol, cocaine, methamphetamines), ADHD who presented as a transfer from OSH for new CHF dx and concern for cardiogenic shock.      # New HFrEF (< 10%)  Presented to OSH with several weeks of URI sxs, LE edema, 20 lbs weight gain. BNP 4.6k, lactate 1.5, troponin 2.8 -> 2.169 (no chest pain, EKG sinus tach). Echo with EF 10%, severe global hypokinesia of LV, multiple (3+) apical mass-like clots in LV, severely decreased RV function, left atrium is severely dilated, 2-3+ MR, 3+ TR. No known CAD hx. No prior angiogram or stress test. Etiology of his heart failure is unclear at this time (likely polysubstance use), he will eventually need workup to evaluate for coronary disease. Warm on exam, mentating well, Lactate nl, HD stable.   - lisinopril 10 mg BID.   - Hydralazine to 25mg TID  - Digoxin 250mcg, check level next week  (was 0.3 on )  - Spiranolactone 12.5 daily.   - Diuretics: holding for now.   - Hold beta blockers   - Lipid panel (LA 2), HIV (negative), TSH (normal). UA + leuk esterase (culture negative), drug screen neg.  Iron studies nl.   - Right and Left Heart Cath done. No CAD.       # LV Thrombus  - Heparin gtt (high intensity), warfarin dosing.   - Insurance does not cover Apixaban     # Leukocytosis // resolved   WBC 17.5 on admission, trending down, afebrile. Recent URI sxs  improving. Given one dose of Levaquin at OSH prior to transfer. Pro-calcitonin 0.05. Did c/o tooth pain, CT dental with left mandibular second molar periapical abscess.   - Zosyn (4/25 -> 4/27), Augmentin (4/27 -> 5/3)  - Influenza/RSV swab, UA all neg  - BCxs at OSH NGTD  - Dental consult re abscess, outpatient follow up to pull tooth.       # Polysubstance abuse (tobacco, alcohol, cocaine, methamphetamines)  - Urine toxicology neg  - Thiamine 100mg, Folic Acid 1mg daily   - chemical dependency following, he was given resources on outpatient follow up, he does not feel he needs inpatient treatment for he chem dep.    PPX: heparin gtt, bridging to warfarin   Dispo: pending INR   Code: Full     Patient was discussed with staff attending, Dr. Gallo.     Halina Soares [id number]  p [id number]             Subjective     No acute events overnight. Sleeping this AM.           Objective     Vitals:  Temp:  [95  F (35  C)-98.5  F (36.9  C)] 98.3  F (36.8  C)  Pulse:  [89-95] 95  Heart Rate:  [] 106  Resp:  [15-18] 18  BP: ()/(59-72) 106/72  SpO2:  [97 %-100 %] 97 %  MAP: 70-80s    I/O: Net -3L over last 24h (4.8L UOP)       Vitals:    05/04/19 0052 05/05/19 0136 05/06/19 0500   Weight: 93.7 kg (206 lb 8 oz) 93.9 kg (207 lb 1.6 oz) 93.2 kg (205 lb 6.4 oz)       Gen: NAD, sleeping comfortably   CV: Tachycardic, S1 S2 nl, no m/r/g  Resp: CTAB, no crackles, wheezing, or rhonchi   Abdomen: soft, non-distended, non-tender, normoactive bowel sounds   Extremities: warm, trace edema, improved   Skin: no rashes or bruises on exposed skin, tattoos on arms   Neuro: sleeping             Data:      Labs reviewed.  Notable INR 1.87  Na 129          Medications     Medications    digoxin  250 mcg Oral Daily     folic acid  1 mg Oral Daily     heparin lock flush  5-10 mL Intracatheter Q24H     hydrALAZINE  25 mg Oral TID     lisinopril  10 mg Oral BID     melatonin  5 mg Oral At Bedtime     sodium chloride (PF)  3 mL Intracatheter Q8H      spironolactone  12.5 mg Oral Daily     vitamin B1  100 mg Oral Daily       HEParin 2,000 Units/hr (05/06/19 7530)     - MEDICATION INSTRUCTIONS -       Warfarin Therapy Reminder

## 2019-05-06 NOTE — PLAN OF CARE
"  Shift: 2300-0730  Neuro: A&Ox3  Cardiac: SR /67 (BP Location: Right arm)   Pulse 91   Temp 97.8  F (36.6  C) (Oral)   Resp 15   Ht 1.88 m (6' 2\")   Wt 93.9 kg (207 lb 1.6 oz)   SpO2 98%   BMI 26.59 kg/m  RA  Respiratory: Lungs clear  GI/: denies nausea  Diet/Appetite: 2gm Na diet  Activity: up to bathroom  Pain: denies  Skin: L big toe ,no nail bacitricin applied b/4 bed.  LDA's: Heparin gtt at 2000u hr.  Other:     Plan: Heparin gtt until theraputic  and transfer to non monitored floor.  "

## 2019-05-06 NOTE — PROGRESS NOTES
D/I: 32yo male with PMHx polysubstance abuse (tobacco, alcohol, cocaine, methamphetamines), ADHD who presented as a transfer from OSH for new CHF dx and concern for cardiogenic shock.   No monitor. Continues on heparin drip at 2000 units/hour per protocol. INR 1.87 this AM.  Denies pain or shortness of breath. Good UO. Up in halls independently without difficulties.   A: Stable HF.  P; Waiting for INR to be >2 and can discharge to home.Contnue current cares and notify providers with questions or concerns.

## 2019-05-07 ENCOUNTER — TELEPHONE (OUTPATIENT)
Dept: BEHAVIORAL HEALTH | Facility: CLINIC | Age: 34
End: 2019-05-07

## 2019-05-07 VITALS
OXYGEN SATURATION: 97 % | BODY MASS INDEX: 26.67 KG/M2 | TEMPERATURE: 97.8 F | SYSTOLIC BLOOD PRESSURE: 111 MMHG | RESPIRATION RATE: 16 BRPM | HEIGHT: 74 IN | WEIGHT: 207.8 LBS | HEART RATE: 88 BPM | DIASTOLIC BLOOD PRESSURE: 58 MMHG

## 2019-05-07 DIAGNOSIS — I50.20 SYSTOLIC CONGESTIVE HEART FAILURE, UNSPECIFIED HF CHRONICITY (H): Primary | ICD-10-CM

## 2019-05-07 LAB
ANION GAP SERPL CALCULATED.3IONS-SCNC: 10 MMOL/L (ref 3–14)
BUN SERPL-MCNC: 26 MG/DL (ref 7–30)
CALCIUM SERPL-MCNC: 9.1 MG/DL (ref 8.5–10.1)
CHLORIDE SERPL-SCNC: 99 MMOL/L (ref 94–109)
CO2 SERPL-SCNC: 20 MMOL/L (ref 20–32)
CREAT SERPL-MCNC: 1.1 MG/DL (ref 0.66–1.25)
ERYTHROCYTE [DISTWIDTH] IN BLOOD BY AUTOMATED COUNT: 13.8 % (ref 10–15)
GFR SERPL CREATININE-BSD FRML MDRD: 87 ML/MIN/{1.73_M2}
GLUCOSE SERPL-MCNC: 73 MG/DL (ref 70–99)
HCT VFR BLD AUTO: 57.3 % (ref 40–53)
HGB BLD-MCNC: 17.8 G/DL (ref 13.3–17.7)
INR PPP: 2.48 (ref 0.86–1.14)
INR PPP: NORMAL (ref 0.86–1.14)
LMWH PPP CHRO-ACNC: 0.68 IU/ML
LMWH PPP CHRO-ACNC: NORMAL IU/ML
MAGNESIUM SERPL-MCNC: 2.3 MG/DL (ref 1.6–2.3)
MCH RBC QN AUTO: 30.3 PG (ref 26.5–33)
MCHC RBC AUTO-ENTMCNC: 31.1 G/DL (ref 31.5–36.5)
MCV RBC AUTO: 98 FL (ref 78–100)
OSMOLALITY SERPL: 288 MMOL/KG (ref 275–295)
PLATELET # BLD AUTO: 178 10E9/L (ref 150–450)
POTASSIUM SERPL-SCNC: 4.6 MMOL/L (ref 3.4–5.3)
RBC # BLD AUTO: 5.87 10E12/L (ref 4.4–5.9)
SODIUM SERPL-SCNC: 128 MMOL/L (ref 133–144)
WBC # BLD AUTO: 8.3 10E9/L (ref 4–11)

## 2019-05-07 PROCEDURE — 25000132 ZZH RX MED GY IP 250 OP 250 PS 637: Performed by: INTERNAL MEDICINE

## 2019-05-07 PROCEDURE — 80048 BASIC METABOLIC PNL TOTAL CA: CPT | Performed by: INTERNAL MEDICINE

## 2019-05-07 PROCEDURE — 83735 ASSAY OF MAGNESIUM: CPT | Performed by: INTERNAL MEDICINE

## 2019-05-07 PROCEDURE — 99238 HOSP IP/OBS DSCHRG MGMT 30/<: CPT | Mod: GC | Performed by: INTERNAL MEDICINE

## 2019-05-07 PROCEDURE — 25000128 H RX IP 250 OP 636: Performed by: INTERNAL MEDICINE

## 2019-05-07 PROCEDURE — 25000132 ZZH RX MED GY IP 250 OP 250 PS 637: Performed by: STUDENT IN AN ORGANIZED HEALTH CARE EDUCATION/TRAINING PROGRAM

## 2019-05-07 PROCEDURE — 85610 PROTHROMBIN TIME: CPT | Performed by: INTERNAL MEDICINE

## 2019-05-07 PROCEDURE — 83930 ASSAY OF BLOOD OSMOLALITY: CPT | Performed by: INTERNAL MEDICINE

## 2019-05-07 PROCEDURE — 85027 COMPLETE CBC AUTOMATED: CPT | Performed by: INTERNAL MEDICINE

## 2019-05-07 PROCEDURE — 85520 HEPARIN ASSAY: CPT | Performed by: INTERNAL MEDICINE

## 2019-05-07 PROCEDURE — 36415 COLL VENOUS BLD VENIPUNCTURE: CPT | Performed by: INTERNAL MEDICINE

## 2019-05-07 RX ORDER — HYDRALAZINE HYDROCHLORIDE 25 MG/1
25 TABLET, FILM COATED ORAL 3 TIMES DAILY
Qty: 90 TABLET | Refills: 1 | Status: SHIPPED | OUTPATIENT
Start: 2019-05-07 | End: 2019-06-09

## 2019-05-07 RX ORDER — LANOLIN ALCOHOL/MO/W.PET/CERES
100 CREAM (GRAM) TOPICAL DAILY
Qty: 30 TABLET | Refills: 1 | Status: SHIPPED | OUTPATIENT
Start: 2019-05-07 | End: 2019-09-09

## 2019-05-07 RX ORDER — HYDRALAZINE HYDROCHLORIDE 25 MG/1
25 TABLET, FILM COATED ORAL 3 TIMES DAILY
Qty: 90 TABLET | Refills: 1 | Status: SHIPPED | OUTPATIENT
Start: 2019-05-07 | End: 2019-05-07

## 2019-05-07 RX ORDER — LANOLIN ALCOHOL/MO/W.PET/CERES
100 CREAM (GRAM) TOPICAL DAILY
Qty: 30 TABLET | Refills: 1 | Status: SHIPPED | OUTPATIENT
Start: 2019-05-07 | End: 2019-05-07

## 2019-05-07 RX ORDER — WARFARIN SODIUM 5 MG/1
TABLET ORAL
Qty: 15 TABLET | Refills: 0 | Status: SHIPPED | OUTPATIENT
Start: 2019-05-07 | End: 2019-05-16

## 2019-05-07 RX ORDER — SPIRONOLACTONE 25 MG/1
12.5 TABLET ORAL DAILY
Qty: 30 TABLET | Refills: 1 | Status: SHIPPED | OUTPATIENT
Start: 2019-05-07 | End: 2019-05-07

## 2019-05-07 RX ORDER — DIGOXIN 250 MCG
250 TABLET ORAL DAILY
Qty: 30 TABLET | Refills: 1 | Status: SHIPPED | OUTPATIENT
Start: 2019-05-07 | End: 2019-06-09

## 2019-05-07 RX ORDER — SPIRONOLACTONE 25 MG/1
12.5 TABLET ORAL DAILY
Qty: 30 TABLET | Refills: 1 | Status: SHIPPED | OUTPATIENT
Start: 2019-05-07 | End: 2019-05-08 | Stop reason: SINTOL

## 2019-05-07 RX ORDER — LISINOPRIL 10 MG/1
10 TABLET ORAL 2 TIMES DAILY
Qty: 60 TABLET | Refills: 1 | Status: SHIPPED | OUTPATIENT
Start: 2019-05-07 | End: 2019-06-09

## 2019-05-07 RX ORDER — WARFARIN SODIUM 10 MG/1
10 TABLET ORAL ONCE
Status: COMPLETED | OUTPATIENT
Start: 2019-05-07 | End: 2019-05-07

## 2019-05-07 RX ORDER — FOLIC ACID 1 MG/1
1 TABLET ORAL DAILY
Qty: 30 TABLET | Refills: 1 | Status: SHIPPED | OUTPATIENT
Start: 2019-05-07 | End: 2019-06-09

## 2019-05-07 RX ORDER — LISINOPRIL 10 MG/1
10 TABLET ORAL 2 TIMES DAILY
Qty: 60 TABLET | Refills: 1 | Status: SHIPPED | OUTPATIENT
Start: 2019-05-07 | End: 2019-05-07

## 2019-05-07 RX ORDER — DIGOXIN 250 MCG
250 TABLET ORAL DAILY
Qty: 30 TABLET | Refills: 1 | Status: SHIPPED | OUTPATIENT
Start: 2019-05-07 | End: 2019-05-07

## 2019-05-07 RX ORDER — WARFARIN SODIUM 10 MG/1
TABLET ORAL
Qty: 30 TABLET | Refills: 0 | Status: SHIPPED | OUTPATIENT
Start: 2019-05-07 | End: 2019-05-07

## 2019-05-07 RX ORDER — FOLIC ACID 1 MG/1
1 TABLET ORAL DAILY
Qty: 30 TABLET | Refills: 1 | Status: SHIPPED | OUTPATIENT
Start: 2019-05-07 | End: 2019-05-07

## 2019-05-07 RX ADMIN — LISINOPRIL 10 MG: 10 TABLET ORAL at 08:17

## 2019-05-07 RX ADMIN — Medication 12.5 MG: at 08:18

## 2019-05-07 RX ADMIN — Medication 100 MG: at 08:19

## 2019-05-07 RX ADMIN — WARFARIN SODIUM 10 MG: 10 TABLET ORAL at 13:37

## 2019-05-07 RX ADMIN — HYDRALAZINE HYDROCHLORIDE 25 MG: 25 TABLET ORAL at 12:18

## 2019-05-07 RX ADMIN — FOLIC ACID 1 MG: 1 TABLET ORAL at 08:18

## 2019-05-07 RX ADMIN — DIGOXIN 250 MCG: 125 TABLET ORAL at 08:18

## 2019-05-07 RX ADMIN — HYDRALAZINE HYDROCHLORIDE 25 MG: 25 TABLET ORAL at 08:18

## 2019-05-07 RX ADMIN — HEPARIN SODIUM 2000 UNITS/HR: 10000 INJECTION, SOLUTION INTRAVENOUS at 04:33

## 2019-05-07 ASSESSMENT — ACTIVITIES OF DAILY LIVING (ADL)
ADLS_ACUITY_SCORE: 10

## 2019-05-07 ASSESSMENT — MIFFLIN-ST. JEOR: SCORE: 1952.32

## 2019-05-07 NOTE — TELEPHONE ENCOUNTER
5-7-19 Recv'd call from Skyla Villarreal [phone]  PMI # [id number]  Verbal Auth CD IOP [address] group 20 individual.     Requested [email or web address]

## 2019-05-07 NOTE — DISCHARGE SUMMARY
[facility]   Cardiology II Service / Advanced Heart Failure  Discharge Summary     Ubaldo Velez MRN# [id number]   YOB: 1985 Age: 33 year old     DATE OF ADMISSION:  4/25/2019  DATE OF DISCHARGE: 5/7/2019  ADMITTING PROVIDER: Maddie Choi MD  DISCHARGE PROVIDER:  Al Gallo MD       I personally saw and examined this patient, prepared discharge plan and conveyed to patient.  Al Gallo         Reason for Admission:     Cardiogenic Shock   New Heart Failure   LV Thrombus     OSH course - see discharge summary from [facility]  See H&P from 4/25/2019 For Full Detail           Discharge Diagnosis:     NICM   CHFrEF (<10%)  LV Thrombus   H/O Polysubstance Abuse          Follow Up:     1. Coumadin Clinic on 5/8/2019 for further titration of warfarin  2. [facility] Clinic 5/8/2019  3. Establish with PCP          Hospital Course by Problem:      Ubaldo Velez is a 34yo male with PMHx polysubstance abuse (tobacco, alcohol, cocaine, methamphetamines), ADHD who presented as a transfer from OSH for new CHF dx and concern for cardiogenic shock.      # New HFrEF (< 10%)  Presented to OSH with several weeks of URI sxs, LE edema, 20 lbs weight gain. BNP 4.6k, lactate 1.5, troponin 2.8 -> 2.169 (no chest pain, EKG sinus tach). Echo with EF 10%, severe global hypokinesia of LV, multiple (3+) apical mass-like clots in LV, severely decreased RV function, left atrium is severely dilated, 2-3+ MR, 3+ TR.     On arrival, he was on nitroglycerin gtt. Had been getting intermittent IV diuresis prior to transfer. PICC was placed and he was transitioned to nitroprusside and started on lasix gtt. BB, ACEI held initially. Labs and hemodynamics closely monitored as he was weaned off Nipride and started on captopril, hydralazine. He underwent right and left heart cath on 4/29 (see below); notably no evidence of ischemic disease. He was started on digoxin and spironolactone. Ultimately significantly  "improved during stay. Discharged home with plans to follow up with CORE clinic within the week.    Discharge Weight: 207lb (94.3kg)   Discharge Medications: digoxin 250 mg, hydralazine 25mg TID, lisinopril 10mg BID, spironolactone 12.5   Follow up: [facility] Clinic as scheduled    # Hyponatremia   Was noted to be hyponatremic with good UOP (3L) on the few days leading up to discharge. Felt well. No other electrolyte abnormalities. Improving on day of discharge. Appeared euvolemic. Advised patient to take spironolactone only every other day until follow up with CORE clinic. He vocalized understanding.     # LV Thrombus  Arrived on heparin gtt. Insurance not able to cover outpatient lovenox nor DOAC so he was remained inpatient until INR therapeutic on warfarin.    Follow up: Warfarin Clinic 5/8/2019     # Leukocytosis // resolved   WBC 17.5 on admission, trending down, afebrile. Recent URI sxs improving. Given one dose of Levaquin at OSH prior to transfer. Pro-calcitonin 0.05. Blood cultures at OSH remained negative. Did c/o tooth pain, CT dental with left mandibular second molar periapical abscess. Seen by dental while inpatient, will follow up as outpatient to pull tooth. While here, he completed course of zosyn and augmentin. Leukocytosis resolved no further issues during stay.     # Polysubstance abuse (tobacco, alcohol, cocaine, methamphetamines)  Seen by psychiatry and chemical dependency while inpatient; information provided. Emphasized to patient that sobriety was necessity should he need advanced heart failure workup, vocalized understanding. Maintained on thiamine and folate during admission.       Physical Exam on day of discharge  Blood pressure 111/58, pulse 88, temperature 97.8  F (36.6  C), temperature source Oral, resp. rate 16, height 1.88 m (6' 2\"), weight 94.3 kg (207 lb 12.8 oz), SpO2 97 %.    General: Standing up in room, bright, conversant    HEENT: anicteric sclera, PERRL, EOMI, MMM  CV: RRR, nl " S1/S2, no S3/S4 appreciated, no r/m/g   Lungs: Normal effort on room air; CTAB, no wheezing/crackles, no cough  Abd: soft, NT, ND   Ext: WWP, no significant LE edema   Skin: no rashes, cyanosis, or jaundice  Neuro: Alert, oriented. Speech fluent and articulate. No localizing deficits noted.     Lab Studies on Day of Discharge:   Last CBC:   Recent Labs   Lab Test 05/07/19  0501   WBC 8.3   RBC 5.87   HGB 17.8*   HCT 57.3*   MCV 98   MCH 30.3   MCHC 31.1*   RDW 13.8          Last CMP:  Recent Labs   Lab Test 05/07/19  0501  04/27/19  0447   *   < > 136   POTASSIUM 4.6   < > 4.2   CHLORIDE 99   < > 101   ARUN 9.1   < > 8.4*   CO2 20   < > 26   BUN 26   < > 19   CR 1.10   < > 1.12   GLC 73   < > 101*   AST  --   --  41   ALT  --   --  49   BILITOTAL  --   --  0.6   ALBUMIN  --   --  2.4*   PROTTOTAL  --   --  5.8*   ALKPHOS  --   --  74    < > = values in this interval not displayed.            Procedures & Significant Findings:     4/29/2019   Right Heart Catheterization     Moderately elevated pulmonary artery hypertension.    Right sided filling pressures are normal.    Left sided filling pressures are moderately elevated.    Normal cardiac output level.    Coronary Angiogram   Left Main   The vessel was visualized by selective angiography and is moderate in size. There was 0% vessel disease.   Left Anterior Descending   The vessel was visualized by selective angiography and is moderate in size. There was 0% vessel disease.   First Diagonal Branch   The vessel is large.   Second Diagonal Branch   The vessel is small.   Left Circumflex   The vessel was visualized by selective angiography and is moderate in size. There was 0% vessel disease.   First Obtuse Marginal Branch   The vessel is small.   Second Obtuse Marginal Branch   The vessel is large.   Right Coronary Artery   The vessel was visualized by selective angiography and is moderate in size. There was 0% vessel disease.     Conclusion    1.Nonischemic cardiomyopathy.  2. Normal right-sided and increased left-sided filling pressures.  3. Mild secondary pulmonary hypertension with a mean PAP of 32 mmHg.  4. Normal cardiac output of 4.6 L/min and reduced cardiac index of 2.0.  5. No angiographic evidence of coronary artery disease.         Consultations:     Psychiatry   Chemical Dependency   [facility] Clinic          Discharge Medications:     Discharge Medication List as of 5/7/2019  1:40 PM      CONTINUE these medications which have CHANGED    Details   digoxin (LANOXIN) 250 MCG tablet Take 1 tablet (250 mcg) by mouth daily, Disp-30 tablet, R-1, E-Prescribe      folic acid (FOLVITE) 1 MG tablet Take 1 tablet (1 mg) by mouth daily, Disp-30 tablet, R-1, E-Prescribe      hydrALAZINE (APRESOLINE) 25 MG tablet Take 1 tablet (25 mg) by mouth 3 times daily, Disp-90 tablet, R-1, E-Prescribe      lisinopril (PRINIVIL/ZESTRIL) 10 MG tablet Take 1 tablet (10 mg) by mouth 2 times daily, Disp-60 tablet, R-1, E-Prescribe      spironolactone (ALDACTONE) 25 MG tablet Take 0.5 tablets (12.5 mg) by mouth daily, Disp-30 tablet, R-1, E-Prescribe      vitamin B1 (THIAMINE) 100 MG tablet Take 1 tablet (100 mg) by mouth daily, Disp-30 tablet, R-1, E-Prescribe      warfarin (COUMADIN) 5 MG tablet Take 2-3 tablets as directed by INR clinic, Disp-15 tablet, R-0, E-Prescribe         STOP taking these medications       furosemide (LASIX) 10 MG/ML injection Comments:   Reason for Stopping:         heparin infusion 25,000 units in 0.45% NaCl 250 mL Comments:   Reason for Stopping:         levofloxacin (LEVAQUIN) 500 MG/100ML infusion Comments:   Reason for Stopping:         Nitroglycerin in D5W (NITROGLYCERIN 50 MG IN D5W 250 ML, ADULT STD,) 200-5 MCG/ML-% infusion Comments:   Reason for Stopping:                    Discharge Instructions and Follow-Up:     Discharge Procedure Orders   Follow-Up with Cardiac Advanced Practice Provider   Standing Status: Future Standing Exp. Date:  04/29/20   Referral Priority: Routine   Number of Visits Requested: 1     Medication Therapy Management Referral   Referral Priority: Routine Referral Type: Med Therapy Management   Number of Visits Requested: 1     Follow Up and recommended labs and tests   Order Comments: ______________________________________________________  [facility] [place]   Phone: [phone]  Fax: [phone]     For INR and Warfarin Monitoring (Goal= 2-3).   Indication for Anticoagulation: LV thrombus .   Expected duration of therapy: 6 months with follow up.   Dr. Caterina Smith to follow.     Appointment on 5/8/2019 at 4:30pm for establishment of care, post hospitalization follow up and INR lab draw with Dr. Caterina Smith.   ______________________________________________________     Reason for your hospital stay   Order Comments: Congestive Heart Failure     Adult [facility] Follow-up and recommended labs and tests   Order Comments: Follow up in Heart Failure clinic with Labs (BMP)    Appointments on [place] and/or [facility] (with [facility] or [facility] provider or service). Call [phone] if you haven't heard regarding these appointments within 7 days of discharge.     Follow Up and recommended labs and tests   Order Comments: Follow up in INR clinic on 5/8/2018    Follow up with PCP within one week     Activity   Order Comments: Your activity upon discharge: activity as tolerated     Order Specific Question Answer Comments   Is discharge order? Yes      Monitor and record   Order Comments: Daily weights - take your weight each morning with the same amount of clothes on     When to contact your care team   Order Comments: Shortness of breath, weight gain, leg swelling, chest pain PLEASE contact CORE clinic or be seen by a physicain     Discharge Instructions   Order Comments: 1. Check your weights daily   2. Take spironolactone only every other day for now - when you see CORE clinic (heart failure clinic) - and get your  labs checked,they will instruct you further about how often to take it    If you do not hear about a heart clinic appointment - PLEASE CALL AND MAKE ONE WITHIN THE WEEK     Full Code     Order Specific Question Answer Comments   Code status determined by: Discussion with patient/legal decision maker      Diet   Order Comments: Follow this diet upon discharge: Orders Placed This Encounter      Fluid restriction 2000 ML FLUID      Snacks/Supplements Adult: Other; If pt asks for a snack, please send.; With Meals      Snacks/Supplements Adult: Other; Allow pt to go over his sodium restriction at meals as long as he does not exceed his daily sodium restriction.; With Meals      2 Gram Sodium Diet     Order Specific Question Answer Comments   Is discharge order? Yes                    Discharge Disposition:   Home          Condition on Discharge:     Discharge condition: Stable   Code status on discharge: Full Code        Date of service: 5/7/2019    The patient was discussed with Dr. Gallo.     60 minutes spent in discharge, including >50% in counseling and coordination of care, medication review and plan of care recommended on follow up. Questions were answered.   Dr. Ross Ref-Primary, Physician  (PCP) was contacted at the time of discharge, so as to bridge from hospital to outpatient care.     Halina Soares [facility]  p [phone]

## 2019-05-07 NOTE — CONSULTS
CORE consult already completed on 5/3. Follow up appointment scheduled for 5/8 at 1pm with labs prior at 12:30pm. Appt confirmed with patient today.

## 2019-05-07 NOTE — PROGRESS NOTES
Social Work Services Progress Note     Hospital Day: 12  Date of Initial Social Work Evaluation:  Not completed  Collaborated with:  Attempted with pt     Data: Pt is a 33 year old male being followed by [facility] for CD resources.     Intervention:  SW attempted to see pt yesterday afternoon re CD resources and follow up from his visit with the [facility].  Pt was not available for meeting.  Will try to follow up tomorrow to see if pt is wanting CD treatment.     - Referrals in Process:    None yet - pt has paperwork to complete to get coverage for services.  Pt has had this for several days and not completed it.     Assessment: Unclear - pt not available for a meeting today.     Plan:    Anticipated Disposition: Home with services    Barriers to d/c plan: Medical stability    Follow Up: SW to follow if pt is interested in care.     RODNEY Gloria  [address]   Phone: [phone]  Pager: [phone]  Unit: [phone]

## 2019-05-07 NOTE — PLAN OF CARE
D: Heart Failure.    I/A: VSS. Denies pain. Not on Tele. Slept intermittently. Heparin drip discontinued at 0640 per MD order. Received call from lab at 0655 about INR needing to be redrawn due to a high hematocrit. Phlebotomy to redraw. Up ad david.    P: Continue to monitor.

## 2019-05-07 NOTE — PROGRESS NOTES
D/I: 34yo male with PMHx polysubstance abuse (tobacco, alcohol, cocaine, methamphetamines), ADHD who presented as a transfer from OSH for new CHF dx and concern for cardiogenic shock.   No monitor. Heparin drip discontinued. Recheck INR 2.48. Na 128. Encourage patient to limit water intake.  Denies pain or shortness of breath. Good UO. Up in halls and out in courtyard without difficulties. Is anxious to go home. See flowsheets for assessments and additional data.  A: Stable HF.  P: INR >2. Waiting for discharge orders. Contnue current cares and notify providers with questions or concerns.     DISCHARGE   Discharged to: Home  Via: Automobile  Accompanied by: Family  Discharge Instructions: diet, activity, medications, follow up appointments, when to call the MD, and what to watchout for (i.e. s/s:  increasing SOB, palpitations, chest pain, weight gain, edema)  Prescriptions: To be filled by [facility] pharmacy per pt's request; medication list reviewed & sent with pt  Follow Up Appointments: arranged; information given  Belongings: All sent with pt  IV: out  Telemetry: off  Pt exhibits understanding of above discharge instructions; all questions answered.  Discharge Paperwork: faxed

## 2019-05-07 NOTE — PROGRESS NOTES
Social Work Services Progress Note     Hospital Day: 13  Date of Initial Social Work Evaluation:  Not completed  Collaborated with:  Pt, CD      Data: Pt is a 33 year old male being followed by [facility] for CD resources.     Intervention:  RN brought pts completed forms and SW sent to [facility] yana for review.  SW was asked after pt discharged to confirm his household size and income per year.  Pt states 5 in the home and average 31,000.00 per year.  This was sent to Y[employer] to add to pt's paperwork.     - Referrals in Process:    Paperwork for county support sent to [facility].     Assessment: Pt willing to be involved in his care for CD treatment.     Plan:    Anticipated Disposition: Home with services    Barriers to d/c plan: Medical stability    Follow Up: SW no longer able to follow as pt has discharged.     RODNEY Gloria  [address]   Phone: [phone]  Pager: [phone]  Unit: [phone]

## 2019-05-07 NOTE — PLAN OF CARE
Pt is here due to new HF. ALOx4. Pleasant. AVSS. Appetite is good. +BM and good UO. Up ad david. On Heparin gtt at 2000 units/hr via PIV. INR came 2.01 this evening. Spoke to Dr. Soares and MD ordered to continue infusing Heparin gtt. Will recheck INR again in am and possible discharge to home.

## 2019-05-08 ENCOUNTER — OFFICE VISIT (OUTPATIENT)
Dept: CARDIOLOGY | Facility: CLINIC | Age: 34
End: 2019-05-08
Attending: NURSE PRACTITIONER
Payer: MEDICAID

## 2019-05-08 VITALS
SYSTOLIC BLOOD PRESSURE: 109 MMHG | WEIGHT: 212.9 LBS | OXYGEN SATURATION: 98 % | HEIGHT: 75 IN | DIASTOLIC BLOOD PRESSURE: 75 MMHG | HEART RATE: 98 BPM | BODY MASS INDEX: 26.47 KG/M2

## 2019-05-08 DIAGNOSIS — I50.22 CHRONIC SYSTOLIC HEART FAILURE (H): Primary | ICD-10-CM

## 2019-05-08 DIAGNOSIS — Z98.890 S/P CORONARY ANGIOGRAM: ICD-10-CM

## 2019-05-08 DIAGNOSIS — I50.20 SYSTOLIC CONGESTIVE HEART FAILURE, UNSPECIFIED HF CHRONICITY (H): ICD-10-CM

## 2019-05-08 LAB
ANION GAP SERPL CALCULATED.3IONS-SCNC: 7 MMOL/L (ref 3–14)
BUN SERPL-MCNC: 40 MG/DL (ref 7–30)
CALCIUM SERPL-MCNC: 9.3 MG/DL (ref 8.5–10.1)
CHLORIDE SERPL-SCNC: 101 MMOL/L (ref 94–109)
CO2 SERPL-SCNC: 22 MMOL/L (ref 20–32)
CREAT SERPL-MCNC: 1.13 MG/DL (ref 0.66–1.25)
GFR SERPL CREATININE-BSD FRML MDRD: 84 ML/MIN/{1.73_M2}
GLUCOSE SERPL-MCNC: 69 MG/DL (ref 70–99)
NT-PROBNP SERPL-MCNC: 1919 PG/ML (ref 0–125)
POTASSIUM SERPL-SCNC: 5 MMOL/L (ref 3.4–5.3)
SODIUM SERPL-SCNC: 130 MMOL/L (ref 133–144)

## 2019-05-08 PROCEDURE — 80048 BASIC METABOLIC PNL TOTAL CA: CPT | Performed by: NURSE PRACTITIONER

## 2019-05-08 PROCEDURE — 99214 OFFICE O/P EST MOD 30 MIN: CPT | Mod: ZP | Performed by: NURSE PRACTITIONER

## 2019-05-08 PROCEDURE — G0463 HOSPITAL OUTPT CLINIC VISIT: HCPCS | Mod: ZF

## 2019-05-08 PROCEDURE — 36415 COLL VENOUS BLD VENIPUNCTURE: CPT | Performed by: NURSE PRACTITIONER

## 2019-05-08 PROCEDURE — 83880 ASSAY OF NATRIURETIC PEPTIDE: CPT | Performed by: NURSE PRACTITIONER

## 2019-05-08 RX ORDER — METOPROLOL SUCCINATE 25 MG/1
12.5 TABLET, EXTENDED RELEASE ORAL DAILY
Qty: 45 TABLET | Refills: 3 | Status: SHIPPED | OUTPATIENT
Start: 2019-05-08 | End: 2019-05-21

## 2019-05-08 ASSESSMENT — ENCOUNTER SYMPTOMS
DIARRHEA: 0
SPUTUM PRODUCTION: 1
DYSPNEA ON EXERTION: 1
NECK PAIN: 1
VOMITING: 0
RECTAL PAIN: 0
POOR WOUND HEALING: 0
HYPERTENSION: 1
LEG PAIN: 1
SYNCOPE: 0
BOWEL INCONTINENCE: 0
ABDOMINAL PAIN: 1
SNORES LOUDLY: 1
JAUNDICE: 0
ALTERED TEMPERATURE REGULATION: 1
POLYDIPSIA: 0
MUSCLE WEAKNESS: 1
HALLUCINATIONS: 0
SHORTNESS OF BREATH: 1
CHILLS: 0
NIGHT SWEATS: 1
JOINT SWELLING: 1
SKIN CHANGES: 0
SLEEP DISTURBANCES DUE TO BREATHING: 1
HYPOTENSION: 0
STIFFNESS: 0
COUGH DISTURBING SLEEP: 1
FEVER: 0
NAUSEA: 0
EXERCISE INTOLERANCE: 1
BLOATING: 1
ORTHOPNEA: 1
LIGHT-HEADEDNESS: 0
WEIGHT LOSS: 0
POLYPHAGIA: 0
HEARTBURN: 0
FATIGUE: 1
PALPITATIONS: 1
BACK PAIN: 1
ARTHRALGIAS: 0
BLOOD IN STOOL: 0
WHEEZING: 1
WEIGHT GAIN: 1
INCREASED ENERGY: 1
MUSCLE CRAMPS: 0
COUGH: 1
CONSTIPATION: 1
MYALGIAS: 1
DECREASED APPETITE: 1
HEMOPTYSIS: 1
POSTURAL DYSPNEA: 1
NAIL CHANGES: 0

## 2019-05-08 ASSESSMENT — MINNESOTA LIVING WITH HEART FAILURE QUESTIONNAIRE (MLHF)
DIFFICULTY SLEEPING WELL AT NIGHT: 4
COSTING YOU MONEY FOR MEDICAL CARE: 0
DIFFICULTY TO CONCENTRATE OR REMEMBERING THINGS: 0
GIVING YOU SIDE EFFECTS FROM TREATMENTS: 0
DIFFICULTY GOING AWAY FROM HOME: 3
DIFFICULTY WORKING TO EARN A LIVING: 3
WORKING AROUND THE HOUSE OR YARD DIFFICULT: 3
SWELLING IN ANKLES OR LEGS: 5
MAKING YOU SHORT OF BREATH: 3
DIFFICULTY WITH RECREATIONAL PASTIMES, SPORTS, HOBBIES: 3
MAKING YOU FEEL DEPRESSED: 0
MAKING YOU WORRY: 0
TOTAL_SCORE: 48
MAKING YOU STAY IN A HOSPITAL: 4
LOSS OF SELF CONTROL IN YOUR LIFE: 0
HAVING TO SIT OR LIE DOWN DURING THE DAY: 3
FEELING LIKE A BURDEN TO FAMILY AND FRIENDS: 0
DIFFICULTY WITH SEXUAL ACTIVITIES: 3
WALKING ABOUT OR CLIMBING STAIRS DIFFICULT: 5
DIFFICULTY SOCIALIZING WITH FAMILY OR FRIENDS: 3
TIRED, FATIGUED OR LOW ON ENERGY: 3
EATING LESS FOODS YOU LIKE: 3

## 2019-05-08 ASSESSMENT — PAIN SCALES - GENERAL: PAINLEVEL: NO PAIN (0)

## 2019-05-08 ASSESSMENT — MIFFLIN-ST. JEOR: SCORE: 1983.4

## 2019-05-08 NOTE — PATIENT INSTRUCTIONS
Take your medicines every day, as directed    Changes made today:  o Please stop taking spironolactone  o Please begin taking metoprolol 12.5 mg once daily at bedtime   Monitor Your Weight and Symptoms    Contact us if you:      Gain 2 pounds in one day or 5 pounds in one week    Feel more short of breath    Notice more leg swelling    Feel lightheadeded   Change your lifestyle    Limit Salt or Sodium:    2000 mg  Limit Fluids:    2000 mL or approximately 64 ounces  Eat a Heart Healthy Diet    Low in saturated fats  Stay Active:    Aim to move at least 150 minutes every  week         To Contact us    During Business Hours:  [phone], option # 1 (University)  Then option # 3 (medical questions)     After hours, weekends or holidays:   [phone], Option #4  Ask to speak to the On-Call Cardiologist. Inform them you are a CORE/heart failure patient at the [place].     Use [employer] allows you to communicate directly with your heart team through secure messaging.    [employer] can be accessed any time on your phone, computer, or tablet.    If you need assistance, we'd be happy to help!         Keep your Heart Appointments:      Please return to clinic in 2 weeks    See Dr. Choi in 6 weeks    Plan to see Cardiac Rehab

## 2019-05-08 NOTE — LETTER
Date:Soledad 10, 2019      Patient was self referred, no letter generated. Do not send.        [facility] Health Information

## 2019-05-08 NOTE — PROGRESS NOTES
HPI  Mr. Ubaldo Velez is a 33 year old male with a relevant past medical history including ADHD and polysubstance abuse (ETOH, cocaine, alcohol, meth) who was admitted to an outside hospital with URI symptoms and 30 pound weight gain and transferred to [facility] with concern for cardiogenic shock. Echo revealed an EF of 10% with mod-severe MR and TR and multiple apical thrombus. Right and left heart caths were notable for clean coronary arteries, normal right sided filling pressures, moderately elevated left sided pressures and a marginal cardiac index. He was diuresed, transitioned to oral afterload, and discharged to home on warfarin with a therapeutic INR and hyponatremia prompting adjustment of his spironolactone dosing. He presents to establish care in the [facility].    Mr. Ubaldo Velez is feeling well, better than he has in years, in fact. He feels short of breath with climbing more than 4 flights of stairs. No dyspnea on a flat surface. He reports stable 1-2 pillow orthopnea without PND. He reports rare lightheadedness and denies recent syncope.  Mr. Ubaldo Velez denies lower extremity edema or abdominal edema.  Appetite is normal and he denies early satiety. His weight is stable at 211 pounds. He has been following a sodium restricted diet. He does not adhere to a 2 liter fluid restriction though intends to.      PMH  Past Medical History:   Diagnosis Date     ADHD      Cocaine use      Methamphetamine use (H)        Past Surgical History:   Procedure Laterality Date     CV CORONARY ANGIOGRAM N/A 4/29/2019    Procedure: CV CORONARY ANGIOGRAM;  Surgeon: Reggie Hua MD;  Location:  HEART CARDIAC CATH LAB     CV RIGHT HEART CATH N/A 4/29/2019    Procedure: Right Heart Cath;  Surgeon: Reggie Hua MD;  Location:  HEART CARDIAC CATH LAB     EXTRACTION(S) DENTAL      [place] teeth     HAND SURGERY Left     Laceration left index finger       Family History   Problem Relation Age of  Onset     Chronic Obstructive Pulmonary Disease Father      Multiple Sclerosis Maternal Aunt        Social History     Socioeconomic History     Marital status: Single     Spouse name: Not on file     Number of children: 1     Years of education: Not on file     Highest education level: Not on file   Occupational History     Occupation: [employer]   Social Needs     Financial resource strain: Not on file     Food insecurity:     Worry: Not on file     Inability: Not on file     Transportation needs:     Medical: Not on file     Non-medical: Not on file   Tobacco Use     Smoking status: Current Every Day Smoker     Packs/day: 1.00     Years: 15.00     Pack years: 15.00     Smokeless tobacco: Never Used     Tobacco comment: Quit 1.5 weeks ago   Substance and Sexual Activity     Alcohol use: Yes     Comment: 8-10 beers a day, now 1 beer a week.     Drug use: Yes     Types: Methamphetamines, Cocaine     Comment: Methamphetamine last used 8 weeks ago     Sexual activity: Not on file   Lifestyle     Physical activity:     Days per week: Not on file     Minutes per session: Not on file     Stress: Not on file   Relationships     Social connections:     Talks on phone: Not on file     Gets together: Not on file     Attends Yarsanism service: Not on file     Active member of club or organization: Not on file     Attends meetings of clubs or organizations: Not on file     Relationship status: Not on file     Intimate partner violence:     Fear of current or ex partner: Not on file     Emotionally abused: Not on file     Physically abused: Not on file     Forced sexual activity: Not on file   Other Topics Concern     Not on file   Social History Narrative    Single but has long standing SO who he lives with, has one child.  (last updated 4/24/2019)        ALLERGIES  No Known Allergies    MEDICATIONS   Current Outpatient Medications on File Prior to Visit:  digoxin (LANOXIN) 250 MCG tablet Take 1 tablet (250 mcg) by mouth daily  "  folic acid (FOLVITE) 1 MG tablet Take 1 tablet (1 mg) by mouth daily   hydrALAZINE (APRESOLINE) 25 MG tablet Take 1 tablet (25 mg) by mouth 3 times daily   lisinopril (PRINIVIL/ZESTRIL) 10 MG tablet Take 1 tablet (10 mg) by mouth 2 times daily   spironolactone (ALDACTONE) 25 MG tablet Take 0.5 tablets (12.5 mg) by mouth daily   vitamin B1 (THIAMINE) 100 MG tablet Take 1 tablet (100 mg) by mouth daily   warfarin (COUMADIN) 5 MG tablet Take 2-3 tablets as directed by INR clinic     No current facility-administered medications on file prior to visit.     ROS:  Constitutional: No fever, chills, or sweats. No weight gain/loss.   ENT: No visual disturbance, ear ache, epistaxis, sore throat.   Allergies/Immunologic: Negative.   Respiratory: No cough, hemoptysis.   Cardiovascular: As per HPI.   GI: No nausea, vomiting, hematemesis, melena, or hematochezia.   : No urinary frequency, dysuria, or hematuria.   Integument: Negative.   Psychiatric: Negative.   Neuro: Negative.   Endocrinology: Negative.   Musculoskeletal: Negative.    EXAM  /75 (BP Location: Right arm, Patient Position: Chair, Cuff Size: Adult Large)   Pulse 98   Ht 1.892 m (6' 2.5\")   Wt 96.6 kg (212 lb 14.4 oz)   SpO2 98%   BMI 26.97 kg/m    Home weight 211 pounds  Vitals:    05/08/19 1312   Weight: 96.6 kg (212 lb 14.4 oz)     General: appears comfortable, alert and articulate  Head: normocephalic, atraumatic  Eyes: anicteric sclera, EOMI  Neck: no adenopathy  Orophyarynx: moist mucosa, no lesions, dentition intact  Heart: regular, S1/S2, no murmur, gallop, rub, estimated JVP flat  Lungs: Respirations even and unlabored, lungs clear, no rales or wheezing  Abdomen: soft, non-tender, bowel sounds present, no hepatomegaly  Extremities: no clubbing, cyanosis or edema  Neurological: normal speech and affect, no gross motor deficits      LABS  Last Comprehensive Metabolic Panel:  Sodium   Date Value Ref Range Status   05/08/2019 130 (L) 133 - 144 " mmol/L Final     Potassium   Date Value Ref Range Status   05/08/2019 5.0 3.4 - 5.3 mmol/L Final     Chloride   Date Value Ref Range Status   05/08/2019 101 94 - 109 mmol/L Final     Carbon Dioxide   Date Value Ref Range Status   05/08/2019 22 20 - 32 mmol/L Final     Anion Gap   Date Value Ref Range Status   05/08/2019 7 3 - 14 mmol/L Final     Glucose   Date Value Ref Range Status   05/08/2019 69 (L) 70 - 99 mg/dL Final     Urea Nitrogen   Date Value Ref Range Status   05/08/2019 40 (H) 7 - 30 mg/dL Final     Creatinine   Date Value Ref Range Status   05/08/2019 1.13 0.66 - 1.25 mg/dL Final     GFR Estimate   Date Value Ref Range Status   05/08/2019 84 >60 mL/min/[1.73_m2] Final     Comment:     Non  GFR Calc  Starting 12/18/2018, serum creatinine based estimated GFR (eGFR) will be   calculated using the Chronic Kidney Disease Epidemiology Collaboration   (CKD-EPI) equation.       Calcium   Date Value Ref Range Status   05/08/2019 9.3 8.5 - 10.1 mg/dL Final       Lab Results   Component Value Date    NTBNPI 4,655 (H) 04/24/2019       No results found for: DIG    MOST RECENT ECHOCARDIOGRAM: 4/24/19  Interpretation Summary     CRITICAL FINDINGS-REPORT CALLED TO ER  The left ventricle is severely dilated.  There is mild concentric left ventricular hypertrophy.  The visual ejection fraction is estimated at 10%.  There is severe global hypokinesia of the left ventricle.  Multiple (3+) apical mass-likely clots in LV  Severely decreased right ventricular systolic function  The left atrium is severely dilated.  There is moderate to mod-severe (2-3+) mitral regurgitation.  Visually moderate MR by PISA quantification it is moderately severe  There is moderately severe (3+) tricuspid regurgitation.  Right ventricular systolic pressure is elevated, consistent with moderate to  severe pulmonary hypertension.  IVC diameter >2.1 cm collapsing <50% with sniff suggests a high RA pressure  estimated at 15 mmHg or  greater.  Trivial pericardial effusion    ASSESSMENT AND PLAN  Mr. Ubaldo Velez is a 33 year old male with a relevant past medical history including NICM secondary to substance abuse who does appear well. He is hyponatremic and will discontinue spironolactone. We'll cautiously begin Toprol 12.5 mg at bedtime and have placed a referral for cardiac rehab. He is enrolled in Chem Dep program and was applauded for abstaining for smoking, ETOH, and illicit drugs. The importance of continued sobriety was emphasized. He'll return to clinic in 2 weeks.    1. Chronic systolic heart failure/HFrEF (EF 10%) secondary to nonischemic cardiomyopathy  NYHA Symptom Class II  Stage C  Primary Cardiologist: Steph; Last seen as an inpatient 4/25/19  ACE-I/ARB/ARNi: Titration ongoing  BB titration ongoing  Aldosterone antagonist discontinued today due to hyponatremia  SCD prophylaxis Decision deferred during medication uptitration  %BiV pacing: N/A  Fluid status Euvolemic  Cardiac Rehab: referral placed today  Sleep Apnea Evaluation: Not indicated  Remote PA Pressure Monitoring (CardioMems) Deferred while medical therapy is optimized  Remote monitoring: Encouraged to utilize MyChart, coaching offerred  Follow-up 2 weeks    2. LV Thrombus. Anticoagulated with warfarin.     3. Polysubstance use. Enrolled in Chem Dep program and doing well. He understands drug/alcohol use would be a barrier to transplant    4. ADHD. Currently on no meds. He feels Adderall use contributed to his eventual meth use    55 minutes spent in direct care, >50% in counseling

## 2019-05-08 NOTE — NURSING NOTE
Vitals completed successfully and medication reconciled.     Mikaela Mejia CMA  1:17 PM  Chief Complaint   Patient presents with     New Patient     [facility] Follow Up

## 2019-05-08 NOTE — LETTER
5/8/2019      RE: Ubaldo Velez  [address]  [place] [id number]       Dear Colleague,    Thank you for the opportunity to participate in the care of your patient, Ubaldo Velez, at the [facility] HEART [facility]. Please see a copy of my visit note below.    HPI  Mr. Ubaldo Velez is a 33 year old male with a relevant past medical history including ADHD and polysubstance abuse (ETOH, cocaine, alcohol, meth) who was admitted to an outside hospital with URI symptoms and 30 pound weight gain and transferred to [facility] with concern for cardiogenic shock. Echo revealed an EF of 10% with mod-severe MR and TR and multiple apical thrombus. Right and left heart caths were notable for clean coronary arteries, normal right sided filling pressures, moderately elevated left sided pressures and a marginal cardiac index. He was diuresed, transitioned to oral afterload, and discharged to home on warfarin with a therapeutic INR and hyponatremia prompting adjustment of his spironolactone dosing. He presents to establish care in the [facility].    Mr. Ubaldo Velez is feeling well, better than he has in years, in fact. He feels short of breath with climbing more than 4 flights of stairs. No dyspnea on a flat surface. He reports stable 1-2 pillow orthopnea without PND. He reports rare lightheadedness and denies recent syncope.  Mr. Ubaldo Velez denies lower extremity edema or abdominal edema.  Appetite is normal and he denies early satiety. His weight is stable at 211 pounds. He has been following a sodium restricted diet. He does not adhere to a 2 liter fluid restriction though intends to.      PMH  Past Medical History:   Diagnosis Date     ADHD      Cocaine use      Methamphetamine use (H)        Past Surgical History:   Procedure Laterality Date     CV CORONARY ANGIOGRAM N/A 4/29/2019    Procedure: CV CORONARY ANGIOGRAM;  Surgeon: Reggie Hua MD;   Location:  HEART CARDIAC CATH LAB     CV RIGHT HEART CATH N/A 4/29/2019    Procedure: Right Heart Cath;  Surgeon: Reggie Hua MD;  Location:  HEART CARDIAC CATH LAB     EXTRACTION(S) DENTAL      [place] teeth     HAND SURGERY Left     Laceration left index finger       Family History   Problem Relation Age of Onset     Chronic Obstructive Pulmonary Disease Father      Multiple Sclerosis Maternal Aunt        Social History     Socioeconomic History     Marital status: Single     Spouse name: Not on file     Number of children: 1     Years of education: Not on file     Highest education level: Not on file   Occupational History     Occupation: [employer]   Social Needs     Financial resource strain: Not on file     Food insecurity:     Worry: Not on file     Inability: Not on file     Transportation needs:     Medical: Not on file     Non-medical: Not on file   Tobacco Use     Smoking status: Current Every Day Smoker     Packs/day: 1.00     Years: 15.00     Pack years: 15.00     Smokeless tobacco: Never Used     Tobacco comment: Quit 1.5 weeks ago   Substance and Sexual Activity     Alcohol use: Yes     Comment: 8-10 beers a day, now 1 beer a week.     Drug use: Yes     Types: Methamphetamines, Cocaine     Comment: Methamphetamine last used 8 weeks ago     Sexual activity: Not on file   Lifestyle     Physical activity:     Days per week: Not on file     Minutes per session: Not on file     Stress: Not on file   Relationships     Social connections:     Talks on phone: Not on file     Gets together: Not on file     Attends Yazidism service: Not on file     Active member of club or organization: Not on file     Attends meetings of clubs or organizations: Not on file     Relationship status: Not on file     Intimate partner violence:     Fear of current or ex partner: Not on file     Emotionally abused: Not on file     Physically abused: Not on file     Forced sexual activity: Not on file   Other Topics  "Concern     Not on file   Social History Narrative    Single but has long standing SO who he lives with, has one child.  (last updated 4/24/2019)        ALLERGIES  No Known Allergies    MEDICATIONS   Current Outpatient Medications on File Prior to Visit:  digoxin (LANOXIN) 250 MCG tablet Take 1 tablet (250 mcg) by mouth daily   folic acid (FOLVITE) 1 MG tablet Take 1 tablet (1 mg) by mouth daily   hydrALAZINE (APRESOLINE) 25 MG tablet Take 1 tablet (25 mg) by mouth 3 times daily   lisinopril (PRINIVIL/ZESTRIL) 10 MG tablet Take 1 tablet (10 mg) by mouth 2 times daily   spironolactone (ALDACTONE) 25 MG tablet Take 0.5 tablets (12.5 mg) by mouth daily   vitamin B1 (THIAMINE) 100 MG tablet Take 1 tablet (100 mg) by mouth daily   warfarin (COUMADIN) 5 MG tablet Take 2-3 tablets as directed by INR clinic     No current facility-administered medications on file prior to visit.     ROS:  Constitutional: No fever, chills, or sweats. No weight gain/loss.   ENT: No visual disturbance, ear ache, epistaxis, sore throat.   Allergies/Immunologic: Negative.   Respiratory: No cough, hemoptysis.   Cardiovascular: As per HPI.   GI: No nausea, vomiting, hematemesis, melena, or hematochezia.   : No urinary frequency, dysuria, or hematuria.   Integument: Negative.   Psychiatric: Negative.   Neuro: Negative.   Endocrinology: Negative.   Musculoskeletal: Negative.    EXAM  /75 (BP Location: Right arm, Patient Position: Chair, Cuff Size: Adult Large)   Pulse 98   Ht 1.892 m (6' 2.5\")   Wt 96.6 kg (212 lb 14.4 oz)   SpO2 98%   BMI 26.97 kg/m     Home weight 211 pounds  Vitals:    05/08/19 1312   Weight: 96.6 kg (212 lb 14.4 oz)     General: appears comfortable, alert and articulate  Head: normocephalic, atraumatic  Eyes: anicteric sclera, EOMI  Neck: no adenopathy  Orophyarynx: moist mucosa, no lesions, dentition intact  Heart: regular, S1/S2, no murmur, gallop, rub, estimated JVP flat  Lungs: Respirations even and unlabored, " lungs clear, no rales or wheezing  Abdomen: soft, non-tender, bowel sounds present, no hepatomegaly  Extremities: no clubbing, cyanosis or edema  Neurological: normal speech and affect, no gross motor deficits      LABS  Last Comprehensive Metabolic Panel:  Sodium   Date Value Ref Range Status   05/08/2019 130 (L) 133 - 144 mmol/L Final     Potassium   Date Value Ref Range Status   05/08/2019 5.0 3.4 - 5.3 mmol/L Final     Chloride   Date Value Ref Range Status   05/08/2019 101 94 - 109 mmol/L Final     Carbon Dioxide   Date Value Ref Range Status   05/08/2019 22 20 - 32 mmol/L Final     Anion Gap   Date Value Ref Range Status   05/08/2019 7 3 - 14 mmol/L Final     Glucose   Date Value Ref Range Status   05/08/2019 69 (L) 70 - 99 mg/dL Final     Urea Nitrogen   Date Value Ref Range Status   05/08/2019 40 (H) 7 - 30 mg/dL Final     Creatinine   Date Value Ref Range Status   05/08/2019 1.13 0.66 - 1.25 mg/dL Final     GFR Estimate   Date Value Ref Range Status   05/08/2019 84 >60 mL/min/[1.73_m2] Final     Comment:     Non  GFR Calc  Starting 12/18/2018, serum creatinine based estimated GFR (eGFR) will be   calculated using the Chronic Kidney Disease Epidemiology Collaboration   (CKD-EPI) equation.       Calcium   Date Value Ref Range Status   05/08/2019 9.3 8.5 - 10.1 mg/dL Final       Lab Results   Component Value Date    NTBNPI 4,655 (H) 04/24/2019       No results found for: DIG    MOST RECENT ECHOCARDIOGRAM: 4/24/19  Interpretation Summary     CRITICAL FINDINGS-REPORT CALLED TO ER  The left ventricle is severely dilated.  There is mild concentric left ventricular hypertrophy.  The visual ejection fraction is estimated at 10%.  There is severe global hypokinesia of the left ventricle.  Multiple (3+) apical mass-likely clots in LV  Severely decreased right ventricular systolic function  The left atrium is severely dilated.  There is moderate to mod-severe (2-3+) mitral regurgitation.  Visually  moderate MR by PISA quantification it is moderately severe  There is moderately severe (3+) tricuspid regurgitation.  Right ventricular systolic pressure is elevated, consistent with moderate to  severe pulmonary hypertension.  IVC diameter >2.1 cm collapsing <50% with sniff suggests a high RA pressure  estimated at 15 mmHg or greater.  Trivial pericardial effusion    ASSESSMENT AND PLAN  Mr. Ubaldo Velez is a 33 year old male with a relevant past medical history including NICM secondary to substance abuse who does appear well. He is hyponatremic and will discontinue spironolactone. We'll cautiously begin Toprol 12.5 mg at bedtime and have placed a referral for cardiac rehab. He is enrolled in Chem Dep program and was applauded for abstaining for smoking, ETOH, and illicit drugs. The importance of continued sobriety was emphasized. He'll return to clinic in 2 weeks.    1. Chronic systolic heart failure/HFrEF (EF 10%) secondary to nonischemic cardiomyopathy  NYHA Symptom Class II  Stage C  Primary Cardiologist: Steph; Last seen as an inpatient 4/25/19  ACE-I/ARB/ARNi: Titration ongoing  BB titration ongoing  Aldosterone antagonist discontinued today due to hyponatremia  SCD prophylaxis Decision deferred during medication uptitration  %BiV pacing: N/A  Fluid status Euvolemic  Cardiac Rehab: referral placed today  Sleep Apnea Evaluation: Not indicated  Remote PA Pressure Monitoring (CardioMems) Deferred while medical therapy is optimized  Remote monitoring: Encouraged to utilize MyChart, coaching offerred  Follow-up 2 weeks    2. LV Thrombus. Anticoagulated with warfarin.     3. Polysubstance use. Enrolled in Chem Dep program and doing well. He understands drug/alcohol use would be a barrier to transplant    4. ADHD. Currently on no meds. He feels Adderall use contributed to his eventual meth use    55 minutes spent in direct care, >50% in counseling      Please do not hesitate to contact me if you have any  questions/concerns.     Sincerely,     Kristin Morales NP

## 2019-05-09 ENCOUNTER — PATIENT OUTREACH (OUTPATIENT)
Dept: CARDIOLOGY | Facility: CLINIC | Age: 34
End: 2019-05-09

## 2019-05-09 DIAGNOSIS — I50.22 CHRONIC SYSTOLIC HEART FAILURE (H): Primary | ICD-10-CM

## 2019-05-09 NOTE — PROGRESS NOTES
I called Ubaldo to connect with him about his Dental follow-up needs.  Gave Ubaldo the follow-up into to call and schedule:   [facility] in [facility] ([phone])    Ubaldo verbalizes understanding and agrees with plan of care. Juany Robb RN

## 2019-05-10 ENCOUNTER — TELEPHONE (OUTPATIENT)
Dept: CARDIOLOGY | Facility: CLINIC | Age: 34
End: 2019-05-10

## 2019-05-10 NOTE — TELEPHONE ENCOUNTER
[facility] Call Center    Phone Message    May a detailed message be left on voicemail: yes    Reason for Call: Other: Shaun calling to request a call back. He states he tried scheduling dental appt but they can't get him in until July. Ubaldo would like to know if that is okay. Please call him back to discuss.      Action Taken: Message routed to:  [facility] ([facility]): [facility]on cardio

## 2019-05-10 NOTE — TELEPHONE ENCOUNTER
Left message for Shaun, forwarded to Kristin Morales NP. Will get back to him next week with an answer.     Carrie Tanner RN

## 2019-05-13 ENCOUNTER — ALLIED HEALTH/NURSE VISIT (OUTPATIENT)
Dept: PHARMACY | Facility: CLINIC | Age: 34
End: 2019-05-13
Attending: INTERNAL MEDICINE
Payer: MEDICAID

## 2019-05-13 DIAGNOSIS — Z78.9 TAKES DIETARY SUPPLEMENTS: ICD-10-CM

## 2019-05-13 DIAGNOSIS — I50.9 CONGESTIVE HEART FAILURE, UNSPECIFIED HF CHRONICITY, UNSPECIFIED HEART FAILURE TYPE (H): Primary | ICD-10-CM

## 2019-05-13 DIAGNOSIS — I51.3 LEFT VENTRICULAR MURAL THROMBUS: ICD-10-CM

## 2019-05-13 PROCEDURE — 99605 MTMS BY PHARM NP 15 MIN: CPT | Performed by: PHARMACIST

## 2019-05-13 NOTE — PATIENT INSTRUCTIONS
Recommendations from today's MTM visit:                                                    MTM (medication therapy management) is a service provided by a clinical pharmacist designed to help you get the most of out of your medicines.   Today we reviewed what your medicines are for, how to know if they are working, that your medicines are safe and how to make your medicine regimen as easy as possible.     1. I am glad you are feeling better since leaving the hospital.    2. Continue following up with [facility] so they can adjust medications as necessary and continue to monitor your heart.    3. Continue to have your INR checked regularly so that your Jantoven can be adjusted if necessary and watch for any unusual bleeding or bruising.     4. Be sure to establish care with a primary care provider so that you can see him or her for your annual visits.    Next MTM visit: as needed    To schedule another MTM appointment, please call the clinic directly or you may call the MTM scheduling line at [phone] or toll-free at [phone].     My Clinical Pharmacist's contact information:                                                      It was a pleasure talking with you today!  Please feel free to contact me with any questions or concerns you have.      Trinity Carlson, Dalia  Medication Therapy Management Pharmacist  [facility] - Monday and Wednesday 7:30 - 4:00  Phone: [phone] - direct clinic line    You may receive a survey about the MTM services you received by email and/or US Mail.  I would appreciate your feedback to help me serve you better in the future. Your comments will be anonymous.

## 2019-05-13 NOTE — PROGRESS NOTES
SUBJECTIVE/OBJECTIVE:                Ubaldo Velez is a 33 year old male called for a transitions of care visit.  He was discharged from [facility] on 5/7/19 for CHF.     - Pt has relevant past medical history including ADHD and polysubstance abuse (ETOH, cocaine, alcohol, meth) who was admitted to an outside hospital with URI symptoms and 30 pound weight gain and transferred to [facility] with concern for cardiogenic shock. Echo revealed an EF of 10% with mod-severe MR and TR and multiple apical thrombus. Right and left heart caths were notable for clean coronary arteries, normal right sided filling pressures, moderately elevated left sided pressures and a marginal cardiac index. He was diuresed, transitioned to oral afterload, and discharged to home on warfarin with a therapeutic INR and hyponatremia prompting adjustment of his spironolactone dosing.     - He quit drinking, smoking and doing drugs. He starts phase 1 of chemical dependence classes later this month.  The classes will be 4 days per week.  He feels that his the resources and support he needs to stay sober.    Chief Complaint: Transition of care visit.    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  PMH: Reviewed in Epic    Medication Adherence/Access:  no issues reported    HFrEF: Current medications include digoxin 250 mcg once daily, metoprolol succinate 25 mg daily, lisinopril 10 mg twice  daily and hydralazine 25 mg 3 times daily.  Patient reports no current medication side effects.  He reports feeling great since being discharged from the hospital. He is following with the [facility] clinic every 2 weeks currently. At last visit on 5/8/19 spironolactone was stopped and metoprolol started in its place. Pt reports he was on no meds prior to his admission.     ECHO:  Date 4/24/19, EF 10%  Pt is not complaining of sx of HF.    Pt is following a low sodium diet, is avoiding EtOH.    Apical thrombus:  Current medications include: warfarin 10 mg once daily.  Patient reports he has 2-1/2 mg tablets and has to take for them to get his dose of 10 mg a day.  Reports in the hospital he was using a 10 mg tablet and is not having any issues.  The first time he took 4 of the 2.5 mg tablets he had some stomach discomfort however that only lasted 2 days it has been fine since.  Patient reports no unusual bleeding.  No blood in stool or urine.  Reports reports having a bruise on his groin area however not spreading and starting to go away.  Patient reports he is avoiding NSAIDs and alcohol.  Patient reports he is can consistent with his vitamin K containing foods.  He has an appointment next week to have his INR rechecked and dose adjusted if necessary.  His goal INR range is 2-3.  His last INR was within therapeutic range.  Patient reports that the plan is to do another echocardiogram in 6 months and determine if he needs to continue warfarin or not.    Supplements: Currently taking thiamine 100 mg once daily ad folic acid 1 mg daily. Denies issues at this time. .    Today's Vitals: There were no vitals taken for this visit. - phone visit    BP Readings from Last 3 Encounters:   05/08/19 109/75   05/07/19 111/58   04/25/19 125/90     ASSESSMENT:                 Current medications were reviewed today.      Medication Adherence: good, no issues identified    HFrEF: Stable. Patient is meeting BP goal of < 140/90mmHg.  EF 10% - 14%. Pt is on appropriate ACE/ARB and  is on appropriate beta blocker: Metoprolol  XL (succinate). Patient pulse is > 50. Pt is not appropriately avoiding NSAID's, diltiazem/verapamil, and pioglitazone. Pt would benefit from no changes in current therapy. Continue to follow up with [facility] clinic.    Apical thrombus:  Stable. Continue to follow up to have INR  checked and warfarin adjusted as necessary.    Supplements: Stable    PLAN:                Post Discharge Medication Reconciliation Status: discharge medications reconciled, continue medications without change.    1. I am glad you are feeling better since leaving the hospital.    2. Continue following up with [facility] so they can adjust medications as necessary and continue to monitor your heart.    3. Continue to have your INR checked regularly so that your Jantoven can be adjusted if necessary and watch for any unusual bleeding or bruising.     I spent 30 minutes with this patient today. Pt has not established care with primary provider yet.    Will follow up as needed.    The patient was sent via [employer] a summary of these recommendations as an after visit summary.    Trinity Carlson, PharmD  Medication Therapy Management Pharmacist

## 2019-05-14 DIAGNOSIS — Z95.1 S/P CABG (CORONARY ARTERY BYPASS GRAFT): Primary | ICD-10-CM

## 2019-05-14 DIAGNOSIS — I50.23 ACUTE ON CHRONIC HFREF (HEART FAILURE WITH REDUCED EJECTION FRACTION) (H): ICD-10-CM

## 2019-05-15 ENCOUNTER — MYC REFILL (OUTPATIENT)
Dept: CARDIOLOGY | Facility: CLINIC | Age: 34
End: 2019-05-15

## 2019-05-15 DIAGNOSIS — I51.3 LV (LEFT VENTRICULAR) MURAL THROMBUS: ICD-10-CM

## 2019-05-15 NOTE — TELEPHONE ENCOUNTER
[facility] Call Center    Phone Message    May a detailed message be left on voicemail: yes    Reason for Call: Other: Pt asking for a call back re: coumadin. He is going to be out tonight and wants to know if he should be concerned.     Action Taken: Message routed to:  [facility] ([facility]

## 2019-05-16 DIAGNOSIS — I51.3 LV (LEFT VENTRICULAR) MURAL THROMBUS: ICD-10-CM

## 2019-05-16 RX ORDER — WARFARIN SODIUM 5 MG/1
TABLET ORAL
Qty: 90 TABLET | Refills: 3 | Status: SHIPPED | OUTPATIENT
Start: 2019-05-16 | End: 2019-10-21

## 2019-05-16 NOTE — TELEPHONE ENCOUNTER
----- Message from CRYSTAL Roy sent at 5/16/2019 10:34 AM CDT -----  Regarding: service initiation session  PLEASE SCHEDULE:  CHRISTINE BARRAGAN [[id number]]  FOR SERVICE INITIATION APPOINTMENT    IN [place] ON: 5/22/19  with CRYSTAL Early @  3:30 PM     Bella Schaefer funding approved:  Per note 5-7-19 Recv'd call from Skyla Villarreal [phone]  PMI # [id number]  Verbal Auth CD IOP [facility] group 20 individual.      Requested jaziel. servando

## 2019-05-16 NOTE — TELEPHONE ENCOUNTER
Called [facility]. Pt is scheduled for May 23rd at 2:30 pm. Called pt, reached VM, LM with appt date, time, location and reminder to bring ID and ins card. Will attempt another call tomorrow to confirm he received message. Pao Anna RN [facility] Care Coordinator

## 2019-05-16 NOTE — TELEPHONE ENCOUNTER
----- Message from CRYSTAL Roy sent at 5/16/2019 10:38 AM CDT -----  Regarding: schedule Phase 1  PLEASE SCHEDULE:  CHRISTINE BARRAGAN [[id number]]  For 24 sessions  START DATE: 5/22/2019  CRYSTAL Early  Phase 1 Mixed (formerly Mens A) in [facility] ON MON, TUES, WED 5:30 PM to 7:30 PM and THURS 5:30 PM to 8:30 PM    Per Rachele Perez note:  5-7-19 Recv'd call from Skyla Villarreal [phone]  PMI # [id number]  Verbal Auth CD IOP [place] 200 group 20 individual.      Requested jaziel. fb

## 2019-05-17 NOTE — TELEPHONE ENCOUNTER
Called pt to confirm reception of VM re: dental appt. Pt stated he heard the VM, reviewed date, time and location with the pt to confirm. Pt had no further questions. Pao Anna RN C[facility] Care Coordinator

## 2019-05-18 RX ORDER — WARFARIN SODIUM 5 MG/1
TABLET ORAL
Qty: 15 TABLET | Refills: 0 | OUTPATIENT
Start: 2019-05-18

## 2019-05-19 RX ORDER — WARFARIN SODIUM 5 MG/1
TABLET ORAL
Qty: 15 TABLET | Refills: 0 | OUTPATIENT
Start: 2019-05-19

## 2019-05-21 ENCOUNTER — OFFICE VISIT (OUTPATIENT)
Dept: CARDIOLOGY | Facility: CLINIC | Age: 34
End: 2019-05-21
Attending: NURSE PRACTITIONER
Payer: MEDICAID

## 2019-05-21 ENCOUNTER — HOSPITAL ENCOUNTER (OUTPATIENT)
Dept: CARDIAC REHAB | Facility: CLINIC | Age: 34
End: 2019-05-21
Attending: NURSE PRACTITIONER
Payer: MEDICAID

## 2019-05-21 VITALS
HEIGHT: 75 IN | BODY MASS INDEX: 27.21 KG/M2 | OXYGEN SATURATION: 97 % | HEART RATE: 116 BPM | WEIGHT: 218.8 LBS | DIASTOLIC BLOOD PRESSURE: 82 MMHG | SYSTOLIC BLOOD PRESSURE: 123 MMHG

## 2019-05-21 DIAGNOSIS — I50.22 CHRONIC SYSTOLIC HEART FAILURE (H): ICD-10-CM

## 2019-05-21 DIAGNOSIS — I50.23 ACUTE ON CHRONIC HFREF (HEART FAILURE WITH REDUCED EJECTION FRACTION) (H): ICD-10-CM

## 2019-05-21 LAB
ANION GAP SERPL CALCULATED.3IONS-SCNC: 6 MMOL/L (ref 3–14)
BUN SERPL-MCNC: 20 MG/DL (ref 7–30)
CALCIUM SERPL-MCNC: 9.3 MG/DL (ref 8.5–10.1)
CHLORIDE SERPL-SCNC: 106 MMOL/L (ref 94–109)
CO2 SERPL-SCNC: 27 MMOL/L (ref 20–32)
CREAT SERPL-MCNC: 1.07 MG/DL (ref 0.66–1.25)
GFR SERPL CREATININE-BSD FRML MDRD: 90 ML/MIN/{1.73_M2}
GLUCOSE SERPL-MCNC: 67 MG/DL (ref 70–99)
POTASSIUM SERPL-SCNC: 4.1 MMOL/L (ref 3.4–5.3)
SODIUM SERPL-SCNC: 139 MMOL/L (ref 133–144)

## 2019-05-21 PROCEDURE — 93797 PHYS/QHP OP CAR RHAB WO ECG: CPT | Mod: XU | Performed by: OCCUPATIONAL THERAPIST

## 2019-05-21 PROCEDURE — G0463 HOSPITAL OUTPT CLINIC VISIT: HCPCS | Mod: ZF

## 2019-05-21 PROCEDURE — 99214 OFFICE O/P EST MOD 30 MIN: CPT | Mod: ZP | Performed by: NURSE PRACTITIONER

## 2019-05-21 PROCEDURE — 80048 BASIC METABOLIC PNL TOTAL CA: CPT | Performed by: NURSE PRACTITIONER

## 2019-05-21 PROCEDURE — 36415 COLL VENOUS BLD VENIPUNCTURE: CPT | Performed by: NURSE PRACTITIONER

## 2019-05-21 PROCEDURE — 93798 PHYS/QHP OP CAR RHAB W/ECG: CPT | Performed by: OCCUPATIONAL THERAPIST

## 2019-05-21 PROCEDURE — 40000575 ZZH STATISTIC OP CARDIAC VISIT #2: Performed by: OCCUPATIONAL THERAPIST

## 2019-05-21 PROCEDURE — 40000116 ZZH STATISTIC OP CR VISIT: Performed by: OCCUPATIONAL THERAPIST

## 2019-05-21 RX ORDER — METOPROLOL SUCCINATE 25 MG/1
25 TABLET, EXTENDED RELEASE ORAL DAILY
Qty: 90 TABLET | Refills: 3 | Status: SHIPPED | OUTPATIENT
Start: 2019-05-21 | End: 2019-06-05

## 2019-05-21 ASSESSMENT — MIFFLIN-ST. JEOR: SCORE: 2010.16

## 2019-05-21 ASSESSMENT — PAIN SCALES - GENERAL: PAINLEVEL: NO PAIN (0)

## 2019-05-21 NOTE — NURSING NOTE
Chief Complaint   Patient presents with     Follow Up     Return CORE, 32 yo male, HFrEF, EF 10%, labs prior.      Vitals were taken and medications reconciled.     [place], NEVAEH  5:58 PM

## 2019-05-21 NOTE — LETTER
5/21/2019      RE: Ubaldo Velez  [address]  [place] [address]       Dear Colleague,    Thank you for the opportunity to participate in the care of your patient, Ubaldo Velez, at the [facility] HEART [facility]. Please see a copy of my visit note below.    HPI  Mr. Ubaldo Velez is a 33 year old male with a relevant past medical history including ADHD and polysubstance abuse (ETOH, cocaine, alcohol, meth) who was admitted to an outside hospital with URI symptoms and 30 pound weight gain and transferred to [facility] with concern for cardiogenic shock. Echo revealed an EF of 10% with mod-severe MR and TR and multiple apical thrombus. Right and left heart caths were notable for clean coronary arteries, normal right sided filling pressures, moderately elevated left sided pressures and a marginal cardiac index. He was diuresed, transitioned to oral afterload, and discharged to home on warfarin with a therapeutic INR and hyponatremia prompting adjustment of his spironolactone dosing. He was seen in clinic 2 weeks ago when spironolactone was stopped due to hyponatremia and Toprol was added. He returns for follow up.    Ubaldo  Reports feeling well. He denies lightheadedness, shortness of breath, lightheadedness, orthopnea, PND, edema, or anorexia.    Actively limiting sodium. Has found the diet changes harder than staying clean. Starts chem dep tomorrow and has dental extractions tomorrow as well. Started cardiac rehab today.    PMH  Past Medical History:   Diagnosis Date     ADHD      Cocaine use      Methamphetamine use (H)        Past Surgical History:   Procedure Laterality Date     CV CORONARY ANGIOGRAM N/A 4/29/2019    Procedure: CV CORONARY ANGIOGRAM;  Surgeon: Reggie Hua MD;  Location: [facility] CARDIAC CATH LAB     CV RIGHT HEART CATH N/A 4/29/2019    Procedure: Right Heart Cath;  Surgeon: Reggie Hua MD;  Location: [facility] CARDIAC  CATH LAB     EXTRACTION(S) DENTAL      Castella teeth     HAND SURGERY Left     Laceration left index finger       Family History   Problem Relation Age of Onset     Chronic Obstructive Pulmonary Disease Father      Multiple Sclerosis Maternal Aunt        Social History     Socioeconomic History     Marital status: Single     Spouse name: Not on file     Number of children: 1     Years of education: Not on file     Highest education level: Not on file   Occupational History     Occupation: [employer]   Social Needs     Financial resource strain: Not on file     Food insecurity:     Worry: Not on file     Inability: Not on file     Transportation needs:     Medical: Not on file     Non-medical: Not on file   Tobacco Use     Smoking status: Current Every Day Smoker     Packs/day: 1.00     Years: 15.00     Pack years: 15.00     Smokeless tobacco: Never Used     Tobacco comment: Quit 1.5 weeks ago   Substance and Sexual Activity     Alcohol use: Yes     Comment: 8-10 beers a day, now 1 beer a week.     Drug use: Yes     Types: Methamphetamines, Cocaine     Comment: Methamphetamine last used 8 weeks ago     Sexual activity: Not on file   Lifestyle     Physical activity:     Days per week: Not on file     Minutes per session: Not on file     Stress: Not on file   Relationships     Social connections:     Talks on phone: Not on file     Gets together: Not on file     Attends Religion service: Not on file     Active member of club or organization: Not on file     Attends meetings of clubs or organizations: Not on file     Relationship status: Not on file     Intimate partner violence:     Fear of current or ex partner: Not on file     Emotionally abused: Not on file     Physically abused: Not on file     Forced sexual activity: Not on file   Other Topics Concern     Not on file   Social History Narrative    Single but has long standing SO who he lives with, has one child.  (last updated 4/24/2019)        ALLERGIES  No Known  "Allergies    MEDICATIONS   Current Outpatient Medications on File Prior to Visit:  digoxin (LANOXIN) 250 MCG tablet Take 1 tablet (250 mcg) by mouth daily   folic acid (FOLVITE) 1 MG tablet Take 1 tablet (1 mg) by mouth daily   hydrALAZINE (APRESOLINE) 25 MG tablet Take 1 tablet (25 mg) by mouth 3 times daily   lisinopril (PRINIVIL/ZESTRIL) 10 MG tablet Take 1 tablet (10 mg) by mouth 2 times daily   metoprolol succinate ER (TOPROL-XL) 25 MG 24 hr tablet Take 0.5 tablets (12.5 mg) by mouth daily   vitamin B1 (THIAMINE) 100 MG tablet Take 1 tablet (100 mg) by mouth daily   warfarin (COUMADIN) 5 MG tablet Take 2-3 tablets as directed by INR clinic     No current facility-administered medications on file prior to visit.     ROS:  Constitutional: No fever, chills, or sweats. No weight gain/loss.   ENT: No visual disturbance, ear ache, epistaxis, sore throat.   Allergies/Immunologic: Negative.   Respiratory: No cough, hemoptysis.   Cardiovascular: As per HPI.   GI: No nausea, vomiting, hematemesis, melena, or hematochezia.   : No urinary frequency, dysuria, or hematuria.   Integument: Negative.   Psychiatric: Negative.   Neuro: Negative.   Endocrinology: Negative.   Musculoskeletal: Negative.    EXAM  /82 (BP Location: Left arm, Patient Position: Chair, Cuff Size: Adult Large)   Pulse 116   Ht 1.892 m (6' 2.5\")   Wt 99.2 kg (218 lb 12.8 oz)   SpO2 97%   BMI 27.72 kg/m     Home weight 211 pounds  Vitals:    05/21/19 1747   Weight: 99.2 kg (218 lb 12.8 oz)     General: appears comfortable, alert and articulate  Head: normocephalic, atraumatic  Eyes: anicteric sclera, EOMI  Neck: no adenopathy  Orophyarynx: moist mucosa, no lesions, dentition intact  Heart: regular, S1/S2, no murmur, gallop, rub, estimated JVP flat  Lungs: Respirations even and unlabored, lungs clear, no rales or wheezing  Abdomen: soft, non-tender, bowel sounds present, no hepatomegaly  Extremities: no clubbing, cyanosis or edema  Neurological: " normal speech and affect, no gross motor deficits      LABS  Last Comprehensive Metabolic Panel:  Sodium   Date Value Ref Range Status   05/21/2019 139 133 - 144 mmol/L Final     Potassium   Date Value Ref Range Status   05/21/2019 4.1 3.4 - 5.3 mmol/L Final     Chloride   Date Value Ref Range Status   05/21/2019 106 94 - 109 mmol/L Final     Carbon Dioxide   Date Value Ref Range Status   05/21/2019 27 20 - 32 mmol/L Final     Anion Gap   Date Value Ref Range Status   05/21/2019 6 3 - 14 mmol/L Final     Glucose   Date Value Ref Range Status   05/21/2019 67 (L) 70 - 99 mg/dL Final     Urea Nitrogen   Date Value Ref Range Status   05/21/2019 20 7 - 30 mg/dL Final     Creatinine   Date Value Ref Range Status   05/21/2019 1.07 0.66 - 1.25 mg/dL Final     GFR Estimate   Date Value Ref Range Status   05/21/2019 90 >60 mL/min/[1.73_m2] Final     Comment:     Non  GFR Calc  Starting 12/18/2018, serum creatinine based estimated GFR (eGFR) will be   calculated using the Chronic Kidney Disease Epidemiology Collaboration   (CKD-EPI) equation.       Calcium   Date Value Ref Range Status   05/21/2019 9.3 8.5 - 10.1 mg/dL Final       Lab Results   Component Value Date    NTBNPI 4,655 (H) 04/24/2019       No results found for: DIG    MOST RECENT ECHOCARDIOGRAM: 4/24/19  Interpretation Summary     CRITICAL FINDINGS-REPORT CALLED TO ER  The left ventricle is severely dilated.  There is mild concentric left ventricular hypertrophy.  The visual ejection fraction is estimated at 10%.  There is severe global hypokinesia of the left ventricle.  Multiple (3+) apical mass-likely clots in LV  Severely decreased right ventricular systolic function  The left atrium is severely dilated.  There is moderate to mod-severe (2-3+) mitral regurgitation.  Visually moderate MR by PISA quantification it is moderately severe  There is moderately severe (3+) tricuspid regurgitation.  Right ventricular systolic pressure is elevated,  consistent with moderate to  severe pulmonary hypertension.  IVC diameter >2.1 cm collapsing <50% with sniff suggests a high RA pressure  estimated at 15 mmHg or greater.  Trivial pericardial effusion    ASSESSMENT AND PLAN  Mr. Ubaldo Velez is a 33 year old male with a relevant past medical history including recently diagnosed NICM secondary to substance abuse (methamphetamine, tobacco, alcohol) who is doing well functionally and was applauded for his work with chem dep and cardiac rehab. His resting pulse of 116 is a worrisome sign though he looks and feels warm. We'll cautiously increase his Toprol to 25 mg once daily and obtain a digoxin level when he returns in 2 weeks.      1. Chronic systolic heart failure/HFrEF (EF 10%) secondary to nonischemic cardiomyopathy  NYHA Symptom Class I  Stage D  Primary Cardiologist: Steph; Last seen during admission in May  ACE-I/ARB/ARNi: Titration ongoing  BB titration ongoing  Aldosterone antagonist deferred while other medical therapy is prioritized  SCD prophylaxis Decision deferred during medication uptitration  %BiV pacing: N/A  Fluid status Euvolemic  Cardiac Rehab: Referral previously placed and starting tomorrow  Sleep Apnea Evaluation: Not indicated  Remote PA Pressure Monitoring (CardioMems) Deferred while medical therapy is optimized  Remote monitoring: Encouraged to utilize MyChart, coaching offerred  Fllow-up 2 weeks    2. LV Thrombus. Anticoagulated with warfarin. INR therapeutic    3. Polysubstance use. Abstinent and enrolled in ChemDep (starts tomorrow)    4. ADHD. Encouraged to talk with his PCP and/or Chem Dep team    40 minutes spent in direct care, >50% in counseling    Please do not hesitate to contact me if you have any questions/concerns.     Sincerely,     Kristin Morales NP

## 2019-05-21 NOTE — PATIENT INSTRUCTIONS
Take your medicines every day, as directed    Changes made today:  o Please increase metoprolol to 25 mg once daily     Monitor Your Weight and Symptoms    Contact us if you:      Gain 2 pounds in one day or 5 pounds in one week    Feel more short of breath    Notice more leg swelling    Feel lightheadeded   Change your lifestyle    Limit Salt or Sodium:    2000 mg  Limit Fluids:    2000 mL or approximately 64 ounces  Eat a Heart Healthy Diet    Low in saturated fats  Stay Active:    Aim to move at least 150 minutes every  week         To Contact us    During Business Hours:  [phone], option # 1 (University)  Then option # 4 (medical questions)     After hours, weekends or holidays:   [phone], Option #4  Ask to speak to the On-Call Cardiologist. Inform them you are a CORE/heart failure patient at the [facility].     Use [employer] allows you to communicate directly with your heart team through secure messaging.    FRAMED can be accessed any time on your phone, computer, or tablet.    If you need assistance, we'd be happy to help!         Keep your Heart Appointments:    Return to clinic in 2 weeks

## 2019-05-21 NOTE — PROGRESS NOTES
HPI  Mr. Ubaldo Velez is a 33 year old male with a relevant past medical history including ADHD and polysubstance abuse (ETOH, cocaine, alcohol, meth) who was admitted to an outside hospital with URI symptoms and 30 pound weight gain and transferred to [facility] with concern for cardiogenic shock. Echo revealed an EF of 10% with mod-severe MR and TR and multiple apical thrombus. Right and left heart caths were notable for clean coronary arteries, normal right sided filling pressures, moderately elevated left sided pressures and a marginal cardiac index. He was diuresed, transitioned to oral afterload, and discharged to home on warfarin with a therapeutic INR and hyponatremia prompting adjustment of his spironolactone dosing. He was seen in clinic 2 weeks ago when spironolactone was stopped due to hyponatremia and Toprol was added. He returns for follow up.    Ubaldo  Reports feeling well. He denies lightheadedness, shortness of breath, lightheadedness, orthopnea, PND, edema, or anorexia.    Actively limiting sodium. Has found the diet changes harder than staying clean. Starts chem dep tomorrow and has dental extractions tomorrow as well. Started cardiac rehab today.    PMH  Past Medical History:   Diagnosis Date     ADHD      Cocaine use      Methamphetamine use (H)        Past Surgical History:   Procedure Laterality Date     CV CORONARY ANGIOGRAM N/A 4/29/2019    Procedure: CV CORONARY ANGIOGRAM;  Surgeon: Reggie Hua MD;  Location:  HEART CARDIAC CATH LAB     CV RIGHT HEART CATH N/A 4/29/2019    Procedure: Right Heart Cath;  Surgeon: Reggie Hua MD;  Location: [facility]     EXTRACTION(S) DENTAL      [place] teeth     HAND SURGERY Left     Laceration left index finger       Family History   Problem Relation Age of Onset     Chronic Obstructive Pulmonary Disease Father      Multiple Sclerosis Maternal Aunt        Social History     Socioeconomic History     Marital status: Single      Spouse name: Not on file     Number of children: 1     Years of education: Not on file     Highest education level: Not on file   Occupational History     Occupation: [employer]   Social Needs     Financial resource strain: Not on file     Food insecurity:     Worry: Not on file     Inability: Not on file     Transportation needs:     Medical: Not on file     Non-medical: Not on file   Tobacco Use     Smoking status: Current Every Day Smoker     Packs/day: 1.00     Years: 15.00     Pack years: 15.00     Smokeless tobacco: Never Used     Tobacco comment: Quit 1.5 weeks ago   Substance and Sexual Activity     Alcohol use: Yes     Comment: 8-10 beers a day, now 1 beer a week.     Drug use: Yes     Types: Methamphetamines, Cocaine     Comment: Methamphetamine last used 8 weeks ago     Sexual activity: Not on file   Lifestyle     Physical activity:     Days per week: Not on file     Minutes per session: Not on file     Stress: Not on file   Relationships     Social connections:     Talks on phone: Not on file     Gets together: Not on file     Attends Jew service: Not on file     Active member of club or organization: Not on file     Attends meetings of clubs or organizations: Not on file     Relationship status: Not on file     Intimate partner violence:     Fear of current or ex partner: Not on file     Emotionally abused: Not on file     Physically abused: Not on file     Forced sexual activity: Not on file   Other Topics Concern     Not on file   Social History Narrative    Single but has long standing SO who he lives with, has one child.  (last updated 4/24/2019)        ALLERGIES  No Known Allergies    MEDICATIONS   Current Outpatient Medications on File Prior to Visit:  digoxin (LANOXIN) 250 MCG tablet Take 1 tablet (250 mcg) by mouth daily   folic acid (FOLVITE) 1 MG tablet Take 1 tablet (1 mg) by mouth daily   hydrALAZINE (APRESOLINE) 25 MG tablet Take 1 tablet (25 mg) by mouth 3 times daily   lisinopril  "(PRINIVIL/ZESTRIL) 10 MG tablet Take 1 tablet (10 mg) by mouth 2 times daily   metoprolol succinate ER (TOPROL-XL) 25 MG 24 hr tablet Take 0.5 tablets (12.5 mg) by mouth daily   vitamin B1 (THIAMINE) 100 MG tablet Take 1 tablet (100 mg) by mouth daily   warfarin (COUMADIN) 5 MG tablet Take 2-3 tablets as directed by INR clinic     No current facility-administered medications on file prior to visit.     ROS:  Constitutional: No fever, chills, or sweats. No weight gain/loss.   ENT: No visual disturbance, ear ache, epistaxis, sore throat.   Allergies/Immunologic: Negative.   Respiratory: No cough, hemoptysis.   Cardiovascular: As per HPI.   GI: No nausea, vomiting, hematemesis, melena, or hematochezia.   : No urinary frequency, dysuria, or hematuria.   Integument: Negative.   Psychiatric: Negative.   Neuro: Negative.   Endocrinology: Negative.   Musculoskeletal: Negative.    EXAM  /82 (BP Location: Left arm, Patient Position: Chair, Cuff Size: Adult Large)   Pulse 116   Ht 1.892 m (6' 2.5\")   Wt 99.2 kg (218 lb 12.8 oz)   SpO2 97%   BMI 27.72 kg/m    Home weight 211 pounds  Vitals:    05/21/19 1747   Weight: 99.2 kg (218 lb 12.8 oz)     General: appears comfortable, alert and articulate  Head: normocephalic, atraumatic  Eyes: anicteric sclera, EOMI  Neck: no adenopathy  Orophyarynx: moist mucosa, no lesions, dentition intact  Heart: regular, S1/S2, no murmur, gallop, rub, estimated JVP flat  Lungs: Respirations even and unlabored, lungs clear, no rales or wheezing  Abdomen: soft, non-tender, bowel sounds present, no hepatomegaly  Extremities: no clubbing, cyanosis or edema  Neurological: normal speech and affect, no gross motor deficits      LABS  Last Comprehensive Metabolic Panel:  Sodium   Date Value Ref Range Status   05/21/2019 139 133 - 144 mmol/L Final     Potassium   Date Value Ref Range Status   05/21/2019 4.1 3.4 - 5.3 mmol/L Final     Chloride   Date Value Ref Range Status   05/21/2019 106 94 - " 109 mmol/L Final     Carbon Dioxide   Date Value Ref Range Status   05/21/2019 27 20 - 32 mmol/L Final     Anion Gap   Date Value Ref Range Status   05/21/2019 6 3 - 14 mmol/L Final     Glucose   Date Value Ref Range Status   05/21/2019 67 (L) 70 - 99 mg/dL Final     Urea Nitrogen   Date Value Ref Range Status   05/21/2019 20 7 - 30 mg/dL Final     Creatinine   Date Value Ref Range Status   05/21/2019 1.07 0.66 - 1.25 mg/dL Final     GFR Estimate   Date Value Ref Range Status   05/21/2019 90 >60 mL/min/[1.73_m2] Final     Comment:     Non  GFR Calc  Starting 12/18/2018, serum creatinine based estimated GFR (eGFR) will be   calculated using the Chronic Kidney Disease Epidemiology Collaboration   (CKD-EPI) equation.       Calcium   Date Value Ref Range Status   05/21/2019 9.3 8.5 - 10.1 mg/dL Final       Lab Results   Component Value Date    NTBNPI 4,655 (H) 04/24/2019       No results found for: DIG    MOST RECENT ECHOCARDIOGRAM: 4/24/19  Interpretation Summary     CRITICAL FINDINGS-REPORT CALLED TO ER  The left ventricle is severely dilated.  There is mild concentric left ventricular hypertrophy.  The visual ejection fraction is estimated at 10%.  There is severe global hypokinesia of the left ventricle.  Multiple (3+) apical mass-likely clots in LV  Severely decreased right ventricular systolic function  The left atrium is severely dilated.  There is moderate to mod-severe (2-3+) mitral regurgitation.  Visually moderate MR by PISA quantification it is moderately severe  There is moderately severe (3+) tricuspid regurgitation.  Right ventricular systolic pressure is elevated, consistent with moderate to  severe pulmonary hypertension.  IVC diameter >2.1 cm collapsing <50% with sniff suggests a high RA pressure  estimated at 15 mmHg or greater.  Trivial pericardial effusion    ASSESSMENT AND PLAN  Mr. Ubaldo Velez is a 33 year old male with a relevant past medical history including recently  diagnosed NICM secondary to substance abuse (methamphetamine, tobacco, alcohol) who is doing well functionally and was applauded for his work with chem dep and cardiac rehab. His resting pulse of 116 is a worrisome sign though he looks and feels warm. We'll cautiously increase his Toprol to 25 mg once daily and obtain a digoxin level when he returns in 2 weeks.      1. Chronic systolic heart failure/HFrEF (EF 10%) secondary to nonischemic cardiomyopathy  NYHA Symptom Class I  Stage D  Primary Cardiologist: Steph; Last seen during admission in May  ACE-I/ARB/ARNi: Titration ongoing  BB titration ongoing  Aldosterone antagonist deferred while other medical therapy is prioritized  SCD prophylaxis Decision deferred during medication uptitration  %BiV pacing: N/A  Fluid status Euvolemic  Cardiac Rehab: Referral previously placed and starting tomorrow  Sleep Apnea Evaluation: Not indicated  Remote PA Pressure Monitoring (CardioMems) Deferred while medical therapy is optimized  Remote monitoring: Encouraged to utilize MyChart, coaching offerred  Fllow-up 2 weeks    2. LV Thrombus. Anticoagulated with warfarin. INR therapeutic    3. Polysubstance use. Abstinent and enrolled in ChemDep (starts tomorrow)    4. ADHD. Encouraged to talk with his PCP and/or Chem Dep team    40 minutes spent in direct care, >50% in counseling

## 2019-05-22 ENCOUNTER — HOSPITAL ENCOUNTER (OUTPATIENT)
Dept: BEHAVIORAL HEALTH | Facility: CLINIC | Age: 34
End: 2019-05-22
Attending: SOCIAL WORKER
Payer: MEDICAID

## 2019-05-22 LAB — INR PPP: 2.07 (ref 0.86–1.14)

## 2019-05-22 PROCEDURE — H2035 A/D TX PROGRAM, PER HOUR: HCPCS | Mod: HQ

## 2019-05-22 PROCEDURE — H2035 A/D TX PROGRAM, PER HOUR: HCPCS

## 2019-05-22 PROCEDURE — 36415 COLL VENOUS BLD VENIPUNCTURE: CPT | Performed by: NURSE PRACTITIONER

## 2019-05-22 PROCEDURE — 85610 PROTHROMBIN TIME: CPT | Performed by: NURSE PRACTITIONER

## 2019-05-22 ASSESSMENT — PATIENT HEALTH QUESTIONNAIRE - PHQ9
5. POOR APPETITE OR OVEREATING: NOT AT ALL
SUM OF ALL RESPONSES TO PHQ QUESTIONS 1-9: 0

## 2019-05-22 ASSESSMENT — ANXIETY QUESTIONNAIRES
6. BECOMING EASILY ANNOYED OR IRRITABLE: NOT AT ALL
1. FEELING NERVOUS, ANXIOUS, OR ON EDGE: NOT AT ALL
GAD7 TOTAL SCORE: 0
3. WORRYING TOO MUCH ABOUT DIFFERENT THINGS: NOT AT ALL
5. BEING SO RESTLESS THAT IT IS HARD TO SIT STILL: NOT AT ALL
2. NOT BEING ABLE TO STOP OR CONTROL WORRYING: NOT AT ALL
7. FEELING AFRAID AS IF SOMETHING AWFUL MIGHT HAPPEN: NOT AT ALL
IF YOU CHECKED OFF ANY PROBLEMS ON THIS QUESTIONNAIRE, HOW DIFFICULT HAVE THESE PROBLEMS MADE IT FOR YOU TO DO YOUR WORK, TAKE CARE OF THINGS AT HOME, OR GET ALONG WITH OTHER PEOPLE: NOT DIFFICULT AT ALL

## 2019-05-22 NOTE — PROGRESS NOTES
Patient:  Ubaldo Velez    Date: May 22, 2019    Comprehensive Assessment UPDATE        Comprehensive Summary Update and Review  Counselor met with patient on 5/22/2019 and reviewed the Comprehensive Assessment.    The following updates have been made:Other: client admits to daily meth use in past year until last use date 4/19/2019.  Client's medication list now reduced to:      Current Outpatient Medications   Medication     digoxin (LANOXIN) 250 MCG tablet     folic acid (FOLVITE) 1 MG tablet     hydrALAZINE (APRESOLINE) 25 MG tablet     lisinopril (PRINIVIL/ZESTRIL) 10 MG tablet     metoprolol succinate ER (TOPROL-XL) 25 MG 24 hr tablet     vitamin B1 (THIAMINE) 100 MG tablet     warfarin (COUMADIN) 5 MG tablet     No current facility-administered medications for this encounter.        [place] Suicide Severity Rating Reassessment    Have you ever wished you were dead or that you could go to sleep and not wake up? Past Month?  NO       Have you actually had any thoughts of killing yourself?   Past Month? NO    Have you been thinking about how you might do this?   Past Month?  N/A  Lifetime?   N/A     Have you had these thoughts and had some intention of acting on them?   Past Month?  N/A  Lifetime?   N/A    Have you started to work out the details of how to kill yourself?  Past Month?  N/A  Lifetime?   N/A     Do you intend to carry out this plan?   N/A     When you have the thoughts how long do they last?   N/A    Are there things - anyone or anything (ie Family, Adventism, pain of death) that stopped you from wanting to die or acting on thoughts of suicide?   Does not apply        2008  [employer]  Used with permission by Aminata Rloon, PhD.

## 2019-05-22 NOTE — PROGRESS NOTES
"    Initial Services Plan          Immediate health & safety concerns: none noted    Suggestions for client during the time between intake & completion of treatment plan:  Tour your treatment center (unit or outpatient clinic).  Introduce yourself to the treatment group.  Spend time getting to know your peers.  Complete the goals list for your treatment plan.  Review your patient or client handbook.  Identify concerns about whom to ask for family week  Look for a support network (such as AA, NA, DBT group, a Confucianism group, etc.)  Begin completing \"10 Worst Consequences\" assignment.    Client issues to be addressed in the first treatment sessions:  Identify motivations(s) for coming to treatment, i.e. legal, family, job, self  Acclimate client to group and IOP.    CRYSTAL Damon  5/22/2019  4:31 PM  "

## 2019-05-23 ENCOUNTER — HOSPITAL ENCOUNTER (OUTPATIENT)
Dept: BEHAVIORAL HEALTH | Facility: CLINIC | Age: 34
End: 2019-05-23
Attending: SOCIAL WORKER
Payer: MEDICAID

## 2019-05-23 PROCEDURE — H2035 A/D TX PROGRAM, PER HOUR: HCPCS | Mod: HQ

## 2019-05-23 ASSESSMENT — ANXIETY QUESTIONNAIRES: GAD7 TOTAL SCORE: 0

## 2019-05-24 DIAGNOSIS — I50.30 (HFPEF) HEART FAILURE WITH PRESERVED EJECTION FRACTION (H): Primary | ICD-10-CM

## 2019-05-24 NOTE — PROGRESS NOTES
CD ADULT Progress Note     Underlined portions in a dimension indicate important information carried forward from week to week as a reminder.     Treatment Plan Review completed on:  5/24/2019     Attendance Dates:  5/22/19, 5/23/2019     Total # of Group Sessions:  Phase I:  2 plus service initiation session    Length of stay/projected discharge date: 10/3/2019     MONDAY TUESDAY WEDNESDAY THURSDAY FRIDAY SATURDAY CONCETTA Total   Group Therapy   2 hours 3 hours    5 hours   Specialty Groups*        0 hours   1:1   1 hour     1 hour   Family Program           [place]             Phase III             Absent           Total   2 hours 3 hours    6 hours     *Specialty Groups include Mental Health Care, Assertiveness and Communication, Sobriety Maintenance Skills, Spiritual Care, Stress Management, Relapse Prevention, Family Systems.                    Learning Style:  by hands-on practice and by watching someone else demonstrate    Staff member contributing: CRYSTAL Early     Received supervision: Staffed with Judith Waterman [facility]     Client: contributed to goals and plan    Did Client receive a copy of treatment plan/revised plan: scheduled 5/28/2019; goals on this note are preliminary.    Changes to Treatment Plan: TBD    Client agrees with plan/revised plan: TBD    Any changes in Vulnerable Adult Status?  No   (If yes, add to treatment plan and individual abuse prevention plan).    Substance Use Disorders:    Alcohol Use Disorder Severe - 303.90 (F10.20)  Amphetamine Use Disorder Severe - 304.40 (F15.20)  Cocaine Use Disorder Mild - 305.60 (F14.10)  Tobacco Use Disorder Severe - 305.10 (F17.200)    1) Care Coordination Activities:  Recommended sober support groups  2) Medical, Mental Health and other appointments the client attended: medical check-up 5/21/19; dental 5/22/19  3) Medication issues: none  4) Physical and mental health problems: See dimensions 2 and 3 below for further details.  5) Review and  evaluation of the individual abuse prevention plan: CHIQUITA    [facility] Risk Ratings and Data       DIMENSION 1: Acute Intoxication/Withdrawal  The client's ability to cope with withdrawal symptoms and current state of intoxication       Acute Intoxication/Withdrawal - Current Risk Factor:  0    Reporting sober date of 4/20/2019    Goals:  Develop effective strategies to maintain sobriety. Report to counselor and group any alcohol or substance use.     Data: Client reports last use of alcohol and methamphetamine as 4/19/2019.  He reports last use of cocaine as several months ago.  Client exhibits no signs of intoxication or withdrawal.    DIMENSION 2:  Biomedical Conditions and Complaints  The degree to which any physical disorder would interfere with treatment for substance abuse and the client's ability to tolerate any related discomfort     Biomedical Conditions and Complaints - Current Risk Factor:  1    Goals: Disclose CD status to medical providers and follow up with medical interventions while in IOP.  Maintain good physical health.    Data: Client reports health issues managed by primary doctors with no immediate health concerns reported.  Client reports compliance with biomedical pharmacotherapy. Client denies any new or worsening medical conditions.      DIMENSION 3:  Emotional/Behavioral/Cognitive Conditions and Complications  The degree to which any condition or complications are likely to interfere with treatment for substance abuse or with function in significant life areas and the likelihood of risk of harm to self or others.     Emotional/Behavioral - Current Risk Factor:  1    DSM-5 Diagnoses:   client denies any past or current mental health issues.     Suicide Assessment: very low risk    Goals: Learn how to apply self-care and psychotherapy to build a repertoire of daily habits that counteract PAWS, fatigue, depression and anxiety leading to increased resiliency and well-being. Understand the relationship  "between mental health concerns and addiction.     Data: Client denies mental health concerns.  Client denies past or current mental health medications.  On 5/22/2019 client's PHQ-9 score was 0 out of 27, indicating minimal (normal) depression and his GIANA-7 score was 0 out of 21, indicating minimal (normal) anxiety.  Client's protective factors include:      Having people in his/her life that would prevent the patient from considering a suicide attempt (i.e. young children, spouse, parents, etc.)     An absence of mental health issues or stable and well treated mental health issues     Having easy access to supportive family members     Having a good community support network     Having restricted access to highly lethal means of suicide     Client rates his depression, anxiety, or other mental health concerns as 0 of 10 (10 highest), due to \"everything is awesome\".  Client denies any thoughts of hurting himself or others.  Client states he is feeling excited and happy to be starting a new endeavor and doing the right thing.    DIMENSION 4:  Readiness to Change  Consider the amount of support and encouragement necessary to keep the client involved in treatment.     Readiness to Change - Current Risk Factor:  2    Goals:  Understand the impact your substance use has had on you, your family, and significant relationships.  Increase internal motivation to change.    Data: Client rates his motivation for recovery as 10 of 10 (10 highest).  Client will prepare his first assignment as a way to help increase his motivation.  Client expressed 3 reasons to remain sober.     DIMENSION 5:  Relapse/Continued Use/Continued Problem Potential  Consider the degree to which the client recognizes relapse issues and has the skills to prevent relapse of either substance use or mental health problems.     Relapse/Continued Use/Continued Problem Potential - Current Risk Factor:  3    Goals:  Identify personal triggers and relapse warning " "signs.  Develop sober coping and living skills.    Data: Client denies any detox admissions or treatment experience.  He reports he has been unable to attain sobriety on his own.  Client rates his strongest craving to use in the past week as 0 of 10 (10 highest).  Client states \"started living healthy\" is something he did to change his thinking and behaviors for the sake of his sobriety.  Client reports a personal strength of being optimistic.  He reports using coping skills of keeping busy with his kids and fishing.    DIMENSION 6:  Recovery Environment  Consider the degree to which key areas of the client's life are supportive of or antagonistic to treatment participation and recovery.     Recovery Environment - Current Risk Factor:  1    Support group attended this week:  No    Did family agree to attend family week:  NA    If yes: NA    Goals:  Increase sober support network. Continue to identify and attend sober support group meetings.     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  Client lives with his SO.  Client denies attendance at sober support meetings this week.  Client denies having a sponsor.  Client states things are \"awesome.  Lots of new adjustments being made in my life\".  Client reports sober activities of playing with his son.         Intervention:   Behavioral Therapy, Cognitive Behavioral Therapy, Counselor Feedback, Psychoeducation, Emotional management, Group Feedback, Motivational Interviewing, Relapse Prevention, Twelve Step Facilitation, REBT, DBT.    Client started treatment on Wednesday.  He introduced himself to group and gave a brief description of what had led him here .  Client participated with the check- in process and affirmation.  Readiness to Change, Relapse Prevention, and Recovery Planning were addressed through group peers  presentations of their  10 Worst Consequences  and  Symptoms  assignments with discussion.  Client participated, provided " "supportive feedback, and spoke about how he related to behaviors, thoughts, and feelings as well as behavioral, emotional, and physical symptoms and signs described in peer s assignments.  Client participated in self-soothing exercises.  Client practiced 4x4 and natural mindful breathing.  Client expressed something he was grateful for.  Client affirmed and supported for beginning treatment.     Assessment:  Stages of Change Model  Contemplation    Plan:   Acclimate client to group and IOP.   Prepare to present first half of \"10 Worst Consequences\" assignment.  Practice 4x4 breathing 3 times daily before next session.     PRIMITIVO Early, LADC   "

## 2019-05-24 NOTE — PROGRESS NOTES
"Ubaldo Velez  May 23, 2019  [id number]    [facility] Mental Health Process Group Note    Day and Date:  Thurs, 5/23/19    Number of participants:   4     Length of Group:  3 hours     Goal of the Group: Clients learn key concepts of traditional CBT (cognitive distortions, conditioning, automatic thoughts, reframing) and then build on these by learning three core concepts of third wave therapies (ACT, DBT, MB): Acceptance, Cognitive Defusion and Being Present. The objective is to increase psychological flexibility and choose behaviors that serve the clients  personal values.      Rating scales are 1-10, with 1 being low and 10 being high or most severe.     [place]: Group Topics and Activities     I.  D3 Intervention and Group Topic addressed: Part 1:  3 Core processes to increase psychological flexibility and increase resiliency for mental and physical health.     II. Mindfulness and/or Motivational Component: Getting Comfortable with Moods, Exploring Affirmations to use this week, Awareness of Sounds Meditation      III. Additional Activity:  Three Simple Steps to Acceptance; Using Affirmations to increase Self-Acceptance; Cognitive Defusion exercises     IV. Review of Progress:   A. Client's self-report: This was Ubaldo s first group with this therapist and he shared what brought him to treatment. He was given his journal format for this group and agreed to do these daily. He doesn't do mindfulness but wants to learn more about it. He shared what brought him to treatment as his meth use contributed to him having heart failure.  He said, \"I'm naturally super grounded - no stress - I use jokes and humor and to take away my problems.\" He said he had no cravings \"but I did start smoking again\" and he was able to see this as a cross addiction connected to his meth use.     B. Therapist's Assessment: Stanleymaryellen participated fully in his first group.   Favorite self-acceptance affirmation(s) from today: #15.     V.   Reflection and " "evaluation of today s group experience:  Gave verbal feedback during group and completed the evaluation. Comments included: The most important things learned today included \"HOw changing the language you decide to use for negative thoughts\" and \"How to be more mindful of others\" and \"Very interesting.\"     VI. Assignments or activities to do for next week: Complete the journal daily. Try to use cognitive defusion throughout the week on negative thoughts and emotions.    Therapeutic techniques in this session:  Motivational Therapy, CBT/DBT/ACT, Supportive and Mindfulness    Additional Treatment Referrals/Follow-up Issues to Address: Not indicated at this time.    Change in Treatment Plan: Not indicated at this time. This group completes one of the Tx Plan interventions under D3.      Change in Diagnosis: No changes at this time.    "

## 2019-05-28 ENCOUNTER — HOSPITAL ENCOUNTER (OUTPATIENT)
Dept: BEHAVIORAL HEALTH | Facility: CLINIC | Age: 34
End: 2019-05-28
Attending: SOCIAL WORKER
Payer: MEDICAID

## 2019-05-28 PROCEDURE — H2035 A/D TX PROGRAM, PER HOUR: HCPCS | Mod: HQ

## 2019-05-28 NOTE — PROGRESS NOTES
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  He reports last use of cocaine as several months ago.  Client exhibits no signs of intoxication or withdrawal.    Dimension Two: Biomedical Condition and Complications    Ratin  (Consider the degree to which any physical disorder would interfere with treatment for substance abuse, and the client's ability to tolerate any related discomfort; determine the impact of continued chemical use on the unborn child if the client is pregnant)   Client reports health issues managed by primary doctors with no immediate health concerns reported.  Client reports compliance with biomedical pharmacotherapy.  Client has medical insurance and appears able to navigate the healthcare system independently.    Dimension Three: Emotional/Behavioral/Cognitive Conditions & Complications  Ratin  (Determine the degree to which any condition or complications are likely to interfere with treatment for substance abuse or with functioning in significant life areas and the likelihood of risk of harm to self or others)   Client denies mental health concerns.  Client denies past or current mental health medications.  On 2019 client's PHQ-9 score was 0 out of 27, indicating minimal (normal) depression  and his GIANA-7 score was 0 out of 21, indicating minimal (normal) anxiety.  Client was evaluated at assessment and rated as very low risk for suicide.  Client denies any past or current suicidal ideation, intent or plan.  Client created a Patient Safety Plan and will be monitored throughout treatment.     Dimension Four: Treatment Acceptance/Resistance     Ratin  (Consider the amount of support and encouragement necessary to keep the client involved in treatment)   Client verbalizes motivation to change but lacks consistent behavior as evidenced by hospitalization for medical condition related to use.  Client appears to be in the Contemplation stage of change as he expresses acceptance of need for change and willingness to follow recommendations.    Dimension Five: Continued Use/Relapse Prevention     Rating:  3  (Consider the degree to which the client's recognizes relapse issues and has the skills to prevent relapse of either substance use or mental health problems)   Client denies any history of detox or treatment experience.  Client reports attending weekly substance education classes while in the , but was drinking at the time.  Client reports he has not previously tried to attain sobriety on his own.  He lacks adequate education on addiction.  Client appears to lack impulse control, insight into his relapse potential, and long-term sober maintenance skills.  Client appears to be at moderately high risk for relapse/continued use.    Dimension Six: Recovery Environment     Ratin  (Consider the degree to which key areas of the client's life are supportive of or antagonistic to treatment participation and recovery)   Client reports he is employed full-time and denies any job concerns.  Client reports he lives with his girlfriend, son and girlfriend's parents. Client reports he has been with his girlfriend for 7 years, and they have a 3.5 year old son together.  Client reports his family,  girlfriend and friends are supportive of him.  Client has daily structured, meaningful activity.  Client denies any engagement in support groups.  He reports past legals, but denies any current legal concerns.    I have reviewed the information on the assessment, psychosocial and medical history and checklist:        it is current

## 2019-05-29 ENCOUNTER — HOSPITAL ENCOUNTER (OUTPATIENT)
Dept: CARDIAC REHAB | Facility: CLINIC | Age: 34
End: 2019-05-29
Attending: INTERNAL MEDICINE
Payer: MEDICAID

## 2019-05-29 ENCOUNTER — HOSPITAL ENCOUNTER (OUTPATIENT)
Dept: BEHAVIORAL HEALTH | Facility: CLINIC | Age: 34
End: 2019-05-29
Attending: SOCIAL WORKER
Payer: MEDICAID

## 2019-05-29 PROCEDURE — 93798 PHYS/QHP OP CAR RHAB W/ECG: CPT

## 2019-05-29 PROCEDURE — 40000116 ZZH STATISTIC OP CR VISIT

## 2019-05-29 PROCEDURE — H2035 A/D TX PROGRAM, PER HOUR: HCPCS

## 2019-05-29 PROCEDURE — H2035 A/D TX PROGRAM, PER HOUR: HCPCS | Mod: HQ

## 2019-05-29 NOTE — PROGRESS NOTES
Client forgot his appt to create treatment plan today which was his 3rd day.  Rescheduled tomorrow 5/29/19 at 4pm.

## 2019-05-30 ENCOUNTER — HOSPITAL ENCOUNTER (OUTPATIENT)
Dept: BEHAVIORAL HEALTH | Facility: CLINIC | Age: 34
End: 2019-05-30
Attending: SOCIAL WORKER
Payer: MEDICAID

## 2019-05-30 ENCOUNTER — MYC REFILL (OUTPATIENT)
Dept: CARDIOLOGY | Facility: CLINIC | Age: 34
End: 2019-05-30

## 2019-05-30 DIAGNOSIS — I50.22 CHRONIC SYSTOLIC HEART FAILURE (H): ICD-10-CM

## 2019-05-30 PROCEDURE — H2035 A/D TX PROGRAM, PER HOUR: HCPCS | Mod: HQ

## 2019-05-30 RX ORDER — METOPROLOL SUCCINATE 25 MG/1
25 TABLET, EXTENDED RELEASE ORAL DAILY
Qty: 90 TABLET | Refills: 3 | Status: CANCELLED | OUTPATIENT
Start: 2019-05-30

## 2019-05-30 NOTE — PROGRESS NOTES
CD ADULT Progress Note     Underlined portions in a dimension indicate important information carried forward from week to week as a reminder.     Treatment Plan Review completed on:  5/30/2019     Attendance Dates:  5/28/19, 5/29/19 and 5/30/19     Total # of Group Sessions:  Phase I:  5 plus service initiation session    Length of stay/projected discharge date: 10/3/2019     MONDAY TUESDAY WEDNESDAY THURSDAY FRIDAY SATURDAY CONCETTA Total   Group Therapy 0 hours  2 hours 3 hours    5 hours   Specialty Groups*  2 hours      2 hours   1:1   1 hour     1 hour   Family Program           [place]             Phase III             Absent holiday          Total 0 hours 2 hours 2 hours 3 hours    8 hours     *Specialty Groups include Mental Health Care, Assertiveness and Communication, Sobriety Maintenance Skills, Spiritual Care, Stress Management, Relapse Prevention, Family Systems.                    Learning Style:  by hands-on practice and by watching someone else demonstrate    Staff member contributing: CRYSTAL Early     Received supervision: Staffed with Judith Waterman [facility]     Client: contributed to goals and plan    Did Client receive a copy of treatment plan/revised plan: yes, signed 5/29/2019    Changes to Treatment Plan: no    Client agrees with plan/revised plan: yes    Any changes in Vulnerable Adult Status?  No   (If yes, add to treatment plan and individual abuse prevention plan).    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  5) Review and evaluation of the  individual abuse prevention plan: NA    [facility] Risk Ratings and Data       DIMENSION 1: Acute Intoxication/Withdrawal  The client's ability to cope with withdrawal symptoms and current state of intoxication       Acute Intoxication/Withdrawal - Current Risk Factor:  0    Reporting sober date of 4/20/2019    Goals:  Develop effective strategies to maintain sobriety. Report to counselor and group any alcohol or substance use.     Data: Client reports last use of alcohol and methamphetamine as 4/19/2019.  He reports last use of cocaine as several months ago.      Client exhibits no signs of intoxication or withdrawal throughout the week.    DIMENSION 2:  Biomedical Conditions and Complaints  The degree to which any physical disorder would interfere with treatment for substance abuse and the client's ability to tolerate any related discomfort     Biomedical Conditions and Complaints - Current Risk Factor:  1    Goals: Disclose CD status to medical providers and follow up with medical interventions while in IOP.  Maintain good physical health.    Data: Client reports health issues managed by primary doctors with no immediate health concerns reported.  Client reports compliance with biomedical pharmacotherapy.     On Tuesday client denies any new or worsening medical conditions.    D-client expressed desire to quit smoking tobacco, especially due to cardiac rehab  I-supportive listening, MI, education that quitting tobacco increases CHERRY tx success.  A-client appears to understand smoking tobacco is now more dangerous than ever for him.  P- client will speak with physician next week about Chantix and other tobacco cessation programs.    DIMENSION 3:  Emotional/Behavioral/Cognitive Conditions and Complications  The degree to which any condition or complications are likely to interfere with treatment for substance abuse or with function in significant life areas and the likelihood of risk of harm to self or others.  "    Emotional/Behavioral - Current Risk Factor:  1    DSM-5 Diagnoses:   client denies any past or current mental health issues.     Suicide Assessment: very low risk    Goals: Learn how to apply self-care and psychotherapy to build a repertoire of daily habits that counteract PAWS, fatigue, depression and anxiety leading to increased resiliency and well-being. Understand the relationship between mental health concerns and addiction.     Data: Client denies mental health concerns.  Client denies past or current mental health medications.  On 5/22/2019 client's PHQ-9 score was 0 out of 27, indicating minimal (normal) depression and his GIANA-7 score was 0 out of 21, indicating minimal (normal) anxiety.  Client's protective factors include:      Having people in his/her life that would prevent the patient from considering a suicide attempt (i.e. young children, spouse, parents, etc.)     An absence of mental health issues or stable and well treated mental health issues     Having easy access to supportive family members     Having a good community support network     Having restricted access to highly lethal means of suicide     On Tuesday client rates his depression, anxiety, or other mental health concerns as 0 of 10 (10 highest), due to \"precious support at home\".  Client denies any thoughts of hurting himself or others.  Client states he is feeling grateful for returning health, and that he has received melina to be starting treatment, believing \"this happened for a reason\".    DIMENSION 4:  Readiness to Change  Consider the amount of support and encouragement necessary to keep the client involved in treatment.     Readiness to Change - Current Risk Factor:  2    Goals:  Understand the impact your substance use has had on you, your family, and significant relationships.  Increase internal motivation to change.    Data: Client rates his motivation for recovery as 8 of 10 (10 highest).  Client will prepare his first " "assignment as a way to help increase his motivation.  Client expressed 3 reasons to remain sober.     DIMENSION 5:  Relapse/Continued Use/Continued Problem Potential  Consider the degree to which the client recognizes relapse issues and has the skills to prevent relapse of either substance use or mental health problems.     Relapse/Continued Use/Continued Problem Potential - Current Risk Factor:  3    Goals:  Identify personal triggers and relapse warning signs.  Develop sober coping and living skills.    Data: Client denies any detox admissions or treatment experience.  He reports he has been unable to attain sobriety on his own.      On Tuesday client rates his strongest craving to use in the past week as 0 of 10 (10 highest) due to \"have no reason to think about going down that road again\".  Client states \"spent every moment with family\" is something he did to change his thinking and behaviors for the sake of his sobriety.  Client reports a personal strength of determination.  He reports using coping skills of not allowing himself to be deterred.    DIMENSION 6:  Recovery Environment  Consider the degree to which key areas of the client's life are supportive of or antagonistic to treatment participation and recovery.     Recovery Environment - Current Risk Factor:  1    Support group attended this week:  No    Did family agree to attend family week:  NA    If yes: NA    Goals:  Increase sober support network. Continue to identify and attend sober support group meetings.     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  Client lives with his SO.      On Tuesday client denies attendance at sober support meetings this week.  Client denies having a sponsor.  Client states things are \"amazing - my family all wants the best for me\".  Client reports sober activities of camping with his family.         Intervention:   Behavioral Therapy, Cognitive Behavioral Therapy, Counselor Feedback, " "Psychoeducation, Emotional management, Group Feedback, Motivational Interviewing, Relapse Prevention, Twelve Step Facilitation, REBT, DBT.    On Monday clinic was closed due to Memorial Day holiday.  On Tuesday client participated with the check- in process and affirmation.  Readiness to Change and Relapse Prevention were addressed through discussion of a group peer use episode.  Client received psychoeducation about mindfulness and DBT concepts of Reason Mind/Emotion Mind/Wise Mind, Doing Mind/Being Mind/Wise Mind, which were presented and discussed.  Client differentiated between states of mind.  On Wednesday, Readiness to Change, Relapse Prevention, and Recovery Planning were addressed through group peer s presentations of his  Anxiety and Recovery  assignment with discussion.  Client participated, provided supportive feedback, and spoke about how he related to symptoms and signs of anxiety described in peer s assignments.  Client participated in self-soothing exercises.  Client participated in a guided imagery meditation and compared it to breathing meditations done in previous groups.  Client expressed he found it to be a useful alternative meditation technique.  Client expressed something he was grateful for.  Client affirmed and supported for actively participating in creation of treatment plan.     Assessment:  Stages of Change Model  Contemplation    Plan:    Client will speak with physician next week about Chantix and other tobacco cessation programs.  Prepare to present first half of \"10 Worst Consequences\" assignment.  Practice 4x4 breathing 3 times daily before next session.     PRIMITIVO Early, CHLOÉC   "

## 2019-05-31 ENCOUNTER — HOSPITAL ENCOUNTER (OUTPATIENT)
Dept: CARDIAC REHAB | Facility: CLINIC | Age: 34
End: 2019-05-31
Attending: INTERNAL MEDICINE
Payer: MEDICAID

## 2019-05-31 PROCEDURE — 93798 PHYS/QHP OP CAR RHAB W/ECG: CPT | Performed by: REHABILITATION PRACTITIONER

## 2019-05-31 PROCEDURE — 40000116 ZZH STATISTIC OP CR VISIT: Performed by: REHABILITATION PRACTITIONER

## 2019-05-31 NOTE — PROGRESS NOTES
Ubaldo Velez  May 30, 2019  [id number]    [facility] Mental Health Process Group Note     Thursday    Group Date: 5/30/19   Group Attendance Attended group session   Group Therapy Type Addiction, Psychoeducation and Psychotherapeutic   Group Topic Covered MH core processes: values, committed action, self-as-context   Client Participation/Contribution Other - Participated fully, see details below.   Client Participation Detail Highly involved   Group Attendance 3.0 Hours   Individual Attendance None   Family Attendance None   Other Comments/Information None       Day and Date:  Thursday, 5/30/19     Number of participants:   4     Length of Group:  3 hours    Goal of the Group: Clients learn key concepts of traditional CBT (cognitive distortions, conditioning, automatic thoughts, reframing) and then build on these by learning three core concepts of third wave therapies (ACT, DBT, MB): Self-as-Context, Values and Committed Action. The objective is to increase psychological flexibility and choose behaviors that serve the clients  personal values.        Rating scales are 1-10, with 1 being low and 10 being high or most severe.     [place]: Group Topics and Activities     I.  D3 Intervention and Group Topic addressed: Mental Health Part 2    II. Mindfulness and/or Motivational Component:  Values and Committed Action, Psychological Flexibility, Excerpts from documentary  I AM  to clarify values and emphasize the importance of community and connection to others.      III. Additional Activity:  Completed examples of  auto-  behavior vs. values based actions, completed the AAQ-II scale; identified personal value to work on this week +  create a  goal that reflects this value + commit to one step toward that goal for this week.       IV. Review of Progress:   A. Client's self-report:  Ubaldo reported that he and his SO went camping in their backyard and he spent quality time with his 4 year old son. He went back to work  "full-time on Wed and is happy to be back at work. He stated,  I never have stress, every day is a good day, I m always a happy person.  Later he said his SO was stressed so he felt some of her stress and told her how to resolve the issue.  He rated his stress at a 2 today which was a positive change for him.     B. Therapist's Assessment:     Ubaldo participates well in group and his verbal comments are less expansive and articulate than his writing aeb his values worksheet and his evaluation below.   The value chosen to work on this week: Empathy  The goal that reflects this value:    To deal with my stress, I m learning the tools, so use them    The step to take this week toward this goal:    Connect with my GF s feeling and try to hear her perspective before I interject my own - listen more.      V.   Reflection and evaluation of today s group experience:  Gave verbal feedback and filled out evaluation form. The most important thing learned today was     VI. Assignments or activities to do for next week: Continue to do the journal and mindfulness, practice cognitive defusion, recognize the \"observing self\" and notice what values are reflected in daily choices and decisions.  Follow through on your STEP 1 from today s value s work.     Therapeutic techniques in this session:  Motivational Therapy, CBT/DBT/ACT, Supportive, Behavioral Modification and Mindfulness     Additional Treatment Referrals/Follow-up Issues to Address: Not indicated at this time.      Change in Treatment Plan: Not indicated at this time. This session completes one Tx Plan intervention under D3.       Change in Diagnosis: Not at this time.        "

## 2019-06-03 ENCOUNTER — HOSPITAL ENCOUNTER (OUTPATIENT)
Dept: BEHAVIORAL HEALTH | Facility: CLINIC | Age: 34
End: 2019-06-03
Attending: SOCIAL WORKER
Payer: COMMERCIAL

## 2019-06-03 PROCEDURE — H2035 A/D TX PROGRAM, PER HOUR: HCPCS | Mod: HQ

## 2019-06-04 ENCOUNTER — HOSPITAL ENCOUNTER (OUTPATIENT)
Dept: BEHAVIORAL HEALTH | Facility: CLINIC | Age: 34
End: 2019-06-04
Attending: SOCIAL WORKER
Payer: COMMERCIAL

## 2019-06-04 PROCEDURE — H2035 A/D TX PROGRAM, PER HOUR: HCPCS | Mod: HQ

## 2019-06-04 NOTE — PROGRESS NOTES
This incomplete note was completed 6/6/2019 by Susanne Granados MFA, MA, CRYSTAL.  See that note.    Ubaldo Velez  [id number]               Adult CD Progress Note and Treatment Plan Review     Attendance     Monday    Group Date: 6/3/2019   Group Attendance Attended group session   Group Therapy Type Addiction   Group Topic Covered Relapse Prevention and AA presentation   Client's Response To Topic Cooperative with task. Expressed readiness to alter behaviors.   Client Participation Detail Adequate participation   Attendance 2.0 Hours   Individual Attendance None   Family Attendance None   Other Comments/Information None      Tuesday     Group Date: 6/4/19     Group Attendance Attended group session   Group Therapy Type Psychoeducation   Group Topic Covered DBT radical acceptance   Client's Response To Topic Cooperative with task. Discussed personal experience with topic. Expressed readiness to alter behaviors. Expressed understanding of topic. Listened actively.   Client Participation Detail Adequate participation   Attendance 2.0 Hours   Individual Attendance None   Family Attendance None   Other Comments/Information None      Wednesday    Group Date: 6/5/19     Group Attendance    Group Therapy Type    Group Topic Covered    Client's Response To Topic    Client Participation Detail    Attendance    Individual Attendance    Family Attendance    Other Comments/Information      Total # of Phase 1 Group Sessions: 9     Total # of Phase 2 Group Sessions: N/A  Total # of Phase 3 Group Sessions: N/A  Total # of 1:1 Sessions: 2    Support group attended this week: no    Reporting sobriety: Yes    Treatment Plan Review     Treatment Plan Review completed on:  6/3/2019     Projected discharge date: 10/3/2019    Client preferred learning style: by hands-on practice and by watching someone else demonstrate    Staff member(s) contributing: CRYSTAL Damon, Susanne Granados MFA, MA, CRYSTAL     Received supervision:     Client  involvement with treatment planning: contributed to goals and plan.    Client received copy of plan/revised plan: Yes, signed 5/29/2019    Client agrees with plan/revised plan: Yes    Changes to Treatment Plan: No    Client's Dimension 1 Goal(s) Develop effective strategies to maintain sobriety.     See below.   Client's Dimension 2 Goal(s) Disclose CD status to medical providers and follow up with medical interventions while in IOP.   Client would like to quit smoking tobacco.  Maintain good physical health. See below.   Client's Dimension 3 Goal(s) Learn how to apply self-care and psychotherapy to build a repertoire of daily habits that counteract PAWS, fatigue, depression and anxiety leading to increased resiliency and well-being See below.   Client's Dimension 4 Goal(s) Understand the impact your substance use has had on you, your family, and significant relationships.  Increase internal motivation to change. See below.   Client's Dimension 5 Goal(s) Gain education about addiction.  Identify personal triggers and relapse warning signs.  Develop sober coping and living skills in order to address the development of a strategy for long term recovery. See below.   Client's Dimension 6 Goal(s) Develop and increase sober support network.  Identify and attend sober support group meetings. D - client has not been attending support meetings  I - client introduced to members of local AA group who spoke about how they found sober support and having a sponsor helpful.  A - client appears willing to try attending  P - client will identify at least 1 meeting to attend      New Goals added since last review: Yes - treatment plan created..    Goal(s) worked on since last review: dim 1,4,5,6    Strategies effective: Yes    Care Coordination Activities: Yes - client introduced to local sober support group..    Medical, Mental Health and other appointments the client attended: None.    Medication issues: None.    Physical and  "mental health problems: Yes - client is recovering from heart failure due to meth use.    Review and evaluation of the individual abuse prevention plan: The program's individual abuse prevention plan (IAPP) is sufficient for this client.     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    Dimension 2 1 On Monday client denies any new or worsening medical conditions.      Dimension 3 1 On Monday client rates his depression, anxiety, or other mental health concerns as 1 of 10 (10 highest), due to \"a little anxiety from trying to balance home and work life\".  Client denies any thoughts of hurting himself or others.     Dimension 4 2 Client rates his motivation for recovery as 7 of 10 (10 highest).  Client expressed 3 reasons to remain sober.    Dimension 5 3 On Monday client rates his strongest craving to use in the past week as 1 of 10 (10 highest) due to \"was at a [employer] tournament and people were drinking\".     Dimension 6 1 On Monday client denies attendance at sober support meetings this week.  Client denies having a sponsor.    Data: (Need to include short narrative for the week on what was worked on)  offered feedback good insight client did actively participate  On Monday client participated with the check- in process and affirmation.  Readiness to Change, Relapse Prevention and Recovery Planning were addressed through a presentation by AA members of how sober support groups helped them change and achieve sustained recovery.  Client expressed that the information was helpful.  On Tuesday client received psychoeducation about Radical Acceptance and associated DBT skills.  Client " identifies something difficult he was learning to accept.  Client participated in self-soothing exercises.  Client practiced 4x4 and natural mindful breathing.  Client expresses these exercises are helpful.  Client expressed something he was grateful for.      Intervention:   Behavioral Therapy, Cognitive Behavioral Therapy, Counselor Feedback, Psychoeducation, Emotional management, Group Feedback, Motivational Interviewing, Relapse Prevention, Twelve Step Facilitation, REBT, DBT.    Assessment:    Stages of Change Model  Contemplation    Appears/Sounds:  Cooperative  Engaged    Plan:   -Attend 2-3 sober support group meetings each week (this includes non-12-step/AA alternative meetings).  -Client will continue to work on treatment plan objectives.  -Client will follow all recommendations of counselor and any other medical provider which includes taking all medications as prescribed.    -Client will attend all weekly Phase 1 group sessions.   -Client to engage in sober activities each week.    Lei Kraus [facility]

## 2019-06-04 NOTE — PROGRESS NOTES
Patient Safety Plan Template    Name:   Ubaldo Velez YOB: 1985 Age:  33 year old MR Number:  [id number]   Step 1: Warning signs (Thoughts, images, mood, situation, behavior) that a crisis may be developin. Old friends want to hang out     2. Frustrating circumstances     3. Visiting neighborhoods where I used     Step 2: Internal coping strategies - Things I can do to take my mind off of my problems without contacting another person (relaxation technique, physical activity):     1. meditation     2. Watching videos of the [employer]     3. Online sober groups     Step 3: People and social settings that provide distraction:     1. Name: Hoa Queen   Phone: [phone]   2. Name: Miracle Monsalve   Phone: [phone]   3. Place: disc [employer]   4. Place: shooting basketball     The one thing that is most important to me and worth living for is: my health     Step 4: People whom I can ask for help:     1. Name: Hoa Queen   Phone: [phone]     2. Name: Miracle Monsalve   Phone: [phone]     3. Name: Cem Lozano   Phone: [phone]     Step 5: Professionals or agencies I can contact during a crisis:     1. Clinician Name: Kristin Collazo   Phone:    Clinician Pager or Emergency Contact #: NA     2. Clinician Name: CRYSTAL Early   Phone: [phone]     Clinician Pager or Emergency Contact #: NA     3. Local Urgent Care Services: [place]    Urgent Care Services Address:     Urgent Care Services Phone: [phone]     4. Suicide Prevention Lifeline Phone: 2-935-024-JIBV (6704)     Step 6: Making the environment safe:     1. No chemicals in house     2. New group of friends     Safety Plan Template 2008 Agustina Calderon and Lucas Wheeler is reprinted with the express permission of the authors.  No portion of the Safety Plan Template may be reproduced without the express, written permission.  You can contact the authors at [email or web address] or  [email or web address].

## 2019-06-05 ENCOUNTER — HOSPITAL ENCOUNTER (OUTPATIENT)
Dept: BEHAVIORAL HEALTH | Facility: CLINIC | Age: 34
End: 2019-06-05
Attending: SOCIAL WORKER
Payer: COMMERCIAL

## 2019-06-05 ENCOUNTER — OFFICE VISIT (OUTPATIENT)
Dept: CARDIOLOGY | Facility: CLINIC | Age: 34
End: 2019-06-05
Attending: NURSE PRACTITIONER
Payer: COMMERCIAL

## 2019-06-05 VITALS
WEIGHT: 220.4 LBS | BODY MASS INDEX: 27.4 KG/M2 | DIASTOLIC BLOOD PRESSURE: 84 MMHG | HEIGHT: 75 IN | OXYGEN SATURATION: 98 % | SYSTOLIC BLOOD PRESSURE: 133 MMHG | HEART RATE: 101 BPM

## 2019-06-05 DIAGNOSIS — I50.30 (HFPEF) HEART FAILURE WITH PRESERVED EJECTION FRACTION (H): ICD-10-CM

## 2019-06-05 DIAGNOSIS — I50.22 CHRONIC SYSTOLIC HEART FAILURE (H): ICD-10-CM

## 2019-06-05 LAB
ANION GAP SERPL CALCULATED.3IONS-SCNC: 8 MMOL/L (ref 3–14)
BUN SERPL-MCNC: 15 MG/DL (ref 7–30)
CALCIUM SERPL-MCNC: 8.8 MG/DL (ref 8.5–10.1)
CHLORIDE SERPL-SCNC: 106 MMOL/L (ref 94–109)
CO2 SERPL-SCNC: 24 MMOL/L (ref 20–32)
CREAT SERPL-MCNC: 0.97 MG/DL (ref 0.66–1.25)
GFR SERPL CREATININE-BSD FRML MDRD: >90 ML/MIN/{1.73_M2}
GLUCOSE SERPL-MCNC: 130 MG/DL (ref 70–99)
POTASSIUM SERPL-SCNC: 4 MMOL/L (ref 3.4–5.3)
SODIUM SERPL-SCNC: 137 MMOL/L (ref 133–144)

## 2019-06-05 PROCEDURE — H2035 A/D TX PROGRAM, PER HOUR: HCPCS | Mod: HQ

## 2019-06-05 PROCEDURE — 99214 OFFICE O/P EST MOD 30 MIN: CPT | Mod: ZP | Performed by: NURSE PRACTITIONER

## 2019-06-05 PROCEDURE — G0463 HOSPITAL OUTPT CLINIC VISIT: HCPCS | Mod: ZF

## 2019-06-05 PROCEDURE — 36415 COLL VENOUS BLD VENIPUNCTURE: CPT | Performed by: NURSE PRACTITIONER

## 2019-06-05 PROCEDURE — 80048 BASIC METABOLIC PNL TOTAL CA: CPT | Performed by: NURSE PRACTITIONER

## 2019-06-05 RX ORDER — METOPROLOL SUCCINATE 25 MG/1
50 TABLET, EXTENDED RELEASE ORAL DAILY
Qty: 90 TABLET | Refills: 3 | Status: SHIPPED | OUTPATIENT
Start: 2019-06-05 | End: 2019-06-17

## 2019-06-05 ASSESSMENT — MIFFLIN-ST. JEOR: SCORE: 2025.36

## 2019-06-05 ASSESSMENT — PAIN SCALES - GENERAL: PAINLEVEL: NO PAIN (0)

## 2019-06-05 NOTE — PATIENT INSTRUCTIONS
Take your medicines every day, as directed    Changes made today:  o Please increase Toprol to 50 mg daily    o Please quit smoking -- you are doing great!     Monitor Your Weight and Symptoms    Contact us if you:      Gain 2 pounds in one day or 5 pounds in one week    Feel more short of breath    Notice more leg swelling    Feel lightheadeded   Change your lifestyle    Limit Salt or Sodium:    2000 mg  Limit Fluids:    2000 mL or approximately 64 ounces  Eat a Heart Healthy Diet    Low in saturated fats  Stay Active:    Aim to move at least 150 minutes every  week         To Contact us    During Business Hours:  [phone], option # 1 (University)  Then option # 4 (medical questions)     After hours, weekends or holidays:   [phone], Option #4  Ask to speak to the On-Call Cardiologist. Inform them you are a CORE/heart failure patient at the [facility].     Use [email or web address] allows you to communicate directly with your heart team through secure messaging.    [facility] can be accessed any time on your phone, computer, or tablet.    If you need assistance, we'd be happy to help!         Keep your Heart Appointments:    Please return to clinic in 2 weeks

## 2019-06-05 NOTE — NURSING NOTE
Chief Complaint   Patient presents with     Follow Up      Return CORE, 34 yo male, HFrEF, EF 10%, labs prior.

## 2019-06-05 NOTE — LETTER
6/5/2019      RE: Ubaldo Velez  [address]  [place] [address]       Dear Colleague,    Thank you for the opportunity to participate in the care of your patient, Ubaldo Velez, at the [facility] HEART [facility]. Please see a copy of my visit note below.    HPI  Mr. Ubaldo Velez is a 33 year old male with a relevant past medical history including ADHD and polysubstance abuse (ETOH, cocaine, alcohol, meth) who was admitted to an outside hospital with URI symptoms and 30 pound weight gain and transferred to [facility] with concern for cardiogenic shock. Echo revealed an EF of 10% with mod-severe MR and TR and multiple apical thrombus. Right and left heart caths were notable for clean coronary arteries, normal right sided filling pressures, moderately elevated left sided pressures and a marginal cardiac index. He was diuresed, transitioned to oral afterload, and discharged to home on warfarin with a therapeutic INR and hyponatremia prompting adjustment of his spironolactone dosing. He was seen in clinic 2 weeks ago when Toprol was increased. He returns for follow up.    Ubaldo has tolerated the increase in Toprol with no change. He is working full time since his last visit. No troubles with fatigue, shortness of breath, chest pain, or edema. Minimal lightheadedness. Appetite is good.     He denies any alcohol or illicit drug use but has smoked cigarettes -- started chewing nicotine gum.     PMH  Past Medical History:   Diagnosis Date     ADHD      Cocaine use      Methamphetamine use (H)        Past Surgical History:   Procedure Laterality Date     CV CORONARY ANGIOGRAM N/A 4/29/2019    Procedure: CV CORONARY ANGIOGRAM;  Surgeon: Reggie Hua MD;  Location: [facility] CARDIAC CATH LAB     CV RIGHT HEART CATH N/A 4/29/2019    Procedure: Right Heart Cath;  Surgeon: Reggie Hua MD;  Location:  HEART CARDIAC CATH LAB     EXTRACTION(S) DENTAL       [place] teeth     HAND SURGERY Left     Laceration left index finger       Family History   Problem Relation Age of Onset     Chronic Obstructive Pulmonary Disease Father      Multiple Sclerosis Maternal Aunt        Social History     Socioeconomic History     Marital status: Single     Spouse name: Not on file     Number of children: 1     Years of education: Not on file     Highest education level: Not on file   Occupational History     Occupation: [employer]   Social Needs     Financial resource strain: Not on file     Food insecurity:     Worry: Not on file     Inability: Not on file     Transportation needs:     Medical: Not on file     Non-medical: Not on file   Tobacco Use     Smoking status: Current Every Day Smoker     Packs/day: 1.00     Years: 15.00     Pack years: 15.00     Smokeless tobacco: Never Used     Tobacco comment: Quit 1.5 weeks ago   Substance and Sexual Activity     Alcohol use: Yes     Comment: 8-10 beers a day, now 1 beer a week.     Drug use: Yes     Types: Methamphetamines, Cocaine     Comment: Methamphetamine last used 8 weeks ago     Sexual activity: Not on file   Lifestyle     Physical activity:     Days per week: Not on file     Minutes per session: Not on file     Stress: Not on file   Relationships     Social connections:     Talks on phone: Not on file     Gets together: Not on file     Attends Baptism service: Not on file     Active member of club or organization: Not on file     Attends meetings of clubs or organizations: Not on file     Relationship status: Not on file     Intimate partner violence:     Fear of current or ex partner: Not on file     Emotionally abused: Not on file     Physically abused: Not on file     Forced sexual activity: Not on file   Other Topics Concern     Not on file   Social History Narrative    Single but has long standing SO who he lives with, has one child.  (last updated 4/24/2019)        ALLERGIES  No Known Allergies    MEDICATIONS   Current  "Outpatient Medications on File Prior to Visit:  digoxin (LANOXIN) 250 MCG tablet Take 1 tablet (250 mcg) by mouth daily   folic acid (FOLVITE) 1 MG tablet Take 1 tablet (1 mg) by mouth daily   hydrALAZINE (APRESOLINE) 25 MG tablet Take 1 tablet (25 mg) by mouth 3 times daily   lisinopril (PRINIVIL/ZESTRIL) 10 MG tablet Take 1 tablet (10 mg) by mouth 2 times daily   metoprolol succinate ER (TOPROL-XL) 25 MG 24 hr tablet Take 1 tablet (25 mg) by mouth daily   vitamin B1 (THIAMINE) 100 MG tablet Take 1 tablet (100 mg) by mouth daily   warfarin (COUMADIN) 5 MG tablet Take 2-3 tablets as directed by INR clinic     No current facility-administered medications on file prior to visit.     ROS:  Constitutional: No fever, chills, or sweats. No weight gain/loss.   ENT: No visual disturbance, ear ache, epistaxis, sore throat.   Allergies/Immunologic: Negative.   Respiratory: No cough, hemoptysis.   Cardiovascular: As per HPI.   GI: No nausea, vomiting, hematemesis, melena, or hematochezia.   : No urinary frequency, dysuria, or hematuria.   Integument: Negative.   Psychiatric: Negative.   Neuro: Negative.   Endocrinology: Negative.   Musculoskeletal: Negative.    EXAM  /84 (BP Location: Left arm, Patient Position: Chair, Cuff Size: Adult Regular)   Pulse 101   Ht 1.905 m (6' 3\")   Wt 100 kg (220 lb 6.4 oz)   SpO2 98%   BMI 27.55 kg/m     Home weight 211 pounds  Vitals:    06/05/19 1436   Weight: 100 kg (220 lb 6.4 oz)     General: appears comfortable, alert and articulate  Head: normocephalic, atraumatic  Eyes: anicteric sclera, EOMI  Neck: no adenopathy  Orophyarynx: moist mucosa, no lesions, dentition intact  Heart: regular, S1/S2, no murmur, gallop, rub, estimated JVP flat  Lungs: Respirations even and unlabored, lungs clear, no rales or wheezing  Abdomen: soft, non-tender, bowel sounds present, no hepatomegaly  Extremities: no clubbing, cyanosis or edema  Neurological: normal speech and affect, no gross motor " deficits      LABS  Last Comprehensive Metabolic Panel:  Sodium   Date Value Ref Range Status   05/21/2019 139 133 - 144 mmol/L Final     Potassium   Date Value Ref Range Status   05/21/2019 4.1 3.4 - 5.3 mmol/L Final     Chloride   Date Value Ref Range Status   05/21/2019 106 94 - 109 mmol/L Final     Carbon Dioxide   Date Value Ref Range Status   05/21/2019 27 20 - 32 mmol/L Final     Anion Gap   Date Value Ref Range Status   05/21/2019 6 3 - 14 mmol/L Final     Glucose   Date Value Ref Range Status   05/21/2019 67 (L) 70 - 99 mg/dL Final     Urea Nitrogen   Date Value Ref Range Status   05/21/2019 20 7 - 30 mg/dL Final     Creatinine   Date Value Ref Range Status   05/21/2019 1.07 0.66 - 1.25 mg/dL Final     GFR Estimate   Date Value Ref Range Status   05/21/2019 90 >60 mL/min/[1.73_m2] Final     Comment:     Non  GFR Calc  Starting 12/18/2018, serum creatinine based estimated GFR (eGFR) will be   calculated using the Chronic Kidney Disease Epidemiology Collaboration   (CKD-EPI) equation.       Calcium   Date Value Ref Range Status   05/21/2019 9.3 8.5 - 10.1 mg/dL Final       Lab Results   Component Value Date    NTBNPI 4,655 (H) 04/24/2019       No results found for: DIG    MOST RECENT ECHOCARDIOGRAM: 4/24/19  Interpretation Summary     CRITICAL FINDINGS-REPORT CALLED TO ER  The left ventricle is severely dilated.  There is mild concentric left ventricular hypertrophy.  The visual ejection fraction is estimated at 10%.  There is severe global hypokinesia of the left ventricle.  Multiple (3+) apical mass-likely clots in LV  Severely decreased right ventricular systolic function  The left atrium is severely dilated.  There is moderate to mod-severe (2-3+) mitral regurgitation.  Visually moderate MR by PISA quantification it is moderately severe  There is moderately severe (3+) tricuspid regurgitation.  Right ventricular systolic pressure is elevated, consistent with moderate to  severe pulmonary  hypertension.  IVC diameter >2.1 cm collapsing <50% with sniff suggests a high RA pressure  estimated at 15 mmHg or greater.  Trivial pericardial effusion    ASSESSMENT AND PLAN  . Ubaldo Velez is a 33 year old male with a relevant past medical history including NICM who does appear well. He will increase Toprol to 50 mg daily and was asked to quit smoking after we again discussed his limited options for advanced therapies, should he deteriorate with active tobacco use. He was applauded for abstaining from alcohol and illicit drugs. Return to clinic in 2 weeks or as needed.      30 minutes spent in direct care, >50% in counseling    Please do not hesitate to contact me if you have any questions/concerns.     Sincerely,     Kristin Morales NP

## 2019-06-05 NOTE — PROGRESS NOTES
HPI  Mr. Ubaldo Velez is a 33 year old male with a relevant past medical history including ADHD and polysubstance abuse (ETOH, cocaine, alcohol, meth) who was admitted to an outside hospital with URI symptoms and 30 pound weight gain and transferred to [facility] with concern for cardiogenic shock. Echo revealed an EF of 10% with mod-severe MR and TR and multiple apical thrombus. Right and left heart caths were notable for clean coronary arteries, normal right sided filling pressures, moderately elevated left sided pressures and a marginal cardiac index. He was diuresed, transitioned to oral afterload, and discharged to home on warfarin with a therapeutic INR and hyponatremia prompting adjustment of his spironolactone dosing. He was seen in clinic 2 weeks ago when Toprol was increased. He returns for follow up.    Ubaldo has tolerated the increase in Toprol with no change. He is working full time since his last visit. No troubles with fatigue, shortness of breath, chest pain, or edema. Minimal lightheadedness. Appetite is good.     He denies any alcohol or illicit drug use but has smoked cigarettes -- started chewing nicotine gum.     PMH  Past Medical History:   Diagnosis Date     ADHD      Cocaine use      Methamphetamine use (H)        Past Surgical History:   Procedure Laterality Date     CV CORONARY ANGIOGRAM N/A 4/29/2019    Procedure: CV CORONARY ANGIOGRAM;  Surgeon: Reggie Hua MD;  Location:  HEART CARDIAC CATH LAB     CV RIGHT HEART CATH N/A 4/29/2019    Procedure: Right Heart Cath;  Surgeon: Reggie Hua MD;  Location: [facility]     EXTRACTION(S) DENTAL      [place] teeth     HAND SURGERY Left     Laceration left index finger       Family History   Problem Relation Age of Onset     Chronic Obstructive Pulmonary Disease Father      Multiple Sclerosis Maternal Aunt        Social History     Socioeconomic History     Marital status: Single     Spouse name: Not on file      Number of children: 1     Years of education: Not on file     Highest education level: Not on file   Occupational History     Occupation: [employer]   Social Needs     Financial resource strain: Not on file     Food insecurity:     Worry: Not on file     Inability: Not on file     Transportation needs:     Medical: Not on file     Non-medical: Not on file   Tobacco Use     Smoking status: Current Every Day Smoker     Packs/day: 1.00     Years: 15.00     Pack years: 15.00     Smokeless tobacco: Never Used     Tobacco comment: Quit 1.5 weeks ago   Substance and Sexual Activity     Alcohol use: Yes     Comment: 8-10 beers a day, now 1 beer a week.     Drug use: Yes     Types: Methamphetamines, Cocaine     Comment: Methamphetamine last used 8 weeks ago     Sexual activity: Not on file   Lifestyle     Physical activity:     Days per week: Not on file     Minutes per session: Not on file     Stress: Not on file   Relationships     Social connections:     Talks on phone: Not on file     Gets together: Not on file     Attends Zoroastrianism service: Not on file     Active member of club or organization: Not on file     Attends meetings of clubs or organizations: Not on file     Relationship status: Not on file     Intimate partner violence:     Fear of current or ex partner: Not on file     Emotionally abused: Not on file     Physically abused: Not on file     Forced sexual activity: Not on file   Other Topics Concern     Not on file   Social History Narrative    Single but has long standing SO who he lives with, has one child.  (last updated 4/24/2019)        ALLERGIES  No Known Allergies    MEDICATIONS   Current Outpatient Medications on File Prior to Visit:  digoxin (LANOXIN) 250 MCG tablet Take 1 tablet (250 mcg) by mouth daily   folic acid (FOLVITE) 1 MG tablet Take 1 tablet (1 mg) by mouth daily   hydrALAZINE (APRESOLINE) 25 MG tablet Take 1 tablet (25 mg) by mouth 3 times daily   lisinopril (PRINIVIL/ZESTRIL) 10 MG tablet  "Take 1 tablet (10 mg) by mouth 2 times daily   metoprolol succinate ER (TOPROL-XL) 25 MG 24 hr tablet Take 1 tablet (25 mg) by mouth daily   vitamin B1 (THIAMINE) 100 MG tablet Take 1 tablet (100 mg) by mouth daily   warfarin (COUMADIN) 5 MG tablet Take 2-3 tablets as directed by INR clinic     No current facility-administered medications on file prior to visit.     ROS:  Constitutional: No fever, chills, or sweats. No weight gain/loss.   ENT: No visual disturbance, ear ache, epistaxis, sore throat.   Allergies/Immunologic: Negative.   Respiratory: No cough, hemoptysis.   Cardiovascular: As per HPI.   GI: No nausea, vomiting, hematemesis, melena, or hematochezia.   : No urinary frequency, dysuria, or hematuria.   Integument: Negative.   Psychiatric: Negative.   Neuro: Negative.   Endocrinology: Negative.   Musculoskeletal: Negative.    EXAM  /84 (BP Location: Left arm, Patient Position: Chair, Cuff Size: Adult Regular)   Pulse 101   Ht 1.905 m (6' 3\")   Wt 100 kg (220 lb 6.4 oz)   SpO2 98%   BMI 27.55 kg/m    Home weight 211 pounds  Vitals:    06/05/19 1436   Weight: 100 kg (220 lb 6.4 oz)     General: appears comfortable, alert and articulate  Head: normocephalic, atraumatic  Eyes: anicteric sclera, EOMI  Neck: no adenopathy  Orophyarynx: moist mucosa, no lesions, dentition intact  Heart: regular, S1/S2, no murmur, gallop, rub, estimated JVP flat  Lungs: Respirations even and unlabored, lungs clear, no rales or wheezing  Abdomen: soft, non-tender, bowel sounds present, no hepatomegaly  Extremities: no clubbing, cyanosis or edema  Neurological: normal speech and affect, no gross motor deficits      LABS  Last Comprehensive Metabolic Panel:  Sodium   Date Value Ref Range Status   05/21/2019 139 133 - 144 mmol/L Final     Potassium   Date Value Ref Range Status   05/21/2019 4.1 3.4 - 5.3 mmol/L Final     Chloride   Date Value Ref Range Status   05/21/2019 106 94 - 109 mmol/L Final     Carbon Dioxide "   Date Value Ref Range Status   05/21/2019 27 20 - 32 mmol/L Final     Anion Gap   Date Value Ref Range Status   05/21/2019 6 3 - 14 mmol/L Final     Glucose   Date Value Ref Range Status   05/21/2019 67 (L) 70 - 99 mg/dL Final     Urea Nitrogen   Date Value Ref Range Status   05/21/2019 20 7 - 30 mg/dL Final     Creatinine   Date Value Ref Range Status   05/21/2019 1.07 0.66 - 1.25 mg/dL Final     GFR Estimate   Date Value Ref Range Status   05/21/2019 90 >60 mL/min/[1.73_m2] Final     Comment:     Non  GFR Calc  Starting 12/18/2018, serum creatinine based estimated GFR (eGFR) will be   calculated using the Chronic Kidney Disease Epidemiology Collaboration   (CKD-EPI) equation.       Calcium   Date Value Ref Range Status   05/21/2019 9.3 8.5 - 10.1 mg/dL Final       Lab Results   Component Value Date    NTBNPI 4,655 (H) 04/24/2019       No results found for: DIG    MOST RECENT ECHOCARDIOGRAM: 4/24/19  Interpretation Summary     CRITICAL FINDINGS-REPORT CALLED TO ER  The left ventricle is severely dilated.  There is mild concentric left ventricular hypertrophy.  The visual ejection fraction is estimated at 10%.  There is severe global hypokinesia of the left ventricle.  Multiple (3+) apical mass-likely clots in LV  Severely decreased right ventricular systolic function  The left atrium is severely dilated.  There is moderate to mod-severe (2-3+) mitral regurgitation.  Visually moderate MR by PISA quantification it is moderately severe  There is moderately severe (3+) tricuspid regurgitation.  Right ventricular systolic pressure is elevated, consistent with moderate to  severe pulmonary hypertension.  IVC diameter >2.1 cm collapsing <50% with sniff suggests a high RA pressure  estimated at 15 mmHg or greater.  Trivial pericardial effusion    ASSESSMENT AND PLAN  Mr. Ubaldo Velez is a 33 year old male with a relevant past medical history including NICM who does appear well. He will increase Toprol  to 50 mg daily and was asked to quit smoking after we again discussed his limited options for advanced therapies, should he deteriorate with active tobacco use. He was applauded for abstaining from alcohol and illicit drugs. Return to clinic in 2 weeks or as needed.      30 minutes spent in direct care, >50% in counseling

## 2019-06-06 NOTE — ADDENDUM NOTE
Encounter addended by: Susanne Granados LADC on: 6/6/2019 4:35 PM   Actions taken: Sign clinical note

## 2019-06-06 NOTE — PROGRESS NOTES
Ubaldo Velez  [id number]               Adult CD Progress Note and Treatment Plan Review     Attendance     Monday    Group Date: 6/3/2019   Group Attendance Attended group session   Group Therapy Type Addiction   Group Topic Covered Relapse Prevention and AA presentation   Client's Response To Topic Cooperative with task. Expressed readiness to alter behaviors.   Client Participation Detail Adequate participation   Attendance 2.0 Hours   Individual Attendance None   Family Attendance None   Other Comments/Information None      Tuesday     Group Date: 6/4/19     Group Attendance Attended group session   Group Therapy Type Psychoeducation   Group Topic Covered DBT radical acceptance   Client's Response To Topic Cooperative with task. Discussed personal experience with topic. Expressed readiness to alter behaviors. Expressed understanding of topic. Listened actively.   Client Participation Detail Adequate participation   Attendance 2.0 Hours   Individual Attendance None   Family Attendance None   Other Comments/Information None      Wednesday    Group Date: 6/5/19     Group Attendance Attended group session   Group Therapy Type Addiction   Group Topic Covered Cognitive Therapy Techniques and Distress Tolerance,    Client's Response To Topic Requested more information about topic.   Client Participation Detail Highly involved   Attendance 2.0 Hours   Individual Attendance None   Family Attendance None   Other Comments/Information None     Total # of Phase 1 Group Sessions: 9     Total # of Phase 2 Group Sessions: N/A  Total # of Phase 3 Group Sessions: N/A  Total # of 1:1 Sessions: 2    Support group attended this week: no    Reporting sobriety: Yes    Treatment Plan Review     Treatment Plan Review completed on:  6/3/2019     Projected discharge date: 10/3/2019    Client preferred learning style: by hands-on practice and by watching someone else demonstrate    Staff member(s) contributing:Susanne Granados MFA, MA,  [facility]     Received supervision: No.    Client involvement with treatment planning: contributed to goals and plan.    Client received copy of plan/revised plan: Yes, signed 5/29/2019    Client agrees with plan/revised plan: Yes    Changes to Treatment Plan: No    Client's Dimension 1 Goal(s) Develop effective strategies to maintain sobriety.     See below.   Client's Dimension 2 Goal(s) Disclose CD status to medical providers and follow up with medical interventions while in IOP.   Client would like to quit smoking tobacco.  Maintain good physical health. See below.   Client's Dimension 3 Goal(s) Learn how to apply self-care and psychotherapy to build a repertoire of daily habits that counteract PAWS, fatigue, depression and anxiety leading to increased resiliency and well-being See below.   Client's Dimension 4 Goal(s) Understand the impact your substance use has had on you, your family, and significant relationships.  Increase internal motivation to change. See below.   Client's Dimension 5 Goal(s) Gain education about addiction.  Identify personal triggers and relapse warning signs.  Develop sober coping and living skills in order to address the development of a strategy for long term recovery. See below.   Client's Dimension 6 Goal(s) Develop and increase sober support network.  Identify and attend sober support group meetings. D - client has not been attending support meetings  I - client introduced to members of local AA group who spoke about how they found sober support and having a sponsor helpful.  A - client appears willing to try attending  P - client will identify at least 1 meeting to attend      New Goals added since last review: Yes - treatment plan created..    Goal(s) worked on since last review: dim 1,4,5,6    Strategies effective: Yes    Care Coordination Activities: Yes - client introduced to local sober support group..    Medical, Mental Health and other appointments the client attended:  "None.    Medication issues: None.    Physical and mental health problems: Yes - client is recovering from heart failure due to meth use.    Review and evaluation of the individual abuse prevention plan: The program's individual abuse prevention plan (IAPP) is sufficient for this client.     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    Dimension 2 1 On Monday client denies any new or worsening medical conditions.      Dimension 3 1 On Monday client rates his depression, anxiety, or other mental health concerns as 1 of 10 (10 highest), due to \"a little anxiety from trying to balance home and work life\".  Client denies any thoughts of hurting himself or others.     Dimension 4 2 Client rates his motivation for recovery as 7 of 10 (10 highest).  Client expressed 3 reasons to remain sober.    Dimension 5 3 On Monday client rates his strongest craving to use in the past week as 1 of 10 (10 highest) due to \"was at a [employer] tournament and people were drinking\".     Dimension 6 1 On Monday client denies attendance at sober support meetings this week.  Client denies having a sponsor.    Data: (Need to include short narrative for the week on what was worked on)  offered feedback good insight client did actively participate  On Monday client participated with the check- in process and affirmation.  Readiness to Change, Relapse Prevention and Recovery Planning were addressed through a presentation by AA members of how sober support groups helped them change and achieve sustained recovery.  Client expressed that the information was helpful.  On Tuesday client received psychoeducation about Radical " Acceptance and associated DBT skills.  Client identifies something difficult he was learning to accept.  Client participated in self-soothing exercises.  Client practiced 4x4 and natural mindful breathing.  Client expresses these exercises are helpful.  Client expressed something he was grateful for.      Intervention:   Behavioral Therapy, Cognitive Behavioral Therapy, Counselor Feedback, Psychoeducation, Emotional management, Group Feedback, Motivational Interviewing, Relapse Prevention, Twelve Step Facilitation, REBT, DBT.    Assessment:    Stages of Change Model  Contemplation    Appears/Sounds:  Cooperative  Engaged    Plan:   -Attend 2-3 sober support group meetings each week (this includes non-12-step/AA alternative meetings).  -Client will continue to work on treatment plan objectives.  -Client will follow all recommendations of counselor and any other medical provider which includes taking all medications as prescribed.    -Client will attend all weekly Phase 1 group sessions.   -Client to engage in sober activities each week.    Susanne Granados MFA, MA, [facility]

## 2019-06-09 ENCOUNTER — MYC REFILL (OUTPATIENT)
Dept: CARDIOLOGY | Facility: CLINIC | Age: 34
End: 2019-06-09

## 2019-06-09 DIAGNOSIS — I50.21 ACUTE SYSTOLIC CONGESTIVE HEART FAILURE (H): ICD-10-CM

## 2019-06-09 DIAGNOSIS — I50.23 ACUTE ON CHRONIC HFREF (HEART FAILURE WITH REDUCED EJECTION FRACTION) (H): ICD-10-CM

## 2019-06-10 ENCOUNTER — HOSPITAL ENCOUNTER (OUTPATIENT)
Dept: CARDIAC REHAB | Facility: CLINIC | Age: 34
End: 2019-06-10
Attending: INTERNAL MEDICINE
Payer: COMMERCIAL

## 2019-06-10 PROCEDURE — 40000116 ZZH STATISTIC OP CR VISIT: Performed by: REHABILITATION PRACTITIONER

## 2019-06-10 PROCEDURE — 93798 PHYS/QHP OP CAR RHAB W/ECG: CPT | Performed by: REHABILITATION PRACTITIONER

## 2019-06-10 RX ORDER — HYDRALAZINE HYDROCHLORIDE 25 MG/1
25 TABLET, FILM COATED ORAL 3 TIMES DAILY
Qty: 90 TABLET | Refills: 0 | Status: SHIPPED | OUTPATIENT
Start: 2019-06-10 | End: 2019-06-29

## 2019-06-10 RX ORDER — DIGOXIN 250 MCG
250 TABLET ORAL DAILY
Qty: 30 TABLET | Refills: 0 | Status: SHIPPED | OUTPATIENT
Start: 2019-06-10 | End: 2019-06-29

## 2019-06-10 RX ORDER — LISINOPRIL 10 MG/1
10 TABLET ORAL 2 TIMES DAILY
Qty: 60 TABLET | Refills: 0 | Status: SHIPPED | OUTPATIENT
Start: 2019-06-10 | End: 2019-06-29

## 2019-06-10 RX ORDER — FOLIC ACID 1 MG/1
1 TABLET ORAL DAILY
Qty: 30 TABLET | Refills: 0 | Status: SHIPPED | OUTPATIENT
Start: 2019-06-10 | End: 2019-06-29

## 2019-06-11 ENCOUNTER — HOSPITAL ENCOUNTER (OUTPATIENT)
Dept: CARDIAC REHAB | Facility: CLINIC | Age: 34
End: 2019-06-11
Attending: INTERNAL MEDICINE
Payer: COMMERCIAL

## 2019-06-11 ENCOUNTER — HOSPITAL ENCOUNTER (OUTPATIENT)
Dept: BEHAVIORAL HEALTH | Facility: CLINIC | Age: 34
End: 2019-06-11
Attending: SOCIAL WORKER
Payer: COMMERCIAL

## 2019-06-11 PROCEDURE — 93798 PHYS/QHP OP CAR RHAB W/ECG: CPT | Performed by: REHABILITATION PRACTITIONER

## 2019-06-11 PROCEDURE — H2035 A/D TX PROGRAM, PER HOUR: HCPCS | Mod: HQ

## 2019-06-11 PROCEDURE — 40000116 ZZH STATISTIC OP CR VISIT: Performed by: REHABILITATION PRACTITIONER

## 2019-06-12 ENCOUNTER — HOSPITAL ENCOUNTER (OUTPATIENT)
Dept: BEHAVIORAL HEALTH | Facility: CLINIC | Age: 34
End: 2019-06-12
Attending: SOCIAL WORKER
Payer: COMMERCIAL

## 2019-06-12 DIAGNOSIS — I50.22 CHRONIC SYSTOLIC HEART FAILURE (H): Primary | ICD-10-CM

## 2019-06-12 PROCEDURE — H2035 A/D TX PROGRAM, PER HOUR: HCPCS | Mod: HQ

## 2019-06-13 ENCOUNTER — HOSPITAL ENCOUNTER (OUTPATIENT)
Dept: CARDIAC REHAB | Facility: CLINIC | Age: 34
End: 2019-06-13
Attending: INTERNAL MEDICINE
Payer: COMMERCIAL

## 2019-06-13 ENCOUNTER — HOSPITAL ENCOUNTER (OUTPATIENT)
Dept: BEHAVIORAL HEALTH | Facility: CLINIC | Age: 34
End: 2019-06-13
Attending: SOCIAL WORKER
Payer: COMMERCIAL

## 2019-06-13 PROCEDURE — 40000575 ZZH STATISTIC OP CARDIAC VISIT #2: Performed by: REHABILITATION PRACTITIONER

## 2019-06-13 PROCEDURE — 40000116 ZZH STATISTIC OP CR VISIT

## 2019-06-13 PROCEDURE — 93798 PHYS/QHP OP CAR RHAB W/ECG: CPT

## 2019-06-13 PROCEDURE — 93797 PHYS/QHP OP CAR RHAB WO ECG: CPT | Performed by: REHABILITATION PRACTITIONER

## 2019-06-13 PROCEDURE — H2035 A/D TX PROGRAM, PER HOUR: HCPCS | Mod: HQ

## 2019-06-14 NOTE — PROGRESS NOTES
"Ubaldo Velez  June 13, 2019  [id number]    [facility] Mental Health Process Group Note       Thursday    Group Date: 6/13/19   Group Attendance Attended group session   Group Therapy Type Addiction, Psychoeducation and Other - MH Skills   Group Topic Covered Mindfulness and Stress Management   Client's Response To Group Topic Other - See below for details .   Client Group Participation Detail Highly involved   Group Attendance (Time) 3.0 Hours   Individual Attendance None   Family Attendance None   Other Comments/Information None     Day and Date: Thursday, 6/13/19     Number of participants: 3      Length of Group: 3 hours       Goal of the Group: Learn the importance of Stress Management and techniques to reduce PAWS and stress-related symptoms, increase resiliency, and learn healthy routines to replace dysfunctional habits       [place]: Group Topics and Activities      I.  D3 Intervention and Group Topic addressed: Stress management, productivity, balance and procrastination     II. Mindfulness and/or Motivational Component: Group completed progressive relaxation and deep breathing techniques and identified skills to work on this week. Group also did the mindfulness exercise Awareness of Sounds       III. Additional Activity: Clients identified personal stressors and if/how addiction intensified their stress. They completed the [place] exercise that involves the mindfulness work.  The group also learned tips to overcome procrastination and the mental health benefits of  de-cluttering  their environment.        IV. Review of Progress:   A. Client s self-report: Ubaldo reported that he spent quality time with his son last weekend camping and he thoroughly enjoyed this. He said his son is learning social skills at 5 yo and it's hard for him to step back and let him \"figure out how to act.\" His is not doing his journal consistently but showed me some of his completed entries. His does mindfulness in the shower each morning. " "His stress is at 0 and his reported no cravings. His stress warning sign is tenseness in his stomach.               Stress Screening:  Low range     B. Therapist s assessment:    Ubaldo participates fully in the group process and needs to redirected at times to stay on topic.               What area address this week:  Relaxation               Step this week: Progressive Relaxation      V.   Reflection and evaluation of today s group experience:  Gave verbal feedback and filled out evaluation form. Client wrote the most important thing learned today was \"That stress comes in a lot of subtle forms\" and \"I experience a lot more stress than I realized.\"      VI. Assignments or activities to do for next week: Continue to do journal before bed, do at least one mindfulness exercise in the day, address one self-care area a week, incorporate one stress reduction technique into daily schedule of structure and routine for this week.      Therapeutic techniques in this session:  Motivational Therapy, CBT/DBT/ACT, Supportive, Behavioral Modification and Mindfulness      Additional Treatment Referrals/Follow-up Issues to Address: Not needed at this time.      Change in Treatment Plan: No change, this group completes one intervention in Treatment Plan under Dimension 3.        Change in Diagnosis: No change in diagnosis indicated.   "

## 2019-06-17 ENCOUNTER — OFFICE VISIT (OUTPATIENT)
Dept: CARDIOLOGY | Facility: CLINIC | Age: 34
End: 2019-06-17
Attending: NURSE PRACTITIONER
Payer: COMMERCIAL

## 2019-06-17 ENCOUNTER — HOSPITAL ENCOUNTER (OUTPATIENT)
Dept: BEHAVIORAL HEALTH | Facility: CLINIC | Age: 34
End: 2019-06-17
Attending: SOCIAL WORKER
Payer: COMMERCIAL

## 2019-06-17 ENCOUNTER — HOSPITAL ENCOUNTER (OUTPATIENT)
Dept: CARDIAC REHAB | Facility: CLINIC | Age: 34
End: 2019-06-17
Attending: INTERNAL MEDICINE
Payer: COMMERCIAL

## 2019-06-17 VITALS
HEIGHT: 75 IN | DIASTOLIC BLOOD PRESSURE: 68 MMHG | OXYGEN SATURATION: 97 % | HEART RATE: 97 BPM | WEIGHT: 217.8 LBS | SYSTOLIC BLOOD PRESSURE: 108 MMHG | BODY MASS INDEX: 27.08 KG/M2

## 2019-06-17 DIAGNOSIS — F90.9 ATTENTION DEFICIT HYPERACTIVITY DISORDER (ADHD), UNSPECIFIED ADHD TYPE: ICD-10-CM

## 2019-06-17 DIAGNOSIS — I50.22 CHRONIC SYSTOLIC HEART FAILURE (H): ICD-10-CM

## 2019-06-17 DIAGNOSIS — I50.22 CHRONIC SYSTOLIC HEART FAILURE (H): Primary | ICD-10-CM

## 2019-06-17 LAB
ANION GAP SERPL CALCULATED.3IONS-SCNC: 6 MMOL/L (ref 3–14)
BUN SERPL-MCNC: 19 MG/DL (ref 7–30)
CALCIUM SERPL-MCNC: 8.6 MG/DL (ref 8.5–10.1)
CHLORIDE SERPL-SCNC: 110 MMOL/L (ref 94–109)
CO2 SERPL-SCNC: 23 MMOL/L (ref 20–32)
CREAT SERPL-MCNC: 1.07 MG/DL (ref 0.66–1.25)
DIGOXIN SERPL-MCNC: 0.7 UG/L (ref 0.5–2)
GFR SERPL CREATININE-BSD FRML MDRD: 90 ML/MIN/{1.73_M2}
GLUCOSE SERPL-MCNC: 107 MG/DL (ref 70–99)
POTASSIUM SERPL-SCNC: 4.4 MMOL/L (ref 3.4–5.3)
SODIUM SERPL-SCNC: 139 MMOL/L (ref 133–144)

## 2019-06-17 PROCEDURE — 36415 COLL VENOUS BLD VENIPUNCTURE: CPT | Performed by: NURSE PRACTITIONER

## 2019-06-17 PROCEDURE — G0463 HOSPITAL OUTPT CLINIC VISIT: HCPCS | Mod: ZF

## 2019-06-17 PROCEDURE — 80048 BASIC METABOLIC PNL TOTAL CA: CPT | Performed by: NURSE PRACTITIONER

## 2019-06-17 PROCEDURE — 40000116 ZZH STATISTIC OP CR VISIT

## 2019-06-17 PROCEDURE — 80162 ASSAY OF DIGOXIN TOTAL: CPT | Performed by: NURSE PRACTITIONER

## 2019-06-17 PROCEDURE — 93798 PHYS/QHP OP CAR RHAB W/ECG: CPT

## 2019-06-17 PROCEDURE — H2035 A/D TX PROGRAM, PER HOUR: HCPCS | Mod: HQ

## 2019-06-17 PROCEDURE — 99213 OFFICE O/P EST LOW 20 MIN: CPT | Mod: ZP | Performed by: NURSE PRACTITIONER

## 2019-06-17 RX ORDER — METOPROLOL SUCCINATE 25 MG/1
75 TABLET, EXTENDED RELEASE ORAL DAILY
Qty: 90 TABLET | Refills: 3 | Status: SHIPPED | OUTPATIENT
Start: 2019-06-17 | End: 2019-07-02

## 2019-06-17 ASSESSMENT — MIFFLIN-ST. JEOR: SCORE: 2013.56

## 2019-06-17 ASSESSMENT — PAIN SCALES - GENERAL: PAINLEVEL: NO PAIN (0)

## 2019-06-17 NOTE — PATIENT INSTRUCTIONS
Take your medicines every day, as directed    Changes made today:  o 75 mg of Toprol daily   Monitor Your Weight and Symptoms    Contact us if you:      Gain 2 pounds in one day or 5 pounds in one week    Feel more short of breath    Notice more leg swelling    Feel lightheadeded   Change your lifestyle    Limit Salt or Sodium:    2000 mg  Limit Fluids:    2000 mL or approximately 64 ounces  Eat a Heart Healthy Diet    Low in saturated fats  Stay Active:    Aim to move at least 150 minutes every  week         To Contact us    During Business Hours:  [phone], option # 1 (University)  Then option # 4 (medical questions)     After hours, weekends or holidays:   [phone], Option #4  Ask to speak to the On-Call Cardiologist. Inform them you are a CORE/heart failure patient at the [facility].     Use [employer] allows you to communicate directly with your heart team through secure messaging.    [facility] [employer] can be accessed any time on your phone, computer, or tablet.    If you need assistance, we'd be happy to help!         Keep your Heart Appointments:    Follow up in 2 weeks with [facility]

## 2019-06-17 NOTE — LETTER
6/17/2019      RE: Ubaldo Velez  [address]  [facility] [address]       Dear Colleague,    Thank you for the opportunity to participate in the care of your patient, Ubaldo Velez, at the [facility] HEART [facility]. Please see a copy of my visit note below.    HPI  Mr. Ubaldo Velez is a 33 year old male with a relevant past medical history including ADHD and polysubstance abuse (ETOH, cocaine, alcohol, meth) who was admitted to an outside hospital with URI symptoms and 30 pound weight gain and transferred to [facility] with concern for cardiogenic shock. Echo revealed an EF of 10% with mod-severe MR and TR and multiple apical thrombus. Right and left heart caths were notable for clean coronary arteries, normal right sided filling pressures, moderately elevated left sided pressures and a marginal cardiac index. He was diuresed, transitioned to oral afterload, and discharged to home on warfarin with a therapeutic INR and hyponatremia prompting adjustment of his spironolactone dosing. He was seen in clinic 2 weeks ago when Toprol was increased. He returns for follow up.    Ubaldo has tolerated the increase in Toprol.   He is working full time since his last visit. No troubles with fatigue, shortness of breath, chest pain, or edema. No lightheadedness. Appetite is good. He is very motivated to feel better. He has been following the dietary recommendations to the best of his ability.       PMH  Past Medical History:   Diagnosis Date     ADHD      Cocaine use      Methamphetamine use (H)        Past Surgical History:   Procedure Laterality Date     CV CORONARY ANGIOGRAM N/A 4/29/2019    Procedure: CV CORONARY ANGIOGRAM;  Surgeon: Reggie Hua MD;  Location: [facility] CARDIAC CATH LAB     CV RIGHT HEART CATH N/A 4/29/2019    Procedure: Right Heart Cath;  Surgeon: Reggie Hua MD;  Location:  HEART CARDIAC CATH LAB     EXTRACTION(S) DENTAL       [place] teeth     HAND SURGERY Left     Laceration left index finger       Family History   Problem Relation Age of Onset     Chronic Obstructive Pulmonary Disease Father      Multiple Sclerosis Maternal Aunt        Social History     Socioeconomic History     Marital status: Single     Spouse name: Not on file     Number of children: 1     Years of education: Not on file     Highest education level: Not on file   Occupational History     Occupation: [employer]   Social Needs     Financial resource strain: Not on file     Food insecurity:     Worry: Not on file     Inability: Not on file     Transportation needs:     Medical: Not on file     Non-medical: Not on file   Tobacco Use     Smoking status: Current Every Day Smoker     Packs/day: 1.00     Years: 15.00     Pack years: 15.00     Smokeless tobacco: Never Used     Tobacco comment: Quit 1.5 weeks ago   Substance and Sexual Activity     Alcohol use: Yes     Comment: 8-10 beers a day, now 1 beer a week.     Drug use: Yes     Types: Methamphetamines, Cocaine     Comment: Methamphetamine last used 8 weeks ago     Sexual activity: Not on file   Lifestyle     Physical activity:     Days per week: Not on file     Minutes per session: Not on file     Stress: Not on file   Relationships     Social connections:     Talks on phone: Not on file     Gets together: Not on file     Attends Congregational service: Not on file     Active member of club or organization: Not on file     Attends meetings of clubs or organizations: Not on file     Relationship status: Not on file     Intimate partner violence:     Fear of current or ex partner: Not on file     Emotionally abused: Not on file     Physically abused: Not on file     Forced sexual activity: Not on file   Other Topics Concern     Not on file   Social History Narrative    Single but has long standing SO who he lives with, has one child.  (last updated 4/24/2019)        ALLERGIES  No Known Allergies    MEDICATIONS   Current  "Outpatient Medications on File Prior to Visit:  digoxin (LANOXIN) 250 MCG tablet Take 1 tablet (250 mcg) by mouth daily   folic acid (FOLVITE) 1 MG tablet Take 1 tablet (1 mg) by mouth daily   hydrALAZINE (APRESOLINE) 25 MG tablet Take 1 tablet (25 mg) by mouth 3 times daily   lisinopril (PRINIVIL/ZESTRIL) 10 MG tablet Take 1 tablet (10 mg) by mouth 2 times daily   vitamin B1 (THIAMINE) 100 MG tablet Take 1 tablet (100 mg) by mouth daily   warfarin (COUMADIN) 5 MG tablet Take 2-3 tablets as directed by INR clinic     No current facility-administered medications on file prior to visit.     ROS:  Constitutional: No fever, chills, or sweats. No weight gain/loss.   ENT: No visual disturbance, ear ache, epistaxis, sore throat.   Allergies/Immunologic: Negative.   Respiratory: No cough, hemoptysis.   Cardiovascular: As per HPI.   GI: No nausea, vomiting, hematemesis, melena, or hematochezia.   : No urinary frequency, dysuria, or hematuria.   Integument: Negative.   Psychiatric: Negative.   Neuro: Negative.   Endocrinology: Negative.   Musculoskeletal: Negative.    EXAM  /68 (BP Location: Left arm, Patient Position: Chair, Cuff Size: Adult Large)   Pulse 97   Ht 1.905 m (6' 3\")   Wt 98.8 kg (217 lb 12.8 oz)   SpO2 97%   BMI 27.22 kg/m     Home weight 211 pounds  Vitals:    06/17/19 1448   Weight: 98.8 kg (217 lb 12.8 oz)     General: appears comfortable, alert and articulate  Head: normocephalic, atraumatic  Eyes: anicteric sclera, EOMI  Neck: no adenopathy  Orophyarynx: moist mucosa, no lesions, dentition intact  Heart: regular, S1/S2, no murmur, gallop, rub, estimated JVP flat  Lungs: Respirations even and unlabored, lungs clear, no rales or wheezing  Abdomen: soft, non-tender, bowel sounds present, no hepatomegaly  Extremities: no clubbing, cyanosis or edema  Neurological: normal speech and affect, no gross motor deficits      LABS  Last Comprehensive Metabolic Panel:  Sodium   Date Value Ref Range Status "   06/17/2019 139 133 - 144 mmol/L Final     Potassium   Date Value Ref Range Status   06/17/2019 4.4 3.4 - 5.3 mmol/L Final     Chloride   Date Value Ref Range Status   06/17/2019 110 (H) 94 - 109 mmol/L Final     Carbon Dioxide   Date Value Ref Range Status   06/17/2019 23 20 - 32 mmol/L Final     Anion Gap   Date Value Ref Range Status   06/17/2019 6 3 - 14 mmol/L Final     Glucose   Date Value Ref Range Status   06/17/2019 107 (H) 70 - 99 mg/dL Final     Urea Nitrogen   Date Value Ref Range Status   06/17/2019 19 7 - 30 mg/dL Final     Creatinine   Date Value Ref Range Status   06/17/2019 1.07 0.66 - 1.25 mg/dL Final     GFR Estimate   Date Value Ref Range Status   06/17/2019 90 >60 mL/min/[1.73_m2] Final     Comment:     Non  GFR Calc  Starting 12/18/2018, serum creatinine based estimated GFR (eGFR) will be   calculated using the Chronic Kidney Disease Epidemiology Collaboration   (CKD-EPI) equation.       Calcium   Date Value Ref Range Status   06/17/2019 8.6 8.5 - 10.1 mg/dL Final       Lab Results   Component Value Date    NTBNPI 4,655 (H) 04/24/2019       No results found for: DIG    MOST RECENT ECHOCARDIOGRAM: 4/24/19  Interpretation Summary     CRITICAL FINDINGS-REPORT CALLED TO ER  The left ventricle is severely dilated.  There is mild concentric left ventricular hypertrophy.  The visual ejection fraction is estimated at 10%.  There is severe global hypokinesia of the left ventricle.  Multiple (3+) apical mass-likely clots in LV  Severely decreased right ventricular systolic function  The left atrium is severely dilated.  There is moderate to mod-severe (2-3+) mitral regurgitation.  Visually moderate MR by PISA quantification it is moderately severe  There is moderately severe (3+) tricuspid regurgitation.  Right ventricular systolic pressure is elevated, consistent with moderate to  severe pulmonary hypertension.  IVC diameter >2.1 cm collapsing <50% with sniff suggests a high RA  pressure  estimated at 15 mmHg or greater.  Trivial pericardial effusion    ASSESSMENT AND PLAN  Mr. Ubaldo Velez is a 33 year old male with a relevant past medical history including recently diagnosed NICM secondary to substance abuse (methamphetamine, tobacco, alcohol) who is doing well functionally. He has verbalized the importance of staying free from any type of substance abuse.  He says he wants to remain healthy to be able to spend time with his young son.  The plan for today is to increase his metoprolol to 75 mg daily and to return to [facility] clinic in 2 weeks.    1.1. Chronic systolic heart failure/HFrEF (EF 10%) secondary to nonischemic cardiomyopathy  NYHA Symptom Class I  Stage D  Primary Cardiologist: Steph; Last seen during admission in May  ACE-I/ARB/ARNi: Titration ongoing  BB titration ongoing  Aldosterone antagonist deferred while other medical therapy is prioritized  SCD prophylaxis Decision deferred during medication uptitration  %BiV pacing: N/A  Fluid status Euvolemic  Sleep Apnea Evaluation: Not indicated  Remote PA Pressure Monitoring (CardioMems) Deferred while medical therapy is optimized     2. LV Thrombus. Anticoagulated with warfarin. INR therapeutic     3. Polysubstance use. Abstinent and enrolled in [facility]      4. ADHD. Encouraged to talk with his PCP and/or Chem Dep team    5.  We will have the patient follow-up in [facility] clinic in 2 weeks         Please do not hesitate to contact me if you have any questions/concerns.     Sincerely,     SCOTTY Covarrubias CNP

## 2019-06-17 NOTE — NURSING NOTE
Vitals completed successfully and medication reconciled.     Mikaela Mejia CMA  2:52 PM  Chief Complaint   Patient presents with     Follow Up     Return [facility], 32 yo male, HFrEF, EF 10%, labs prior.

## 2019-06-18 ENCOUNTER — HOSPITAL ENCOUNTER (OUTPATIENT)
Dept: BEHAVIORAL HEALTH | Facility: CLINIC | Age: 34
End: 2019-06-18
Attending: SOCIAL WORKER
Payer: COMMERCIAL

## 2019-06-18 ENCOUNTER — HOSPITAL ENCOUNTER (OUTPATIENT)
Dept: CARDIAC REHAB | Facility: CLINIC | Age: 34
End: 2019-06-18
Attending: INTERNAL MEDICINE
Payer: COMMERCIAL

## 2019-06-18 PROCEDURE — 93798 PHYS/QHP OP CAR RHAB W/ECG: CPT | Performed by: OCCUPATIONAL THERAPIST

## 2019-06-18 PROCEDURE — H2035 A/D TX PROGRAM, PER HOUR: HCPCS | Mod: HQ

## 2019-06-18 PROCEDURE — 40000116 ZZH STATISTIC OP CR VISIT: Performed by: OCCUPATIONAL THERAPIST

## 2019-06-18 NOTE — PROGRESS NOTES
Ubaldo Velez  [id number]               Adult CD Progress Note and Treatment Plan Review     Attendance     Monday    Group Date: 6/17/2019   Group Attendance Attended group session   Group Therapy Type Addiction   Group Topic Covered Risks of relapse; disease of addiction   Client's Response To Topic Cooperative with task, shared personal experience, expressed desire for recovery   Client Participation Detail Highly involved and Shows interest   Attendance 2.0 Hours   Individual Attendance None   Family Attendance None   Other Comments/Information None      Tuesday     Group Date: 6/18/19     Group Attendance Attended group session   Group Therapy Type Psychoeducation   Group Topic Covered Meditation/Breathing Exercises, Relapse Prevention and Stages of Change   Client's Response To Topic Cooperative with task. Listened actively.   Client Participation Detail Highly involved   Attendance 2.0 Hours   Individual Attendance None   Family Attendance None   Other Comments/Information None      Wednesday    Group Date: 6/19/2019     Group Attendance Attended group session   Group Therapy Type Addiction   Group Topic Covered Meditation/Breathing Exercises, Relapse Prevention and Reviewed Previous Skills Group Topic (Stages of Change)   Client's Response To Group Topic Cooperative with task. Expressed readiness to alter behaviors. Listened actively.   Client Group Participation Detail Highly involved   Group Attendance (Time) 2.0 Hours   Individual Attendance None   Family Attendance None   Other Comments/Information None       Total # of Phase 1 Group Sessions: 14     Total # of Phase 2 Group Sessions: N/A  Total # of Phase 3 Group Sessions: N/A  Total # of 1:1 Sessions: 2    Support group attended this week: no    Reporting sobriety: Yes    Treatment Plan Review     Treatment Plan Review completed on:  6/21/2019     Projected discharge date: 10/3/2019    Client preferred learning style: by hands-on practice and by  watching someone else demonstrate    Staff member(s) contributing:  Susanne Granados MFA, MA, [facility]; PRIMITIVO Early, [facility]    Received supervision: None    Client involvement with treatment planning: contributed to goals and plan.    Client received copy of plan/revised plan: Yes, signed 5/29/2019    Client agrees with plan/revised plan: Yes    Changes to Treatment Plan: No    Client's Dimension 1 Goal(s) Develop effective strategies to maintain sobriety.     See below.   Client's Dimension 2 Goal(s) Disclose CD status to medical providers and follow up with medical interventions while in IOP.   Client would like to quit smoking tobacco.  Maintain good physical health. See below.   Client's Dimension 3 Goal(s) Learn how to apply self-care and psychotherapy to build a repertoire of daily habits that counteract PAWS, fatigue, depression and anxiety leading to increased resiliency and well-being See below.   Client's Dimension 4 Goal(s) Understand the impact your substance use has had on you, your family, and significant relationships.  Increase internal motivation to change. See below.   Client's Dimension 5 Goal(s) Gain education about addiction.  Identify personal triggers and relapse warning signs.  Develop sober coping and living skills in order to address the development of a strategy for long term recovery. Client presented assignment.   Client's Dimension 6 Goal(s) Develop and increase sober support network.  Identify and attend sober support group meetings. See below.     New Goals added since last review: None.    Goal(s) worked on since last review: dim 5    Strategies effective: Yes    Care Coordination Activities: recommended attendance at sober support group..    Medical, Mental Health and other appointments the client attended: Continued medical care for recovery for heart failure due to meth use.     Medication issues: None.    Physical and mental health problems: Yes - client is recovering from heart  "failure due to meth use.    Review and evaluation of the individual abuse prevention plan: The program's individual abuse prevention plan (IAPP) is sufficient for this client.     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    Dimension 2 1 On Monday client denies any new or worsening medical conditions, but continues medical care for recovery from heart failure due to use of meth. Client notes need to stop smoking to meet criteria for transplant, if needed.     Dimension 3 1 On Monday client rates his depression, anxiety, or other mental health concerns as 0 of 10 (10 highest), due to \"no stress\".  Client denies any thoughts of hurting himself or others.     Dimension 4 2 Client rates his motivation for recovery as 8-10 of 10 (10 highest).  Client expressed 3 reasons to remain sober.    Dimension 5 3 On Monday, client rates his strongest craving to use in the past week as 0 of 10 (10 highest) due to \"good family activities\".  When asked if he had tried any new coping skill client expressed he didn't need to because the ones he used already were adequate.  Client challenged on this, and eventually agreed he should try some new things.    Dimension 6 1 On Monday, client denied attendance at sober support meetings this week.  Client denies having a sponsor.    Data: (Need to include short narrative for the week on what was worked on)  On Monday, client participated with the check- in process, including reflections about past weekend, including experiences with celebration of Father's Day with families and friends. Clients discussed concerns about some family members who continue to " "demonstrate very vigilant monitoring of client in recovery, and struggle to identify and define healthy boundaries.  Readiness to Change and Relapse Prevention were addressed through client s presentation of his  10 Worst [place]sequences  assignment with discussion.  Ubaldo shared his personal history of substance use, and was open to feedback from group peers.  Group peers also presented their  Triggers and Cues  assignments with discussion.  Client participated, provided supportive feedback, and spoke about how he related to internal and external triggers and cues described in peers  assignments.  On Tuesday, group session focused on psychoeducation topic \"Stages of Change\" and \"Ambivalence to Change.\" Group members were provided information on stages of change, including correlations with relapse. Clients were encouraged to identify current stage of change and share with group. Clients also reviewed complexity of ambivalence to change and each identified feelings of ambivalence about one issue, and to complete a worksheet to share with group at next session.  On Wednesday client participated in a review of stages of change and ambivalence.  Client expressed understanding of the difference between transformational change and mere compliance.   Group peer presented his  10 Worst Consequences  assignment with discussion.  Client participated, provided supportive feedback, and spoke about how he related to behaviors, thoughts, and feelings described in peer s assignment.  Client participated in self-soothing exercises.  Client practiced 4x4 and natural mindful breathing and participated in a written meditation.  Client expresses these exercises are helpful.  Client expressed something he was grateful for.      Intervention:   Behavioral Therapy, Cognitive Behavioral Therapy, Counselor Feedback, Psychoeducation, Emotional management, Group Feedback, Motivational Interviewing, Relapse Prevention, Twelve Step Facilitation, " REBT, DBT.    Assessment:    Stages of Change Model  Contemplation    Appears/Sounds:  Cooperative  Engaged    Plan:   -Attend 2-3 sober support group meetings each week (this includes non-12-step/AA alternative meetings).  -Client will continue to work on treatment plan objectives.  -Client will follow all recommendations of counselor and any other medical provider which includes taking all medications as prescribed.    -Client will attend all weekly Phase 1 group sessions.   -Client to engage in sober activities each week.  -Client will try one new coping skill/activity before next session.    Susanne Granados MFA, MA, LADC; PRIMITIVO Early, LADC

## 2019-06-19 ENCOUNTER — HOSPITAL ENCOUNTER (OUTPATIENT)
Dept: BEHAVIORAL HEALTH | Facility: CLINIC | Age: 34
End: 2019-06-19
Attending: SOCIAL WORKER
Payer: COMMERCIAL

## 2019-06-19 DIAGNOSIS — I50.22 CHRONIC SYSTOLIC HEART FAILURE (H): Primary | ICD-10-CM

## 2019-06-19 PROCEDURE — H2035 A/D TX PROGRAM, PER HOUR: HCPCS | Mod: HQ

## 2019-06-19 NOTE — ADDENDUM NOTE
Encounter addended by: Susanne Granados LADC on: 6/19/2019 3:26 PM   Actions taken: Pend clinical note

## 2019-06-20 NOTE — ADDENDUM NOTE
Encounter addended by: Lei Kraus, [facility] on: 6/19/2019 8:22 PM   Actions taken: Pend clinical note

## 2019-06-21 NOTE — ADDENDUM NOTE
Encounter addended by: Lei Kraus Formerly named [facility] on: 6/21/2019 10:54 AM   Actions taken: Sign clinical note

## 2019-06-24 NOTE — PROGRESS NOTES
HPI  Mr. Ubaldo Velez is a 33 year old male with a relevant past medical history including ADHD and polysubstance abuse (ETOH, cocaine, alcohol, meth) who was admitted to an outside hospital with URI symptoms and 30 pound weight gain and transferred to [facility] with concern for cardiogenic shock. Echo revealed an EF of 10% with mod-severe MR and TR and multiple apical thrombus. Right and left heart caths were notable for clean coronary arteries, normal right sided filling pressures, moderately elevated left sided pressures and a marginal cardiac index. He was diuresed, transitioned to oral afterload, and discharged to home on warfarin with a therapeutic INR and hyponatremia prompting adjustment of his spironolactone dosing. He was seen in clinic 2 weeks ago when Toprol was increased. He returns for follow up.    Ubaldo has tolerated the increase in Toprol.   He is working full time since his last visit. No troubles with fatigue, shortness of breath, chest pain, or edema. No lightheadedness. Appetite is good. He is very motivated to feel better. He has been following the dietary recommendations to the best of his ability.       PMH  Past Medical History:   Diagnosis Date     ADHD      Cocaine use      Methamphetamine use (H)        Past Surgical History:   Procedure Laterality Date     CV CORONARY ANGIOGRAM N/A 4/29/2019    Procedure: CV CORONARY ANGIOGRAM;  Surgeon: Reggie Hua MD;  Location:  [facility]     CV RIGHT HEART CATH N/A 4/29/2019    Procedure: Right Heart Cath;  Surgeon: Reggie Hua MD;  Location: [facility]     EXTRACTION(S) DENTAL      [place] teeth     HAND SURGERY Left     Laceration left index finger       Family History   Problem Relation Age of Onset     Chronic Obstructive Pulmonary Disease Father      Multiple Sclerosis Maternal Aunt        Social History     Socioeconomic History     Marital status: Single     Spouse name: Not on file     Number of  children: 1     Years of education: Not on file     Highest education level: Not on file   Occupational History     Occupation: [employer]   Social Needs     Financial resource strain: Not on file     Food insecurity:     Worry: Not on file     Inability: Not on file     Transportation needs:     Medical: Not on file     Non-medical: Not on file   Tobacco Use     Smoking status: Current Every Day Smoker     Packs/day: 1.00     Years: 15.00     Pack years: 15.00     Smokeless tobacco: Never Used     Tobacco comment: Quit 1.5 weeks ago   Substance and Sexual Activity     Alcohol use: Yes     Comment: 8-10 beers a day, now 1 beer a week.     Drug use: Yes     Types: Methamphetamines, Cocaine     Comment: Methamphetamine last used 8 weeks ago     Sexual activity: Not on file   Lifestyle     Physical activity:     Days per week: Not on file     Minutes per session: Not on file     Stress: Not on file   Relationships     Social connections:     Talks on phone: Not on file     Gets together: Not on file     Attends Yazidi service: Not on file     Active member of club or organization: Not on file     Attends meetings of clubs or organizations: Not on file     Relationship status: Not on file     Intimate partner violence:     Fear of current or ex partner: Not on file     Emotionally abused: Not on file     Physically abused: Not on file     Forced sexual activity: Not on file   Other Topics Concern     Not on file   Social History Narrative    Single but has long standing SO who he lives with, has one child.  (last updated 4/24/2019)        ALLERGIES  No Known Allergies    MEDICATIONS   Current Outpatient Medications on File Prior to Visit:  digoxin (LANOXIN) 250 MCG tablet Take 1 tablet (250 mcg) by mouth daily   folic acid (FOLVITE) 1 MG tablet Take 1 tablet (1 mg) by mouth daily   hydrALAZINE (APRESOLINE) 25 MG tablet Take 1 tablet (25 mg) by mouth 3 times daily   lisinopril (PRINIVIL/ZESTRIL) 10 MG tablet Take 1  "tablet (10 mg) by mouth 2 times daily   vitamin B1 (THIAMINE) 100 MG tablet Take 1 tablet (100 mg) by mouth daily   warfarin (COUMADIN) 5 MG tablet Take 2-3 tablets as directed by INR clinic     No current facility-administered medications on file prior to visit.     ROS:  Constitutional: No fever, chills, or sweats. No weight gain/loss.   ENT: No visual disturbance, ear ache, epistaxis, sore throat.   Allergies/Immunologic: Negative.   Respiratory: No cough, hemoptysis.   Cardiovascular: As per HPI.   GI: No nausea, vomiting, hematemesis, melena, or hematochezia.   : No urinary frequency, dysuria, or hematuria.   Integument: Negative.   Psychiatric: Negative.   Neuro: Negative.   Endocrinology: Negative.   Musculoskeletal: Negative.    EXAM  /68 (BP Location: Left arm, Patient Position: Chair, Cuff Size: Adult Large)   Pulse 97   Ht 1.905 m (6' 3\")   Wt 98.8 kg (217 lb 12.8 oz)   SpO2 97%   BMI 27.22 kg/m    Home weight 211 pounds  Vitals:    06/17/19 1448   Weight: 98.8 kg (217 lb 12.8 oz)     General: appears comfortable, alert and articulate  Head: normocephalic, atraumatic  Eyes: anicteric sclera, EOMI  Neck: no adenopathy  Orophyarynx: moist mucosa, no lesions, dentition intact  Heart: regular, S1/S2, no murmur, gallop, rub, estimated JVP flat  Lungs: Respirations even and unlabored, lungs clear, no rales or wheezing  Abdomen: soft, non-tender, bowel sounds present, no hepatomegaly  Extremities: no clubbing, cyanosis or edema  Neurological: normal speech and affect, no gross motor deficits      LABS  Last Comprehensive Metabolic Panel:  Sodium   Date Value Ref Range Status   06/17/2019 139 133 - 144 mmol/L Final     Potassium   Date Value Ref Range Status   06/17/2019 4.4 3.4 - 5.3 mmol/L Final     Chloride   Date Value Ref Range Status   06/17/2019 110 (H) 94 - 109 mmol/L Final     Carbon Dioxide   Date Value Ref Range Status   06/17/2019 23 20 - 32 mmol/L Final     Anion Gap   Date Value Ref " Range Status   06/17/2019 6 3 - 14 mmol/L Final     Glucose   Date Value Ref Range Status   06/17/2019 107 (H) 70 - 99 mg/dL Final     Urea Nitrogen   Date Value Ref Range Status   06/17/2019 19 7 - 30 mg/dL Final     Creatinine   Date Value Ref Range Status   06/17/2019 1.07 0.66 - 1.25 mg/dL Final     GFR Estimate   Date Value Ref Range Status   06/17/2019 90 >60 mL/min/[1.73_m2] Final     Comment:     Non  GFR Calc  Starting 12/18/2018, serum creatinine based estimated GFR (eGFR) will be   calculated using the Chronic Kidney Disease Epidemiology Collaboration   (CKD-EPI) equation.       Calcium   Date Value Ref Range Status   06/17/2019 8.6 8.5 - 10.1 mg/dL Final       Lab Results   Component Value Date    NTBNPI 4,655 (H) 04/24/2019       No results found for: DIG    MOST RECENT ECHOCARDIOGRAM: 4/24/19  Interpretation Summary     CRITICAL FINDINGS-REPORT CALLED TO ER  The left ventricle is severely dilated.  There is mild concentric left ventricular hypertrophy.  The visual ejection fraction is estimated at 10%.  There is severe global hypokinesia of the left ventricle.  Multiple (3+) apical mass-likely clots in LV  Severely decreased right ventricular systolic function  The left atrium is severely dilated.  There is moderate to mod-severe (2-3+) mitral regurgitation.  Visually moderate MR by PISA quantification it is moderately severe  There is moderately severe (3+) tricuspid regurgitation.  Right ventricular systolic pressure is elevated, consistent with moderate to  severe pulmonary hypertension.  IVC diameter >2.1 cm collapsing <50% with sniff suggests a high RA pressure  estimated at 15 mmHg or greater.  Trivial pericardial effusion    ASSESSMENT AND PLAN  Mr. Ubaldo Velez is a 33 year old male with a relevant past medical history including recently diagnosed NICM secondary to substance abuse (methamphetamine, tobacco, alcohol) who is doing well functionally. He has verbalized the  importance of staying free from any type of substance abuse.  He says he wants to remain healthy to be able to spend time with his young son.  The plan for today is to increase his metoprolol to 75 mg daily and to return to [facility] in 2 weeks.    1.1. Chronic systolic heart failure/HFrEF (EF 10%) secondary to nonischemic cardiomyopathy  NYHA Symptom Class I  Stage D  Primary Cardiologist: Steph; Last seen during admission in May  ACE-I/ARB/ARNi: Titration ongoing  BB titration ongoing  Aldosterone antagonist deferred while other medical therapy is prioritized  SCD prophylaxis Decision deferred during medication uptitration  %BiV pacing: N/A  Fluid status Euvolemic  Sleep Apnea Evaluation: Not indicated  Remote PA Pressure Monitoring (CardioMems) Deferred while medical therapy is optimized     2. LV Thrombus. Anticoagulated with warfarin. INR therapeutic     3. Polysubstance use. Abstinent and enrolled in [facility]      4. ADHD. Encouraged to talk with his PCP and/or [facility] team    5.  We will have the patient follow-up in [facility] in 2 weeks

## 2019-06-25 ENCOUNTER — HOSPITAL ENCOUNTER (OUTPATIENT)
Dept: BEHAVIORAL HEALTH | Facility: CLINIC | Age: 34
End: 2019-06-25
Attending: SOCIAL WORKER
Payer: COMMERCIAL

## 2019-06-25 ENCOUNTER — HOSPITAL ENCOUNTER (OUTPATIENT)
Dept: CARDIAC REHAB | Facility: CLINIC | Age: 34
End: 2019-06-25
Attending: INTERNAL MEDICINE
Payer: COMMERCIAL

## 2019-06-25 PROCEDURE — 93798 PHYS/QHP OP CAR RHAB W/ECG: CPT | Performed by: REHABILITATION PRACTITIONER

## 2019-06-25 PROCEDURE — H2035 A/D TX PROGRAM, PER HOUR: HCPCS | Mod: HQ

## 2019-06-25 PROCEDURE — 40000116 ZZH STATISTIC OP CR VISIT: Performed by: REHABILITATION PRACTITIONER

## 2019-06-26 ENCOUNTER — HOSPITAL ENCOUNTER (OUTPATIENT)
Dept: BEHAVIORAL HEALTH | Facility: CLINIC | Age: 34
End: 2019-06-26
Attending: SOCIAL WORKER
Payer: COMMERCIAL

## 2019-06-26 PROCEDURE — H2035 A/D TX PROGRAM, PER HOUR: HCPCS | Mod: HQ

## 2019-06-27 ENCOUNTER — HOSPITAL ENCOUNTER (OUTPATIENT)
Dept: CARDIAC REHAB | Facility: CLINIC | Age: 34
End: 2019-06-27
Attending: INTERNAL MEDICINE
Payer: COMMERCIAL

## 2019-06-27 PROCEDURE — 93798 PHYS/QHP OP CAR RHAB W/ECG: CPT | Performed by: REHABILITATION PRACTITIONER

## 2019-06-27 PROCEDURE — 40000116 ZZH STATISTIC OP CR VISIT: Performed by: REHABILITATION PRACTITIONER

## 2019-06-29 ENCOUNTER — MYC REFILL (OUTPATIENT)
Dept: CARDIOLOGY | Facility: CLINIC | Age: 34
End: 2019-06-29

## 2019-06-29 DIAGNOSIS — I51.3 LV (LEFT VENTRICULAR) MURAL THROMBUS: ICD-10-CM

## 2019-06-29 DIAGNOSIS — I50.21 ACUTE SYSTOLIC CONGESTIVE HEART FAILURE (H): ICD-10-CM

## 2019-06-29 DIAGNOSIS — I50.23 ACUTE ON CHRONIC HFREF (HEART FAILURE WITH REDUCED EJECTION FRACTION) (H): ICD-10-CM

## 2019-06-29 DIAGNOSIS — I50.22 CHRONIC SYSTOLIC HEART FAILURE (H): ICD-10-CM

## 2019-07-01 ENCOUNTER — HOSPITAL ENCOUNTER (OUTPATIENT)
Dept: BEHAVIORAL HEALTH | Facility: CLINIC | Age: 34
End: 2019-07-01
Attending: SOCIAL WORKER
Payer: COMMERCIAL

## 2019-07-01 ENCOUNTER — HOSPITAL ENCOUNTER (OUTPATIENT)
Dept: CARDIAC REHAB | Facility: CLINIC | Age: 34
End: 2019-07-01
Attending: INTERNAL MEDICINE
Payer: COMMERCIAL

## 2019-07-01 DIAGNOSIS — I50.22 CHRONIC SYSTOLIC HEART FAILURE (H): Primary | ICD-10-CM

## 2019-07-01 PROCEDURE — 40000116 ZZH STATISTIC OP CR VISIT: Performed by: REHABILITATION PRACTITIONER

## 2019-07-01 PROCEDURE — 93798 PHYS/QHP OP CAR RHAB W/ECG: CPT | Performed by: REHABILITATION PRACTITIONER

## 2019-07-01 PROCEDURE — H2035 A/D TX PROGRAM, PER HOUR: HCPCS | Mod: HQ

## 2019-07-01 RX ORDER — METOPROLOL SUCCINATE 25 MG/1
75 TABLET, EXTENDED RELEASE ORAL DAILY
Qty: 90 TABLET | Refills: 3 | Status: CANCELLED | OUTPATIENT
Start: 2019-07-01

## 2019-07-01 RX ORDER — LISINOPRIL 10 MG/1
10 TABLET ORAL 2 TIMES DAILY
Qty: 180 TABLET | Refills: 3 | Status: SHIPPED | OUTPATIENT
Start: 2019-07-01 | End: 2019-07-11

## 2019-07-01 RX ORDER — DIGOXIN 250 MCG
250 TABLET ORAL DAILY
Qty: 90 TABLET | Refills: 3 | Status: SHIPPED | OUTPATIENT
Start: 2019-07-01 | End: 2019-09-09

## 2019-07-01 RX ORDER — HYDRALAZINE HYDROCHLORIDE 25 MG/1
25 TABLET, FILM COATED ORAL 3 TIMES DAILY
Qty: 270 TABLET | Refills: 3 | Status: SHIPPED | OUTPATIENT
Start: 2019-07-01 | End: 2019-09-09

## 2019-07-01 RX ORDER — WARFARIN SODIUM 5 MG/1
TABLET ORAL
Qty: 90 TABLET | Refills: 3 | OUTPATIENT
Start: 2019-07-01

## 2019-07-01 RX ORDER — FOLIC ACID 1 MG/1
1 TABLET ORAL DAILY
Qty: 90 TABLET | Refills: 3 | Status: SHIPPED | OUTPATIENT
Start: 2019-07-01 | End: 2020-11-10

## 2019-07-01 NOTE — PROGRESS NOTES
Ubaldo eVlez  [id number]               Adult CD Progress Note and Treatment Plan Review     Attendance       Monday    Group Date: 7/1/2019     Group Attendance Attended group session   Group Therapy Type Addiction   Group Topic Covered Meditation/Breathing Exercises, Relapse Prevention and sober support groups   Client's Response To Group Topic Cooperative with task. Listened actively.   Client Group Participation Detail Highly involved   Group Attendance (Time) 2.0 Hours   Individual Attendance None   Family Attendance None   Other Comments/Information None      Tuesday     Group Date: 7/2/19     Group Attendance Excused from group session   Group Therapy Type Psychoeducation   Group Topic Covered Relapse Prevention and Readiness to Change   Client's Response To Group Topic Other - excused absence.   Client Group Participation Detail Other - excused absence   Group Attendance (Time) Other - excused absence   Individual Attendance None   Family Attendance None   Other Comments/Information None      Wednesday    Group Date: 7/3/2019     Group Attendance Excused from group session   Group Therapy Type Addiction   Group Topic Covered excused absence   Client's Response To Group Topic Other - excused absence.   Client Group Participation Detail Other - excused absence   Group Attendance (Time) Other - excused absence   Individual Attendance None   Family Attendance None   Other Comments/Information None       Total # of Phase 1 Group Sessions: 17     Total # of Phase 2 Group Sessions: N/A  Total # of Phase 3 Group Sessions: N/A  Total # of 1:1 Sessions: 2    Support group attended this week: no    Reporting sobriety: Yes, client reports last use of meth as 4/19/2019    Treatment Plan Review     Treatment Plan Review completed on:  7/3/2019     Projected discharge date: 10/3/2019    Client preferred learning style: by hands-on practice and by watching someone else demonstrate    Staff member(s) contributing:  Lei  PRIMITIVO Kraus, CRYSTAL    Received supervision: None    Client involvement with treatment planning: contributed to goals and plan.    Client received copy of plan/revised plan: Yes, signed 5/29/2019    Client agrees with plan/revised plan: Yes    Changes to Treatment Plan: No    Client's Dimension 1 Goal(s) Develop effective strategies to maintain sobriety.     See below.   Client's Dimension 2 Goal(s) Disclose CD status to medical providers and follow up with medical interventions while in IOP.   Client would like to quit smoking tobacco.  Maintain good physical health. See below.   Client's Dimension 3 Goal(s) Learn how to apply self-care and psychotherapy to build a repertoire of daily habits that counteract PAWS, fatigue, depression and anxiety leading to increased resiliency and well-being See below.   Client's Dimension 4 Goal(s) Understand the impact your substance use has had on you, your family, and significant relationships.  Increase internal motivation to change. See below.   Client's Dimension 5 Goal(s) Gain education about addiction.  Identify personal triggers and relapse warning signs.  Develop sober coping and living skills in order to address the development of a strategy for long term recovery. See below.   Client's Dimension 6 Goal(s) Develop and increase sober support network.  Identify and attend sober support group meetings. See below.     New Goals added since last review: None.    Goal(s) worked on since last review: dim 4, 5    Strategies effective: Yes    Care Coordination Activities: strongly recommended attendance at sober support group.    Medical, Mental Health and other appointments the client attended: Continued medical care for recovery for heart failure due to meth use.  PT 3x weekly and medical appt on 7/2/19    Medication issues: None.    Physical and mental health problems: Yes - client is recovering from heart failure due to meth use.  Client notes need to stop smoking to meet  "criteria for transplant, if needed.     Review and evaluation of the individual abuse prevention plan: The program's individual abuse prevention plan (IAPP) is sufficient for this client.     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    Dimension 2 1 On Monday client denies any new or worsening medical conditions, but continues PT and other medical care for recovery from heart failure due to use of meth. Client reports exercise.    Dimension 3 1 On Monday client rates his depression, anxiety, or other mental health concerns as 0 of 10 (10 highest), due to \"no real problems\".  Client denies any thoughts of hurting himself or others.     Dimension 4 1 On Monday client rates his motivation for recovery as 9 of 10 (10 highest).  Client expressed 3 reasons to remain sober.     Dimension 5 2  On Monday client rates his strongest craving to use in the past week as 0 of 10 (10 highest) due to \"have plenty to keep my mind off of it\".  Client reports using coping skill of having a heart-to-heart talk with his father.    Dimension 6 1 On Monday client denied attendance at sober support meetings this week.  Client denies having a sponsor.  Client reports sober activities.    Data: (Need to include short narrative for the week on what was worked on)  On Monday, client participated with the check- in process and affirmation.  Readiness to Change, Relapse Prevention and Recovery Planning were addressed through a presentation by AA members of how sober support groups helped them change and achieve sustained recovery.  Client actively participated and expressed that the information was helpful.  On " Tuesday client had an excused absence for a medical appointment.  Client participated in self-soothing exercises.  Client practiced 4x4 and natural mindful breathing.  Client expresses these exercises are helpful.  Client expressed something he was grateful for.  On Wednesday client had an excused absence.    Intervention:   Behavioral Therapy, Cognitive Behavioral Therapy, Counselor Feedback, Psychoeducation, Emotional management, Group Feedback, Motivational Interviewing, Relapse Prevention, Twelve Step Facilitation, REBT.    Assessment:    Stages of Change Model  Contemplation    Appears/Sounds:  Cooperative  Engaged    Plan:   Client will practice choice of breathing techniques 2X daily.  Client will set an alarm to make to group on time.  -Attend 1-2 sober support group meetings each week (this includes non-12-step/AA alternative meetings).  -Client will continue to work on treatment plan objectives.  -Client will follow all recommendations of counselor and any other medical provider which includes taking all medications as prescribed.    -Client will attend all weekly Phase 1 group sessions.   -Client to engage in sober activities each week.  -Client will try one new coping skill/activity before next session.    PRIMITIVO Early, [facility]

## 2019-07-02 ENCOUNTER — OFFICE VISIT (OUTPATIENT)
Dept: CARDIOLOGY | Facility: CLINIC | Age: 34
End: 2019-07-02
Attending: NURSE PRACTITIONER
Payer: COMMERCIAL

## 2019-07-02 VITALS
DIASTOLIC BLOOD PRESSURE: 80 MMHG | HEIGHT: 75 IN | BODY MASS INDEX: 27.49 KG/M2 | OXYGEN SATURATION: 97 % | WEIGHT: 221.1 LBS | HEART RATE: 87 BPM | SYSTOLIC BLOOD PRESSURE: 129 MMHG

## 2019-07-02 DIAGNOSIS — I50.9 CONGESTIVE HEART FAILURE, UNSPECIFIED HF CHRONICITY, UNSPECIFIED HEART FAILURE TYPE (H): Primary | ICD-10-CM

## 2019-07-02 DIAGNOSIS — I50.22 CHRONIC SYSTOLIC HEART FAILURE (H): ICD-10-CM

## 2019-07-02 DIAGNOSIS — I50.9 CONGESTIVE HEART FAILURE, UNSPECIFIED HF CHRONICITY, UNSPECIFIED HEART FAILURE TYPE (H): ICD-10-CM

## 2019-07-02 LAB
AMPHETAMINES UR QL SCN: NEGATIVE
ANION GAP SERPL CALCULATED.3IONS-SCNC: 6 MMOL/L (ref 3–14)
BARBITURATES UR QL: NEGATIVE
BENZODIAZ UR QL: NEGATIVE
BUN SERPL-MCNC: 15 MG/DL (ref 7–30)
CALCIUM SERPL-MCNC: 8.8 MG/DL (ref 8.5–10.1)
CANNABINOIDS UR QL SCN: NEGATIVE
CHLORIDE SERPL-SCNC: 106 MMOL/L (ref 94–109)
CO2 SERPL-SCNC: 26 MMOL/L (ref 20–32)
COCAINE UR QL: NEGATIVE
CREAT SERPL-MCNC: 1.09 MG/DL (ref 0.66–1.25)
ETHANOL UR QL SCN: POSITIVE
GFR SERPL CREATININE-BSD FRML MDRD: 88 ML/MIN/{1.73_M2}
GLUCOSE SERPL-MCNC: 68 MG/DL (ref 70–99)
NT-PROBNP SERPL-MCNC: 997 PG/ML (ref 0–125)
OPIATES UR QL SCN: NEGATIVE
POTASSIUM SERPL-SCNC: 4 MMOL/L (ref 3.4–5.3)
SODIUM SERPL-SCNC: 139 MMOL/L (ref 133–144)

## 2019-07-02 PROCEDURE — 83880 ASSAY OF NATRIURETIC PEPTIDE: CPT | Performed by: INTERNAL MEDICINE

## 2019-07-02 PROCEDURE — 36415 COLL VENOUS BLD VENIPUNCTURE: CPT | Performed by: INTERNAL MEDICINE

## 2019-07-02 PROCEDURE — 80307 DRUG TEST PRSMV CHEM ANLYZR: CPT | Performed by: NURSE PRACTITIONER

## 2019-07-02 PROCEDURE — G0463 HOSPITAL OUTPT CLINIC VISIT: HCPCS | Mod: ZF

## 2019-07-02 PROCEDURE — 80048 BASIC METABOLIC PNL TOTAL CA: CPT | Performed by: INTERNAL MEDICINE

## 2019-07-02 PROCEDURE — 99213 OFFICE O/P EST LOW 20 MIN: CPT | Mod: ZP | Performed by: NURSE PRACTITIONER

## 2019-07-02 PROCEDURE — 80320 DRUG SCREEN QUANTALCOHOLS: CPT | Performed by: NURSE PRACTITIONER

## 2019-07-02 RX ORDER — METOPROLOL SUCCINATE 100 MG/1
100 TABLET, EXTENDED RELEASE ORAL DAILY
Qty: 90 TABLET | Refills: 3 | Status: SHIPPED | OUTPATIENT
Start: 2019-07-02 | End: 2019-09-09

## 2019-07-02 ASSESSMENT — MIFFLIN-ST. JEOR: SCORE: 2028.53

## 2019-07-02 ASSESSMENT — PAIN SCALES - GENERAL: PAINLEVEL: NO PAIN (0)

## 2019-07-02 NOTE — LETTER
7/2/2019      RE: Ubaldo Velez  [address]  [facility] [id number]       Dear Colleague,    Thank you for the opportunity to participate in the care of your patient, Ubaldo Velez, at the [facility] HEART [facility] at [facility]. Please see a copy of my visit note below.    HPI  . Ubaldo Velez is a 33 year old male with a relevant past medical history including ADHD and polysubstance abuse (ETOH, cocaine, alcohol, meth) who was admitted to an outside hospital with URI symptoms and 30 pound weight gain and transferred to [facility] with concern for cardiogenic shock. Echo revealed an EF of 10% with mod-severe MR and TR and multiple apical thrombus. Right and left heart caths were notable for clean coronary arteries, normal right sided filling pressures, moderately elevated left sided pressures and a marginal cardiac index. He was diuresed, transitioned to oral afterload, and discharged to home on warfarin with a therapeutic INR and hyponatremia prompting adjustment of his spironolactone dosing. He was seen in clinic  2 weeks ago when his metoprolol was increased. He returns for follow up.    Ubaldo presents to clinic, accompanied by his son. He is working full time. No shortness of breath, chest pain, occasional palpitations, lightheadedness, lower extremity edema, bloating, anorexia, nausea, orthopnea, or PND.    Continues cardiac rehab. Continues Chem Dep. Has been smoking roughly 1 pack a week. No illicit or ETOH use.       PMH  Past Medical History:   Diagnosis Date     ADHD      Cocaine use      Methamphetamine use (H)        Past Surgical History:   Procedure Laterality Date     CV CORONARY ANGIOGRAM N/A 4/29/2019    Procedure: CV CORONARY ANGIOGRAM;  Surgeon: Reggie Hua MD;  Location: [facility] CARDIAC CATH LAB     CV RIGHT HEART CATH N/A 4/29/2019    Procedure: Right Heart Cath;  Surgeon: Reggie Hua MD;  Location:  HEART CARDIAC CATH LAB      EXTRACTION(S) DENTAL      Eldena teeth     HAND SURGERY Left     Laceration left index finger       Family History   Problem Relation Age of Onset     Chronic Obstructive Pulmonary Disease Father      Multiple Sclerosis Maternal Aunt        Social History     Socioeconomic History     Marital status: Single     Spouse name: Not on file     Number of children: 1     Years of education: Not on file     Highest education level: Not on file   Occupational History     Occupation: [employer]   Social Needs     Financial resource strain: Not on file     Food insecurity:     Worry: Not on file     Inability: Not on file     Transportation needs:     Medical: Not on file     Non-medical: Not on file   Tobacco Use     Smoking status: Current Every Day Smoker     Packs/day: 1.00     Years: 15.00     Pack years: 15.00     Smokeless tobacco: Never Used     Tobacco comment: Quit 1.5 weeks ago   Substance and Sexual Activity     Alcohol use: Yes     Comment: 8-10 beers a day, now 1 beer a week.     Drug use: Yes     Types: Methamphetamines, Cocaine     Comment: Methamphetamine last used 8 weeks ago     Sexual activity: Not on file   Lifestyle     Physical activity:     Days per week: Not on file     Minutes per session: Not on file     Stress: Not on file   Relationships     Social connections:     Talks on phone: Not on file     Gets together: Not on file     Attends Islam service: Not on file     Active member of club or organization: Not on file     Attends meetings of clubs or organizations: Not on file     Relationship status: Not on file     Intimate partner violence:     Fear of current or ex partner: Not on file     Emotionally abused: Not on file     Physically abused: Not on file     Forced sexual activity: Not on file   Other Topics Concern     Not on file   Social History Narrative    Single but has long standing SO who he lives with, has one child.  (last updated 4/24/2019)        ALLERGIES  No Known  "Allergies    MEDICATIONS   Current Outpatient Medications on File Prior to Visit:  digoxin (LANOXIN) 250 MCG tablet Take 1 tablet (250 mcg) by mouth daily   folic acid (FOLVITE) 1 MG tablet Take 1 tablet (1 mg) by mouth daily   hydrALAZINE (APRESOLINE) 25 MG tablet Take 1 tablet (25 mg) by mouth 3 times daily   lisinopril (PRINIVIL/ZESTRIL) 10 MG tablet Take 1 tablet (10 mg) by mouth 2 times daily   metoprolol succinate ER (TOPROL-XL) 25 MG 24 hr tablet Take 3 tablets (75 mg) by mouth daily   vitamin B1 (THIAMINE) 100 MG tablet Take 1 tablet (100 mg) by mouth daily   warfarin (COUMADIN) 5 MG tablet Take 2-3 tablets as directed by INR clinic     No current facility-administered medications on file prior to visit.     ROS:  Constitutional: No fever, chills, or sweats. No weight gain/loss.   ENT: No visual disturbance, ear ache, epistaxis, sore throat.   Allergies/Immunologic: Negative.   Respiratory: No cough, hemoptysis.   Cardiovascular: As per HPI.   GI: No nausea, vomiting, hematemesis, melena, or hematochezia.   : No urinary frequency, dysuria, or hematuria.   Integument: Negative.   Psychiatric: Negative.   Neuro: Negative.   Endocrinology: Negative.   Musculoskeletal: Negative.    EXAM  /80 (BP Location: Right arm, Patient Position: Chair, Cuff Size: Adult Regular)   Pulse 87   Ht 1.905 m (6' 3\")   Wt 100.3 kg (221 lb 1.6 oz)   SpO2 97%   BMI 27.64 kg/m     Vitals:    07/02/19 1826   Weight: 100.3 kg (221 lb 1.6 oz)     General: appears comfortable, alert and articulate  Head: normocephalic, atraumatic  Eyes: anicteric sclera, EOMI  Neck: no adenopathy  Orophyarynx: moist mucosa, no lesions, dentition intact  Heart: regular, S1/S2, no murmur, gallop, rub, estimated JVP flat  Lungs: Respirations even and unlabored, lungs clear, no rales or wheezing  Abdomen: soft, non-tender, bowel sounds present, no hepatomegaly  Extremities: no clubbing, cyanosis or edema  Neurological: normal speech and affect, " no gross motor deficits      LABS  Last Comprehensive Metabolic Panel:  Sodium   Date Value Ref Range Status   06/17/2019 139 133 - 144 mmol/L Final     Potassium   Date Value Ref Range Status   06/17/2019 4.4 3.4 - 5.3 mmol/L Final     Chloride   Date Value Ref Range Status   06/17/2019 110 (H) 94 - 109 mmol/L Final     Carbon Dioxide   Date Value Ref Range Status   06/17/2019 23 20 - 32 mmol/L Final     Anion Gap   Date Value Ref Range Status   06/17/2019 6 3 - 14 mmol/L Final     Glucose   Date Value Ref Range Status   06/17/2019 107 (H) 70 - 99 mg/dL Final     Urea Nitrogen   Date Value Ref Range Status   06/17/2019 19 7 - 30 mg/dL Final     Creatinine   Date Value Ref Range Status   06/17/2019 1.07 0.66 - 1.25 mg/dL Final     GFR Estimate   Date Value Ref Range Status   06/17/2019 90 >60 mL/min/[1.73_m2] Final     Comment:     Non  GFR Calc  Starting 12/18/2018, serum creatinine based estimated GFR (eGFR) will be   calculated using the Chronic Kidney Disease Epidemiology Collaboration   (CKD-EPI) equation.       Calcium   Date Value Ref Range Status   06/17/2019 8.6 8.5 - 10.1 mg/dL Final       Lab Results   Component Value Date    NTBNPI 4,655 (H) 04/24/2019       No results found for: DIG    MOST RECENT ECHOCARDIOGRAM: 4/24/19  Interpretation Summary     CRITICAL FINDINGS-REPORT CALLED TO ER  The left ventricle is severely dilated.  There is mild concentric left ventricular hypertrophy.  The visual ejection fraction is estimated at 10%.  There is severe global hypokinesia of the left ventricle.  Multiple (3+) apical mass-likely clots in LV  Severely decreased right ventricular systolic function  The left atrium is severely dilated.  There is moderate to mod-severe (2-3+) mitral regurgitation.  Visually moderate MR by PISA quantification it is moderately severe  There is moderately severe (3+) tricuspid regurgitation.  Right ventricular systolic pressure is elevated, consistent with moderate  to  severe pulmonary hypertension.  IVC diameter >2.1 cm collapsing <50% with sniff suggests a high RA pressure  estimated at 15 mmHg or greater.  Trivial pericardial effusion    ASSESSMENT AND PLAN  Mr. Ubaldo Velez is a 33 year old male with a relevant past medical history including chronic systolic heart failure secondary to NICM (substance use) who appears well. His renal function and electrolytes are stable. Heart rate improved to at least less than 100  with plenty of blood pressure. Elevated NT ProBNP still worrisome. Once optimized, my consider for GUIDE-HF trial.    Today, we'll increase Toprol to 100 mg daily. Reinforced the importance of quitting smoking and continued abstinence from ETOH and illicit drugs. Sent for urine tox after his visit today.     He'll return to see Dr. Choi, as scheduled next week and CORE in 1 month or prn.     1. Chronic systolic heart failure/HFrEF (EF 10%) secondary to nonischemic cardiomyopathy  NYHA Symptom Class I  Stage C  Primary Cardiologist: Steph; scheduled follow up next week  ACE-I/ARB/ARNi: Titration ongoing  BB titration ongoing  Aldosterone antagonist deferred while other medical therapy is prioritized  SCD prophylaxis Decision deferred during medication uptitration  %BiV pacing: N/A  Fluid status Euvolemic  Cardiac Rehab: Referral previously placed -- currently enrolled  Sleep Apnea Evaluation: Not indicated  Remote PA Pressure Monitoring (CardioMems) Deferred while medical therapy is optimized  Remote monitoring: Encouraged to utilize MyChart, coaching offerred  Follow-up 1 week with Steph    2. LV Thrombus. Anticoagulated with warfarin managed by Family Practice at [facility] -- last measured 6/6, therapeutic at 2.2    3. Polysubstance use. Still smoking but reports abstaining from ETOH and illicit drugs. Sent to lab for urine tox following clinic      20 minutes spent in direct care, >50% in counseling    Please do not hesitate to contact me  if you have any questions/concerns.     Sincerely,     Kristin Morales, NP

## 2019-07-02 NOTE — PROGRESS NOTES
HPI  Mr. Ubaldo Velez is a 33 year old male with a relevant past medical history including ADHD and polysubstance abuse (ETOH, cocaine, alcohol, meth) who was admitted to an outside hospital with URI symptoms and 30 pound weight gain and transferred to [facility] with concern for cardiogenic shock. Echo revealed an EF of 10% with mod-severe MR and TR and multiple apical thrombus. Right and left heart caths were notable for clean coronary arteries, normal right sided filling pressures, moderately elevated left sided pressures and a marginal cardiac index. He was diuresed, transitioned to oral afterload, and discharged to home on warfarin with a therapeutic INR and hyponatremia prompting adjustment of his spironolactone dosing. He was seen in clinic  2 weeks ago when his metoprolol was increased. He returns for follow up.    Ubaldo presents to clinic, accompanied by his son. He is working full time. No shortness of breath, chest pain, occasional palpitations, lightheadedness, lower extremity edema, bloating, anorexia, nausea, orthopnea, or PND.    Continues cardiac rehab. Continues Chem Dep. Has been smoking roughly 1 pack a week. No illicit or ETOH use.       PMH  Past Medical History:   Diagnosis Date     ADHD      Cocaine use      Methamphetamine use (H)        Past Surgical History:   Procedure Laterality Date     CV CORONARY ANGIOGRAM N/A 4/29/2019    Procedure: CV CORONARY ANGIOGRAM;  Surgeon: Reggie Hua MD;  Location:  HEART CARDIAC CATH LAB     CV RIGHT HEART CATH N/A 4/29/2019    Procedure: Right Heart Cath;  Surgeon: Reggie Hua MD;  [facility] CARDIAC C[facility] LAB     EXTRACTION(S) DENTAL      [place]ertel teeth     HAND SURGERY Left     Laceration left index finger       Family History   Problem Relation Age of Onset     Chronic Obstructive Pulmonary Disease Father      Multiple Sclerosis Maternal Aunt        Social History     Socioeconomic History     Marital status: Single     Spouse  name: Not on file     Number of children: 1     Years of education: Not on file     Highest education level: Not on file   Occupational History     Occupation: [employer]   Social Needs     Financial resource strain: Not on file     Food insecurity:     Worry: Not on file     Inability: Not on file     Transportation needs:     Medical: Not on file     Non-medical: Not on file   Tobacco Use     Smoking status: Current Every Day Smoker     Packs/day: 1.00     Years: 15.00     Pack years: 15.00     Smokeless tobacco: Never Used     Tobacco comment: Quit 1.5 weeks ago   Substance and Sexual Activity     Alcohol use: Yes     Comment: 8-10 beers a day, now 1 beer a week.     Drug use: Yes     Types: Methamphetamines, Cocaine     Comment: Methamphetamine last used 8 weeks ago     Sexual activity: Not on file   Lifestyle     Physical activity:     Days per week: Not on file     Minutes per session: Not on file     Stress: Not on file   Relationships     Social connections:     Talks on phone: Not on file     Gets together: Not on file     Attends Mosque service: Not on file     Active member of club or organization: Not on file     Attends meetings of clubs or organizations: Not on file     Relationship status: Not on file     Intimate partner violence:     Fear of current or ex partner: Not on file     Emotionally abused: Not on file     Physically abused: Not on file     Forced sexual activity: Not on file   Other Topics Concern     Not on file   Social History Narrative    Single but has long standing SO who he lives with, has one child.  (last updated 4/24/2019)        ALLERGIES  No Known Allergies    MEDICATIONS   Current Outpatient Medications on File Prior to Visit:  digoxin (LANOXIN) 250 MCG tablet Take 1 tablet (250 mcg) by mouth daily   folic acid (FOLVITE) 1 MG tablet Take 1 tablet (1 mg) by mouth daily   hydrALAZINE (APRESOLINE) 25 MG tablet Take 1 tablet (25 mg) by mouth 3 times daily   lisinopril  "(PRINIVIL/ZESTRIL) 10 MG tablet Take 1 tablet (10 mg) by mouth 2 times daily   metoprolol succinate ER (TOPROL-XL) 25 MG 24 hr tablet Take 3 tablets (75 mg) by mouth daily   vitamin B1 (THIAMINE) 100 MG tablet Take 1 tablet (100 mg) by mouth daily   warfarin (COUMADIN) 5 MG tablet Take 2-3 tablets as directed by INR clinic     No current facility-administered medications on file prior to visit.     ROS:  Constitutional: No fever, chills, or sweats. No weight gain/loss.   ENT: No visual disturbance, ear ache, epistaxis, sore throat.   Allergies/Immunologic: Negative.   Respiratory: No cough, hemoptysis.   Cardiovascular: As per HPI.   GI: No nausea, vomiting, hematemesis, melena, or hematochezia.   : No urinary frequency, dysuria, or hematuria.   Integument: Negative.   Psychiatric: Negative.   Neuro: Negative.   Endocrinology: Negative.   Musculoskeletal: Negative.    EXAM  /80 (BP Location: Right arm, Patient Position: Chair, Cuff Size: Adult Regular)   Pulse 87   Ht 1.905 m (6' 3\")   Wt 100.3 kg (221 lb 1.6 oz)   SpO2 97%   BMI 27.64 kg/m    Vitals:    07/02/19 1826   Weight: 100.3 kg (221 lb 1.6 oz)     General: appears comfortable, alert and articulate  Head: normocephalic, atraumatic  Eyes: anicteric sclera, EOMI  Neck: no adenopathy  Orophyarynx: moist mucosa, no lesions, dentition intact  Heart: regular, S1/S2, no murmur, gallop, rub, estimated JVP flat  Lungs: Respirations even and unlabored, lungs clear, no rales or wheezing  Abdomen: soft, non-tender, bowel sounds present, no hepatomegaly  Extremities: no clubbing, cyanosis or edema  Neurological: normal speech and affect, no gross motor deficits      LABS  Last Comprehensive Metabolic Panel:  Sodium   Date Value Ref Range Status   06/17/2019 139 133 - 144 mmol/L Final     Potassium   Date Value Ref Range Status   06/17/2019 4.4 3.4 - 5.3 mmol/L Final     Chloride   Date Value Ref Range Status   06/17/2019 110 (H) 94 - 109 mmol/L Final "     Carbon Dioxide   Date Value Ref Range Status   06/17/2019 23 20 - 32 mmol/L Final     Anion Gap   Date Value Ref Range Status   06/17/2019 6 3 - 14 mmol/L Final     Glucose   Date Value Ref Range Status   06/17/2019 107 (H) 70 - 99 mg/dL Final     Urea Nitrogen   Date Value Ref Range Status   06/17/2019 19 7 - 30 mg/dL Final     Creatinine   Date Value Ref Range Status   06/17/2019 1.07 0.66 - 1.25 mg/dL Final     GFR Estimate   Date Value Ref Range Status   06/17/2019 90 >60 mL/min/[1.73_m2] Final     Comment:     Non  GFR Calc  Starting 12/18/2018, serum creatinine based estimated GFR (eGFR) will be   calculated using the Chronic Kidney Disease Epidemiology Collaboration   (CKD-EPI) equation.       Calcium   Date Value Ref Range Status   06/17/2019 8.6 8.5 - 10.1 mg/dL Final       Lab Results   Component Value Date    NTBNPI 4,655 (H) 04/24/2019       No results found for: DIG    MOST RECENT ECHOCARDIOGRAM: 4/24/19  Interpretation Summary     CRITICAL FINDINGS-REPORT CALLED TO ER  The left ventricle is severely dilated.  There is mild concentric left ventricular hypertrophy.  The visual ejection fraction is estimated at 10%.  There is severe global hypokinesia of the left ventricle.  Multiple (3+) apical mass-likely clots in LV  Severely decreased right ventricular systolic function  The left atrium is severely dilated.  There is moderate to mod-severe (2-3+) mitral regurgitation.  Visually moderate MR by PISA quantification it is moderately severe  There is moderately severe (3+) tricuspid regurgitation.  Right ventricular systolic pressure is elevated, consistent with moderate to  severe pulmonary hypertension.  IVC diameter >2.1 cm collapsing <50% with sniff suggests a high RA pressure  estimated at 15 mmHg or greater.  Trivial pericardial effusion    ASSESSMENT AND PLAN  Mr. Ubaldo Velez is a 33 year old male with a relevant past medical history including chronic systolic heart failure  secondary to NICM (substance use) who appears well. His renal function and electrolytes are stable. Heart rate improved to at least less than 100  with plenty of blood pressure. Elevated NT ProBNP still worrisome. Once optimized, my consider for GUIDE-HF trial.    Today, we'll increase Toprol to 100 mg daily. Reinforced the importance of quitting smoking and continued abstinence from ETOH and illicit drugs. Sent for urine tox after his visit today.     He'll return to see Dr. Choi, as scheduled next week and CORE in 1 month or prn.     1. Chronic systolic heart failure/HFrEF (EF 10%) secondary to nonischemic cardiomyopathy  NYHA Symptom Class I  Stage C  Primary Cardiologist: Steph; scheduled follow up next week  ACE-I/ARB/ARNi: Titration ongoing  BB titration ongoing  Aldosterone antagonist deferred while other medical therapy is prioritized  SCD prophylaxis Decision deferred during medication uptitration  %BiV pacing: N/A  Fluid status Euvolemic  Cardiac Rehab: Referral previously placed -- currently enrolled  Sleep Apnea Evaluation: Not indicated  Remote PA Pressure Monitoring (CardioMems) Deferred while medical therapy is optimized  Remote monitoring: Encouraged to utilize MyChart, coaching offerred  Follow-up 1 week with Steph    2. LV Thrombus. Anticoagulated with warfarin managed by Family Practice at [facility] -- last measured 6/6, therapeutic at 2.2    3. Polysubstance use. Still smoking but reports abstaining from ETOH and illicit drugs. Sent to lab for urine tox following clinic      20 minutes spent in direct care, >50% in counseling

## 2019-07-02 NOTE — PATIENT INSTRUCTIONS
Take your medicines every day, as directed    Changes made today:  o Please increase Toprol to 100 mg daily   Monitor Your Weight and Symptoms    Contact us if you:      Gain 2 pounds in one day or 5 pounds in one week    Feel more short of breath    Notice more leg swelling    Feel lightheadeded   Change your lifestyle    Limit Salt or Sodium:    2000 mg  Limit Fluids:    2000 mL or approximately 64 ounces  Eat a Heart Healthy Diet    Low in saturated fats  Stay Active:    Aim to move at least 150 minutes every  week         To Contact us    During Business Hours:  [phone], option # 1 (University)  Then option # 4 (medical questions)     After hours, weekends or holidays:   [phone], Option #4  Ask to speak to the On-Call Cardiologist. Inform them you are a CORE/heart failure patient at the [facility].     Use [employer] allows you to communicate directly with your heart team through secure messaging.    [employer] can be accessed any time on your phone, computer, or tablet.    If you need assistance, we'd be happy to help!         Keep your Heart Appointments:    Please see Dr. Choi, as planned  CORE clinic in 1 month

## 2019-07-02 NOTE — NURSING NOTE
Chief Complaint   Patient presents with     Follow Up     Return CORE, 34 yo male, HFrEF, EF 10%, labs prior.      Vitals were taken and medications reconciled.     [place] CMA  6:34 PM

## 2019-07-08 ENCOUNTER — HOSPITAL ENCOUNTER (OUTPATIENT)
Dept: CARDIAC REHAB | Facility: CLINIC | Age: 34
End: 2019-07-08
Attending: INTERNAL MEDICINE
Payer: COMMERCIAL

## 2019-07-08 ENCOUNTER — HOSPITAL ENCOUNTER (OUTPATIENT)
Dept: BEHAVIORAL HEALTH | Facility: CLINIC | Age: 34
End: 2019-07-08
Attending: SOCIAL WORKER
Payer: COMMERCIAL

## 2019-07-08 PROCEDURE — 40000116 ZZH STATISTIC OP CR VISIT: Performed by: CLINICAL EXERCISE PHYSIOLOGIST

## 2019-07-08 PROCEDURE — H2035 A/D TX PROGRAM, PER HOUR: HCPCS | Mod: HQ

## 2019-07-08 PROCEDURE — 93798 PHYS/QHP OP CAR RHAB W/ECG: CPT | Performed by: CLINICAL EXERCISE PHYSIOLOGIST

## 2019-07-09 ENCOUNTER — HOSPITAL ENCOUNTER (OUTPATIENT)
Dept: BEHAVIORAL HEALTH | Facility: CLINIC | Age: 34
End: 2019-07-09
Attending: SOCIAL WORKER
Payer: COMMERCIAL

## 2019-07-09 PROCEDURE — H2035 A/D TX PROGRAM, PER HOUR: HCPCS | Mod: HQ

## 2019-07-09 NOTE — PROGRESS NOTES
Ubaldo Velez  [id number]               Adult CD Progress Note and Treatment Plan Review     Attendance       Monday    Group Date: 7/8/2019     Group Attendance Attended group session   Group Therapy Type Addiction   Group Topic Covered Coping Skills/Lifestyle Management, Emotions/Expression/Feelings, Relapse Prevention and process peer's use episode   Client's Response To Group Topic Cooperative with task. Expressed readiness to alter behaviors. Listened actively.   Client Group Participation Detail Highly involved   Group Attendance (Time) 2.0 Hours   Individual Attendance None   Family Attendance None   Other Comments/Information None      Tuesday     Group Date: 7/9/19     Group Attendance Attended group session   Group Therapy Type Psychoeducation   Group Topic Covered Disease of Addiction/Choices in Recovery and neurobiology of addiction   Client's Response To Group Topic Cooperative with task. Expressed readiness to alter behaviors. Listened actively.   Client Group Participation Detail Highly involved   Group Attendance (Time) 2.0 Hours   Individual Attendance None   Family Attendance None   Other Comments/Information None      Wednesday    Group Date: 7/10/2019     Group Attendance Attended group session   Group Therapy Type Addiction   Group Topic Covered Disease of Addiction/Choices in Recovery, Meditation/Breathing Exercises, Relapse Prevention and Resentments   Client's Response To Group Topic Cooperative with task. Discussed personal experience with topic. Expressed readiness to alter behaviors. Listened actively.   Client Group Participation Detail Highly involved   Group Attendance (Time) 2.0 Hours   Individual Attendance None   Family Attendance None   Other Comments/Information None       Total # of Phase 1 Group Sessions: 20     Total # of Phase 2 Group Sessions: N/A  Total # of Phase 3 Group Sessions: N/A  Total # of 1:1 Sessions: 2    Support group attended this week: no    Reporting  sobriety: Yes, client reports last use of meth as 4/19/2019    Treatment Plan Review     Treatment Plan Review completed on:  7/11/2019     Projected discharge date: 10/3/2019    Client preferred learning style: by hands-on practice and by watching someone else demonstrate    Staff member(s) contributing:  PRIMITIVO Early, CRYSTAL    Received supervision: None    Client involvement with treatment planning: contributed to goals and plan.    Client received copy of plan/revised plan: Yes, signed 5/29/2019    Client agrees with plan/revised plan: Yes    Changes to Treatment Plan: No    Client's Dimension 1 Goal(s) Develop effective strategies to maintain sobriety.     See below.   Client's Dimension 2 Goal(s) Disclose CD status to medical providers and follow up with medical interventions while in IOP.   Client would like to quit smoking tobacco.  Maintain good physical health. See below.   Client's Dimension 3 Goal(s) Learn how to apply self-care and psychotherapy to build a repertoire of daily habits that counteract PAWS, fatigue, depression and anxiety leading to increased resiliency and well-being See below.   Client's Dimension 4 Goal(s) Understand the impact your substance use has had on you, your family, and significant relationships.  Increase internal motivation to change. See below.   Client's Dimension 5 Goal(s) Gain education about addiction.  Identify personal triggers and relapse warning signs.  Develop sober coping and living skills in order to address the development of a strategy for long term recovery. See below.   Client's Dimension 6 Goal(s) Develop and increase sober support network.  Identify and attend sober support group meetings. See below.     New Goals added since last review: None.    Goal(s) worked on since last review: dim 4, 5    Strategies effective: Yes    Care Coordination Activities: strongly recommended attendance at sober support group.    Medical, Mental Health and other appointments  "the client attended: Continued medical care for recovery for heart failure due to meth use.  PT 3x weekly.    Medication issues: None.    Physical and mental health problems: Yes - client is recovering from heart failure due to meth use.  Client notes need to stop smoking to meet criteria for transplant, if needed.     Review and evaluation of the individual abuse prevention plan: The program's individual abuse prevention plan (IAPP) is sufficient for this client.     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    Dimension 2 1 On Monday client denies any new or worsening medical conditions, but continues PT and other medical care for recovery from heart failure due to use of meth. Client reports exercise.    Dimension 3 1 On Monday client rates his depression, anxiety, or other mental health concerns as 0 of 10 (10 highest), due to \"everything is going as planned\".  Client denies any thoughts of hurting himself or others.     Dimension 4 1 On Monday client rates his motivation for recovery as 9 of 10 (10 highest).  Client expressed 3 reasons to remain sober.     Dimension 5 2  On Monday client rates his strongest craving to use in the past week as 1 of 10 (10 highest) due to \"a lot going on with (son's birthday) party planning\".  Client reports using coping skill of talking with family.    Dimension 6 1 On Monday client denied attendance at sober support meetings this week.  Client denies having a sponsor.  Client reports sober activities.    Data: (Need to include short narrative for the week on what was worked on)  On Monday client participated with the check- in process and " affirmation.  Readiness to Change and Relapse Prevention were addressed through discussion of a group peer use episode.  Client actively participated in discussion and provided supportive feedback.  Group peer presented his  guilt and shame  assignment.  Client expressed understanding of the difference between them, and shared his own feelings of these related to addiction.  On Tuesday the psychoeducation topic was the neurobiology of addiction with discussion of whether it was a disease or a choice.  Video  Pleasure Unwoven  was presented and processed with group peers.  Client participated in discussion of the disease of addiction and the brain areas and neurotransmitters involved.  Client expressed better understanding of addiction neurobiology as portrayed in video, and expressed that he now believes addiction is not a choice.  On Wednesday Readiness to Change and Relapse Prevention were addressed through group peers  presentations of their  10 Worst Consequences  and  Symptoms  assignments with discussion.  Client participated, provided supportive feedback, and spoke about how he related to behaviors, thoughts, and feelings as well as behavioral, emotional, and physical symptoms and signs described in peers  assignments.  Client participated in self-soothing exercises.  Client practiced 4x4 and natural mindful breathing.  Client expresses these exercises are helpful.  Client expressed something he was grateful for.      Intervention:   Behavioral Therapy, Cognitive Behavioral Therapy, Counselor Feedback, Psychoeducation, Emotional management, Group Feedback, Motivational Interviewing, Relapse Prevention, Twelve Step Facilitation, REBT.    Assessment:    Stages of Change Model  Contemplation    Appears/Sounds:  Cooperative  Engaged    Plan:   Client will practice choice of breathing techniques 2X daily.  Present 2 assignments next week.  -Attend 1-2 sober support group meetings each week (this includes  non-12-step/AA alternative meetings).  -Client will continue to work on treatment plan objectives.  -Client will follow all recommendations of counselor and any other medical provider which includes taking all medications as prescribed.    -Client will attend all weekly Phase 1 group sessions.   -Client to engage in sober activities each week.  -Client will try one new coping skill/activity before next session.    PRIMITIVO Early, [facility]

## 2019-07-10 ENCOUNTER — HOSPITAL ENCOUNTER (OUTPATIENT)
Dept: BEHAVIORAL HEALTH | Facility: CLINIC | Age: 34
End: 2019-07-10
Attending: SOCIAL WORKER
Payer: COMMERCIAL

## 2019-07-10 PROCEDURE — H2035 A/D TX PROGRAM, PER HOUR: HCPCS | Mod: HQ

## 2019-07-11 ENCOUNTER — OFFICE VISIT (OUTPATIENT)
Dept: CARDIOLOGY | Facility: CLINIC | Age: 34
End: 2019-07-11
Attending: INTERNAL MEDICINE
Payer: COMMERCIAL

## 2019-07-11 ENCOUNTER — HOSPITAL ENCOUNTER (OUTPATIENT)
Dept: CARDIAC REHAB | Facility: CLINIC | Age: 34
End: 2019-07-11
Attending: INTERNAL MEDICINE
Payer: COMMERCIAL

## 2019-07-11 VITALS
HEART RATE: 74 BPM | HEIGHT: 75 IN | OXYGEN SATURATION: 98 % | SYSTOLIC BLOOD PRESSURE: 140 MMHG | WEIGHT: 220.6 LBS | BODY MASS INDEX: 27.43 KG/M2 | DIASTOLIC BLOOD PRESSURE: 80 MMHG

## 2019-07-11 DIAGNOSIS — I50.23 ACUTE ON CHRONIC HFREF (HEART FAILURE WITH REDUCED EJECTION FRACTION) (H): ICD-10-CM

## 2019-07-11 DIAGNOSIS — I50.22 CHRONIC SYSTOLIC HEART FAILURE (H): ICD-10-CM

## 2019-07-11 PROCEDURE — 40000116 ZZH STATISTIC OP CR VISIT

## 2019-07-11 PROCEDURE — 93798 PHYS/QHP OP CAR RHAB W/ECG: CPT

## 2019-07-11 PROCEDURE — G0463 HOSPITAL OUTPT CLINIC VISIT: HCPCS | Mod: ZF

## 2019-07-11 PROCEDURE — 99205 OFFICE O/P NEW HI 60 MIN: CPT | Mod: ZP | Performed by: INTERNAL MEDICINE

## 2019-07-11 RX ORDER — LISINOPRIL 10 MG/1
15 TABLET ORAL 2 TIMES DAILY
Qty: 270 TABLET | Refills: 1 | Status: SHIPPED | OUTPATIENT
Start: 2019-07-11 | End: 2019-07-31

## 2019-07-11 ASSESSMENT — MIFFLIN-ST. JEOR: SCORE: 2026.27

## 2019-07-11 ASSESSMENT — PAIN SCALES - GENERAL: PAINLEVEL: NO PAIN (0)

## 2019-07-11 NOTE — PROGRESS NOTES
July 11, 2019    Mr. Ubaldo Velez is a 33 year old male with a relevant past medical history including ADHD and polysubstance abuse (ETOH, cocaine, alcohol, meth) who was admitted to an outside hospital with URI symptoms and 30 pound weight gain and transferred to [facility] with concern for cardiogenic shock. Echo revealed an EF of 10% with mod-severe MR and TR and multiple apical thrombus. Right and left heart caths were notable for clean coronary arteries, normal right sided filling pressures, moderately elevated left sided pressures and a marginal cardiac index. He was diuresed, transitioned to oral afterload, and discharged to home on warfarin with a therapeutic INR and hyponatremia prompting adjustment of his spironolactone dosing. He was seen in clinic  2 weeks ago when his metoprolol was increased. He returns for follow up.     Ubaldo presents to clinic, accompanied by his son. He is working full time. No shortness of breath, chest pain, occasional palpitations, lightheadedness, lower extremity edema, bloating, anorexia, nausea, orthopnea, or PND.    Continues cardiac rehab. Continues Chem Dep. Has been smoking roughly 1 pack a week. No illicit or ETOH use.      HPI  Mr. Ubaldo Velez is a 33 year old male with a relevant past medical history including ADHD and polysubstance abuse (ETOH, cocaine, alcohol, meth) who was admitted to an outside hospital with URI symptoms and 30 pound weight gain and transferred to [facility] with concern for cardiogenic shock. Echo revealed an EF of 10% with mod-severe MR and TR and multiple apical thrombus. Right and left heart caths were notable for clean coronary arteries, normal right sided filling pressures, moderately elevated left sided pressures and a marginal cardiac index. He was diuresed, transitioned to oral afterload, and discharged to home on warfarin with a therapeutic INR and hyponatremia prompting adjustment of his spironolactone dosing. He was seen in clinic  2 weeks  "ago when his metoprolol was increased. He returns for follow up.    Ubaldo presents to clinic, accompanied by his son. He is working full time. No shortness of breath, chest pain, occasional palpitations, lightheadedness, lower extremity edema, bloating, anorexia, nausea, orthopnea, or PND.    Continues cardiac rehab. Continues Chem Dep. Has been smoking roughly 1 pack a week. No illicit or ETOH use.       PMH  Past Medical History:   Diagnosis Date     ADHD      Cocaine use      Methamphetamine use (H)          Family History   Problem Relation Age of Onset     Chronic Obstructive Pulmonary Disease Father      Multiple Sclerosis Maternal Aunt        Painting, carpentry     ETOH- heavier 10 per night   Meth\" 1.5 year -snorter      Cocaine: some     ALLERGIES  No Known Allergies      Current Outpatient Medications   Medication Sig Dispense Refill     digoxin (LANOXIN) 250 MCG tablet Take 1 tablet (250 mcg) by mouth daily 90 tablet 3     folic acid (FOLVITE) 1 MG tablet Take 1 tablet (1 mg) by mouth daily 90 tablet 3     hydrALAZINE (APRESOLINE) 25 MG tablet Take 1 tablet (25 mg) by mouth 3 times daily 270 tablet 3     lisinopril (PRINIVIL/ZESTRIL) 10 MG tablet Take 1 tablet (10 mg) by mouth 2 times daily 180 tablet 3     metoprolol succinate ER (TOPROL-XL) 100 MG 24 hr tablet Take 1 tablet (100 mg) by mouth daily 90 tablet 3     vitamin B1 (THIAMINE) 100 MG tablet Take 1 tablet (100 mg) by mouth daily 30 tablet 1     warfarin (COUMADIN) 5 MG tablet Take 2-3 tablets as directed by INR clinic 90 tablet 3       ROS:  Constitutional: No fever, chills, or sweats. No weight gain/loss.   ENT: No visual disturbance, ear ache, epistaxis, sore throat.   Allergies/Immunologic: Negative.   Respiratory: No cough, hemoptysis.   Cardiovascular: As per HPI.   GI: No nausea, vomiting, hematemesis, melena, or hematochezia.   : No urinary frequency, dysuria, or hematuria.   Integument: Negative.   Psychiatric: Negative.   Neuro: " "Negative.   Endocrinology: Negative.   Musculoskeletal: Negative.    EXAM  Vital signs:      BP: 140/80 Pulse: 74     SpO2: 98 %     Height: 190.5 cm (6' 3\") Weight: 100.1 kg (220 lb 9.6 oz)  Estimated body mass index is 27.57 kg/m  as calculated from the following:    Height as of this encounter: 1.905 m (6' 3\").    Weight as of this encounter: 100.1 kg (220 lb 9.6 oz).    General: appears comfortable, alert and articulate  Head: normocephalic, atraumatic  Eyes: anicteric sclera, EOMI  Neck: no adenopathy  Orophyarynx: moist mucosa, no lesions, dentition intact  Heart: regular, S1/S2, no murmur, gallop, rub, estimated JVP flat  Lungs: Respirations even and unlabored, lungs clear, no rales or wheezing  Abdomen: soft, non-tender, bowel sounds present, no hepatomegaly  Extremities: no clubbing, cyanosis or edema  Neurological: normal speech and affect, no gross motor deficits      LABS      Lab Results   Component Value Date    NTBNPI 4,655 (H) 04/24/2019       No results found for: DIG           MOST RECENT ECHOCARDIOGRAM: 4/24/19  Interpretation Summary     CRITICAL FINDINGS-REPORT CALLED TO ER  The left ventricle is severely dilated.  There is mild concentric left ventricular hypertrophy.  The visual ejection fraction is estimated at 10%.  There is severe global hypokinesia of the left ventricle.  Multiple (3+) apical mass-likely clots in LV  Severely decreased right ventricular systolic function  The left atrium is severely dilated.  There is moderate to mod-severe (2-3+) mitral regurgitation.  Visually moderate MR by PISA quantification it is moderately severe  There is moderately severe (3+) tricuspid regurgitation.  Right ventricular systolic pressure is elevated, consistent with moderate to  severe pulmonary hypertension.  IVC diameter >2.1 cm collapsing <50% with sniff suggests a high RA pressure  estimated at 15 mmHg or greater.  Trivial pericardial effusion        ASSESSMENT AND PLAN   Ubaldo MALIN Velez is " a 33 year old male with a relevant past medical history including chronic systolic heart failure secondary to NICM (substance use) who appears well. His renal function and electrolytes are stable. Heart rate improved to at least less than 100  with plenty of blood pressure. Elevated NT ProBNP still worrisome. Once optimized, my consider for GUIDE-HF trial.    Today, we'll increase Toprol to 100 mg daily. Reinforced the importance of quitting smoking and continued abstinence from ETOH and illicit drugs. Sent for urine tox after his visit today.     He'll return to see Dr. Choi, as scheduled next week and CORE in 1 month or prn.     1. Chronic systolic heart failure/HFrEF (EF 10%) secondary to nonischemic cardiomyopathy  NYHA Symptom Class I  Stage C  Primary Cardiologist: Steph; scheduled follow up next week  ACE-I/ARB/ARNi: Titration ongoing  BB titration ongoing  Aldosterone antagonist deferred while other medical therapy is prioritized  SCD prophylaxis Decision deferred during medication uptitration  %BiV pacing: N/A  Fluid status Euvolemic  Cardiac Rehab: Referral previously placed -- currently enrolled  Sleep Apnea Evaluation: Not indicated  Remote PA Pressure Monitoring (CardioMems) Deferred while medical therapy is optimized  Remote monitoring: Encouraged to utilize MyChart, coaching offerred  Follow-up 1 week with Steph    2. LV Thrombus. Anticoagulated with warfarin managed by [facility] at [facility] -- last measured 6/6, therapeutic at 2.2    3. Polysubstance use. Still smoking but reports abstaining from ETOH and illicit drugs. Sent to lab for urine tox following clinic      20 minutes spent in direct care, >50% in counseling      CC  Patient Care Team:  No Ref-Primary, Physician as PCP - Luna Gould RPH as Pharmacist (Pharmacist)  Carrie Tanner, RN as Registered Nurse (Cardiology)  [facility]

## 2019-07-11 NOTE — NURSING NOTE
Chief Complaint   Patient presents with     New Patient     HFrEF, EF 10%, labs prior     Vitals were taken and medications were reconciled.   Angelique Thorne  12:57 PM

## 2019-07-11 NOTE — LETTER
7/11/2019      RE: Ubaldo Velez  [address]  [facility] [address]       Dear Colleague,    Thank you for the opportunity to participate in the care of your patient, Ubaldo Velez, at the [facility] at [facility]. Please see a copy of my visit note below.      July 11, 2019    Mr. Ubaldo Velez is a 33 year old male with a relevant past medical history including ADHD and polysubstance abuse (ETOH, cocaine, alcohol, meth) who was admitted to an outside hospital with URI symptoms and 30 pound weight gain and transferred to [facility] with concern for cardiogenic shock. Echo revealed an EF of 10% with mod-severe MR and TR and multiple apical thrombus. Right and left heart caths were notable for clean coronary arteries, normal right sided filling pressures, moderately elevated left sided pressures and a marginal cardiac index. He was diuresed, transitioned to oral afterload, and discharged to home on warfarin with a therapeutic INR and hyponatremia prompting adjustment of his spironolactone dosing. He was seen in clinic  2 weeks ago when his metoprolol was increased. He returns for follow up.     Ubaldo presents to clinic, accompanied by his son. He is working full time. No shortness of breath, chest pain, occasional palpitations, lightheadedness, lower extremity edema, bloating, anorexia, nausea, orthopnea, or PND.    Continues cardiac rehab. Continues Chem Dep. Has been smoking roughly 1 pack a week. No illicit or ETOH use.      HPI  Mr. Ubaldo Velez is a 33 year old male with a relevant past medical history including ADHD and polysubstance abuse (ETOH, cocaine, alcohol, meth) who was admitted to an outside hospital with URI symptoms and 30 pound weight gain and transferred to [facility] with concern for cardiogenic shock. Echo revealed an EF of 10% with mod-severe MR and TR and multiple apical thrombus. Right and left heart caths were notable for clean coronary  Final Anesthesia Post-op Assessment    Patient: Errol Torres  Procedure(s) Performed: PTERYGIUM REMOVAL - LEFT  Anesthesia type: Monitor Anesthesia Care    Vitals Value Taken Time   Temp 35.9 °C (96.7 °F) 4/17/2019 12:49 PM   Pulse 60 4/17/2019 12:57 PM   Resp 23 4/17/2019 12:57 PM   /82 4/17/2019 12:55 PM   SpO2 95 % 4/17/2019 12:57 PM   Vitals shown include unvalidated device data.    Last 24 I/O:     Intake/Output Summary (Last 24 hours) at 4/17/2019 1259  Last data filed at 4/17/2019 1251  Gross per 24 hour   Intake 800 ml   Output --   Net 800 ml       PATIENT LOCATION: PACU Phase 2  LEVEL OF CONSCIOUSNESS: participates in exam, awake, oriented, answers questions appropriately and alert  RESPIRATORY STATUS: spontaneous ventilation and room air  CARDIOVASCULAR: blood pressure returned to baseline  HYDRATION: euvolemic    PAIN MANAGEMENT: adequately controlled  NAUSEA: None  AIRWAY PATENCY: patent  POST-OP ASSESSMENT: no complications, patient tolerated procedure well with no complications and sufficiently recovered from acute administration of anesthesia effects and able to participate in evaluation  COMPLICATIONS: none  HANDOFF:  Handoff to receiving nurse was performed and questions were answered       "arteries, normal right sided filling pressures, moderately elevated left sided pressures and a marginal cardiac index. He was diuresed, transitioned to oral afterload, and discharged to home on warfarin with a therapeutic INR and hyponatremia prompting adjustment of his spironolactone dosing. He was seen in clinic  2 weeks ago when his metoprolol was increased. He returns for follow up.    Ubaldo presents to clinic, accompanied by his son. He is working full time. No shortness of breath, chest pain, occasional palpitations, lightheadedness, lower extremity edema, bloating, anorexia, nausea, orthopnea, or PND.    Continues cardiac rehab. Continues Chem Dep. Has been smoking roughly 1 pack a week. No illicit or ETOH use.       PMH  Past Medical History:   Diagnosis Date     ADHD      Cocaine use      Methamphetamine use (H)          Family History   Problem Relation Age of Onset     Chronic Obstructive Pulmonary Disease Father      Multiple Sclerosis Maternal Aunt        Painting, carpentry     ETOH- heavier 10 per night   Meth\" 1.5 year -snorter      Cocaine: some     ALLERGIES  No Known Allergies      Current Outpatient Medications   Medication Sig Dispense Refill     digoxin (LANOXIN) 250 MCG tablet Take 1 tablet (250 mcg) by mouth daily 90 tablet 3     folic acid (FOLVITE) 1 MG tablet Take 1 tablet (1 mg) by mouth daily 90 tablet 3     hydrALAZINE (APRESOLINE) 25 MG tablet Take 1 tablet (25 mg) by mouth 3 times daily 270 tablet 3     lisinopril (PRINIVIL/ZESTRIL) 10 MG tablet Take 1 tablet (10 mg) by mouth 2 times daily 180 tablet 3     metoprolol succinate ER (TOPROL-XL) 100 MG 24 hr tablet Take 1 tablet (100 mg) by mouth daily 90 tablet 3     vitamin B1 (THIAMINE) 100 MG tablet Take 1 tablet (100 mg) by mouth daily 30 tablet 1     warfarin (COUMADIN) 5 MG tablet Take 2-3 tablets as directed by INR clinic 90 tablet 3       ROS:  Constitutional: No fever, chills, or sweats. No weight gain/loss.   ENT: No visual " "disturbance, ear ache, epistaxis, sore throat.   Allergies/Immunologic: Negative.   Respiratory: No cough, hemoptysis.   Cardiovascular: As per HPI.   GI: No nausea, vomiting, hematemesis, melena, or hematochezia.   : No urinary frequency, dysuria, or hematuria.   Integument: Negative.   Psychiatric: Negative.   Neuro: Negative.   Endocrinology: Negative.   Musculoskeletal: Negative.    EXAM  Vital signs:      BP: 140/80 Pulse: 74     SpO2: 98 %     Height: 190.5 cm (6' 3\") Weight: 100.1 kg (220 lb 9.6 oz)  Estimated body mass index is 27.57 kg/m  as calculated from the following:    Height as of this encounter: 1.905 m (6' 3\").    Weight as of this encounter: 100.1 kg (220 lb 9.6 oz).    General: appears comfortable, alert and articulate  Head: normocephalic, atraumatic  Eyes: anicteric sclera, EOMI  Neck: no adenopathy  Orophyarynx: moist mucosa, no lesions, dentition intact  Heart: regular, S1/S2, no murmur, gallop, rub, estimated JVP flat  Lungs: Respirations even and unlabored, lungs clear, no rales or wheezing  Abdomen: soft, non-tender, bowel sounds present, no hepatomegaly  Extremities: no clubbing, cyanosis or edema  Neurological: normal speech and affect, no gross motor deficits      LABS      Lab Results   Component Value Date    NTBNPI 4,655 (H) 04/24/2019       No results found for: DIG           MOST RECENT ECHOCARDIOGRAM: 4/24/19  Interpretation Summary     CRITICAL FINDINGS-REPORT CALLED TO ER  The left ventricle is severely dilated.  There is mild concentric left ventricular hypertrophy.  The visual ejection fraction is estimated at 10%.  There is severe global hypokinesia of the left ventricle.  Multiple (3+) apical mass-likely clots in LV  Severely decreased right ventricular systolic function  The left atrium is severely dilated.  There is moderate to mod-severe (2-3+) mitral regurgitation.  Visually moderate MR by PISA quantification it is moderately severe  There is moderately severe (3+) " tricuspid regurgitation.  Right ventricular systolic pressure is elevated, consistent with moderate to  severe pulmonary hypertension.  IVC diameter >2.1 cm collapsing <50% with sniff suggests a high RA pressure  estimated at 15 mmHg or greater.  Trivial pericardial effusion        ASSESSMENT AND PLAN  Mr. Ubaldo Velez is a 33 year old male with a relevant past medical history including chronic systolic heart failure secondary to NICM (substance use) who appears well. His renal function and electrolytes are stable. Heart rate improved to at least less than 100  with plenty of blood pressure. Elevated NT ProBNP still worrisome. Once optimized, my consider for GUIDE-HF trial.    Today, we'll increase Toprol to 100 mg daily. Reinforced the importance of quitting smoking and continued abstinence from ETOH and illicit drugs. Sent for urine tox after his visit today.     He'll return to see Dr. Choi, as scheduled next week and CORE in 1 month or prn.     1. Chronic systolic heart failure/HFrEF (EF 10%) secondary to nonischemic cardiomyopathy  NYHA Symptom Class I  Stage C  Primary Cardiologist: Steph; scheduled follow up next week  ACE-I/ARB/ARNi: Titration ongoing  BB titration ongoing  Aldosterone antagonist deferred while other medical therapy is prioritized  SCD prophylaxis Decision deferred during medication uptitration  %BiV pacing: N/A  Fluid status Euvolemic  Cardiac Rehab: Referral previously placed -- currently enrolled  Sleep Apnea Evaluation: Not indicated  Remote PA Pressure Monitoring (CardioMems) Deferred while medical therapy is optimized  Remote monitoring: Encouraged to utilize MyChart, coaching offerred  Follow-up 1 week with Steph    2. LV Thrombus. Anticoagulated with warfarin managed by [facility] at [facility] -- last measured 6/6, therapeutic at 2.2    3. Polysubstance use. Still smoking but reports abstaining from ETOH and illicit drugs. Sent to lab for urine tox following  clinic      20 minutes spent in direct care, >50% in counseling      CC  Patient Care Team:  No Ref-Primary, Physician as PCP - General  Luna Carlson RPH as Pharmacist (Pharmacist)  Carrie Tanner, RN as Registered Nurse (Cardiology)  [facility]    Please do not hesitate to contact me if you have any questions/concerns.     Sincerely,     Maddie Choi MD

## 2019-07-11 NOTE — NURSING NOTE
Diet: Patient instructed regarding a heart healthy diet, including discussion of reduced fat and sodium intake. Patient demonstrated understanding of this information and agreed to call with further questions or concerns.  Labs: Patient was given results of the laboratory testing obtained today. Patient was instructed to return for the next laboratory testing in 2 weeks. Patient demonstrated understanding of this information and agreed to call with further questions or concerns.   Return Appointment: Patient given instructions regarding scheduling next clinic visit. Patient demonstrated understanding of this information and agreed to call with further questions or concerns.  Medication Change: Patient was educated regarding prescribed medication change, including discussion of the indication, administration, side effects, and when to report to MD or RN. Patient demonstrated understanding of this information and agreed to call with further questions or concerns.  Patient stated he understood all health information given and agreed to call with further questions or concerns.

## 2019-07-11 NOTE — PATIENT INSTRUCTIONS
Cardiology Providers you saw during your visit:  Dr. Choi    Medication changes:  Increase Lisinopril to 15 mg twice daily.   In 2 weeks try to stop by a [facility] and check BP. We will check in with you and if BP is ok will increase Lisinopril to 20 mg twice daily.     Follow up:    Labs in 2 weeks  Follow up in [facility] in 2 months  Follow up with Dr Choi in 4 months with an echo prior.       Please call if you have :  1. Weight gain of more than 2 pounds in a day or 5 pounds in a week  2. Increased shortness of breath, swelling or bloating  3. Dizziness, lightheadedness   4. Any questions or concerns.       Follow the American Heart Association Diet and Lifestyle recommendations:  Limit saturated fat, trans fat, sodium, red meat, sweets and sugar-sweetened beverages. If you choose to eat red meat, compare labels and select the leanest cuts available.  Aim for at least 150 minutes of moderate physical activity or 75 minutes of vigorous physical activity - or an equal combination of both - each week.      During business hours: [phone], press option # 1 to be routed to the [facility] then option # 4 for medical questions to speak with a nurse      After hours, weekends or holidays: On Call Cardiologist- [phone]   option #4 and ask to speak to the on-call Cardiologist. Inform them you are a CORE/heart failure patient at the [facility].      Luna Rubin RN CHFN  Cardiology Nurse Care Coordinator    Keep up the good work!    Take Care!

## 2019-07-15 ENCOUNTER — HOSPITAL ENCOUNTER (OUTPATIENT)
Dept: BEHAVIORAL HEALTH | Facility: CLINIC | Age: 34
End: 2019-07-15
Attending: SOCIAL WORKER
Payer: COMMERCIAL

## 2019-07-15 ENCOUNTER — HOSPITAL ENCOUNTER (OUTPATIENT)
Dept: CARDIAC REHAB | Facility: CLINIC | Age: 34
End: 2019-07-15
Attending: INTERNAL MEDICINE
Payer: COMMERCIAL

## 2019-07-15 PROCEDURE — 93798 PHYS/QHP OP CAR RHAB W/ECG: CPT | Performed by: CLINICAL EXERCISE PHYSIOLOGIST

## 2019-07-15 PROCEDURE — 40000116 ZZH STATISTIC OP CR VISIT: Performed by: CLINICAL EXERCISE PHYSIOLOGIST

## 2019-07-15 PROCEDURE — H2035 A/D TX PROGRAM, PER HOUR: HCPCS | Mod: HQ

## 2019-07-17 ENCOUNTER — HOSPITAL ENCOUNTER (OUTPATIENT)
Dept: BEHAVIORAL HEALTH | Facility: CLINIC | Age: 34
End: 2019-07-17
Attending: SOCIAL WORKER
Payer: COMMERCIAL

## 2019-07-17 PROCEDURE — H2035 A/D TX PROGRAM, PER HOUR: HCPCS | Mod: HQ

## 2019-07-18 ENCOUNTER — HOSPITAL ENCOUNTER (OUTPATIENT)
Dept: CARDIAC REHAB | Facility: CLINIC | Age: 34
End: 2019-07-18
Attending: INTERNAL MEDICINE
Payer: COMMERCIAL

## 2019-07-18 ENCOUNTER — HOSPITAL ENCOUNTER (OUTPATIENT)
Dept: BEHAVIORAL HEALTH | Facility: CLINIC | Age: 34
End: 2019-07-18
Attending: SOCIAL WORKER
Payer: COMMERCIAL

## 2019-07-18 PROCEDURE — 93798 PHYS/QHP OP CAR RHAB W/ECG: CPT | Performed by: REHABILITATION PRACTITIONER

## 2019-07-18 PROCEDURE — H2035 A/D TX PROGRAM, PER HOUR: HCPCS | Mod: HQ

## 2019-07-18 PROCEDURE — 40000116 ZZH STATISTIC OP CR VISIT: Performed by: REHABILITATION PRACTITIONER

## 2019-07-18 NOTE — PROGRESS NOTES
Ubaldo Velez  [id number]               Adult CD Progress Note and Treatment Plan Review          Monday    Group Date: 7/15/2019     Group Attendance Attended group session   Group Therapy Type Addiction   Group Topic Covered Meditation/Breathing Exercises, Relapse Prevention and Thinking Errors/Negative Self-Talk   Client's Response To Group Topic Cooperative with task. Expressed readiness to alter behaviors. Listened actively.   Client Group Participation Detail Highly involved   Group Attendance (Time) 2.0 Hours   Individual Attendance None   Family Attendance None   Other Comments/Information None      Tuesday     Group Date: 7/16/19     Group Attendance Excused from group session   Group Therapy Type Psychoeducation   Group Topic Covered Excused from group session   Client's Response To Group Topic Other - Excused from group session.   Client Group Participation Detail Other - Excused from group session   Group Attendance (Time) Other - Excused from group session   Individual Attendance None   Family Attendance None   Other Comments/Information None      Wednesday    Group Date: 7/17/2019     Group Attendance Attended group session   Group Therapy Type Addiction and Psychoeducation   Group Topic Covered Disease of Addiction/Choices in Recovery, Emotions/Expression/Feelings and Self-Care Activities   Client's Response To Group Topic Cooperative with task. Discussed personal experience with topic. Expressed readiness to alter behaviors. Listened actively.   Client Group Participation Detail Highly involved   Group Attendance (Time) 2.0 Hours   Individual Attendance None   Family Attendance None   Other Comments/Information None       Total # of Phase 1 Group Sessions: 23     Total # of Phase 2 Group Sessions: N/A  Total # of Phase 3 Group Sessions: N/A  Total # of 1:1 Sessions: 2    Support group attended this week: no    Reporting sobriety: Yes, client reports last use of meth as 4/19/2019    Treatment Plan  Review     Treatment Plan Review completed on:  7/18/2019     Projected discharge date: 10/3/2019    Client preferred learning style: by hands-on practice and by watching someone else demonstrate    Staff member(s) contributing:  PRIMITIVO Early, [facility]; Susanne Granados, [facility]    Received supervision: Staffed with Judith Waterman [facility]     Client involvement with treatment planning: contributed to goals and plan.    Client received copy of plan/revised plan: Yes, signed 5/29/2019    Client agrees with plan/revised plan: Yes    Changes to Treatment Plan: No    Client's Dimension 1 Goal(s) Develop effective strategies to maintain sobriety.     See below.   Client's Dimension 2 Goal(s) Disclose CD status to medical providers and follow up with medical interventions while in IOP.   Client would like to quit smoking tobacco.  Maintain good physical health. See below.   Client's Dimension 3 Goal(s) Learn how to apply self-care and psychotherapy to build a repertoire of daily habits that counteract PAWS, fatigue, depression and anxiety leading to increased resiliency and well-being See below.   Client's Dimension 4 Goal(s) Understand the impact your substance use has had on you, your family, and significant relationships.  Increase internal motivation to change. Client presented assignment.   Client's Dimension 5 Goal(s) Gain education about addiction.  Identify personal triggers and relapse warning signs.  Develop sober coping and living skills in order to address the development of a strategy for long term recovery. See below.   Client's Dimension 6 Goal(s) Develop and increase sober support network.  Identify and attend sober support group meetings. See below.     New Goals added since last review: None.    Goal(s) worked on since last review: dim 4, 5    Strategies effective: Yes    Care Coordination Activities: strongly recommended attendance at sober support group.    Medical, Mental Health and other appointments the  "client attended: Continued medical care for recovery for heart failure due to meth use.  PT 3x weekly.  Next ECHO in 4 months.    Medication issues: None.    Physical and mental health problems: Yes - client is recovering from heart failure due to meth use.  Client notes need to stop smoking to meet criteria for transplant, if needed.     Review and evaluation of the individual abuse prevention plan: The program's individual abuse prevention plan (IAPP) is sufficient for this client.     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    Dimension 2 1 On Monday client denies any new or worsening medical conditions, but continues PT and other medical care for recovery from heart failure due to use of meth. Client reports exercise.    Dimension 3 1 On Monday client rates his depression, anxiety, or other mental health concerns as 0-2 of 10 (10 highest), due to \"waiting for an email from the renter's company\".  Client denies any thoughts of hurting himself or others.     Dimension 4 1 On Monday client rates his motivation for recovery as 9 of 10 (10 highest).  Client expressed 3 reasons to remain sober.  Client presented second half \"10 Worst Consequences\" assignment.    Dimension 5 2  On Monday client rates his strongest craving to use in the past week as 1 of 10 (10 highest) due to \"just a lot of anxiety finding a new place\".  Client reports a strength of being calm.  Client reports using coping skill of staying away from drinking/using situations.    Dimension 6 1 On Monday client denied attendance at sober support meetings this week.  Client denies having a sponsor.  Client reports sober " activities.    Data: (Need to include short narrative for the week on what was worked on)  On Monday client participated with the check- in process and affirmation.  Readiness to Change and Relapse Prevention were addressed through discussion of a group peer use episode.  Client actively participated in discussion and provided supportive feedback.  Dimension 4 and 5  were further addressed through client's and group peer's presentations of their  10 Worst Consequences  assignments with discussion.  Client shared incidents during active substance use that have had negative impact on his life.  He also provided supportive feedback, and spoke about how he related to behaviors, thoughts, and feelings described in peer s assignment.  On Tuesday the psychoeducation topic was Positive Psychology with discussion of how one's perspective leads to what is felt.  Video  Happiness  was presented and client processed with group peers.  Client participated in self-soothing exercises.  Client practiced 4x4 and natural mindful breathing as well as [place] Kindness meditations.  Client expresses these exercises are helpful.  Client expressed something he was grateful for.  On Wednesday, client participated in check-in process and shared reflections on reading meditation on topic of happiness. He actively engaged in process group, sharing specific relationship dynamics that risk happiness, and applied solution-focused strategies to increase experiences and awareness of happiness.    Intervention:   Behavioral Therapy, Cognitive Behavioral Therapy, Counselor Feedback, Psychoeducation, Emotional management, Group Feedback, Motivational Interviewing, Relapse Prevention, Twelve Step Facilitation, REBT.    Assessment:    Stages of Change Model  Contemplation    Appears/Sounds:  Cooperative  Engaged    Plan:   Client will express 3 things he is grateful for each night.  Client will practice choice of breathing techniques 2X daily.  Present  2 assignments next week.  -Attend 1-2 sober support group meetings each week (this includes non-12-step/AA alternative meetings).  -Client will continue to work on treatment plan objectives.  -Client will follow all recommendations of counselor and any other medical provider which includes taking all medications as prescribed.    -Client will attend all weekly Phase 1 group sessions.   -Client to engage in sober activities each week.  -Client will try one new coping skill/activity before next session.    PRIMITIVO Early, [facility]

## 2019-07-19 NOTE — ADDENDUM NOTE
Encounter addended by: Lei Kraus, [facility] on: 7/18/2019 8:48 PM   Actions taken: Delete clinical note

## 2019-07-19 NOTE — PROGRESS NOTES
"Ubaldo Velez  July 18, 2019  [id number]    [facility] Process Group Note  Self-Care          Group Date:    Group Attendance Attended group session   Group Therapy Type Addiction, Psychoeducation and Psychotherapeutic   Group Topic Covered Self-Care Activities and Addiction    Client's Response To Group Topic Other - Participated when prompted, see details below.   Client Group Participation Detail Shows interest   Group Attendance (Time) 3.0 Hours   Individual Attendance None   Family Attendance None   Other Comments/Information None     Number of participants:  6      Length of Group:  3 hours      Goal of the Group: Learn aspects of self-care to increase self-empowerment and reduce the impact of post-acute withdrawal symptoms. Learn how self-care can lead to clearer thinking and increased physical and mental resiliency.      [place]: Group Topics and Activities      I.  D3 Intervention and Group Topic addressed: Self-Care and Self-Compassion; Self-Care quiz and goals for next week     II. Mindfulness and/or Motivational Component: Self-Care Evaluation, Worksheet to prioritize needs, Laughter as Self-Care     III. Additional Activity: Completed a self-care evaluation and then prioritized what they felt was lacking and identified what aspect of self-care to address first. Then, developed steps to start this week to improve this area and be more proactive in their own healthcare routine.    The group also learned how Laughter is beneficial to our health.                  IV. Review of Progress:   A. Client's self-report:  Ubaldo reported that he is having \"an awesome week\" and that his job in [place] is almost done and he'll be doing work in [place]. He did his journal 4 times this week and he is doing mindfulness occasionally. He has not had that topic yet.  He said he has no stress and hasn't had any cravings.      B. Therapist's Assessment: Ubaldo participated when prompted. He appeared engaged throughout the " "discussion and asked relevant questions.   The Self-Care area to address first: Nutrition      The Step to work on this week:  No fast food this week, he wants to pack his lunch.             V.   Reflection and evaluation of today's group experience:  Gave verbal feedback and filled out evaluation form. The most important things learned today were \"Laughter can heal you\" and \"The food pyramid changed\" and \"Lecture was great!\"      VI. Assignments or activities to do for next week: Continue to complete the daily journal before bed, do at least one mindfulness exercise a day, practice cognitive defusion for negative thoughts and emotions.   Follow the steps s/he created regarding the self-care identified as most urgent to address.       Therapeutic techniques in this session:  Motivational Therapy, Supportive, Behavioral Modification and Mindfulness      Additional Treatment Referrals/Follow-up Issues to Address: Follow-up is not indicated at this time.       Change Treatment Plan:  No, however, this group does fulfill one intervention under D3.       Change Diagnosis: No changes this week.  ;  "

## 2019-07-23 ENCOUNTER — HOSPITAL ENCOUNTER (OUTPATIENT)
Dept: BEHAVIORAL HEALTH | Facility: CLINIC | Age: 34
End: 2019-07-23
Attending: SOCIAL WORKER
Payer: COMMERCIAL

## 2019-07-23 PROCEDURE — H2035 A/D TX PROGRAM, PER HOUR: HCPCS | Mod: HQ

## 2019-07-24 ENCOUNTER — HOSPITAL ENCOUNTER (OUTPATIENT)
Dept: BEHAVIORAL HEALTH | Facility: CLINIC | Age: 34
End: 2019-07-24
Attending: SOCIAL WORKER
Payer: COMMERCIAL

## 2019-07-24 PROCEDURE — H2035 A/D TX PROGRAM, PER HOUR: HCPCS | Mod: HQ

## 2019-07-24 ASSESSMENT — ANXIETY QUESTIONNAIRES
IF YOU CHECKED OFF ANY PROBLEMS ON THIS QUESTIONNAIRE, HOW DIFFICULT HAVE THESE PROBLEMS MADE IT FOR YOU TO DO YOUR WORK, TAKE CARE OF THINGS AT HOME, OR GET ALONG WITH OTHER PEOPLE: NOT DIFFICULT AT ALL
5. BEING SO RESTLESS THAT IT IS HARD TO SIT STILL: SEVERAL DAYS
GAD7 TOTAL SCORE: 2
7. FEELING AFRAID AS IF SOMETHING AWFUL MIGHT HAPPEN: NOT AT ALL
1. FEELING NERVOUS, ANXIOUS, OR ON EDGE: SEVERAL DAYS
2. NOT BEING ABLE TO STOP OR CONTROL WORRYING: NOT AT ALL
3. WORRYING TOO MUCH ABOUT DIFFERENT THINGS: NOT AT ALL
6. BECOMING EASILY ANNOYED OR IRRITABLE: NOT AT ALL

## 2019-07-24 ASSESSMENT — PATIENT HEALTH QUESTIONNAIRE - PHQ9
5. POOR APPETITE OR OVEREATING: NOT AT ALL
SUM OF ALL RESPONSES TO PHQ QUESTIONS 1-9: 6

## 2019-07-24 NOTE — PROGRESS NOTES
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   Client Participation Detail Other - Excused from group session   Attendance Other - Excused from group session   Individual Attendance None   Family Attendance None   Other Comments/Information None      Tuesday     Group Date: 2019   Group Attendance Attended group session   Group Therapy Type Psychoeducation   Group Topic Covered Disease of Addiction/Choices in Recovery, Relapse Prevention and acute/post acute withdrawal   Client Participation/Contribution Cooperative with task. Discussed personal experience with topic. Listened actively.   Client Participation Detail Highly involved   Attendance 2.0 Hours   Individual Attendance None   Family Attendance None   Other Comments/Information None      Wednesday    Group Date: 2019   Group Attendance Attended group session   Group Therapy Type Addiction and Psychoeducation   Group Topic Covered Disease of Addiction/Choices in Recovery, Meditation/Breathing Exercises, Relapse Prevention, Reviewed Previous Skills Group Topic (PAWS) and Self-Care Activities   Client Participation/Contribution Cooperative with task.  Listened actively.   Client Participation Detail Highly involved   Attendance 2.0 Hours   Individual Attendance None   Family Attendance None   Other Comments/Information None     Total # of Phase 1 Group Sessions: 24     Total # of Phase 2 Group Sessions:   1  Total # of Phase 3 Group Sessions: N/A  Total # of 1:1 Sessions: 2  Length of stay/projected discharge date: 10/3/2019    Support group attended this week: no    Reporting sobriety: Yes, client reports last use of meth as 2019              Learning Style(s):    by hands-on practice and by watching someone else demonstrate    Staff member contributing: Lei Kraus [facility]     Received supervision: Staffed with Judith Waterman [facility]      Client contributed to goals and plan: Yes    Client received a signed copy of treatment plan/revised plan: Yes, signed 2019    Changes to Treatment Plan: No.    Client agrees with plan/revised plan: Yes    Any changes in Vulnerable Adult Status: No    Treatment Coordination Activities: strongly recommended attendance at sober support group.    Medical, Mental Health and other appointments the client attended:  Continued medical care for recovery for heart failure due to meth use.  PT 3x weekly.  Next ECHO in 4 months.    Medication issues: No.    Physical and mental health problems:  Yes - client is recovering from heart failure due to meth use.  Client notes need to stop smoking to meet criteria for transplant, if needed.     Review and evaluation of the individual abuse prevention plan:  The program's individual abuse prevention plan (IAPP) is sufficient for this client.     Dimension One: Acute Intoxication/Withdrawal Potential     Risk at admission to [facility]: 0. Risk at transition to Phase 2: Risk Ratin.     Treatment plan goal:   Develop effective strategies to maintain sobriety. Goal Status: CONTINUE.     Current ASAM criteria: no withdrawal risk.     Treatment Summary/Justification of Transition DIM 1: Client  "exhibits no signs of intoxication or withdrawal throughout the week.    Dimension Two: Biomedical Conditions and Complaints     Risk at admission to IOP: 1. Risk at transition to Phase 2: Risk Ratin.     Treatment plan goal:   Disclose CD status to medical providers and follow up with medical interventions while in treatment program. CONTINUE.  Client would like to quit smoking tobacco. CONTINUE.  Maintain good physical health. CONTINUE.     Current ASAM criteria: stable and manageable.     Treatment Summary/Justification of Transition DIM 2: On Tuesday client denies any new or worsening medical conditions, but continues PT and other medical care for recovery from heart failure due to use of meth. Client reports exercise.  Dimension Three: Emotional/Behavioral/Cognitive Conditions and Complications     Risk at admission to IOP: 1. Risk at transition to Phase 2: Risk Ratin.     Treatment plan goal:   Learn how to apply self-care and psychotherapy to build a repertoire of daily habits that counteract PAWS, fatigue, depression and anxiety leading to increased resiliency and well-being. CONTINUE.     Current ASAM criteria: stable and none or minimal severity     Treatment Summary/Justification of Transition DIM 3: at intake client denies mental health concerns.  Client denies past or current mental health medications.  On 2019 client's PHQ-9 score was 0 out of 27, indicating minimal (normal) depression and his GIANA-7 score was 0 out of 21, indicating minimal (normal) anxiety.   On Tuesday client rates his depression, anxiety, or other mental health concerns as 0-1 of 10 (10 highest), due to \"slightly nervous of securing our new place\".  Client denies any thoughts of hurting himself or others.     PHQ-9 (score at time of transition): Client scored 6 and indicated the symptoms are; not difficult at all to their daily living.  GIANA-7 (score at time of transition): Client scored 2 and indicated the symptoms are; " "not difficult at all to their daily living.    Dimension Four: Readiness to Change     Risk at admission to IOP: 2. Risk at transition to Phase 2: Risk Ratin.     Treatment plan goal:   Understand the impact your substance use has had on you, your family, and significant relationships. CONTINUE.  Increase internal motivation to change. CONTINUE.     Current ASAM criteria: cooperative, engaged in treatment and willing to explore how use affects personal goals.     Treatment Summary/Justification of Transition DIM 4: On Tuesday client rates his motivation for recovery as 8 of 10 (10 highest).  Client expressed 3 reasons to remain sober.     Dimension Five: Relapse/Continues Use/Continues Problem Potential     Risk at admission to IOP: 3. Risk at transition to Phase 2: Risk Ratin.     Treatment plan goal:   Gain education about addiction. CONTINUE.  Identify personal triggers and relapse warning signs. CONTINUE.  Develop sober coping and living skills in order to address the development of a strategy for long term recovery. CONTINUE.     Current ASAM criteria: recognizes relapse issues and prevention strategies but displays some vulnerability for further substance use problems, moderate risk of relapse and client appears to developing coping skills     Treatment Summary/Justification of Transition DIM 5: On Tuesday client rates his strongest craving to use in the past week as 0-1 of 10 (10 highest) due to \"a little unmotivated with children\".  Client reports a strength of optimism.  Client reports using coping skill of staying optimistic.  Client urged to try new coping skills.    Dimension Six: Recovery Environment     Risk at admission to IOP: 1. Risk at transition to Phase 2: Risk Ratin.     Treatment plan goal:   Develop and increase sober support network. Identify and attend sober support group meetings. CONTINUE.     Current ASAM criteria: supportive environment, engaged in structured and meaningful " activity and client appears to developing coping skills     Treatment Summary/Justification of Transition DIM 6: On Tuesday client denied attendance at sober support meetings this week.  Client denies having a sponsor.  Client reports sober activities.  Client promised to attend a support meeting this weekend.  Intervention:   Behavioral Therapy, Cognitive Behavioral Therapy, Counselor Feedback, Psychoeducation, Emotional management, Group Feedback, Motivational Interviewing, Relapse Prevention, Twelve Step Facilitation, REBT.  On Monday client had an excused absence.  On Tuesday the psychoeducation topic was acute and post-acute withdrawal with discussion.  A video lecture by [facility] Medical Director Dr. Lucas Bertrand dealing with this topic was presented.  Client processed with group peers.  On Wednesday, Readiness to Change and Relapse Prevention were addressed through discussion of a group peer use episode.  Client actively participated in discussion and provided supportive feedback.  These dimensions were also addressed through review of  the information presented Tuesday and presentation of further information regarding post-acute withdrawal syndrome.  Client expressed interest and appeared to understand information presented.  These dimensions were further addressed through group peer presentation of their  Symptoms  assignment.  Client participated, provided supportive feedback, and spoke about how he related to behavioral, emotional, and physical symptoms and signs described in peer s assignment.  Client participated in self-soothing exercises.  Client practiced 4x4 and natural mindful breathing as well as a visualization meditation.  Client expresses these exercises are helpful.  Client expressed something he was grateful for.    Assessment:    Stages of Change Model:  Contemplation   Appears/Sounds:  Cooperative, Engaged  PLAN: Transition client to Phase 2 effective 7/24/2019 due to  progress toward treatment goals.   Client currently meets the ASAM criteria for; Outpatient (ASAM level 1)  level of care.    CRYSTAL Damon

## 2019-07-25 ENCOUNTER — HOSPITAL ENCOUNTER (OUTPATIENT)
Dept: CARDIAC REHAB | Facility: CLINIC | Age: 34
End: 2019-07-25
Attending: INTERNAL MEDICINE
Payer: COMMERCIAL

## 2019-07-25 ENCOUNTER — PATIENT OUTREACH (OUTPATIENT)
Dept: CARDIOLOGY | Facility: CLINIC | Age: 34
End: 2019-07-25

## 2019-07-25 PROCEDURE — 40000116 ZZH STATISTIC OP CR VISIT: Performed by: REHABILITATION PRACTITIONER

## 2019-07-25 PROCEDURE — 93798 PHYS/QHP OP CAR RHAB W/ECG: CPT | Performed by: REHABILITATION PRACTITIONER

## 2019-07-25 ASSESSMENT — ANXIETY QUESTIONNAIRES: GAD7 TOTAL SCORE: 2

## 2019-07-25 NOTE — PROGRESS NOTES
Called Ubaldo to check on blood pressure and labs.  He will stop by and get his BP checked today and will MyChart that number.  He is going to reschedule his labs, encouraged him to get them drawn in the next day or two.  He stated understanding of plan    Carrie Tanner RN

## 2019-07-29 ENCOUNTER — HOSPITAL ENCOUNTER (OUTPATIENT)
Dept: BEHAVIORAL HEALTH | Facility: CLINIC | Age: 34
End: 2019-07-29
Attending: SOCIAL WORKER
Payer: COMMERCIAL

## 2019-07-29 ENCOUNTER — HOSPITAL ENCOUNTER (OUTPATIENT)
Dept: CARDIAC REHAB | Facility: CLINIC | Age: 34
End: 2019-07-29
Attending: INTERNAL MEDICINE
Payer: COMMERCIAL

## 2019-07-29 PROCEDURE — 40000116 ZZH STATISTIC OP CR VISIT: Performed by: REHABILITATION PRACTITIONER

## 2019-07-29 PROCEDURE — 93798 PHYS/QHP OP CAR RHAB W/ECG: CPT | Performed by: REHABILITATION PRACTITIONER

## 2019-07-29 PROCEDURE — H2035 A/D TX PROGRAM, PER HOUR: HCPCS | Mod: HQ

## 2019-07-30 DIAGNOSIS — I50.22 CHRONIC SYSTOLIC HEART FAILURE (H): ICD-10-CM

## 2019-07-30 LAB
ANION GAP SERPL CALCULATED.3IONS-SCNC: 6 MMOL/L (ref 3–14)
BUN SERPL-MCNC: 15 MG/DL (ref 7–30)
CALCIUM SERPL-MCNC: 8.4 MG/DL (ref 8.5–10.1)
CHLORIDE SERPL-SCNC: 107 MMOL/L (ref 94–109)
CO2 SERPL-SCNC: 24 MMOL/L (ref 20–32)
CREAT SERPL-MCNC: 1.03 MG/DL (ref 0.66–1.25)
GFR SERPL CREATININE-BSD FRML MDRD: >90 ML/MIN/{1.73_M2}
GLUCOSE SERPL-MCNC: 80 MG/DL (ref 70–99)
POTASSIUM SERPL-SCNC: 4 MMOL/L (ref 3.4–5.3)
SODIUM SERPL-SCNC: 138 MMOL/L (ref 133–144)

## 2019-07-30 NOTE — PROGRESS NOTES
Ubaldo Velez  [id number]               Adult CD Progress Note and Treatment Plan Review          Monday    Group Date: 7/29/2019     Group Attendance Attended group session   Group Therapy Type Addiction   Group Topic Covered Meditation/Breathing Exercises, Relapse Prevention and values violations, spirituality discussion   Client's Response To Group Topic Cooperative with task. Expressed readiness to alter behaviors. Listened actively.   Client Group Participation Detail Highly involved   Group Attendance (Time) 2.0 Hours   Individual Attendance None   Family Attendance None   Other Comments/Information None      Tuesday     Group Date: 7/30/19     Group Attendance Excused from group session   Group Therapy Type Psychoeducation   Group Topic Covered Communication   Client's Response To Group Topic Other - Excused from group session.   Client Group Participation Detail Other - Excused from group session   Group Attendance (Time) Other - Excused from group session   Individual Attendance None   Family Attendance None   Other Comments/Information None      Wednesday    Group Date: 7/31/2019     Group Attendance Attended group session   Group Therapy Type Addiction and Psychoeducation   Group Topic Covered Disease of Addiction/Choices in Recovery, Emotions/Expression/Feelings and Self-Care Activities   Client's Response To Group Topic Cooperative with task. Discussed personal experience with topic. Expressed readiness to alter behaviors. Listened actively.   Client Group Participation Detail Highly involved   Group Attendance (Time) 2.0 Hours   Individual Attendance None   Family Attendance None   Other Comments/Information None       Total # of Phase 1 Group Sessions: 24     Total # of Phase 2 Group Sessions:   4  Total # of Phase 3 Group Sessions: N/A  Total # of 1:1 Sessions: 2    Support group attended this week: no    Reporting sobriety: Yes, client reports last use of meth as 4/19/2019    Treatment Plan Review      Treatment Plan Review completed on:  8/1/2019     Projected discharge date: 10/3/2019    Client preferred learning style: by hands-on practice and by watching someone else demonstrate    Staff member(s) contributing:  PRIMITIVO Early, [facility]; Susanne Granados [facility]    Received supervision: Staffed with Judith Waterman [facility]     Client involvement with treatment planning: contributed to goals and plan.    Client received copy of plan/revised plan: Yes, signed 5/29/2019    Client agrees with plan/revised plan: Yes    Changes to Treatment Plan: No    Client's Dimension 1 Goal(s) Develop effective strategies to maintain sobriety.     See below.   Client's Dimension 2 Goal(s) Disclose CD status to medical providers and follow up with medical interventions while in IOP.   Client would like to quit smoking tobacco.  Maintain good physical health. See below.   Client's Dimension 3 Goal(s) Learn how to apply self-care and psychotherapy to build a repertoire of daily habits that counteract PAWS, fatigue, depression and anxiety leading to increased resiliency and well-being See below.   Client's Dimension 4 Goal(s) Understand the impact your substance use has had on you, your family, and significant relationships.  Increase internal motivation to change. See below.   Client's Dimension 5 Goal(s) Gain education about addiction.  Identify personal triggers and relapse warning signs.  Develop sober coping and living skills in order to address the development of a strategy for long term recovery. See below.   Client's Dimension 6 Goal(s) Develop and increase sober support network.  Identify and attend sober support group meetings. See below.     New Goals added since last review: None.    Goal(s) worked on since last review: dim 4, 5    Strategies effective: Yes    Care Coordination Activities: again strongly recommended attendance at sober support group.    Medical, Mental Health and other appointments the client attended:  "Continued medical care for recovery for heart failure due to meth use.  PT 3x weekly.  Next ECHO in 4 months.    Medication issues: None.    Physical and mental health problems: Yes - client is recovering from heart failure due to meth use.  Client notes need to stop smoking to meet criteria for transplant, if needed.     Review and evaluation of the individual abuse prevention plan: The program's individual abuse prevention plan (IAPP) is sufficient for this client.     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    Dimension 2 1 On Monday client denies any new or worsening medical conditions, but continues PT and other medical care for recovery from heart failure due to use of meth. Client reports exercise.    Dimension 3 1 On Monday client rates his depression, anxiety, or other mental health concerns as 0 of 10 (10 highest), due to \"just living in the present\".  Client denies any thoughts of hurting himself or others.     Dimension 4 1 On Monday client rates his motivation for recovery as 9 of 10 (10 highest).  Client expressed 3 reasons to remain sober.    Dimension 5 2  On Monday client rates his strongest craving to use in the past week as 0 of 10 (10 highest) due to \"didn't have time to think about using\".  Client reports a strength of confidence.  Client reports using coping skill of smiling.    Dimension 6 1 On Monday client denied attendance at sober support meetings this week.  He promises to attend one soon.  Client denies having a sponsor.  Client reports sober activities.    Data: (Need to include short narrative for the week on what was worked on)  On Monday client " participated with the check- in process and affirmation.  Readiness to Change and Relapse Prevention were addressed through group peer s presentation of her  10 Worst [place]sequences  assignment with discussion. Client participated, provided supportive feedback, and spoke about how he related to behaviors, thoughts, and feelings described in peer s assignment.  Client discussed how he had committed values violations while using.  Group peer reported his reflections on the AA Big Book after having read it twice.  Client participated in group discussion of spirituality as presented there, and of traits to look for in a sponsor.  Client practiced 4x4 and natural mindful breathing as well as a  How are you feeling? Check your breathing  exercise.  Client expresses these exercises are helpful.  On Tuesday the psychoeducation topic was communication.  Communication styles and skills were presented and processed with group peers.  Client expressed better understanding of assertive communication rather than aggressive, passive, or passive-aggressive styles.  Client also expressed the importance of listening skills.  Client completed an assertiveness questionnaire and identified as having a fairly assertive approach.  On Wednesday, Readiness to Change and Relapse Prevention were addressed through group peers' presentations of their  Relapse Autopsy  assignments.  Client participated, provided supportive feedback, and spoke about how he related to behavioral, emotional, and mental changes that precede relapse.  Client received psychoeducation and participated in discussion of the Relapse Cycle.  Client actively participated in a group exercise to identify triggers and brainstormed with group peers possible strategies to prevent relapse.  Client participated in self-soothing exercises.  Client expressed something he was grateful for each day.      Intervention:   Behavioral Therapy, Cognitive Behavioral Therapy, Counselor  Feedback, Psychoeducation, Emotional management, Group Feedback, Motivational Interviewing, Relapse Prevention, Twelve Step Facilitation, REBT.    Assessment:    Stages of Change Model  Contemplation    Appears/Sounds:  Cooperative  Engaged    Plan:   Present Symptoms assignment next week.  -Attend 1-2 sober support group meetings each week (this includes non-12-step/AA alternative meetings).  Client will express 3 things he is grateful for each night.  Client will practice choice of breathing techniques 2X daily.  -Client will continue to work on treatment plan objectives.  -Client will follow all recommendations of counselor and any other medical provider which includes taking all medications as prescribed.    -Client will attend all weekly Phase 2 group sessions.   -Client to engage in sober activities each week.  -Client will try one new coping skill/activity before next session.    PRIMITIVO Early, [facility]

## 2019-07-31 ENCOUNTER — PATIENT OUTREACH (OUTPATIENT)
Dept: CARDIOLOGY | Facility: CLINIC | Age: 34
End: 2019-07-31

## 2019-07-31 ENCOUNTER — HOSPITAL ENCOUNTER (OUTPATIENT)
Dept: BEHAVIORAL HEALTH | Facility: CLINIC | Age: 34
End: 2019-07-31
Attending: SOCIAL WORKER
Payer: COMMERCIAL

## 2019-07-31 DIAGNOSIS — I50.23 ACUTE ON CHRONIC HFREF (HEART FAILURE WITH REDUCED EJECTION FRACTION) (H): ICD-10-CM

## 2019-07-31 PROCEDURE — H2035 A/D TX PROGRAM, PER HOUR: HCPCS | Mod: HQ

## 2019-07-31 RX ORDER — LISINOPRIL 20 MG/1
20 TABLET ORAL 2 TIMES DAILY
Qty: 180 TABLET | Refills: 3 | Status: SHIPPED | OUTPATIENT
Start: 2019-07-31 | End: 2019-10-10 | Stop reason: ALTCHOICE

## 2019-07-31 NOTE — PROGRESS NOTES
Called Ubaldo to review labs. We will increase lisinopril based on plan from last visit to 20 mg BID.  Ubaldo stated understanding of plan    Date: 7/31/2019    Time of Call: 3:10 PM     Diagnosis:  Heart Failure     [ VORB ] Ordering provider: Kristin Morales NP    Order: Increase lisinopril to 20 mg BID and check labs in 2 weeks     Order received by: Carrie Tanner RN       Follow-up/additional notes: follow up on BPs

## 2019-08-01 ENCOUNTER — HOSPITAL ENCOUNTER (OUTPATIENT)
Dept: BEHAVIORAL HEALTH | Facility: CLINIC | Age: 34
End: 2019-08-01
Attending: SOCIAL WORKER
Payer: COMMERCIAL

## 2019-08-01 PROCEDURE — H2035 A/D TX PROGRAM, PER HOUR: HCPCS | Mod: HQ
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
## 2019-08-05 ENCOUNTER — HOSPITAL ENCOUNTER (OUTPATIENT)
Dept: BEHAVIORAL HEALTH | Facility: CLINIC | Age: 34
End: 2019-08-05
Attending: SOCIAL WORKER
Payer: COMMERCIAL

## 2019-08-05 ENCOUNTER — HOSPITAL ENCOUNTER (OUTPATIENT)
Dept: CARDIAC REHAB | Facility: CLINIC | Age: 34
End: 2019-08-05
Attending: INTERNAL MEDICINE
Payer: COMMERCIAL

## 2019-08-05 PROCEDURE — 93798 PHYS/QHP OP CAR RHAB W/ECG: CPT | Performed by: OCCUPATIONAL THERAPIST

## 2019-08-05 PROCEDURE — 40000116 ZZH STATISTIC OP CR VISIT: Performed by: OCCUPATIONAL THERAPIST

## 2019-08-05 PROCEDURE — H2035 A/D TX PROGRAM, PER HOUR: HCPCS | Mod: HQ

## 2019-08-06 NOTE — PROGRESS NOTES
Ubaldo Velez  [id number]               Adult CD Progress Note and Treatment Plan Review          Monday    Group Date: 8/5/2019     Group Attendance Attended group session   Group Therapy Type Addiction   Group Topic Covered Disease of Addiction/Choices in Recovery, Meditation/Breathing Exercises, Relapse Prevention and Thinking Errors/Negative Self-Talk   Client's Response To Group Topic Cooperative with task. Discussed personal experience with topic. Expressed readiness to alter behaviors. Listened actively.   Client Group Participation Detail Highly involved   Group Attendance (Time) 2.0 Hours   Individual Attendance None   Family Attendance None   Other Comments/Information None      Tuesday     Group Date: 8/6/19     Group Attendance Excused from group session   Group Therapy Type Psychoeducation   Group Topic Covered Meditation/Breathing Exercises, Relapse Prevention, Relaxation Techniques and Stress Reduction techniques   Client's Response To Group Topic Other - Excused from group session.   Client Group Participation Detail Other - Excused from group session   Group Attendance (Time) Other - Excused from group session   Individual Attendance None   Family Attendance None   Other Comments/Information None      Wednesday    Group Date: 8/7/2019     Group Attendance Attended group session   Group Therapy Type Addiction   Group Topic Covered Emotions/Expression/Feelings, Relapse Prevention, Reviewed Previous Skills Group Topic (stress reduction techniques), Thinking Errors/Negative Self-Talk and self-perception   Client's Response To Group Topic Cooperative with task. Discussed personal experience with topic. Expressed readiness to alter behaviors. Listened actively.   Client Group Participation Detail Highly involved   Group Attendance (Time) 2.0 Hours   Individual Attendance None   Family Attendance None   Other Comments/Information None       Total # of Phase 1 Group Sessions: 24     Total # of Phase 2  Group Sessions:   6  Total # of Phase 3 Group Sessions: N/A  Total # of 1:1 Sessions: 2    Support group attended this week: no    Reporting sobriety: Yes, client reports last use of meth as 4/19/2019    Treatment Plan Review     Treatment Plan Review completed on:  8/8/2019     Projected discharge date: 10/3/2019    Client preferred learning style: by hands-on practice and by watching someone else demonstrate    Staff member(s) contributing:  PRIMITIVO Early, [facility]    Received supervision: Staffed with Judith Waterman [facility]     Client involvement with treatment planning: contributed to goals and plan.    Client received copy of plan/revised plan: Yes, signed 5/29/2019    Client agrees with plan/revised plan: Yes    Changes to Treatment Plan: No    Client's Dimension 1 Goal(s) Develop effective strategies to maintain sobriety.     See below.   Client's Dimension 2 Goal(s) Disclose CD status to medical providers and follow up with medical interventions while in IOP.   Client would like to quit smoking tobacco.  Maintain good physical health. See below.   Client's Dimension 3 Goal(s) Learn how to apply self-care and psychotherapy to build a repertoire of daily habits that counteract PAWS, fatigue, depression and anxiety leading to increased resiliency and well-being See below.   Client's Dimension 4 Goal(s) Understand the impact your substance use has had on you, your family, and significant relationships.  Increase internal motivation to change. See below.   Client's Dimension 5 Goal(s) Gain education about addiction.  Identify personal triggers and relapse warning signs.  Develop sober coping and living skills in order to address the development of a strategy for long term recovery. See below.   Client's Dimension 6 Goal(s) Develop and increase sober support network.  Identify and attend sober support group meetings. See below.     New Goals added since last review: None.    Goal(s) worked on since last review: dim 4,  "5    Strategies effective: Yes    Care Coordination Activities: again strongly recommended attendance at sober support group as client again did not follow through..    Medical, Mental Health and other appointments the client attended: Continued medical care for recovery for heart failure due to meth use.  PT 3x weekly.  Next ECHO in 4 months.    Medication issues: None.    Physical and mental health problems: Yes - client is recovering from heart failure due to meth use.  Client notes need to stop smoking to meet criteria for transplant, if needed.     Review and evaluation of the individual abuse prevention plan: The program's individual abuse prevention plan (IAPP) is sufficient for this client.     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    Dimension 2 1 On Monday client reports new or worsening medical conditions of gaining weight.  He continues PT and other medical care for recovery from heart failure due to use of meth. Client reports exercise.    Dimension 3 1 On Monday client rates his depression, anxiety, or other mental health concerns as 2 of 10 (10 highest), due to \"didn't get the townhouse\".  Client denies any thoughts of hurting himself or others.     Dimension 4 1 On Monday client rates his motivation for recovery as 9 of 10 (10 highest).  Client expressed 3 reasons to remain sober.    Dimension 5 2  On Monday client rates his strongest craving to use in the past week as 3 of 10 (10 highest) due to \"a lot of build up\".  Client reports a strength of discipline.  Client reports using coping skill of going for a drive.  He used \"mindfulness\" as something to change his " thinking and behaviors for the sake of his sobriety.    Dimension 6 1 On Monday client denied attendance at sober support meetings this week.  He will try (saying he won't make any more promises) to attend one soon.  Client denies having a sponsor.  Client reports sober activities.    Data: (Need to include short narrative for the week on what was worked on)  On Monday client participated with the check- in process and affirmation.  Readiness to Change and Relapse Prevention were addressed through group peer s presentation of her  10 Worst Consequences  assignment with discussion. Client participated, provided supportive feedback, and spoke about how he related to behaviors, thoughts, and feelings described in peer s assignment.  Client discussed how he had committed values violations while using.  These dimensions were further addressed through group peer presentation of her  Symptoms  assignment.  Client participated, provided supportive feedback, and spoke about how he related to behavioral, emotional, and physical symptoms and signs described in peer s assignment.  Client practiced 4x4 and natural mindful breathing.  Client expresses these exercises are helpful.  On Tuesday the psychoeducation topic was techniques for Stress Reduction.  Client was introduced to and practiced Progressive Muscle Relaxation, Mindful Body Scan, and Guided Imagery.  Client identified Progressive Muscle Relaxation as most helpful.  Client participated in discussion of how stress is not always negative, and how to change our automatic thoughts about it.  On Wednesday, Readiness to Change and Relapse Prevention were addressed through group peer s presentation of his 10 Worst Consequences  assignment with discussion.  Client participated, provided supportive feedback, and spoke about how he related to behaviors, thoughts, and feelings described in peer s assignment.  Client actively participated in an Expressive Art Therapy exercise in  which he contrasted portrayal of self to the outside world while using as opposed to how he felt and viewed himself internally at that time.  Client expressed that his inside self and outside self are becoming more synchronized in recovery, and that he would like to work toward them becoming even more closely matched.  Client spoke about how he perceives the difference between abstinence alone versus true sobriety in which one is authentic to self and others.  Client participated in self-soothing exercises.  Client expressed something he was grateful for each day.      Intervention:   Behavioral Therapy, Cognitive Behavioral Therapy, Counselor Feedback, Psychoeducation, Emotional management, Group Feedback, Motivational Interviewing, Relapse Prevention, Twelve Step Facilitation, REBT, DBT, and Expressive Art Therapy.    Assessment:    Stages of Change Model  Contemplation    Appears/Sounds:  Cooperative  Engaged    Plan:   Present Symptoms assignment next week.  Client will affirm himself for completion of another day sober each night for a week.  Client will express 3 things he is grateful for each night.  Client will try one new coping skill/activity before next session.  -Attend 1-2 sober support group meetings each week (this includes non-12-step/AA alternative meetings).  Client will practice choice of breathing techniques 2X daily.  -Client will continue to work on treatment plan objectives.  -Client will follow all recommendations of counselor and any other medical provider which includes taking all medications as prescribed.    -Client will attend all weekly Phase 2 group sessions.   -Client to engage in sober activities each week.  -Client will try one new coping skill/activity before next session.    PRIMITIVO Early, [facility]

## 2019-08-07 ENCOUNTER — HOSPITAL ENCOUNTER (OUTPATIENT)
Dept: BEHAVIORAL HEALTH | Facility: CLINIC | Age: 34
End: 2019-08-07
Attending: SOCIAL WORKER
Payer: COMMERCIAL

## 2019-08-07 PROCEDURE — H2035 A/D TX PROGRAM, PER HOUR: HCPCS | Mod: HQ

## 2019-08-08 ENCOUNTER — HOSPITAL ENCOUNTER (OUTPATIENT)
Dept: CARDIAC REHAB | Facility: CLINIC | Age: 34
End: 2019-08-08
Attending: INTERNAL MEDICINE
Payer: COMMERCIAL

## 2019-08-08 PROCEDURE — 93798 PHYS/QHP OP CAR RHAB W/ECG: CPT | Performed by: REHABILITATION PRACTITIONER

## 2019-08-08 PROCEDURE — 40000116 ZZH STATISTIC OP CR VISIT: Performed by: REHABILITATION PRACTITIONER

## 2019-08-13 ENCOUNTER — HOSPITAL ENCOUNTER (OUTPATIENT)
Dept: BEHAVIORAL HEALTH | Facility: CLINIC | Age: 34
End: 2019-08-13
Attending: SOCIAL WORKER
Payer: COMMERCIAL

## 2019-08-13 ENCOUNTER — HOSPITAL ENCOUNTER (OUTPATIENT)
Dept: CARDIAC REHAB | Facility: CLINIC | Age: 34
End: 2019-08-13
Attending: INTERNAL MEDICINE
Payer: COMMERCIAL

## 2019-08-13 PROCEDURE — 40000116 ZZH STATISTIC OP CR VISIT

## 2019-08-13 PROCEDURE — 93798 PHYS/QHP OP CAR RHAB W/ECG: CPT

## 2019-08-13 PROCEDURE — H2035 A/D TX PROGRAM, PER HOUR: HCPCS | Mod: HQ

## 2019-08-14 ENCOUNTER — HOSPITAL ENCOUNTER (OUTPATIENT)
Dept: BEHAVIORAL HEALTH | Facility: CLINIC | Age: 34
End: 2019-08-14
Attending: SOCIAL WORKER
Payer: COMMERCIAL

## 2019-08-14 PROCEDURE — H2035 A/D TX PROGRAM, PER HOUR: HCPCS | Mod: HQ

## 2019-08-15 ENCOUNTER — PATIENT OUTREACH (OUTPATIENT)
Dept: CARDIOLOGY | Facility: CLINIC | Age: 34
End: 2019-08-15

## 2019-08-15 NOTE — PROGRESS NOTES
Attempted to call Ubaldo to remind him that he needed to get labs following the increase in lisinopril.  Unable to leave message, [facility] not set up    Carrie Tanner RN

## 2019-08-19 ENCOUNTER — HOSPITAL ENCOUNTER (OUTPATIENT)
Dept: BEHAVIORAL HEALTH | Facility: CLINIC | Age: 34
End: 2019-08-19
Attending: SOCIAL WORKER
Payer: COMMERCIAL

## 2019-08-19 DIAGNOSIS — I50.23 ACUTE ON CHRONIC HFREF (HEART FAILURE WITH REDUCED EJECTION FRACTION) (H): ICD-10-CM

## 2019-08-19 LAB
ANION GAP SERPL CALCULATED.3IONS-SCNC: 5 MMOL/L (ref 3–14)
BUN SERPL-MCNC: 16 MG/DL (ref 7–30)
CALCIUM SERPL-MCNC: 8.8 MG/DL (ref 8.5–10.1)
CHLORIDE SERPL-SCNC: 108 MMOL/L (ref 94–109)
CO2 SERPL-SCNC: 27 MMOL/L (ref 20–32)
CREAT SERPL-MCNC: 1.13 MG/DL (ref 0.66–1.25)
GFR SERPL CREATININE-BSD FRML MDRD: 84 ML/MIN/{1.73_M2}
GLUCOSE SERPL-MCNC: 103 MG/DL (ref 70–99)
POTASSIUM SERPL-SCNC: 4 MMOL/L (ref 3.4–5.3)
SODIUM SERPL-SCNC: 140 MMOL/L (ref 133–144)

## 2019-08-19 PROCEDURE — H2035 A/D TX PROGRAM, PER HOUR: HCPCS | Mod: HQ

## 2019-08-19 NOTE — PROGRESS NOTES
Able to talk to Ubaldo today, he stated that he will get his labs today- has an appointment scheduled at 3pm.    Carrie Tanner RN

## 2019-08-20 ENCOUNTER — HOSPITAL ENCOUNTER (OUTPATIENT)
Dept: BEHAVIORAL HEALTH | Facility: CLINIC | Age: 34
End: 2019-08-20
Attending: SOCIAL WORKER
Payer: COMMERCIAL

## 2019-08-20 ENCOUNTER — MYC REFILL (OUTPATIENT)
Dept: CARDIOLOGY | Facility: CLINIC | Age: 34
End: 2019-08-20

## 2019-08-20 ENCOUNTER — HOSPITAL ENCOUNTER (OUTPATIENT)
Dept: CARDIAC REHAB | Facility: CLINIC | Age: 34
End: 2019-08-20
Attending: INTERNAL MEDICINE
Payer: COMMERCIAL

## 2019-08-20 DIAGNOSIS — I50.21 ACUTE SYSTOLIC CONGESTIVE HEART FAILURE (H): ICD-10-CM

## 2019-08-20 DIAGNOSIS — I50.23 ACUTE ON CHRONIC HFREF (HEART FAILURE WITH REDUCED EJECTION FRACTION) (H): ICD-10-CM

## 2019-08-20 PROCEDURE — 93798 PHYS/QHP OP CAR RHAB W/ECG: CPT | Performed by: REHABILITATION PRACTITIONER

## 2019-08-20 PROCEDURE — H2035 A/D TX PROGRAM, PER HOUR: HCPCS | Mod: HQ

## 2019-08-20 PROCEDURE — 40000116 ZZH STATISTIC OP CR VISIT: Performed by: REHABILITATION PRACTITIONER

## 2019-08-20 RX ORDER — HYDRALAZINE HYDROCHLORIDE 25 MG/1
25 TABLET, FILM COATED ORAL 3 TIMES DAILY
Qty: 270 TABLET | Refills: 3 | Status: CANCELLED | OUTPATIENT
Start: 2019-08-20

## 2019-08-20 RX ORDER — FOLIC ACID 1 MG/1
1 TABLET ORAL DAILY
Qty: 90 TABLET | Refills: 3 | Status: CANCELLED | OUTPATIENT
Start: 2019-08-20

## 2019-08-20 NOTE — PROGRESS NOTES
Ubaldo Velez  [id number]               Adult CD Progress Note and Treatment Plan Review          Monday    Group Date: 8/19/2019     Group Attendance Attended group session   Group Therapy Type Addiction   Group Topic Covered Disease of Addiction/Choices in Recovery, Meditation/Breathing Exercises, Relapse Prevention and Thinking Errors/Negative Self-Talk   Client's Response To Group Topic Cooperative with task. Discussed personal experience with topic. Listened actively.   Client Group Participation Detail Highly involved   Group Attendance (Time) 2.0 Hours   Individual Attendance None   Family Attendance None   Other Comments/Information None      Tuesday     Group Date: 8/20/2019     Group Attendance Attended group session   Group Therapy Type Psychoeducation   Group Topic Covered Relapse Prevention and Relapse Cycle, trigger recognition and mgmt strategies   Client's Response To Group Topic Cooperative with task. Discussed personal experience with topic. Listened actively.   Client Group Participation Detail Highly involved   Group Attendance (Time) 2.0 Hours   Individual Attendance None   Family Attendance None   Other Comments/Information None      Wednesday    Group Date: 8/21/2019     Group Attendance Attended group session   Group Therapy Type Addiction   Group Topic Covered Relapse Prevention and Reviewed Previous Skills Group Topic (Relapse Cycle, trigger recognition and mgmt strategies)   Client's Response To Group Topic Cooperative with task. Discussed personal experience with topic. Expressed readiness to alter behaviors. Listened actively.   Client Group Participation Detail Highly involved   Group Attendance (Time) 2.0 Hours   Individual Attendance None   Family Attendance None   Other Comments/Information None       Total # of Phase 1 Group Sessions: 24     Total # of Phase 2 Group Sessions: 11  Total # of Phase 3 Group Sessions: N/A  Total # of 1:1 Sessions: 2    Support group attended this  week: no    Reporting sobriety: Yes, client reports last use of meth as 4/19/2019    Treatment Plan Review     Treatment Plan Review completed on:  8/22/2019     Projected discharge date: 10/3/2019    Client preferred learning style: by hands-on practice and by watching someone else demonstrate    Staff member(s) contributing:  PRIMITIVO Early, [facility]    Received supervision: no    Client involvement with treatment planning: contributed to goals and plan.    Client received copy of plan/revised plan: Yes, signed 5/29/2019    Client agrees with plan/revised plan: Yes    Changes to Treatment Plan: No    Client's Dimension 1 Goal(s) Develop effective strategies to maintain sobriety.     See below.   Client's Dimension 2 Goal(s) Disclose CD status to medical providers and follow up with medical interventions while in IOP.   Client would like to quit smoking tobacco.  Maintain good physical health. See below.   Client's Dimension 3 Goal(s) Learn how to apply self-care and psychotherapy to build a repertoire of daily habits that counteract PAWS, fatigue, depression and anxiety leading to increased resiliency and well-being See below.   Client's Dimension 4 Goal(s) Understand the impact your substance use has had on you, your family, and significant relationships.  Increase internal motivation to change. See below.   Client's Dimension 5 Goal(s) Gain education about addiction.  Identify personal triggers and relapse warning signs.  Develop sober coping and living skills in order to address the development of a strategy for long term recovery. See below.   Client's Dimension 6 Goal(s) Develop and increase sober support network.  Identify and attend sober support group meetings. See below.     New Goals added since last review: None.    Goal(s) worked on since last review: dim 4, 5    Strategies effective: Yes    Care Coordination Activities: None.    Medical, Mental Health and other appointments the client attended: Continued  "medical care for recovery for heart failure due to meth use.  PT 3x weekly now done.  Labs done 8/19/2019.  Next ECHO in 4 months.    Medication issues: None.    Physical and mental health problems: Yes - client is recovering from heart failure due to meth use.  Client notes need to stop smoking to meet criteria for transplant, if needed.     Review and evaluation of the individual abuse prevention plan: The program's individual abuse prevention plan (IAPP) is sufficient for this client.     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    Dimension 2 1 On Monday client denies any new or worsening medical conditions.  He continues medical care for recovery from heart failure due to use of meth.  He reports he had his final cardio rehab PT appt this week.  Client reports exercise.    Dimension 3 1 On Monday client rates his depression, anxiety, or other mental health concerns as 0 of 10 (10 highest), due to \"everything is going smooth\".  Client denies any thoughts of hurting himself or others.     Dimension 4 1 On Monday client rates his motivation for recovery as 9 of 10 (10 highest).  Client expressed 3 reasons to remain sober.    Dimension 5 2  On Monday client rates his strongest craving to use in the past week as 0 of 10 (10 highest).  Client reports strengths of being outgoing and active.  Client reports using coping skill of reading.  He used \"spent time with family\" as something to change his thinking and behaviors for the sake of his sobriety.    Dimension 6 1 On Monday client denied attendance at sober support meetings this week.  Client denies having a sponsor.  Client reports " sober activities.    Data: (Need to include short narrative for the week on what was worked on)  On Monday client participated with the check- in process, affirmation, and introduced self to new client.  Readiness to Change and Relapse Prevention were addressed through group peer s presentation of his  10 Worst Consequences  assignment with discussion. Client participated, provided supportive feedback, and spoke about how he related to behaviors, thoughts, and feelings described in peer s assignment.  Client participated in a visualization meditative exercise.  Client expresses this exercise was helpful.  On Tuesday, client actively participated in a group exercise to identify triggers and described which were personally dangerous for him.   Client received psychoeducation and participated in discussion of the Relapse Cycle.   Client discussed personally relevant triggers, what thoughts he had when experiencing triggers, what cravings felt like mentally and physically, and what he had told himself  to give permission to use in the past.  Client learned relapse warning signs and received tips to avoid relapse.  On Wednesday, the Relapse Cycle, trigger recognition and management strategies were reviewed and further discussed.  Client brainstormed with group peers other possible strategies to prevent relapse.  Client then created a Trigger Lock to help arrest the relapse cycle at the start.  Client expressed that he found the exercise helpful.  Client participated in self-soothing exercises.  Client practiced 4x4 and natural mindful breathing.  Client expressed something he was grateful for each day.      Intervention:   Behavioral Therapy, Cognitive Behavioral Therapy, Counselor Feedback, Psychoeducation, Emotional management, Group Feedback, Motivational Interviewing, Relapse Prevention, Twelve Step Facilitation, REBT    Assessment:    Stages of Change  Model  Contemplation    Appears/Sounds:  Cooperative  Engaged    Plan:   Client will carry his trigger-lock card with him each day.  Present Symptoms assignment next week.  Client will affirm himself for completion of another day sober each night for a week.  Client will express 3 things he is grateful for each night.  Client will try one new coping skill/activity before next session.  -Attend 1 sober support group meeting this week (this includes non-12-step/AA alternative meetings).  Client will practice choice of breathing techniques 2X daily.  -Client will continue to work on treatment plan objectives.  -Client will follow all recommendations of counselor and any other medical provider which includes taking all medications as prescribed.    -Client will attend all weekly Phase 2 group sessions.   -Client to engage in sober activities each week.  -Client will try one new coping skill/activity before next session.    PRIMITIVO Early, [facility]

## 2019-08-20 NOTE — PROGRESS NOTES
Discussed with Kristin Morales NP whether any changes were necessary- none at this time.    Carrie Tanner RN

## 2019-08-20 NOTE — PROGRESS NOTES
"Ubaldo able to get labs drawn yesterday.  He has been on 20 mg of lisinopril BID and has been feeling \"great.\"  He reports that his blood pressures have been \"perfect\"- per cardiac rehab notes 110-120's/80's.  He denies lightheadedness, states his energy levels have been great. He is graduating from cardiac rehab today and has started back at the gym as well.  He reports that he is doing what he considers to be \"light, high rep weights.\"  I reminded his that he Dr Choi had limited the amount of weight he should be lifting to protect his heart.  He stated understanding but repeated that he wanted to get back in shape.      Carrie Tanner RN    "

## 2019-08-21 ENCOUNTER — HOSPITAL ENCOUNTER (OUTPATIENT)
Dept: BEHAVIORAL HEALTH | Facility: CLINIC | Age: 34
End: 2019-08-21
Attending: SOCIAL WORKER
Payer: COMMERCIAL

## 2019-08-21 ENCOUNTER — TELEPHONE (OUTPATIENT)
Dept: CARDIOLOGY | Facility: CLINIC | Age: 34
End: 2019-08-21

## 2019-08-21 PROCEDURE — H2035 A/D TX PROGRAM, PER HOUR: HCPCS | Mod: HQ

## 2019-08-21 NOTE — TELEPHONE ENCOUNTER
[facility] Call Center    Phone Message    May a detailed message be left on voicemail: yes    Reason for Call: Other: Mara calling to [facility] is not seeing pt anymore. Please call her back with any questions.     Action Taken: Message routed to:  [facility] cardio

## 2019-08-22 ENCOUNTER — ANTICOAGULATION THERAPY VISIT (OUTPATIENT)
Dept: ANTICOAGULATION | Facility: CLINIC | Age: 34
End: 2019-08-22

## 2019-08-22 DIAGNOSIS — Z79.01 LONG TERM (CURRENT) USE OF ANTICOAGULANTS: ICD-10-CM

## 2019-08-22 DIAGNOSIS — I50.21 ACUTE SYSTOLIC CONGESTIVE HEART FAILURE (H): Primary | ICD-10-CM

## 2019-08-22 DIAGNOSIS — G08 THROMBOSIS OF LATERAL VENOUS SINUS: ICD-10-CM

## 2019-08-22 DIAGNOSIS — I74.9 EMBOLISM AND THROMBOSIS (H): ICD-10-CM

## 2019-08-22 PROBLEM — T82.868A: Status: ACTIVE | Noted: 2019-08-22

## 2019-08-22 NOTE — TELEPHONE ENCOUNTER
"Called the AC clinic back.  Ubaldo has not been seen by them in 2 months, the provider that had been following him is no longer at [facility] and he has insurance that does not allow him to follow there any more anyways.  Called Ubaldo to confirm that he hadn't had his INR checked in 2 months, he confirmed that and stated \"oh, yeah, I need to take care of that.\"  Placed referral for INR monitoring and standing INR lab check.  Confirmed with INR clinic that they had everything they needed. They stated that they would call Ubaldo to set up monitoring.    Date: 8/22/2019    Time of Call: 12:11 PM     Diagnosis:  Heart Failure     [ VORB ] Ordering provider: Dr Choi    Order: standing INR lab draw, INR referral     Order received by: Carrie Tanner RN       Follow-up/additional notes: followed up with AC clinic who will call to set up monitoring        "

## 2019-08-22 NOTE — PROGRESS NOTES
8/27/19 ADDENDUM  Spoke to Ubaldo.  He attempted to be drawn at [facility], however he did not have a standing order.  Reviewed that [facility] would be preferable for INR labs.  Patient verbalized understanding.  Will have an INR drawn at [facility] tomorrow.  Niko Cox RN        Referral Date:8/22/19  Physician Responsible for Anticoagulation:Dr. Choi  Indication: LV Thrombosis                            Goal Range: 2.0-3.0                           Duration:Lifetime  INR Referral is in EPIC:    Yes     Standing Lab Orders are in EPIC:   yes   Patient Contacted: yes  Appropriate for Education  Patient has been on Warfarin  Future Medical or Dental Procedures  No  Currently on Lovenox:  no  Currently on Warfarin: Yes:  12.5mg Thursday, 10mg all other days of the week.  Patient will Have Home care no   Patient in a TCU or Assisted Living no  [place] or Outside Lab: [facility]   New Recommended Warfarin Regimen: Continue current maintenance dose  Next Scheduled Labs: 8/23  HIPAA Release Form Sent:   yes   Welcome Letter and Warfarin Educational Packet Sent:  Yes    Spoke to Ubaldo.  He was willing to go into the lab this afternoon. May not see results until 8/23.    Niko CoxRN

## 2019-08-26 ENCOUNTER — HOSPITAL ENCOUNTER (OUTPATIENT)
Dept: BEHAVIORAL HEALTH | Facility: CLINIC | Age: 34
End: 2019-08-26
Attending: SOCIAL WORKER
Payer: COMMERCIAL

## 2019-08-26 PROCEDURE — H2035 A/D TX PROGRAM, PER HOUR: HCPCS | Mod: HQ

## 2019-08-27 ENCOUNTER — HOSPITAL ENCOUNTER (OUTPATIENT)
Dept: BEHAVIORAL HEALTH | Facility: CLINIC | Age: 34
End: 2019-08-27
Attending: SOCIAL WORKER
Payer: COMMERCIAL

## 2019-08-27 PROCEDURE — H2035 A/D TX PROGRAM, PER HOUR: HCPCS | Mod: HQ

## 2019-08-27 NOTE — PROGRESS NOTES
Ubaldo Velez  [id number]               Adult CD Progress Note and Treatment Plan Review          Monday    Group Date: 8/26/2019     Group Attendance Attended group session   Group Therapy Type Addiction   Group Topic Covered Disease of Addiction/Choices in Recovery, Meditation/Breathing Exercises, Relapse Prevention, Thinking Errors/Negative Self-Talk and REBT   Client's Response To Group Topic Cooperative with task. Discussed personal experience with topic. Listened actively.   Client Group Participation Detail Highly involved   Group Attendance (Time) 2.0 Hours   Individual Attendance None   Family Attendance None   Other Comments/Information None      Tuesday     Group Date: 8/27/2019     Group Attendance Attended group session   Group Therapy Type Psychoeducation   Group Topic Covered Relapse Prevention and cravings mgmt/coping skills   Client's Response To Group Topic Cooperative with task. Discussed personal experience with topic. Listened actively.   Client Group Participation Detail Highly involved   Group Attendance (Time) 2.0 Hours   Individual Attendance None   Family Attendance None   Other Comments/Information None      Wednesday    Group Date: 8/28/2019     Group Attendance Attended group session   Group Therapy Type Addiction   Group Topic Covered Coping Skills/Lifestyle Management, Meditation/Breathing Exercises, Relapse Prevention, Reviewed Previous Skills Group Topic (cravings mgmt/coping skills), Thinking Errors/Negative Self-Talk and Spirituality   Client's Response To Group Topic Cooperative with task. Discussed personal experience with topic. Expressed readiness to alter behaviors. Listened actively.   Client Group Participation Detail Highly involved   Group Attendance (Time) 2.0 Hours   Individual Attendance None   Family Attendance None   Other Comments/Information None       Total # of Phase 1 Group Sessions: 24     Total # of Phase 2 Group Sessions: 14  Total # of Phase 3 Group  Sessions: N/A  Total # of 1:1 Sessions: 2    Support group attended this week: no    Reporting sobriety: Yes, client reports last use of meth as 4/19/2019    Treatment Plan Review     Treatment Plan Review completed on:  8/29/2019     Projected discharge date: 10/3/2019    Client preferred learning style: by hands-on practice and by watching someone else demonstrate    Staff member(s) contributing:  Lei Kraus, PRIMITIVO, CHLOÉC    Received supervision: no    Client involvement with treatment planning: contributed to goals and plan.    Client received copy of plan/revised plan: Yes, signed 5/29/2019    Client agrees with plan/revised plan: Yes    Changes to Treatment Plan: No    Client's Dimension 1 Goal(s) Develop effective strategies to maintain sobriety.     See below.   Client's Dimension 2 Goal(s) Disclose CD status to medical providers and follow up with medical interventions while in IOP.   Client would like to quit smoking tobacco.  Maintain good physical health. See below.   Client's Dimension 3 Goal(s) Learn how to apply self-care and psychotherapy to build a repertoire of daily habits that counteract PAWS, fatigue, depression and anxiety leading to increased resiliency and well-being See below.   Client's Dimension 4 Goal(s) Understand the impact your substance use has had on you, your family, and significant relationships.  Increase internal motivation to change. Client presented assignment.   Client's Dimension 5 Goal(s) Gain education about addiction.  Identify personal triggers and relapse warning signs.  Develop sober coping and living skills in order to address the development of a strategy for long term recovery. See below.   Client's Dimension 6 Goal(s) Develop and increase sober support network.  Identify and attend sober support group meetings. See below.     New Goals added since last review: None.    Goal(s) worked on since last review: dim 4, 5    Strategies effective: Yes    Care Coordination  "Activities: None.    Medical, Mental Health and other appointments the client attended: Continued medical care for recovery for heart failure due to meth use.  PT 3x weekly now done.  Labs done 8/19/2019.  Next ECHO in 4 months.    Medication issues: None.    Physical and mental health problems: Yes - client is recovering from heart failure due to meth use.  Client notes need to stop smoking to meet criteria for transplant, if needed.  8/19/2019 client reports he had his final cardio rehab PT appt this week.     Review and evaluation of the individual abuse prevention plan: The program's individual abuse prevention plan (IAPP) is sufficient for this client.     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    Dimension 2 1 On Monday client denies any new or worsening medical conditions.  He continues medical care for recovery from heart failure due to use of meth.  Client reports exercise.    Dimension 3 1 On Monday client rates his depression, anxiety, or other mental health concerns as 0 of 10 (10 highest), due to \"achieving every goal I had set in the past week\".  Client denies any thoughts of hurting himself or others.     Dimension 4 1 On Monday client rates his motivation for recovery as 9 of 10 (10 highest).  Client expressed 3 reasons to remain sober.  Client presented his Symptoms assignment.    Dimension 5 2  On Monday client rates his strongest craving to use in the past week as 0 of 10 (10 highest).  Client reports strengths of being committed to living his values, goal oriented and disciplined.  Client reports using coping skill of spending quality time with his son.  He \"set " "new goals for rest of the year\" as something he did to change his thinking and behaviors for the sake of his sobriety.    Dimension 6 1 On Monday client denied attendance at sober support meetings this week.  Client denies having a sponsor.  Client reports sober activities.    Data: (Need to include short narrative for the week on what was worked on)  On Monday client participated with the check- in process and affirmation.  Readiness to Change and Relapse Prevention were addressed through discussion of group peer s use episode of last week.  Client gave supportive feedback to group peer.  Relapse Cycle reviewed.  Client received education about naltrexone, acamprosate, Antabuse and suboxone.  Client took part in group discussion of MAT for CHERRY.   Group peers presented their first half  10 Worst Consequences  and  Anxiety and Recovery  assignments with discussion.  Client participated, provided supportive feedback, and spoke about how he related to behaviors, thoughts, and feelings described in peers  assignments.  Client presented his Symptoms assignment.  Client shared behavioral, emotional, and physical symptoms and signs he had experienced and had previously denied that they were related to his CHERRY.  Client practiced 4x4 and natural mindful breathing.  Client expresses this exercise as helpful.    On Tuesday the psychoeducation topic was cravings management/coping skills.  Client introduced to combating urges with DEADS (delay, escape, accept, dispute, substitute), learned urge surfing, and how to DISARM.  Client actively participated in a group exercise to identify sober activities and coping skills for each letter of the alphabet A-Z.  A list of over 100 activities/coping skills was collaboratively created.      On Wednesday the psychoeducational topic was reviewed and taught back by client.  60 Essential Coping Skills, including spirituality, were presented and discussed.  Client was an active participant.  " Readiness to Change and Relapse Prevention were addressed through group peer s presentation of his  10 Worst Consequences  assignment with discussion.  Client participated, provided supportive feedback, and spoke about how he related to behaviors, thoughts, and feelings described in peer s assignment.  Client participated in self-soothing exercises.  Client participated in a guided imagery [place] Meditation and compared it to breathing meditations done in previous groups.  Client expressed he found it to be a useful alternative meditation technique.  Client expressed something he was grateful for each day.       Intervention:   Behavioral Therapy, Cognitive Behavioral Therapy, Counselor Feedback, Psychoeducation, Emotional management, Group Feedback, Motivational Interviewing, Relapse Prevention, Twelve Step Facilitation, REBT    Assessment:    Stages of Change Model  Contemplation    Appears/Sounds:  Cooperative  Engaged    Plan:   Client will try one new coping skill/activity before next session.  Present Triggers and Cues assignment next week.  Client will affirm himself for completion of another day sober each night for a week.  Client will express 3 things he is grateful for each night.  Client will carry his trigger-lock card with him each day.  -Attend 1 sober support group meeting this week (this includes non-12-step/AA alternative meetings).  Client will practice choice of breathing techniques 2X daily.  -Client will continue to work on treatment plan objectives.  -Client will follow all recommendations of counselor and any other medical provider which includes taking all medications as prescribed.    -Client will attend one more Phase 2 group session and then transition to Phase 3.   -Client to engage in sober activities each week.  -Client will try one new coping skill/activity before next session.    PRIMITIVO Early, [facility]

## 2019-08-28 ENCOUNTER — HOSPITAL ENCOUNTER (OUTPATIENT)
Dept: BEHAVIORAL HEALTH | Facility: CLINIC | Age: 34
End: 2019-08-28
Attending: SOCIAL WORKER
Payer: COMMERCIAL

## 2019-08-28 PROCEDURE — H2035 A/D TX PROGRAM, PER HOUR: HCPCS | Mod: HQ

## 2019-08-29 NOTE — ADDENDUM NOTE
Encounter addended by: Lei Kraus, [facility] on: 8/29/2019 2:11 PM   Actions taken: Sign clinical note

## 2019-08-29 NOTE — ADDENDUM NOTE
Encounter addended by: Lei Kraus, [facility] on: 8/29/2019 12:54 PM   Actions taken: Sign clinical note

## 2019-08-30 ENCOUNTER — ANTICOAGULATION THERAPY VISIT (OUTPATIENT)
Dept: ANTICOAGULATION | Facility: CLINIC | Age: 34
End: 2019-08-30

## 2019-08-30 DIAGNOSIS — I74.9 EMBOLISM AND THROMBOSIS (H): ICD-10-CM

## 2019-08-30 DIAGNOSIS — Z79.01 LONG TERM (CURRENT) USE OF ANTICOAGULANTS: ICD-10-CM

## 2019-08-30 LAB — INR PPP: 1.41 (ref 0.86–1.14)

## 2019-08-30 PROCEDURE — 36415 COLL VENOUS BLD VENIPUNCTURE: CPT | Performed by: INTERNAL MEDICINE

## 2019-08-30 PROCEDURE — 85610 PROTHROMBIN TIME: CPT | Performed by: INTERNAL MEDICINE

## 2019-08-30 NOTE — PROGRESS NOTES
ANTICOAGULATION FOLLOW-UP CLINIC VISIT    Patient Name:  Ubaldo Velez  Date:  2019  Contact Type:  Telephone    SUBJECTIVE:  Patient Findings     Positives:   Missed doses (missed warfarin dose  or ), Change in medications (lisinopril dose increased)             OBJECTIVE    INR   Date Value Ref Range Status   2019 1.41 (H) 0.86 - 1.14 Final       ASSESSMENT / PLAN  INR assessment SUB    Recheck INR In: 4 DAYS    INR Location Clinic      Anticoagulation Summary  As of 2019    INR goal:   2.0-3.0   TTR:   --   INR used for dosin.41! (2019)   Warfarin maintenance plan:   12.5 mg (5 mg x 2.5) every Thu; 10 mg (5 mg x 2) all other days   Full warfarin instructions:   : 15 mg; : 12.5 mg; Otherwise 12.5 mg every Thu; 10 mg all other days   Weekly warfarin total:   72.5 mg   Plan last modified:   Liliana Bañuelos RN (2019)   Next INR check:   9/3/2019   Priority:   INR   Target end date:   Indefinite    Indications    Embolism and thrombosis (H) [I74.9]  Long term (current) use of anticoagulants [Z79.01]             Anticoagulation Episode Summary     INR check location:       Preferred lab:       Send INR reminders to:   [facility]    Comments:   Patient transferred from [facility]. Hx of ETOH, polysubstance abuse. ok to leave .  HIPPA packet sent 19.  Will use [facility] lab. Has 5mg tablets.  Not new to Warfarin.  LV thrombosis.  Contact . [phone]      Anticoagulation Care Providers     Provider Role Specialty Phone number    Maddie Choi MD Responsible Cardiology [phone]            See the Encounter Report to view Anticoagulation Flowsheet and Dosing Calendar (Go to Encounters tab in chart review, and find the Anticoagulation Therapy Visit)    Spoke with Ubaldo.  He states he missed one dose of warfarin this week, either  or .  Will increase warfarin dose and recheck INR in 4 days.    He did not return to the [facility] when  recommended.    Liliana Bañuelos RN

## 2019-09-03 ENCOUNTER — ANTICOAGULATION THERAPY VISIT (OUTPATIENT)
Dept: ANTICOAGULATION | Facility: CLINIC | Age: 34
End: 2019-09-03

## 2019-09-03 DIAGNOSIS — I74.9 EMBOLISM AND THROMBOSIS (H): ICD-10-CM

## 2019-09-03 DIAGNOSIS — Z79.01 LONG TERM (CURRENT) USE OF ANTICOAGULANTS: ICD-10-CM

## 2019-09-03 LAB — INR PPP: 1.83 (ref 0.86–1.14)

## 2019-09-03 PROCEDURE — 36415 COLL VENOUS BLD VENIPUNCTURE: CPT | Performed by: INTERNAL MEDICINE

## 2019-09-03 PROCEDURE — 85610 PROTHROMBIN TIME: CPT | Performed by: INTERNAL MEDICINE

## 2019-09-03 NOTE — PROGRESS NOTES
ANTICOAGULATION FOLLOW-UP CLINIC VISIT    Patient Name:  Ubaldo Velez  Date:  9/3/2019  Contact Type:  Telephone    SUBJECTIVE:         OBJECTIVE    INR   Date Value Ref Range Status   2019 1.83 (H) 0.86 - 1.14 Final       ASSESSMENT / PLAN  INR assessment SUB    Recheck INR In: 8 DAYS    INR Location Clinic      Anticoagulation Summary  As of 9/3/2019    INR goal:   2.0-3.0   TTR:   0.0 % (2 d)   INR used for dosin.83! (9/3/2019)   Warfarin maintenance plan:   12.5 mg (5 mg x 2.5) every Thu; 10 mg (5 mg x 2) all other days   Full warfarin instructions:   9/3: 12.5 mg; Otherwise 12.5 mg every Thu; 10 mg all other days   Weekly warfarin total:   72.5 mg   Plan last modified:   Liliana Bañuelos RN (2019)   Next INR check:   2019   Priority:   INR   Target end date:   Indefinite    Indications    Embolism and thrombosis (H) [I74.9]  Long term (current) use of anticoagulants [Z79.01]             Anticoagulation Episode Summary     INR check location:       Preferred lab:       Send INR reminders to:    [facility]    Comments:   Patient transferred from [facility]. Hx of ETOH, polysubstance abuse. ok to leave .  HIPPA packet sent 19.  Will use [facility] lab. Has 5mg tablets.  Not new to Warfarin.  LV thrombosis.  Contact [facility] [phone]      Anticoagulation Care Providers     Provider Role Specialty Phone number    Maddie Choi MD [facility] Cardiology [phone]            See the Encounter Report to view Anticoagulation Flowsheet and Dosing Calendar (Go to Encounters tab in chart review, and find the Anticoagulation Therapy Visit)    INR/CFX/F2 RESULT: INR result is 1.83 on 9/3    ASSESSMENT:No Problems found. No Changes in Health, Medications, or diet. No Signs of bruising, bleeding or clotting.    DOSING ADJUSTMENT: Recommended 12.5mg one time today, then resume current maintenance dose.    NEXT INR/FACTOR X OR FACTOR II: at next appt.    PROTOCOL  FOLLOWED:goal 2-3    Niko Cox, RN

## 2019-09-06 DIAGNOSIS — I50.21 ACUTE SYSTOLIC CONGESTIVE HEART FAILURE (H): Primary | ICD-10-CM

## 2019-09-09 ENCOUNTER — HOSPITAL ENCOUNTER (OUTPATIENT)
Dept: BEHAVIORAL HEALTH | Facility: CLINIC | Age: 34
End: 2019-09-09
Attending: SOCIAL WORKER
Payer: COMMERCIAL

## 2019-09-09 ENCOUNTER — MYC REFILL (OUTPATIENT)
Dept: CARDIOLOGY | Facility: CLINIC | Age: 34
End: 2019-09-09

## 2019-09-09 DIAGNOSIS — I50.21 ACUTE SYSTOLIC CONGESTIVE HEART FAILURE (H): ICD-10-CM

## 2019-09-09 DIAGNOSIS — I50.22 CHRONIC SYSTOLIC HEART FAILURE (H): ICD-10-CM

## 2019-09-09 DIAGNOSIS — I50.23 ACUTE ON CHRONIC HFREF (HEART FAILURE WITH REDUCED EJECTION FRACTION) (H): ICD-10-CM

## 2019-09-09 PROCEDURE — H2035 A/D TX PROGRAM, PER HOUR: HCPCS | Mod: HQ

## 2019-09-10 RX ORDER — HYDRALAZINE HYDROCHLORIDE 25 MG/1
25 TABLET, FILM COATED ORAL 3 TIMES DAILY
Qty: 270 TABLET | Refills: 3 | Status: SHIPPED | OUTPATIENT
Start: 2019-09-10 | End: 2019-10-10

## 2019-09-10 RX ORDER — DIGOXIN 250 MCG
250 TABLET ORAL DAILY
Qty: 90 TABLET | Refills: 3 | Status: SHIPPED | OUTPATIENT
Start: 2019-09-10 | End: 2020-04-16

## 2019-09-10 RX ORDER — FOLIC ACID 1 MG/1
1 TABLET ORAL DAILY
Qty: 90 TABLET | Refills: 3 | Status: CANCELLED | OUTPATIENT
Start: 2019-09-10

## 2019-09-10 RX ORDER — METOPROLOL SUCCINATE 100 MG/1
100 TABLET, EXTENDED RELEASE ORAL DAILY
Qty: 90 TABLET | Refills: 3 | Status: SHIPPED | OUTPATIENT
Start: 2019-09-10 | End: 2019-09-25

## 2019-09-10 NOTE — TELEPHONE ENCOUNTER
Last Clinic Visit: 7/11/2019  [facility]    HTN addreessed at this visit

## 2019-09-10 NOTE — TELEPHONE ENCOUNTER
Vitamin B1      Last Written Prescription Date:  5-7-19  Last Fill Quantity: 30,   # refills: 1  Last Office Visit : 7-11-19  Future Office visit:  9-11-19    Routing refill request to provider for review/approval because:  Not on protocol.      Kathleen M Doege RN

## 2019-09-11 RX ORDER — LANOLIN ALCOHOL/MO/W.PET/CERES
100 CREAM (GRAM) TOPICAL DAILY
Qty: 90 TABLET | Refills: 1 | Status: SHIPPED | OUTPATIENT
Start: 2019-09-11 | End: 2020-11-10

## 2019-09-19 ENCOUNTER — HOSPITAL ENCOUNTER (OUTPATIENT)
Dept: BEHAVIORAL HEALTH | Facility: CLINIC | Age: 34
End: 2019-09-19
Attending: SOCIAL WORKER
Payer: COMMERCIAL

## 2019-09-19 PROCEDURE — H2035 A/D TX PROGRAM, PER HOUR: HCPCS | Mod: HQ

## 2019-09-20 ENCOUNTER — TELEPHONE (OUTPATIENT)
Dept: CARDIOLOGY | Facility: CLINIC | Age: 34
End: 2019-09-20

## 2019-09-20 NOTE — TELEPHONE ENCOUNTER
[facility] Call Center    Phone Message    May a detailed message be left on voicemail: yes    Reason for Call: Other: Ubaldo calling to request a call back. he states he's been feeling fatigued and would like to talk to Carrie. Please call him back to discuss.      Action Taken: Message routed to:  [facility] cardio

## 2019-09-20 NOTE — PROGRESS NOTES
"Ubaldo Velez  September 19, 2019  [id number]    [facility] Mental Health Skills Group Note  Mindfulness    Day: Thursday   Date: 9/19/19   Group Attendance Attended group session   Group Therapy Type Addiction, Psychoeducation and Psychotherapeutic   Group Topic Covered Mindfulness Skills in MH & Addiction    Client's Response To Group Topic Other - Participated Fully, see details below.   Client Group Participation Detail Shows interest   Group Attendance (Time) 3.0 Hours   Individual Attendance None   Family Attendance None   Other Comments/Information None       Number of participants: 8      Length of Group:  3 hours      Goal of the Group: Learn mindfulness exercises to help clients focus on the present and increase their awareness of thoughts and emotions.  This process helps clients detach from unhelpful thinking patterns and be less affected by negative emotions. This practice has been shown to decrease reactivity and help facilitate effective action to work on changes in the present and thereby create a brighter, healthier future. This practice has also been shown to increase resiliency if practiced regularly.       [place]: Group Topics and Activities      I.  D3 Intervention and Group Topic addressed:  Learn mindfulness exercises to facilitate effective action, educate on mindfulness practice and its benefits.     II. Mindfulness and/or Motivational Component: Mindful Eating, Mindful Listening, Awareness of Sounds Meditation, Sitting Meditation      III. Additional Activity:  Mindful Eating, Mindful Listening, [place] exercise       IV. Review of Progress:   A. Client self-report: Ubaldo reported he is finishing his move with his family into a new home. He feels very secure in the new neighborhood which is near a playground with walking trails and \"lots of churches.\" His young son doesn't understand what happened yet and keeps asking for the couch, which is the only left to move over. He continues to do his " "journal and learned more mindfulness today. His stress is a 3 and he said he had a little craving when he was in the middle of his cravings because when he used meth he had \"so much more energy.\"   Mindful Eating: Enjoyed this, noticed more about the food.     Mindful Listening: \"It was bonding, we have things in common.\"     B. Therapist Assessment: Ubaldo participated fully in the discussion and the activities and he adds helpful comments that are honest and heart-felt.         [place] exercise issue: Time managment  Comments before mindfulness included: \"I sleep too late, I could have prevented a mistake if I had gotten to the site earlier\"  Comments after mindfulness included: \"It does give me more time in the mornings with my son and my family, but I feel guilty too.\"    V.   Reflection and evaluation of today's group experience:  Gave verbal feedback and filled out evaluation form. Client wrote that the most important things learned were \"Being mindful of everything as it's happening\" and \"That textures of food are quite important.\"      VI. Assignments or activities to do for next week: Mindfulness in the day and journal before bed. Continue to monitor stress daily and use skills to manage.      Therapeutic techniques in this session:  Motivational Therapy, CBT/DBT/ACT, Supportive, Behavioral Modification and Mindfulness     Additional Treatment Referrals/Follow-up Issues to Address: Not indicated at this time      Change in Treatment Plan: No changes are indicated at this time. This group does complete one Treatment Plan intervention under D3.       Change in Diagnosis: No Changes at this time.             "

## 2019-09-23 NOTE — TELEPHONE ENCOUNTER
"I called Ubaldo back.  He states he's been feeling tried, but then asks if he can call me back \"I'm not in a place I get good service.\" I gave Ubaldo my direct line to call me back on.  Waiting for return call.  Juany Robb RN   "

## 2019-09-25 ENCOUNTER — OFFICE VISIT (OUTPATIENT)
Dept: CARDIOLOGY | Facility: CLINIC | Age: 34
End: 2019-09-25
Attending: INTERNAL MEDICINE
Payer: COMMERCIAL

## 2019-09-25 VITALS
DIASTOLIC BLOOD PRESSURE: 90 MMHG | OXYGEN SATURATION: 96 % | SYSTOLIC BLOOD PRESSURE: 147 MMHG | BODY MASS INDEX: 28.65 KG/M2 | WEIGHT: 230.4 LBS | HEIGHT: 75 IN | HEART RATE: 112 BPM

## 2019-09-25 DIAGNOSIS — F19.10 POLYSUBSTANCE ABUSE (H): ICD-10-CM

## 2019-09-25 DIAGNOSIS — I50.21 ACUTE SYSTOLIC CONGESTIVE HEART FAILURE (H): Primary | ICD-10-CM

## 2019-09-25 DIAGNOSIS — Z72.0 TOBACCO ABUSE: ICD-10-CM

## 2019-09-25 DIAGNOSIS — I74.9 EMBOLISM AND THROMBOSIS (H): ICD-10-CM

## 2019-09-25 DIAGNOSIS — Z79.01 LONG TERM (CURRENT) USE OF ANTICOAGULANTS: ICD-10-CM

## 2019-09-25 DIAGNOSIS — I50.21 ACUTE SYSTOLIC CONGESTIVE HEART FAILURE (H): ICD-10-CM

## 2019-09-25 LAB
ANION GAP SERPL CALCULATED.3IONS-SCNC: 6 MMOL/L (ref 3–14)
BUN SERPL-MCNC: 11 MG/DL (ref 7–30)
CALCIUM SERPL-MCNC: 9.1 MG/DL (ref 8.5–10.1)
CHLORIDE SERPL-SCNC: 107 MMOL/L (ref 94–109)
CO2 SERPL-SCNC: 26 MMOL/L (ref 20–32)
CREAT SERPL-MCNC: 0.94 MG/DL (ref 0.66–1.25)
GFR SERPL CREATININE-BSD FRML MDRD: >90 ML/MIN/{1.73_M2}
GLUCOSE SERPL-MCNC: 93 MG/DL (ref 70–99)
INR PPP: 1.02 (ref 0.86–1.14)
POTASSIUM SERPL-SCNC: 4.1 MMOL/L (ref 3.4–5.3)
SODIUM SERPL-SCNC: 139 MMOL/L (ref 133–144)

## 2019-09-25 PROCEDURE — 80048 BASIC METABOLIC PNL TOTAL CA: CPT | Performed by: NURSE PRACTITIONER

## 2019-09-25 PROCEDURE — 36415 COLL VENOUS BLD VENIPUNCTURE: CPT | Performed by: NURSE PRACTITIONER

## 2019-09-25 PROCEDURE — 99214 OFFICE O/P EST MOD 30 MIN: CPT | Mod: ZP | Performed by: NURSE PRACTITIONER

## 2019-09-25 PROCEDURE — G0463 HOSPITAL OUTPT CLINIC VISIT: HCPCS | Mod: ZF

## 2019-09-25 PROCEDURE — 85610 PROTHROMBIN TIME: CPT | Performed by: NURSE PRACTITIONER

## 2019-09-25 RX ORDER — METOPROLOL SUCCINATE 50 MG/1
50 TABLET, EXTENDED RELEASE ORAL DAILY
Qty: 90 TABLET | Refills: 3 | Status: SHIPPED | OUTPATIENT
Start: 2019-09-25 | End: 2019-09-27

## 2019-09-25 ASSESSMENT — MIFFLIN-ST. JEOR: SCORE: 2070.72

## 2019-09-25 ASSESSMENT — ENCOUNTER SYMPTOMS
WEIGHT GAIN: 1
FATIGUE: 0
POLYDIPSIA: 0
ALTERED TEMPERATURE REGULATION: 0
INCREASED ENERGY: 1
DECREASED APPETITE: 0
WEIGHT LOSS: 0
HALLUCINATIONS: 0
FEVER: 0

## 2019-09-25 ASSESSMENT — PAIN SCALES - GENERAL: PAINLEVEL: NO PAIN (0)

## 2019-09-25 NOTE — PROGRESS NOTES
HPI  Ubaldo Velez is a 33 year old male with a relevant past medical history including ADHD and polysubstance abuse (ETOH, cocaine, alcohol, meth) NICM with an EF of 10% with moderate to severe MR and TR and multiple apical thrombus. He previously underwent a right and left heart cath which were notable for clean coronary arteries, normal right sided filling pressures, moderately elevated left sided pressures and a marginal cardiac index of 2.0. He returns for follow up.    Ubaldo quit his heart failure medications three days ago and feels wonderful.  He admits that he initially forgot to take them as he was in the process of moving and left his medications at his old house.  Since he felt better off the meds, he continued to hold his heart failure medications.    He admits that he feels blunted on his heart failure medications and lacked motivation and felt depressed. He made excuses to not do anything, which is unlike him.     He denies SOB, ZAPATA, PND, orthopnea, edema, chest pain, palpitations, lightheadedness, or syncope.    He continues to smoke 5 cigarettes daily and is also vaping.  He denies any ilicit drug use.       PMH  Past Medical History:   Diagnosis Date     ADHD      Cocaine use      Electronic cigarette use      Methamphetamine use (H)      NICM (nonischemic cardiomyopathy) (H)      Systolic heart failure (H)      Tobacco abuse        Past Surgical History:   Procedure Laterality Date     CV CORONARY ANGIOGRAM N/A 4/29/2019    Procedure: CV CORONARY ANGIOGRAM;  Surgeon: Reggie Hua MD;  Location:  HEART CARDIAC CATH LAB     CV RIGHT HEART CATH N/A 4/29/2019    Procedure: Right Heart Cath;  Surgeon: Reggie Hua MD;  Location:  HEART CARDIAC CATH LAB     EXTRACTION(S) DENTAL      [place] teeth     HAND SURGERY Left     Laceration left index finger       Family History   Problem Relation Age of Onset     Chronic Obstructive Pulmonary Disease Father      Multiple Sclerosis  Maternal Aunt        Social History     Socioeconomic History     Marital status: Single     Spouse name: Not on file     Number of children: 1     Years of education: Not on file     Highest education level: Not on file   Occupational History     Occupation: [employer]   Social Needs     Financial resource strain: Not on file     Food insecurity:     Worry: Not on file     Inability: Not on file     Transportation needs:     Medical: Not on file     Non-medical: Not on file   Tobacco Use     Smoking status: Former Smoker     Packs/day: 1.00     Years: 15.00     Pack years: 15.00     Smokeless tobacco: Never Used     Tobacco comment: Quit 1.5 weeks ago   Substance and Sexual Activity     Alcohol use: Not Currently     Comment: 8-10 beers a day, now 1 beer a week.     Drug use: Not Currently     Types: Methamphetamines, Cocaine     Comment: Methamphetamine last used 8 weeks ago     Sexual activity: Not on file   Lifestyle     Physical activity:     Days per week: Not on file     Minutes per session: Not on file     Stress: Not on file   Relationships     Social connections:     Talks on phone: Not on file     Gets together: Not on file     Attends [employer] service: Not on file     Active member of club or organization: Not on file     Attends meetings of clubs or organizations: Not on file     Relationship status: Not on file     Intimate partner violence:     Fear of current or ex partner: Not on file     Emotionally abused: Not on file     Physically abused: Not on file     Forced sexual activity: Not on file   Other Topics Concern     Not on file   Social History Narrative    Single but has long standing SO who he lives with, has one child.  (last updated 4/24/2019)        ALLERGIES  No Known Allergies    MEDICATIONS digoxin (LANOXIN) 250 MCG tablet, Take 1 tablet (250 mcg) by mouth daily  folic acid (FOLVITE) 1 MG tablet, Take 1 tablet (1 mg) by mouth daily  hydrALAZINE (APRESOLINE) 25 MG tablet, Take 1 tablet  "(25 mg) by mouth 3 times daily  lisinopril (PRINIVIL/ZESTRIL) 20 MG tablet, Take 1 tablet (20 mg) by mouth 2 times daily  metoprolol succinate ER (TOPROL-XL) 100 MG 24 hr tablet, Take 1 tablet (100 mg) by mouth daily  vitamin B1 (THIAMINE) 100 MG tablet, Take 1 tablet (100 mg) by mouth daily  warfarin (COUMADIN) 5 MG tablet, Take 2-3 tablets as directed by INR clinic    No current facility-administered medications on file prior to visit.       ROS:  Answers for HPI/ROS submitted by the patient on 9/25/2019   General Symptoms: Yes  Skin Symptoms: No  HENT Symptoms: No  EYE SYMPTOMS: No  HEART SYMPTOMS: No  LUNG SYMPTOMS: No  INTESTINAL SYMPTOMS: No  URINARY SYMPTOMS: No  REPRODUCTIVE SYMPTOMS: No  SKELETAL SYMPTOMS: No  BLOOD SYMPTOMS: No  NERVOUS SYSTEM SYMPTOMS: No  MENTAL HEALTH SYMPTOMS: No  Fever: No  Loss of appetite: No  Weight loss: No  Weight gain: Yes  Fatigue: No  Excessive thirst: No  Feeling hot or cold when others believe the temperature is normal: No  Loss of height: No  Post-operative complications: No  Surgical site pain: No  Hallucinations: No  Change in or Loss of Energy: Yes  Hyperactivity: No  Confusion: No        EXAM  BP (!) 147/90 (BP Location: Left arm, Patient Position: Chair, Cuff Size: Adult Regular)   Pulse 112   Ht 1.905 m (6' 3\")   Wt 104.5 kg (230 lb 6.4 oz)   SpO2 96%   BMI 28.80 kg/m    General: appears comfortable, alert and articulate  Head: normocephalic, atraumatic  Eyes: anicteric sclera, EOMI  Neck: no adenopathy, neck supple.  Orophyarynx: moist mucosa, no lesions  Heart: regular, S1/S2, no murmur, gallop, rub, estimated JVP 11 cm  Lungs: Respirations even and unlabored, lungs clear, no rales or wheezing  Abdomen: soft, non-tender, bowel sounds present, no hepatomegaly  Extremities: 1+ pitting edema bilaterally  Neurological: Alert and oriented x 3. Acclerated speech.  No gross motor deficits    LABS  Last Comprehensive Metabolic Panel:  Sodium   Date Value Ref Range " Status   08/19/2019 140 133 - 144 mmol/L Final     Potassium   Date Value Ref Range Status   08/19/2019 4.0 3.4 - 5.3 mmol/L Final     Chloride   Date Value Ref Range Status   08/19/2019 108 94 - 109 mmol/L Final     Carbon Dioxide   Date Value Ref Range Status   08/19/2019 27 20 - 32 mmol/L Final     Anion Gap   Date Value Ref Range Status   08/19/2019 5 3 - 14 mmol/L Final     Glucose   Date Value Ref Range Status   08/19/2019 103 (H) 70 - 99 mg/dL Final     Urea Nitrogen   Date Value Ref Range Status   08/19/2019 16 7 - 30 mg/dL Final     Creatinine   Date Value Ref Range Status   08/19/2019 1.13 0.66 - 1.25 mg/dL Final     GFR Estimate   Date Value Ref Range Status   08/19/2019 84 >60 mL/min/[1.73_m2] Final     Comment:     Non  GFR Calc  Starting 12/18/2018, serum creatinine based estimated GFR (eGFR) will be   calculated using the Chronic Kidney Disease Epidemiology Collaboration   (CKD-EPI) equation.       Calcium   Date Value Ref Range Status   08/19/2019 8.8 8.5 - 10.1 mg/dL Final       Lab Results   Component Value Date    NTBNPI 4,655 (H) 04/24/2019       MOST RECENT ECHOCARDIOGRAM 4/24/19:  Interpretation Summary  CRITICAL FINDINGS-REPORT CALLED TO ER  The left ventricle is severely dilated.  There is mild concentric left ventricular hypertrophy.  The visual ejection fraction is estimated at 10%.  There is severe global hypokinesia of the left ventricle.  Multiple (3+) apical mass-likely clots in LV  Severely decreased right ventricular systolic function  The left atrium is severely dilated.  There is moderate to mod-severe (2-3+) mitral regurgitation.  Visually moderate MR by PISA quantification it is moderately severe  There is moderately severe (3+) tricuspid regurgitation.  Right ventricular systolic pressure is elevated, consistent with moderate to  severe pulmonary hypertension.  IVC diameter >2.1 cm collapsing <50% with sniff suggests a high RA pressure  estimated at 15 mmHg or  greater.  Trivial pericardial effusion    ASSESSMENT AND PLAN  Mr. Ubaldo Velez is a 33 year old male with a NICM secondary to polysubstance abuse with an EF of 10%, who appears hypervolemic today due to discontinuing his diuretics.  He is also tachycardic with a HR of 112 today.  I explained the rationale for not discontinuing his medications unless recommended by a provider. I suspect that his tachycardia is due to discontinuing his Metoprolol suddenly as well as having an expresso prior to the visit.  I recommended that he restart his Metoprolol as 50 mg daily at bedtime.  (previously was on 100 mg). I also recommended that he restart all of his other HF medications at the previous doses.      I suspect that some of the bluntness and lack of motivation/depression he is describing is from the beta blocker.  He will notify us if he redevelops any of these symptoms on the lower dose of Toprol XL.      He will repeat an echocardiogram, which is overdue, at his convenience.  He will follow up with Dr. Choi as scheduled in November.     1. Chronic systolic heart failure/HFrEF (EF 10%) secondary to polysubstance abuse   NYHA Symptom Class II  Stage C  ACE-I/ARB/ARNi: Restarting Lisinopril 20 mg twice daily  BB Restarting Toprol XL at 50 mg daily.   Aldosterone antagonist:  Discontinued secondary to worsening hyponatremia.   SCD prophylaxis Decision deferred during medication uptitration  Fluid status Hypervolemic  Cardiac Rehab: Completed  Sleep Apnea Evaluation: Not indicated  Remote PA Pressure Monitoring: none.   Remote monitoring: Active on Mychart.    2.  Tobacco abuse:  Currently smoking but motivated to quit.  Continue to reassess at subsequent visits.    3. Polysubstance abuse:  Abstinent.  Currently going to chem dep support groups.  Reassess at subsequent visits.     3.  Follow-up:  Echo next available appointment.   Dr. Choi in November.     >40 minutes spent face to face with patient with >50% in  counseling, education, and coordination of care      Stephania FAJARDO, CNP  [facility]

## 2019-09-25 NOTE — PATIENT INSTRUCTIONS
Take your medicines every day, as directed    Changes made today:  o Decrease Toprol XL (Metoprolol) to 50 mg daily and take at bedtime  o Restart all other meds at their current doses  o   o    Monitor Your Weight and Symptoms    Contact us if you:      Gain 2 pounds in one day or 5 pounds in one week    Feel more short of breath    Notice more leg swelling    Feel lightheadeded   Change your lifestyle    Limit Salt or Sodium:    2000 mg  Limit Fluids:    2000 mL or approximately 64 ounces  Eat a Heart Healthy Diet    Low in saturated fats  Stay Active:    Aim to move at least 150 minutes every  week         To Contact us    During Business Hours:  [phone], option # 1 (University)  Then option # 4 (medical questions)     After hours, weekends or holidays:   [phone], Option #4  Ask to speak to the On-Call Cardiologist. Inform them you are a CORE/heart failure patient at the [facility].     Use [employer] allows you to communicate directly with your heart team through secure messaging.    [employer] can be accessed any time on your phone, computer, or tablet.    If you need assistance, we'd be happy to help!         Keep your Heart Appointments:    Echo next week  Follow up with Dr Choi as scheduled in November.

## 2019-09-25 NOTE — LETTER
9/25/2019      RE: Ubaldo Velez  [id number]  [facility] [address]       Dear Colleague,    Thank you for the opportunity to participate in the care of your patient, Ubaldo Velez, at the [facility] at [facility]. Please see a copy of my visit note below.      HPI  Ubaldo Velez is a 33 year old male with a relevant past medical history including ADHD and polysubstance abuse (ETOH, cocaine, alcohol, meth) NICM with an EF of 10% with moderate to severe MR and TR and multiple apical thrombus. He previously underwent a right and left heart cath which were notable for clean coronary arteries, normal right sided filling pressures, moderately elevated left sided pressures and a marginal cardiac index of 2.0. He returns for follow up.    Ubaldo quit his heart failure medications three days ago and feels wonderful.  He admits that he initially forgot to take them as he was in the process of moving and left his medications at his old house.  Since he felt better off the meds, he continued to hold his heart failure medications.    He admits that he feels blunted on his heart failure medications and lacked motivation and felt depressed. He made excuses to not do anything, which is unlike him.     He denies SOB, ZAPATA, PND, orthopnea, edema, chest pain, palpitations, lightheadedness, or syncope.    He continues to smoke 5 cigarettes daily and is also vaping.  He denies any ilicit drug use.       PMH  Past Medical History:   Diagnosis Date     ADHD      Cocaine use      Electronic cigarette use      Methamphetamine use (H)      NICM (nonischemic cardiomyopathy) (H)      Systolic heart failure (H)      Tobacco abuse        Past Surgical History:   Procedure Laterality Date     CV CORONARY ANGIOGRAM N/A 4/29/2019    Procedure: CV CORONARY ANGIOGRAM;  Surgeon: Reggie Hua MD;  Location:  HEART CARDIAC CATH LAB     CV RIGHT HEART CATH N/A 4/29/2019    Procedure:  Right Heart Cath;  Surgeon: Reggie Hua MD;  Location:  HEART CARDIAC CATH LAB     EXTRACTION(S) DENTAL      [place] teeth     HAND SURGERY Left     Laceration left index finger       Family History   Problem Relation Age of Onset     Chronic Obstructive Pulmonary Disease Father      Multiple Sclerosis Maternal Aunt        Social History     Socioeconomic History     Marital status: Single     Spouse name: Not on file     Number of children: 1     Years of education: Not on file     Highest education level: Not on file   Occupational History     Occupation: [employer]   Social Needs     Financial resource strain: Not on file     Food insecurity:     Worry: Not on file     Inability: Not on file     Transportation needs:     Medical: Not on file     Non-medical: Not on file   Tobacco Use     Smoking status: Former Smoker     Packs/day: 1.00     Years: 15.00     Pack years: 15.00     Smokeless tobacco: Never Used     Tobacco comment: Quit 1.5 weeks ago   Substance and Sexual Activity     Alcohol use: Not Currently     Comment: 8-10 beers a day, now 1 beer a week.     Drug use: Not Currently     Types: Methamphetamines, Cocaine     Comment: Methamphetamine last used 8 weeks ago     Sexual activity: Not on file   Lifestyle     Physical activity:     Days per week: Not on file     Minutes per session: Not on file     Stress: Not on file   Relationships     Social connections:     Talks on phone: Not on file     Gets together: Not on file     Attends [employer] service: Not on file     Active member of club or organization: Not on file     Attends meetings of clubs or organizations: Not on file     Relationship status: Not on file     Intimate partner violence:     Fear of current or ex partner: Not on file     Emotionally abused: Not on file     Physically abused: Not on file     Forced sexual activity: Not on file   Other Topics Concern     Not on file   Social History Narrative    Single but has long standing  "SO who he lives with, has one child.  (last updated 4/24/2019)        ALLERGIES  No Known Allergies    MEDICATIONS digoxin (LANOXIN) 250 MCG tablet, Take 1 tablet (250 mcg) by mouth daily  folic acid (FOLVITE) 1 MG tablet, Take 1 tablet (1 mg) by mouth daily  hydrALAZINE (APRESOLINE) 25 MG tablet, Take 1 tablet (25 mg) by mouth 3 times daily  lisinopril (PRINIVIL/ZESTRIL) 20 MG tablet, Take 1 tablet (20 mg) by mouth 2 times daily  metoprolol succinate ER (TOPROL-XL) 100 MG 24 hr tablet, Take 1 tablet (100 mg) by mouth daily  vitamin B1 (THIAMINE) 100 MG tablet, Take 1 tablet (100 mg) by mouth daily  warfarin (COUMADIN) 5 MG tablet, Take 2-3 tablets as directed by INR clinic    No current facility-administered medications on file prior to visit.         EXAM  BP (!) 147/90 (BP Location: Left arm, Patient Position: Chair, Cuff Size: Adult Regular)   Pulse 112   Ht 1.905 m (6' 3\")   Wt 104.5 kg (230 lb 6.4 oz)   SpO2 96%   BMI 28.80 kg/m     General: appears comfortable, alert and articulate  Head: normocephalic, atraumatic  Eyes: anicteric sclera, EOMI  Neck: no adenopathy, neck supple.  Orophyarynx: moist mucosa, no lesions  Heart: regular, S1/S2, no murmur, gallop, rub, estimated JVP 11 cm  Lungs: Respirations even and unlabored, lungs clear, no rales or wheezing  Abdomen: soft, non-tender, bowel sounds present, no hepatomegaly  Extremities: 1+ pitting edema bilaterally  Neurological: Alert and oriented x 3. Acclerated speech.  No gross motor deficits    LABS  Last Comprehensive Metabolic Panel:  Sodium   Date Value Ref Range Status   08/19/2019 140 133 - 144 mmol/L Final     Potassium   Date Value Ref Range Status   08/19/2019 4.0 3.4 - 5.3 mmol/L Final     Chloride   Date Value Ref Range Status   08/19/2019 108 94 - 109 mmol/L Final     Carbon Dioxide   Date Value Ref Range Status   08/19/2019 27 20 - 32 mmol/L Final     Anion Gap   Date Value Ref Range Status   08/19/2019 5 3 - 14 mmol/L Final     Glucose "   Date Value Ref Range Status   08/19/2019 103 (H) 70 - 99 mg/dL Final     Urea Nitrogen   Date Value Ref Range Status   08/19/2019 16 7 - 30 mg/dL Final     Creatinine   Date Value Ref Range Status   08/19/2019 1.13 0.66 - 1.25 mg/dL Final     GFR Estimate   Date Value Ref Range Status   08/19/2019 84 >60 mL/min/[1.73_m2] Final     Comment:     Non  GFR Calc  Starting 12/18/2018, serum creatinine based estimated GFR (eGFR) will be   calculated using the Chronic Kidney Disease Epidemiology Collaboration   (CKD-EPI) equation.       Calcium   Date Value Ref Range Status   08/19/2019 8.8 8.5 - 10.1 mg/dL Final       Lab Results   Component Value Date    NTBNPI 4,655 (H) 04/24/2019       MOST RECENT ECHOCARDIOGRAM 4/24/19:  Interpretation Summary  CRITICAL FINDINGS-REPORT CALLED TO ER  The left ventricle is severely dilated.  There is mild concentric left ventricular hypertrophy.  The visual ejection fraction is estimated at 10%.  There is severe global hypokinesia of the left ventricle.  Multiple (3+) apical mass-likely clots in LV  Severely decreased right ventricular systolic function  The left atrium is severely dilated.  There is moderate to mod-severe (2-3+) mitral regurgitation.  Visually moderate MR by PISA quantification it is moderately severe  There is moderately severe (3+) tricuspid regurgitation.  Right ventricular systolic pressure is elevated, consistent with moderate to  severe pulmonary hypertension.  IVC diameter >2.1 cm collapsing <50% with sniff suggests a high RA pressure  estimated at 15 mmHg or greater.  Trivial pericardial effusion    ASSESSMENT AND PLAN  Mr. Ubaldo Velez is a 33 year old male with a NICM secondary to polysubstance abuse with an EF of 10%, who appears hypervolemic today due to discontinuing his diuretics.  He is also tachycardic with a HR of 112 today.  I explained the rationale for not discontinuing his medications unless recommended by a provider. I suspect  that his tachycardia is due to discontinuing his Metoprolol suddenly as well as having an expresso prior to the visit.  I recommended that he restart his Metoprolol as 50 mg daily at bedtime.  (previously was on 100 mg). I also recommended that he restart all of his other HF medications at the previous doses.      I suspect that some of the bluntness and lack of motivation/depression he is describing is from the beta blocker.  He will notify us if he redevelops any of these symptoms on the lower dose of Toprol XL.      He will repeat an echocardiogram, which is overdue, at his convenience.  He will follow up with Dr. Choi as scheduled in November.     1. Chronic systolic heart failure/HFrEF (EF 10%) secondary to polysubstance abuse   NYHA Symptom Class II  Stage C  ACE-I/ARB/ARNi: Restarting Lisinopril 20 mg twice daily  BB Restarting Toprol XL at 50 mg daily.   Aldosterone antagonist:  Discontinued secondary to worsening hyponatremia.   SCD prophylaxis Decision deferred during medication uptitration  Fluid status Hypervolemic  Cardiac Rehab: Completed  Sleep Apnea Evaluation: Not indicated  Remote PA Pressure Monitoring: none.   Remote monitoring: Active on Mychart.    2.  Tobacco abuse:  Currently smoking but motivated to quit.  Continue to reassess at subsequent visits.    3. Polysubstance abuse:  Abstinent.  Currently going to chem dep support groups.  Reassess at subsequent visits.     3.  Follow-up:  Echo next available appointment.   Dr. Choi in November.     >40 minutes spent face to face with patient with >50% in counseling, education, and coordination of care      Stephania FAJARDO CNP  [facility]

## 2019-09-26 ENCOUNTER — ANTICOAGULATION THERAPY VISIT (OUTPATIENT)
Dept: ANTICOAGULATION | Facility: CLINIC | Age: 34
End: 2019-09-26

## 2019-09-26 DIAGNOSIS — Z79.01 LONG TERM (CURRENT) USE OF ANTICOAGULANTS: ICD-10-CM

## 2019-09-26 DIAGNOSIS — I74.9 EMBOLISM AND THROMBOSIS (H): ICD-10-CM

## 2019-09-26 NOTE — NURSING NOTE
Cardiac Testing: Patient given instructions regarding  echocardiogram . Discussed purpose, preparation, procedure and when to expect results reported back to the patient. Patient demonstrated understanding of this information and agreed to call with further questions or concerns.  Diet: Patient instructed regarding a heart healthy diet, including discussion of reduced fat and sodium intake. Patient demonstrated understanding of this information and agreed to call with further questions or concerns.  Labs: Patient was given results of the laboratory testing obtained today.  Patient demonstrated understanding of this information and agreed to call with further questions or concerns.   Return Appointment: Patient given instructions regarding scheduling next clinic visit. Patient demonstrated understanding of this information and agreed to call with further questions or concerns.  Medication Change: Patient was educated regarding prescribed medication change, including discussion of the indication, administration, side effects, and when to report to MD or RN. Patient demonstrated understanding of this information and agreed to call with further questions or concerns.  Patient stated he understood all health information given and agreed to call with further questions or concerns.     Discussed moving up echo to next week and will have schedulers call him to schedule this tomorrow.     Luna Rubin, RN

## 2019-09-26 NOTE — PROGRESS NOTES
ANTICOAGULATION FOLLOW-UP CLINIC VISIT    Patient Name:  Ubaldo Velez  Date:  2019  Contact Type:  Telephone    SUBJECTIVE:  Patient Findings     Positives:   Missed doses    Comments:   Patient stopped medications for 3-4 days.  Patient reports taking 15mg of warfarin last night.         Clinical Outcomes     Comments:   Patient stopped medications for 3-4 days.  Patient reports taking 15mg of warfarin last night.            OBJECTIVE    INR   Date Value Ref Range Status   2019 1.02 0.86 - 1.14 Final       ASSESSMENT / PLAN  INR assessment SUB    Recheck INR In: 1 WEEK    INR Location Clinic      Anticoagulation Summary  As of 2019    INR goal:   2.0-3.0   TTR:   0.0 % (3.4 wk)   INR used for dosin.02! (2019)   Warfarin maintenance plan:   12.5 mg (5 mg x 2.5) every Thu; 10 mg (5 mg x 2) all other days   Full warfarin instructions:   : 15 mg; Otherwise 12.5 mg every Thu; 10 mg all other days   Weekly warfarin total:   72.5 mg   Plan last modified:   Liliana Bañuelos RN (2019)   Next INR check:   10/3/2019   Priority:   INR   Target end date:   Indefinite    Indications    Embolism and thrombosis (H) [I74.9]  Long term (current) use of anticoagulants [Z79.01]             Anticoagulation Episode Summary     INR check location:       Preferred lab:       Send INR reminders to:   [facility]    Comments:   Patient transferred from [facility]. Hx of ETOH, polysubstance abuse. ok to leave .  HIPPA packet sent 19.  Will use [facility] lab. Has 5mg tablets.  Not new to Warfarin.  LV thrombosis.  Contact . [phone]      Anticoagulation Care Providers     Provider Role Specialty Phone number    Maddie Choi MD Responsible Cardiology [phone]            See the Encounter Report to view Anticoagulation Flowsheet and Dosing Calendar (Go to Encounters tab in chart review, and find the Anticoagulation Therapy Visit)    Spoke with patient.     Daphne DYSON  PRERNA Addison

## 2019-09-27 ENCOUNTER — TELEPHONE (OUTPATIENT)
Dept: CARDIOLOGY | Facility: CLINIC | Age: 34
End: 2019-09-27

## 2019-09-27 DIAGNOSIS — I50.22 CHRONIC SYSTOLIC HEART FAILURE (H): ICD-10-CM

## 2019-09-27 RX ORDER — METOPROLOL SUCCINATE 25 MG/1
25 TABLET, EXTENDED RELEASE ORAL DAILY
Qty: 30 TABLET | Refills: 3 | Status: SHIPPED | OUTPATIENT
Start: 2019-09-27 | End: 2019-09-27

## 2019-09-27 RX ORDER — METOPROLOL SUCCINATE 25 MG/1
25 TABLET, EXTENDED RELEASE ORAL DAILY
Qty: 30 TABLET | Refills: 3 | Status: SHIPPED | OUTPATIENT
Start: 2019-09-27 | End: 2020-06-11

## 2019-09-27 NOTE — TELEPHONE ENCOUNTER
[facility] Call Center    Phone Message    May a detailed message be left on voicemail: yes    Reason for Call: Other: Ubaldo calling to request a call back in regards to his metoprolol. Please call  him back to discuss.      Action Taken: Message routed to:  [facility] ([facility]): dameon cardio

## 2019-09-27 NOTE — TELEPHONE ENCOUNTER
Called Ubaldo. He reports he restarted all his meds 2 days ago including the Toprol 50 mg at bedtime. The next day he felt sluggish and fatigued again. Took all of his meds the next day except he didn't take his Toprol and felt great the next day. He asks if there is another alternative available or if he can try the short acting Metoprolol. We reviewed why we use the long acting Metoprolol for heart failure and short acting is not recommended. I asked him if he'd be willing to try to push through it for 3-4 days and see how he feels as especially since he's restarting all of his meds and sometimes it happens that patients are going to feel fatigued for a few days and then it gets better. He reiterated that he felt great the second day off Toprol and would really like to try something else.     Luna Rubin RN

## 2019-09-27 NOTE — PROGRESS NOTES
Reviewed recommendation from Stephania that he can either try reducing Toprol XL to 25 mg daily at bedtime or try Coreg 6.25 mg BID. Riley prefers to try reduced dose of toprol at 25 mg daily. I encouraged him to try to push through 3-4 days if he is feeling fatigued as a lot of times these symptoms will get better. He is agreeable and will call us with ongoing symptoms.     Date: 9/27/2019    Time of Call: 3:32 PM     Diagnosis:  HFrEF     [ VORB ] Ordering provider: Stephania Crocker   Order: Toprol 25 mg daily at bedtime - reduction of dose     Order received by: Luna Rubin RN      Follow-up/additional notes:

## 2019-10-03 ENCOUNTER — HOSPITAL ENCOUNTER (OUTPATIENT)
Dept: BEHAVIORAL HEALTH | Facility: CLINIC | Age: 34
End: 2019-10-03
Attending: SOCIAL WORKER
Payer: COMMERCIAL

## 2019-10-03 PROCEDURE — H2035 A/D TX PROGRAM, PER HOUR: HCPCS | Mod: HQ

## 2019-10-03 NOTE — PLAN OF CARE
Pt is alert and oriented x4. Vital signs stable, O2 saturations WDL on room air. Niprifde titrated down to .75 mcg/kg/min, MAPs are slightly elevated in the low 80s. MD Diamond is aware and is okay with that if nipride can be weaned as captopril doses are increased. Pt was given a one time dose of captopril this evening to facilitate that process. CO/CI were 4.1/1.7 and SvO2 61. Pt and family were provided more education regarding pt's heart failure diagnosis, medications and how to modulate other contributing factors. Order nutrition service placed in. Will continue to monitor.    Elevated BP

## 2019-10-04 NOTE — PROGRESS NOTES
Ubaldo Velez  [id number]               Adult CD Progress Note and Treatment Plan Review     Note: Document was not signed and posted on 9/09/2019 due to attendance irregularity during transition from coverage counselor to primary counselor.                                    Monday                                                                     Group Date: 9/09/2019   Group Attendance Attended group session   Group Therapy Type Addiction   Group Topic Covered Disease of Addiction/Choices in Recovery, Relapse Prevention, Self-Care Activities and Self-Esteem/Self [place]   Client's Response To Group Topic Shared personal experiences, Provided effective insights, Demonstrated understanding of material, presented as willing to change   Client Group Participation Detail Highly engaged   Group Attendance (Time) 2.0 Hours   Individual Attendance None   Family Attendance None   Other Comments/Information None       Total # of Phase 1 Group Sessions: 24     Total # of Phase 2 Group Sessions: 15  Total # of Phase 3 Group Sessions: N/A  Total # of 1:1 Sessions: 2    Support group attended this week: no    Reporting sobriety: Yes, client reports last use of meth as 4/19/2019    Treatment Plan Review     Treatment Plan Review completed on:  9/09/2019     Projected discharge date: 10/3/2019    Client preferred learning style: by hands-on practice and by watching someone else demonstrate    Staff member(s) contributing:  Susanne Granados MFA, MA, [facility]    Received supervision: no    Client involvement with treatment planning: contributed to goals and plan.    Client received copy of plan/revised plan: Yes, signed 5/29/2019    Client agrees with plan/revised plan: Yes    Changes to Treatment Plan: No    Client's Dimension 1 Goal(s) Develop effective strategies to maintain sobriety.     See below.   Client's Dimension 2 Goal(s) Disclose CD status to medical providers and follow up with medical interventions while in IOP.   Client  would like to quit smoking tobacco.  Maintain good physical health. See below.   Client's Dimension 3 Goal(s) Learn how to apply self-care and psychotherapy to build a repertoire of daily habits that counteract PAWS, fatigue, depression and anxiety leading to increased resiliency and well-being See below.   Client's Dimension 4 Goal(s) Understand the impact your substance use has had on you, your family, and significant relationships.  Increase internal motivation to change. Client presented assignment.   Client's Dimension 5 Goal(s) Gain education about addiction.  Identify personal triggers and relapse warning signs.  Develop sober coping and living skills in order to address the development of a strategy for long term recovery. See below.   Client's Dimension 6 Goal(s) Develop and increase sober support network.  Identify and attend sober support group meetings. See below.     New Goals added since last review: None.    Goal(s) worked on since last review: dim 4, 5    Strategies effective: Yes    Care Coordination Activities: None.    Medical, Mental Health and other appointments the client attended: Continued medical care for recovery for heart failure due to meth use.  PT 3x weekly now done.  Labs done 8/19/2019.  Next ECHO in 4 months.    Medication issues: None.    Physical and mental health problems: Yes - client is recovering from heart failure due to meth use.  Client notes need to stop smoking to meet criteria for transplant, if needed. Client reports medical appointment scheduled for 9/11/2019.    Review and evaluation of the individual abuse prevention plan: The program's individual abuse prevention plan (IAPP) is sufficient for this client.     Substance Use Disorders:    Alcohol Use Disorder Severe - 303.90 (F10.20)  Amphetamine Use Disorder Severe - 304.40 (F15.20)  Cocaine Use Disorder Mild - 305.60 (F14.10)  Tobacco Use Disorder Severe - 305.10 (F17.200)    ASAM Risk Rating: (Note the rationale for  risk rating changes)    Dimension 1 0 Client exhibits no signs of intoxication or withdrawal throughout this review period.. Client rates cravings/urges to use at 1/10, noting stress about having a lot to do has caused increase.     Dimension 2 1 Client reports medical appointment scheduled for 9/11/2019. He continues medical care for recovery from heart failure due to use of meth.  Client reports exercise.    Dimension 3 1 Client continues to rate symptoms of depression or anxiety at 0/10. Client denies SI, Intent, or thoughts of self-harm.    Dimension 4 1 Client rates his motivation for sobriety at 9/10. He reports that he feels positive about his life, including family and work, and hopeful. Client also reports high motivation for recovery due to health risks following previous heart failure due to meth use and pending evaluation for transplant.    Dimension 5 2  Client denies attending any external sober support meetings; denies work with a sponsor. Client denies any current stressors or challenges that would present significant risk to his recovery.    Dimension 6 1 Client reports that he and spouse were approved for new housing, and he feels positive support from family.    Data: (Need to include short narrative for the week on what was worked on)  Client presents in Preparation Stage of Change for recovery, managing his health, attending all medical appointments and IOP group sessions. Client demonstrates that he is learning and applying information and strategies to help strengthen his recovery and build resiliency. Client provides effective feedback to group members, and he shares his personal experiences, encouraging others in their recovery.    Intervention:   Counselor utilized therapeutic approaches, including: positive feedback, Psychoeducation, Group Feedback, and Motivational Interviewing.  Assessment:    Stages of Change  Model  Preparation/Determination    Appears/Sounds:  Cooperative  Engaged  Client demonstrates that he is learning and applying information and strategies to help strengthen his recovery and build resiliency. Client presents with increased confidence, including with job and family..     Plan:   Client will benefit from continued clinical guidance to establish a sober support network.      Susanne Granados MFA, MA, [facility]

## 2019-10-05 NOTE — ADDENDUM NOTE
Encounter addended by: Lei Kraus [facility] on: 10/5/2019 1:11 PM   Actions taken: Clinical Note Signed

## 2019-10-05 NOTE — PROGRESS NOTES
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 (F15.20)  Cocaine Use Disorder Mild - 305.60 (F14.10)  Tobacco Use Disorder Severe - 305.10 (F17.200)    LAST USE DATE: client reports last use of meth as 2019    Treatment Plan Review completed on:  10/05/2019     Attendance Grid:     Monday    Group Date: 2019   Group Attendance Attended group session   Group Therapy Type Addiction   Group Topic Covered Coping Skills/Lifestyle Management, Goal Setting and Relapse Prevention   Client Participation/Contribution Cooperative with task Discussed personal experience with topic Expressed readiness to alter behaviors Listened actively   Client Participation Detail Highly involved   Attendance 2.0 Hours   Individual Attendance None   Family Attendance None   Other Comments/Information None       Total # of Phase 1 Group Sessions: 24     Total # of Phase 2 Group Sessions: 16  Total # of Phase 3 Group Sessions:   1  Total # of 1:1 Sessions:  2  Length of stay/projected discharge date: 2019    Support group attended this week: no    Reporting sobriety: Yes, client reports last use of meth as 2019              Learning Style(s):    by hands-on practice and by watching someone else demonstrate    Staff member contributing: CRYSTAL Damon     Received supervision: no      Client contributed to goals and plan: Yes    Client received a signed copy of treatment plan/revised plan: Yes, signed 2019    Changes to Treatment Plan: No.    Client agrees with plan/revised plan: Yes    Any changes in Vulnerable Adult Status: No    Treatment Coordination Activities: recommended  attendance at sober support group.    Medical, Mental Health and other appointments the client attended:  Continued medical care for recovery for heart failure due to meth use.  PT 3x weekly.  Next ECHO 10/15/2019    Medication issues: No.    Physical and mental health problems:  Yes - client is recovering from heart failure due to meth use.  Client notes need to stop smoking to meet criteria for transplant, if needed.     Review and evaluation of the individual abuse prevention plan:  The program's individual abuse prevention plan (IAPP) is sufficient for this client.     Dimension One: Acute Intoxication/Withdrawal Potential     Risk at admission to IOP: 0. Risk at transition to Phase 3: Risk Ratin.     Treatment plan goal:   Develop effective strategies to maintain sobriety. Goal Status: CONTINUE.     Current ASAM criteria: no withdrawal risk.     Treatment Summary/Justification of Transition DIM 1: On Thursday client exhibits no signs of intoxication or withdrawal.    Dimension Two: Biomedical Conditions and Complaints     Risk at admission to IOP: 1. Risk at transition to Phase 3: Risk Ratin.     Treatment plan goal:   Disclose CD status to medical providers and follow up with medical interventions while in treatment program. CONTINUE.  Client would like to quit smoking tobacco. CONTINUE.  Maintain good physical health. CONTINUE.     Current ASAM criteria: stable and manageable.     Treatment Summary/Justification of Transition DIM 2: On Thursday client reports new or worsening medical conditions of fatigue, likely due to medication.  He continues medical care for recovery from heart failure due to use of meth. Client reports exercise at work.  Dimension Three: Emotional/Behavioral/Cognitive Conditions and Complications     Risk at admission to IOP: 1. Risk at transition to Phase 3: Risk Ratin.     Treatment plan goal:   Learn how to apply self-care and psychotherapy to build a repertoire of daily  "habits that counteract PAWS, fatigue, depression and anxiety leading to increased resiliency and well-being. COMPLETE.     Current ASAM criteria: stable and none or minimal severity     Treatment Summary/Justification of Transition DIM 3: at intake client denies mental health concerns.  Client denies past or current mental health medications.  On 2019 client's PHQ-9 score was 0 out of 27, indicating minimal (normal) depression and his GIANA-7 score was 0 out of 21, indicating minimal (normal) anxiety.   On Thursday client rates his depression, anxiety, or other mental health concerns as 8 of 10 (10 highest), significantly higher than usual, due to \"meds\".   He expresses feeling depression which is foreign to his normal state-of-mind.  See cardiology note of 2019.  Client denies any thoughts of hurting himself or others.     PHQ-9 (score at time of transition): administer next week  GIANA-7 (score at time of transition): administer next week    Dimension Four: Readiness to Change     Risk at admission to IOP: 2. Risk at transition to Phase 3: Risk Ratin.     Treatment plan goal:   Understand the impact your substance use has had on you, your family, and significant relationships. CONTINUE.  Increase internal motivation to change. CONTINUE.     Current ASAM criteria: cooperative, engaged in treatment and willing to explore how use affects personal goals.     Treatment Summary/Justification of Transition DIM 4: On Thursday client rates his motivation for recovery as 9 of 10 (10 highest).  Client expressed 3 reasons to remain sober.  Client expresses great motivation for change.  Risk rating lowered.    Dimension Five: Relapse/Continues Use/Continues Problem Potential     Risk at admission to IOP: 3. Risk at transition to Phase 3: Risk Ratin.     Treatment plan goal:   Gain education about addiction. CONTINUE.  Identify personal triggers and relapse warning signs. COMPLETE.  Develop sober coping and living " "skills in order to address the development of a strategy for long term recovery. CONTINUE.     Current ASAM criteria: recognizes relapse issues and prevention strategies but displays some vulnerability for further substance use problems, moderate risk of relapse and client appears to developing coping skills     Treatment Summary/Justification of Transition DIM 5: On Thursday client rates his strongest craving to use in the past week as 9 of 10 (10 highest) due to \"depression\".  Client reports a strength of acceptance.  Client reports using coping skill of talking to his stepdad.  Client urged to try new coping skills.  Client shared that he had a very difficult week and thoughts of using were completely disputed.   He talked to other people and shared openly in group.  Client appears to be successfully using new coping skills.  Risk rating lowered.    Dimension Six: Recovery Environment     Risk at admission to [facility]: 1. Risk at transition to Phase 3: Risk Ratin.     Treatment plan goal:   Develop and increase sober support network. Identify and attend sober support group meetings. CONTINUE.     Current ASAM criteria: supportive environment, engaged in structured and meaningful activity and client appears to developing coping skills     Treatment Summary/Justification of Transition DIM 6: On Thursday client denied attendance at sober support meetings this week.  Client denies having a sponsor.  Client reports sober activities.  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  He expresses plans to join a union.  Intervention:   Behavioral Therapy, Cognitive Behavioral Therapy, Counselor Feedback, Psychoeducation, Emotional management, Group Feedback, Motivational Interviewing, Relapse Prevention, Twelve Step Facilitation, REBT.  On Thursday we discussed motivation to change, relapse prevention and recovery planning.  Client participated " with the check- in process.  Recovery Planning was addressed through group peers' presentations portions of their  Recovery Care Plan  assignments with discussion.  Client participated, provided supportive feedback, and spoke about how he related to recovery goals and skills as described in peers  assignments.    Client took part in affirming a group peer who has completed this program.  Client expressed something he was grateful for.   Assessment:    Stages of Change Model:  Contemplation   Appears/Sounds:  Cooperative, Engaged  PLAN: Transition client to Phase 3 effective 10/03/2019 due to progress toward treatment goals.   Client currently meets the ASAM criteria for; Outpatient (ASAM level 1)  level of care.    CRYSTAL Damon

## 2019-10-07 ENCOUNTER — TELEPHONE (OUTPATIENT)
Dept: PHARMACY | Facility: CLINIC | Age: 34
End: 2019-10-07

## 2019-10-07 ENCOUNTER — MYC MEDICAL ADVICE (OUTPATIENT)
Dept: PHARMACY | Facility: CLINIC | Age: 34
End: 2019-10-07

## 2019-10-07 ENCOUNTER — ANTICOAGULATION THERAPY VISIT (OUTPATIENT)
Dept: ANTICOAGULATION | Facility: CLINIC | Age: 34
End: 2019-10-07

## 2019-10-07 DIAGNOSIS — I74.9 EMBOLISM AND THROMBOSIS (H): ICD-10-CM

## 2019-10-07 DIAGNOSIS — Z76.89 ENCOUNTER TO ESTABLISH CARE: Primary | ICD-10-CM

## 2019-10-07 DIAGNOSIS — Z79.01 LONG TERM (CURRENT) USE OF ANTICOAGULANTS: ICD-10-CM

## 2019-10-07 LAB — INR PPP: 2.27 (ref 0.86–1.14)

## 2019-10-07 PROCEDURE — 85610 PROTHROMBIN TIME: CPT | Performed by: INTERNAL MEDICINE

## 2019-10-07 PROCEDURE — 36415 COLL VENOUS BLD VENIPUNCTURE: CPT | Performed by: INTERNAL MEDICINE

## 2019-10-07 NOTE — TELEPHONE ENCOUNTER
I called the patient after receiving this [employer] message since I was not sure if this message was meant for me,  I have not spoken with the patient since May 2019 when I called him for a transition of care visit after leaving the hospital.    He told me that he may have clicked the wrong button when sending the message but that he also was not sure who to send a message to.  He reports that his primary care provider prior to hospitalization was at [facility] but he does not currently see anyone within primary care there and would not know who to see.     He states that someone at the [facility] lowered his metoprolol dose to see if that would help with his decreased motivation/mood. He reports that this decrease in dose has not helped and that he continues to have low motivation and possibly increasing depression.  He states that he just does not feel like himself.  I asked him if he had any thoughts of suicide or hurting himself or anyone else.  He states that he does NOT have those thoughts.  He does attend treatment groups for his history of drug use.  He reports that he does talk to his group about his want to use drugs again but knows that due to his heart condition he cannot use them.  He states that he has cried over the fact that he cannot use drugs.    I told him since his primary care is not within [place] I am not able to use my collaborative practice agreement to initiate any medications and that he should follow-up or establish care with a primary care provider.  Most of his care is within [place] or at least has been recently and so I suggested that he find a primary care provider within [place] for the best continuity of care.  Patient agreed that he would do this.    I will also send the patient the one access phone number ([phone] ) via [employer] to get behavioral health services.     Trinity Carlson, PharmD  Medication Therapy Management Pharmacist

## 2019-10-07 NOTE — PROGRESS NOTES
ANTICOAGULATION FOLLOW-UP CLINIC VISIT    Patient Name:  Ubaldo Velez  Date:  10/7/2019  Contact Type:  Telephone    SUBJECTIVE:         OBJECTIVE    INR   Date Value Ref Range Status   10/07/2019 2.27 (H) 0.86 - 1.14 Final       ASSESSMENT / PLAN  No question data found.  Anticoagulation Summary  As of 10/7/2019    INR goal:   2.0-3.0   TTR:   7.0 % (1.2 mo)   INR used for dosin.27 (10/7/2019)   Warfarin maintenance plan:   12.5 mg (5 mg x 2.5) every Thu; 10 mg (5 mg x 2) all other days   Full warfarin instructions:   12.5 mg every Thu; 10 mg all other days   Weekly warfarin total:   72.5 mg   No change documented:   Daphne Addison RN   Plan last modified:   Liliana Bañuelos RN (2019)   Next INR check:   10/14/2019   Priority:   INR   Target end date:   Indefinite    Indications    Embolism and thrombosis (H) [I74.9]  Long term (current) use of anticoagulants [Z79.01]             Anticoagulation Episode Summary     INR check location:       Preferred lab:       Send INR reminders to:    ANTICOAG CLINIC    Comments:   Patient transferred from [facility]. Hx of ETOH, polysubstance abuse. ok to leave .  HIPPA packet sent 19.  Will use [place] lab. Has 5mg tablets.  Not new to Warfarin.  LV thrombosis.  Contact . [phone]      Anticoagulation Care Providers     Provider Role Specialty Phone number    Maddie Choi MD [facility] Card[facility] [phone]            See the Encounter Report to view Anticoagulation Flowsheet and Dosing Calendar (Go to Encounters tab in chart review, and find the Anticoagulation Therapy Visit)    Spoke with patient.     Daphne Addison, RN

## 2019-10-07 NOTE — TELEPHONE ENCOUNTER
See my chart message from today. I called the patient after receiving a [employer] message from this patient. I was not sure if this message was meant for me,  I have not spoken with the patient since May 2019 when I called him for a transition of care visit after leaving the hospital.    He told me that he may have clicked the wrong button when sending the message but that he also was not sure who to send a message to.  He reports that his primary care provider prior to hospitalization was at [facility] but he does not currently see anyone within primary care there and would not know who to see.     He states that someone at the [facility] lowered his metoprolol dose to see if that would help with his decreased motivation/mood. He reports that this decrease in dose has not helped and that he continues to have low motivation and possibly increasing depression.  He states that he just does not feel like himself.  I asked him if he had any thoughts of suicide or hurting himself or anyone else.  He states that he does NOT have those thoughts.  He does attend treatment groups for his history of drug use.  He reports that he does talk to his group about his want to use drugs again but knows that due to his heart condition he cannot use them.  He states that he has cried over the fact that he cannot use drugs.    I told him since his primary care is not within [place] I am not able to use my collaborative practice agreement to initiate any medications and that he should follow-up or establish care with a primary care provider.  Most of his care is within [place] or at least has been recently and so I suggested that he find a primary care provider within [place] for the best continuity of care.  Patient agreed that he would do this.    I will also send the patient the one access phone number ([phone] ) via [employer] to get behavioral health services.     Routing to cardiology for review since they are managing his  metoprolol.    Trinity Carlson, PharmD  Medication Therapy Management Pharmacist

## 2019-10-09 NOTE — TELEPHONE ENCOUNTER
I called Ubaldo to further discuss. He tells me he still is having low motivation/mood issues. Feels like it has improved a bit after decreasing Toprol XL. He asks again if he can try short acting Metoprolol to see if this would help him at all. We discussed that Toprol XL is for his heart failure and to improve his heart function and Metoprolol Tartrate isn't used for heart failure. He states he also feels like his mood symptoms could be due to stressors going on in his life, including a recent move. He has talked to his counselor through his [email or web address] health treatment program who has recommended he see his PCP to discuss possible medication for depression. He would like to do this but his PCP at [facility] doesn't take his new insurance. I told him I would put in a primary care referral and send him the phone number via [employer] to make an appointment.   Date: 10/9/2019    Time of Call: 12:21 PM     Diagnosis:  Encounter to establish care     [ VORB ] Ordering provider: Stephania Crocker NP  Order: internal medicin referral     Order received by: Luna Rubin RN      Follow-up/additional notes:            Luna Rubin RN

## 2019-10-10 ENCOUNTER — ANCILLARY PROCEDURE (OUTPATIENT)
Dept: CARDIOLOGY | Facility: CLINIC | Age: 34
End: 2019-10-10
Attending: INTERNAL MEDICINE
Payer: COMMERCIAL

## 2019-10-10 ENCOUNTER — TELEPHONE (OUTPATIENT)
Dept: CARDIOLOGY | Facility: CLINIC | Age: 34
End: 2019-10-10

## 2019-10-10 ENCOUNTER — PATIENT OUTREACH (OUTPATIENT)
Dept: CARDIOLOGY | Facility: CLINIC | Age: 34
End: 2019-10-10

## 2019-10-10 DIAGNOSIS — I50.22 CHRONIC SYSTOLIC HEART FAILURE (H): ICD-10-CM

## 2019-10-10 DIAGNOSIS — I50.23 ACUTE ON CHRONIC HFREF (HEART FAILURE WITH REDUCED EJECTION FRACTION) (H): ICD-10-CM

## 2019-10-10 DIAGNOSIS — I50.22 CHRONIC SYSTOLIC HEART FAILURE (H): Primary | ICD-10-CM

## 2019-10-10 RX ORDER — HYDRALAZINE HYDROCHLORIDE 25 MG/1
25 TABLET, FILM COATED ORAL 2 TIMES DAILY
Qty: 180 TABLET | Refills: 3 | Status: SHIPPED | OUTPATIENT
Start: 2019-10-10 | End: 2020-04-15

## 2019-10-10 NOTE — TELEPHONE ENCOUNTER
Prior Authorization Specialty Medication Request    Medication/Dose: Entresto 49-51 mg.   ICD code (if different than what is on RX):  Chronic systolic heart failure (H) [I50.22]       Rationale: Chronic systolic heart failure (H) [I50.22]     Insurance Name: [place] and consolidated funds  Insurance ID: [id number] and [id number]   Insurance Phone Number: 0[facility] AND [facility] Ph: [phone] and 7[facility]-[employer] Ph: [phone]     Pharmacy Information (if different than what is on RX)      Forms faxed to GABRIEL VANG. Dept. 10/10/19 Harry Powell L.P.N.

## 2019-10-10 NOTE — PROGRESS NOTES
Called Ubaldo to review echo results. He was pleased to hear EF improved from 10% to 28%. Reviewed that Apical LV masses/clots no longer seen. We discussed Entresto and he is willing to switch to Entresto. Given following verbal orders:    Date: 10/10/2019    Time of Call: 3:55 PM     Diagnosis:  HFrEF     [ VORB ] Ordering provider: Stephania Crocker NP  Order: Entresto 49/50 mg BID. STOP Lisinopril. BMP 1-2 weeks after starting Entresto. Decrease Hydralazine to 50 mg twice daily.      Order received by: Luna Rubin RN      Follow-up/additional notes: reviewed washout period of stopping Lisinopril for 36 hours before starting Entresto with Ubaldo and will send further information via [employer] as well.

## 2019-10-11 ENCOUNTER — TELEPHONE (OUTPATIENT)
Dept: CARDIOLOGY | Facility: CLINIC | Age: 34
End: 2019-10-11

## 2019-10-11 NOTE — TELEPHONE ENCOUNTER
PA Initiation    Medication: Entresto 49-51 mg -   Insurance Company: [employer] - Phone [phone] Fax [phone]  Pharmacy Filling the Rx: [id number] - [place] [address] AT [facility] [address]  Filling Pharmacy Phone: [phone]  Filling Pharmacy Fax: [phone]  Start Date: 10/11/2019

## 2019-10-11 NOTE — TELEPHONE ENCOUNTER
Prior Authorization Retail Medication Request    Medication/Dose: sacubitril-valsartan (ENTRESTO) 49-51 MG per tablet  ICD code (if different than what is on RX):    Previously Tried and Failed:    Rationale:      Insurance Name:  [facility]  Insurance ID:  [id number]      Pharmacy Information (if different than what is on RX)  Name:  [id number]  Phone:  [phone]

## 2019-10-16 NOTE — TELEPHONE ENCOUNTER
Prior Authorization Approval    Medication: Entresto 49-51 mg - APPROVED was approved on 10/15/2019  Effective:   to 10/10/2020  Reference #:    Approved Dose/Quantity:   Insurance Company: [employer] - Phone [phone] Fax [phone]  Expected CoPay:    Pharmacy Filling the Rx: [facility] #[id number] - [place], [place] - [address] AT [facility] [address]  Pharmacy Notified: Yes  Patient Notified: Comment:  **Instructed pharmacy to notify patient when script is ready to /ship.**    Verbal approval

## 2019-10-17 ENCOUNTER — HOSPITAL ENCOUNTER (OUTPATIENT)
Dept: BEHAVIORAL HEALTH | Facility: CLINIC | Age: 34
End: 2019-10-17
Attending: SOCIAL WORKER
Payer: COMMERCIAL

## 2019-10-17 ENCOUNTER — PATIENT OUTREACH (OUTPATIENT)
Dept: CARDIOLOGY | Facility: CLINIC | Age: 34
End: 2019-10-17

## 2019-10-17 PROCEDURE — H2035 A/D TX PROGRAM, PER HOUR: HCPCS | Mod: HQ

## 2019-10-17 NOTE — PROGRESS NOTES
I called pharmacy to find out if Ubaldo would have a co-pay with his Entresto, no co-pay at this time. Called Ubaldo to let him know Entresto was approved by his insurance. Left voicemail and asked for call back. Reminded him I had sent him the details of stopping Lisinopril and starting Entresto in a MD-IT message last week. Second MD-IT message sent today. Will follow up on getting labs next week. Luna Rubin RN

## 2019-10-18 NOTE — PROGRESS NOTES
Ubaldo MALIN Velez  [id number]               Adult CD Progress Note and Treatment Plan Review          Thursday    Group Date: 10/17/2019     Group Attendance Attended group session   Group Therapy Type Addiction   Group Topic Covered Communication, Emotions/Expression/Feelings, Relapse Prevention, Stress Management and Time Management   Client's Response To Group Topic Cooperative with task Discussed personal experience with topic Expressed readiness to alter behaviors Expressed understanding of topic Listened actively   Client Group Participation Detail Highly involved   Group Attendance (Time) 2.0 Hours   Individual Attendance None   Family Attendance None   Other Comments/Information None         Total # of Phase 1 Group Sessions: 24     Total # of Phase 2 Group Sessions: 14  Total # of Phase 3 Group Sessions: 2  Total # of 1:1 Sessions: 2    Support group attended this week: no    Reporting sobriety: Yes, client reports last use of meth as 4/19/2019    Treatment Plan Review     Treatment Plan Review completed on:  10/17/2019     Projected discharge date: 12/05/2019    Client preferred learning style: by hands-on practice and by watching someone else demonstrate    Staff member(s) contributing:  Lei Kraus, MPS, LADC; Carmela Garcia, Intern.    Received supervision: yes - Intern was supervised.    Client involvement with treatment planning: contributed to goals and plan.    Client received copy of plan/revised plan: Yes, signed 5/29/2019    Client agrees with plan/revised plan: Yes    Changes to Treatment Plan: No    Client's Dimension 1 Goal(s) Develop effective strategies to maintain sobriety.     See below.   Client's Dimension 2 Goal(s) Disclose CD status to medical providers and follow up with medical interventions while in IOP.   Client would like to quit smoking tobacco.  Maintain good physical health. See below.   Client's Dimension 3 Goal(s) Learn how to apply self-care and psychotherapy to build a  repertoire of daily habits that counteract PAWS, fatigue, depression and anxiety leading to increased resiliency and well-being See below.   Client's Dimension 4 Goal(s) Understand the impact your substance use has had on you, your family, and significant relationships.  Increase internal motivation to change. Client presented assignment.   Client's Dimension 5 Goal(s) Gain education about addiction.  Identify personal triggers and relapse warning signs.  Develop sober coping and living skills in order to address the development of a strategy for long term recovery. See below.   Client's Dimension 6 Goal(s) Develop and increase sober support network.  Identify and attend sober support group meetings. See below.     New Goals added since last review: None.    Goal(s) worked on since last review: dim 4, 5    Strategies effective: Yes    Care Coordination Activities: None.    Medical, Mental Health and other appointments the client attended: Continued medical care for recovery for heart failure due to meth use.  PT 3x weekly now done.  Labs done 8/19/2019.  Next ECHO in 4 months.    Medication issues: None.    Physical and mental health problems: Yes - client is recovering from heart failure due to meth use.  Client notes need to stop smoking to meet criteria for transplant, if needed.  8/19/2019 client reports he had his final cardio rehab PT appt this week.     Review and evaluation of the individual abuse prevention plan: The program's individual abuse prevention plan (IAPP) is sufficient for this client.     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    Dimension 2 1 On Thursday client  "denies any new or worsening medical conditions.  He continues medical care for recovery from heart failure due to use of meth.  Client reports exercise. Client has medical appointment 10/22/19.    Dimension 3 1 On Thursday client rates his depression, anxiety, or other mental health concerns as 3 of 10 (10 highest), due to \"a few money problems.\"  Client denies any thoughts of hurting himself or others.     Dimension 4 1 On Thursday client rates his motivation for recovery as 9 of 10 (10 highest).  Client expressed 3 reasons to remain sober.  Client states he \"is ready to move onto a new place.\"    Dimension 5 2  On Thursday client rates his strongest craving to use in the past week as 4 of 10 (10 highest).  Client reports strengths of being committed to living his values, communicating with his girlfriend openly and empathically..  Client reports using coping skill of spending quality time with his son.  He reported mindfulness as something he did to change his thinking and behaviors for the sake of his sobriety.    Dimension 6 1 On Thursday client denied attendance at sober support meeting this week.  Client denies having a sponsor.  Client reports family sober activities, and is addressing potential acceptance of a structured, steady job offer he received.      Data: (Need to include short narrative for the week on what was worked on)  On Thursday client participated with the check- in process.  A peer presented the final part of his recovery care plan  Client gave supportive feedback relating to the plan to his peer.  Client participated, and spoke about how he related to behaviors, thoughts, and feelings described in peers  plan.  Client participated in a devotion read aloud.  Client expressed something he was grateful for each day.       Intervention:   Behavioral Therapy, Cognitive Behavioral Therapy, Counselor Feedback, Psychoeducation, Emotional management, Group Feedback, Motivational Interviewing, Relapse " Prevention, Twelve Step Facilitation, REBT    Assessment:    Stages of Change Model  Contemplation    Appears/Sounds:  Cooperative  Engaged    Plan:   Client will plan to present recovery care plan.  Client will try one new coping skill/activity before next session.  Client will affirm himself for completion of another day sober each night for a week.  Client will express 3 things he is grateful for each night.  Client will carry his trigger-lock card with him each day.  -Attend 1 sober support group meeting this week (this includes non-12-step/AA alternative meetings).  Client will practice choice of breathing techniques 2X daily.  -Client will continue to work on treatment plan objectives.  -Client will follow all recommendations of counselor and any other medical provider which includes taking all medications as prescribed.    -Client to engage in sober activities each week.  -Client will try one new coping skill/activity before next session.    PRIMITIVO Early, [facility], Carmela Garcia, Intern

## 2019-10-21 ENCOUNTER — PATIENT OUTREACH (OUTPATIENT)
Dept: CARDIOLOGY | Facility: CLINIC | Age: 34
End: 2019-10-21

## 2019-10-21 ENCOUNTER — ANTICOAGULATION THERAPY VISIT (OUTPATIENT)
Dept: ANTICOAGULATION | Facility: CLINIC | Age: 34
End: 2019-10-21

## 2019-10-21 DIAGNOSIS — I74.9 EMBOLISM AND THROMBOSIS (H): ICD-10-CM

## 2019-10-21 DIAGNOSIS — Z79.01 LONG TERM (CURRENT) USE OF ANTICOAGULANTS: ICD-10-CM

## 2019-10-21 NOTE — PROGRESS NOTES
Followed up on Ubaldo's question about discontinuing coumadin with Dr Choi.  Thrombus had resolved on Echo.      Date: 10/21/2019    Time of Call: 3:30 PM     Diagnosis:  Heart Failure     [ VORB ] Ordering provider: Dr Choi    Order: discontinue coumadin     Order received by: Carrie Tanner RN       Follow-up/additional notes: make sure Ubaldo received message or mychart message, call INR clinic

## 2019-10-21 NOTE — PROGRESS NOTES
Called Ubaldo to review Echo results.  Left message asking for call back.  Will also send MyChart message    Carrie Tanner RN

## 2019-10-22 ENCOUNTER — OFFICE VISIT (OUTPATIENT)
Dept: INTERNAL MEDICINE | Facility: CLINIC | Age: 34
End: 2019-10-22
Attending: NURSE PRACTITIONER
Payer: COMMERCIAL

## 2019-10-22 VITALS
DIASTOLIC BLOOD PRESSURE: 85 MMHG | HEART RATE: 100 BPM | SYSTOLIC BLOOD PRESSURE: 126 MMHG | WEIGHT: 236 LBS | BODY MASS INDEX: 29.5 KG/M2 | OXYGEN SATURATION: 100 %

## 2019-10-22 DIAGNOSIS — F32.A DEPRESSION, UNSPECIFIED DEPRESSION TYPE: ICD-10-CM

## 2019-10-22 DIAGNOSIS — Z76.89 ENCOUNTER TO ESTABLISH CARE WITH NEW DOCTOR: Primary | ICD-10-CM

## 2019-10-22 RX ORDER — ESCITALOPRAM OXALATE 10 MG/1
10 TABLET ORAL DAILY
Qty: 60 TABLET | Refills: 1 | Status: SHIPPED | OUTPATIENT
Start: 2019-10-22 | End: 2020-11-10

## 2019-10-22 ASSESSMENT — PAIN SCALES - GENERAL: PAINLEVEL: NO PAIN (0)

## 2019-10-22 NOTE — PROGRESS NOTES
PRIMARY CARE CENTER         HPI:       Ubaldo Velez is a 33 year old male with past medical history ADHD and polysubstance abuse (ETOH, cocaine, alcohol, meth) NICM with an EF of 10% with moderate to severe MR and TR and multiple apical thrombus who presents to clinic for: Establish Care (general health check up)    Ubaldo presents to establish care with a new PCP.  He had previously gone to [facility] for his primary care, but ever since he was hospitalized for heart failure at [facility] he has been following with Dr. Choi in cardiology so he now wants to get all of his care at [facility].      Patient was hospitalized in April 2019 for heart failure that was deemed to be nonischemic cardiomyopathy secondary to drug abuse.  At that time, his ejection fraction was found to be 10%.  He underwent aggressive diuresis and was eventually discharged on May 7.  Since that time, he is followed at the [facility] advanced heart failure clinic with Dr. Choi.  His most recent echocardiogram showed recovery of his EF to about 30%.  Patient had been on Coumadin for apical thrombus, however he does stop this drug yesterday.    He has not used drugs since his admission for heart failure.  He says this event is a wake-up call in his life.  He does admit that it is still struggle to stay clean.  He is enrolled in a chemical dependency program that involves multiple sessions per week with group sessions and occasionally one-on-one time with a counselor.  1 of the counselors at chemical dependency had raised concerns the patient might be depressed and advised him to talk to this about with a primary care doctor.    Riley states that he no longer takes enjoying things he normally like to do, for instance taking his son to the park that is just right across the street from where he lives.  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  He finds it difficult to sleep at night.  He feels down and depressed most  days over the past couple months.  At times he feels like he has little energy.  He has difficulty concentrating.  He does note that he has been on up to 100 mg of metoprolol XL, however, he feels like this medicine just made him more tired.  Eventually cardiology clinic down titrated the dose of this medicine to 25 mg/day.  Patient is interested in starting an antidepressant.      ROS:  14 point ROS is otherwise negative     PMHx:  Past Medical History:   Diagnosis Date     ADHD      Cocaine use      Electronic cigarette use      Methamphetamine use (H)      NICM (nonischemic cardiomyopathy) (H)      Systolic heart failure (H)      Tobacco abuse        PSHx:  Past Surgical History:   Procedure Laterality Date     CV CORONARY ANGIOGRAM N/A 4/29/2019    Procedure: CV CORONARY ANGIOGRAM;  Surgeon: Reggie Hua MD;  Location:  HEART CARDIAC CATH LAB     CV RIGHT HEART CATH N/A 4/29/2019    Procedure: Right Heart Cath;  Surgeon: Reggie Hua MD;  Location: [facility]     EXTRACTION(S) DENTAL      [place] teeth     HAND SURGERY Left     Laceration left index finger       FamHx:  Family History   Problem Relation Age of Onset     Chronic Obstructive Pulmonary Disease Father      Multiple Sclerosis Maternal Aunt        Allergies:   No Known Allergies    Meds:    Current Outpatient Medications:      digoxin (LANOXIN) 250 MCG tablet, Take 1 tablet (250 mcg) by mouth daily, Disp: 90 tablet, Rfl: 3     escitalopram (LEXAPRO) 10 MG tablet, Take 1 tablet (10 mg) by mouth daily, Disp: 60 tablet, Rfl: 1     folic acid (FOLVITE) 1 MG tablet, Take 1 tablet (1 mg) by mouth daily, Disp: 90 tablet, Rfl: 3     hydrALAZINE (APRESOLINE) 25 MG tablet, Take 1 tablet (25 mg) by mouth 2 times daily, Disp: 180 tablet, Rfl: 3     metoprolol succinate ER (TOPROL-XL) 25 MG 24 hr tablet, Take 1 tablet (25 mg) by mouth daily At bedtime, Disp: 30 tablet, Rfl: 3     vitamin B1 (THIAMINE) 100 MG tablet, Take 1  tablet (100 mg) by mouth daily, Disp: 90 tablet, Rfl: 1     sacubitril-valsartan (ENTRESTO) 49-51 MG per tablet, Take 1 tablet by mouth 2 times daily, Disp: 60 tablet, Rfl: 3    SocHx:  Social History     Socioeconomic History     Marital status: Single     Spouse name: Not on file     Number of children: 1     Years of education: Not on file     Highest education level: Not on file   Occupational History     Occupation: [employer]   Social Needs     Financial resource strain: Not on file     Food insecurity:     Worry: Not on file     Inability: Not on file     Transportation needs:     Medical: Not on file     Non-medical: Not on file   Tobacco Use     Smoking status: Current Every Day Smoker     Packs/day: 1.00     Years: 15.00     Pack years: 15.00     Smokeless tobacco: Never Used     Tobacco comment:  5 cigarettes daily with vaping   Substance and Sexual Activity     Alcohol use: Not Currently     Comment: 8-10 beers a day, now 1 beer a week.     Drug use: Not Currently     Types: Methamphetamines, Cocaine     Comment: Methamphetamine last used 8 weeks ago     Sexual activity: Not on file   Lifestyle     Physical activity:     Days per week: Not on file     Minutes per session: Not on file     Stress: Not on file   Relationships     Social connections:     Talks on phone: Not on file     Gets together: Not on file     Attends Gnosticist service: Not on file     Active member of club or organization: Not on file     Attends meetings of clubs or organizations: Not on file     Relationship status: Not on file     Intimate partner violence:     Fear of current or ex partner: Not on file     Emotionally abused: Not on file     Physically abused: Not on file     Forced sexual activity: Not on file   Other Topics Concern     Not on file   Social History Narrative    Single but has long standing SO who he lives with, has one child.  (last updated 4/24/2019)        Problem, Medication and Allergy Lists were reviewed and  are current.  Patient is a new patient to this clinic and so  I reviewed/updated the Past Medical History, the Family History and the Social History.          Review of Systems:   ROS  14 point ROS is otherwise negative          Physical Exam:   /85   Pulse 100   Wt 107 kg (236 lb)   SpO2 100%   BMI 29.50 kg/m    Body mass index is 29.5 kg/m .  Vitals were reviewed       GENERAL APPEARANCE: healthy, alert and no distress     EYES: EOMI,  PERRL, no slceral icterus     HENT: NCAT     RESP: lungs clear to auscultation - no rales, rhonchi or wheezes     CV: regular rates and rhythm, no murmur noted     ABDOMEN:  soft, nontender, no HSM or masses and bowel sounds normal     MS: extremities normal- no gross deformities noted, no evidence of inflammation in joints, FROM in all extremities.     SKIN: no suspicious lesions or rashes, multiple tattoos on upper extremities     NEURO: Normal strength and tone, sensory exam grossly normal, mentation intact and speech normal     PSYCH: mentation appears normal. and affect normal/bright        Results:     Orders Only on 10/07/2019   Component Date Value Ref Range Status     INR 10/07/2019 2.27* 0.86 - 1.14 Final       Lab Results   Component Value Date    WBC 8.3 05/07/2019    HGB 17.8 (H) 05/07/2019    HCT 57.3 (H) 05/07/2019     05/07/2019     09/25/2019    POTASSIUM 4.1 09/25/2019    CHLORIDE 107 09/25/2019    CO2 26 09/25/2019    BUN 11 09/25/2019    CR 0.94 09/25/2019    GLC 93 09/25/2019    DD 1.9 (H) 04/24/2019    NTBNPI 4,655 (H) 04/24/2019    NTBNP 997 (H) 07/02/2019    TROPI 2.169 (HH) 04/25/2019    AST 41 04/27/2019    ALT 49 04/27/2019    ALKPHOS 74 04/27/2019    BILITOTAL 0.6 04/27/2019    INR 2.27 (H) 10/07/2019       Assessment and Plan     Ubaldo Velez is a 33 year old male with past medical history ADHD and polysubstance abuse (ETOH, cocaine, alcohol, meth) NICM with an EF of 10% with moderate to severe MR and TR and multiple apical  geremias who presents to clinic to establish care and to discuss depression.      Ubaldo was seen today for establish care.    Diagnoses and all orders for this visit:    Depression, unspecified depression type  Patient meets criteria for major depression.  The differential diagnosis in this patient includes MDD, substance abuse depressive disorder, depressive disorder due to another medical condition, beta blocker use, anxiety, substance abuse.  Patient's symptoms were almost certainly made worse by high dose metoprolol, however, decreasing this dose has not alleviated the symptoms and he still meets diagnostic criteria with >5 symptoms for > 2 weeks.  Thus, it is reasonable to trial an serotonin specific reuptake inhibitor.  Discussed the side effects of escitalopram with patient and he agreed to start this medicine.  Informed patient that it will take several weeks to see effects.    -     escitalopram (LEXAPRO) 10 MG tablet; Take 1 tablet (10 mg) by mouth daily  -     RRT in 3 weeks to evaluate dose adjustment and monitor for side effects    Options for treatment and follow-up care were reviewed with the patient. Ubaldo Velez engaged in the decision making process and verbalized understanding of the options discussed and agreed with the final plan.    Shailesh Gudino,   Oct 22, 2019    Pt was seen and plan of care discussed with Dr. Collins.     Teaching Physician Note:  I was present during the visit and the patient was seen and examined by me.   I discussed the case with the resident and agree with the note as documented by the resident with the following exceptions:  None.    Bernie Collins M.D.  Internal Medicine   pager [phone]

## 2019-10-22 NOTE — NURSING NOTE
Chief Complaint   Patient presents with     Establish Care     general health check up     Kimberly Nissen, EMT at 1:30 PM on 10/22/2019

## 2019-10-24 ENCOUNTER — PATIENT OUTREACH (OUTPATIENT)
Dept: CARDIOLOGY | Facility: CLINIC | Age: 34
End: 2019-10-24

## 2019-10-24 DIAGNOSIS — I50.22 CHRONIC SYSTOLIC HEART FAILURE (H): Primary | ICD-10-CM

## 2019-10-24 NOTE — PROGRESS NOTES
Called Ubaldo to find out when he started Entresto and schedule follow up labs. Left voicemail and asked for call back.     Luna Rubin RN   Date: 10/24/2019    Time of Call: 8:49 AM     Diagnosis:  HFrEF     [ VORB ] Ordering provider: Stephania Crocker NP  Order: BMP 1 week after starting Entresto     Order received by: Luna Rubin RN      Follow-up/additional notes:

## 2019-10-28 DIAGNOSIS — I50.22 CHRONIC SYSTOLIC HEART FAILURE (H): ICD-10-CM

## 2019-10-28 NOTE — PROGRESS NOTES
Called Ubaldo to follow up on Entresto.  He stated he had not started it yet, but had run out of lisinopril today and wanted to wait for the 36 hour wash out period before starting the Entresto.  He asked that the script be moved to [address] [place] [address] pharmacy.  He also stated that he knew he needed labs checked a week after starting Entresto Moved script.    Carrie Tanner RN

## 2019-10-29 DIAGNOSIS — I50.9 CONGESTIVE HEART FAILURE, UNSPECIFIED HF CHRONICITY, UNSPECIFIED HEART FAILURE TYPE (H): Primary | ICD-10-CM

## 2019-11-06 ENCOUNTER — PATIENT OUTREACH (OUTPATIENT)
Dept: CARDIOLOGY | Facility: CLINIC | Age: 34
End: 2019-11-06

## 2019-11-06 NOTE — PROGRESS NOTES
Called pharmacy to ask if Ubaldo has picked up his Entresto yet. Has not picked it up yet. Called Ubaldo and left message to check in. Left voicemail and asked for call back. Also told him I would send a [employer] message.   Luna Rubin RN

## 2019-11-12 ENCOUNTER — DOCUMENTATION ONLY (OUTPATIENT)
Dept: CARE COORDINATION | Facility: CLINIC | Age: 34
End: 2019-11-12

## 2019-11-14 ENCOUNTER — TELEPHONE (OUTPATIENT)
Dept: BEHAVIORAL HEALTH | Facility: CLINIC | Age: 34
End: 2019-11-14

## 2019-11-14 ENCOUNTER — PATIENT OUTREACH (OUTPATIENT)
Dept: CARDIOLOGY | Facility: CLINIC | Age: 34
End: 2019-11-14

## 2019-11-14 NOTE — TELEPHONE ENCOUNTER
"Ubaldo canceled his appointment today due to \"vehicle troubles.\"  Called to ask if he wanted to reschedule and check if he has started the Entresto.  He still has not picked it up and has not been on lisinopril or Entresto for over a week.  I strongly encouraged his to pick it up this afternoon and reminded him to get his labs checked a week later.  Will work out a plan for follow up and call him back.      Carrie Tanner RN      "

## 2019-11-15 DIAGNOSIS — I50.9 CONGESTIVE HEART FAILURE, UNSPECIFIED HF CHRONICITY, UNSPECIFIED HEART FAILURE TYPE (H): Primary | ICD-10-CM

## 2019-11-15 DIAGNOSIS — I50.21 ACUTE SYSTOLIC CONGESTIVE HEART FAILURE (H): ICD-10-CM

## 2019-11-15 DIAGNOSIS — I50.22 CHRONIC SYSTOLIC HEART FAILURE (H): ICD-10-CM

## 2019-11-15 NOTE — PROGRESS NOTES
Called Ubaldo to schedule him with JEM and Dr Choi.  Asked if he had picked up his Entresto- he has not but did say he was planning on getting it today.     Carrie Tanner RN

## 2019-11-18 RX ORDER — LANOLIN ALCOHOL/MO/W.PET/CERES
CREAM (GRAM) TOPICAL
Qty: 100 TABLET | Refills: 0 | OUTPATIENT
Start: 2019-11-18

## 2020-02-04 ENCOUNTER — PATIENT OUTREACH (OUTPATIENT)
Dept: CARDIOLOGY | Facility: CLINIC | Age: 35
End: 2020-02-04

## 2020-02-04 NOTE — PROGRESS NOTES
Called Ubaldo to move his appointment on 3/5 to 3/3.  He reports that he still does not have insurance but is now motivated to get it as he has the appointment in March coming up.  He has not taken any medications since losing his insurance but has not noticed any worsening symptoms.  He will let us know if he doesn't get insurance prior to the appointment.

## 2020-02-24 NOTE — PROGRESS NOTES
Notified by Patient Financial counseling that Ubaldo still does not appear to have insurance.  Called Ubaldo and left message asking him to return call and let us know if he has gotten around to getting insurance.

## 2020-02-25 NOTE — PROGRESS NOTES
Able to speak with Ubaldo today. He has not gotten insurance yet but plans to work on it this week.  I will touch base with him on Friday to see if he has gotten insurance.

## 2020-02-28 NOTE — PROGRESS NOTES
Spoke to Ubaldo today.  He said he has his plan picked out and he should have everything set up by Monday

## 2020-03-11 ENCOUNTER — HEALTH MAINTENANCE LETTER (OUTPATIENT)
Age: 35
End: 2020-03-11

## 2020-03-19 ENCOUNTER — MYC MEDICAL ADVICE (OUTPATIENT)
Dept: CARDIOLOGY | Facility: CLINIC | Age: 35
End: 2020-03-19

## 2020-03-19 NOTE — TELEPHONE ENCOUNTER
Called Ubaldo to schedule.  He is currently not having symptoms and feels really well. He has gotten his insurance started.  Tentatively scheduled in mid-April, we did discuss with the current health situation that things may change and he stated understanding

## 2020-04-13 DIAGNOSIS — I50.21 ACUTE SYSTOLIC CONGESTIVE HEART FAILURE (H): Primary | ICD-10-CM

## 2020-04-13 PROBLEM — S64.40XA DIGITAL NERVE LACERATION, FINGER: Status: ACTIVE | Noted: 2017-04-17

## 2020-04-13 PROBLEM — Z79.01 LONG TERM CURRENT USE OF ANTICOAGULANT: Status: ACTIVE | Noted: 2019-05-16

## 2020-04-13 PROBLEM — F90.9 ADHD (ATTENTION DEFICIT HYPERACTIVITY DISORDER): Status: ACTIVE | Noted: 2019-04-24

## 2020-04-13 PROBLEM — I51.3 LEFT VENTRICULAR MURAL THROMBUS: Status: ACTIVE | Noted: 2019-05-16

## 2020-04-13 RX ORDER — WARFARIN SODIUM 2.5 MG/1
TABLET ORAL
COMMUNITY
Start: 2019-05-07 | End: 2020-11-10

## 2020-04-14 DIAGNOSIS — I50.21 ACUTE SYSTOLIC CONGESTIVE HEART FAILURE (H): ICD-10-CM

## 2020-04-14 DIAGNOSIS — I50.9 CONGESTIVE HEART FAILURE, UNSPECIFIED HF CHRONICITY, UNSPECIFIED HEART FAILURE TYPE (H): ICD-10-CM

## 2020-04-14 LAB
ANION GAP SERPL CALCULATED.3IONS-SCNC: 4 MMOL/L (ref 3–14)
BUN SERPL-MCNC: 14 MG/DL (ref 7–30)
CALCIUM SERPL-MCNC: 9.3 MG/DL (ref 8.5–10.1)
CHLORIDE SERPL-SCNC: 105 MMOL/L (ref 94–109)
CO2 SERPL-SCNC: 28 MMOL/L (ref 20–32)
CREAT SERPL-MCNC: 1.01 MG/DL (ref 0.66–1.25)
GFR SERPL CREATININE-BSD FRML MDRD: >90 ML/MIN/{1.73_M2}
GLUCOSE SERPL-MCNC: 81 MG/DL (ref 70–99)
NT-PROBNP SERPL-MCNC: 105 PG/ML (ref 0–125)
POTASSIUM SERPL-SCNC: 4 MMOL/L (ref 3.4–5.3)
SODIUM SERPL-SCNC: 138 MMOL/L (ref 133–144)

## 2020-04-15 ENCOUNTER — VIRTUAL VISIT (OUTPATIENT)
Dept: CARDIOLOGY | Facility: CLINIC | Age: 35
End: 2020-04-15
Payer: MEDICAID

## 2020-04-15 DIAGNOSIS — Z91.148 NONCOMPLIANCE WITH MEDICATIONS: ICD-10-CM

## 2020-04-15 DIAGNOSIS — F19.11 HISTORY OF SUBSTANCE ABUSE (H): ICD-10-CM

## 2020-04-15 DIAGNOSIS — I50.22 CHRONIC SYSTOLIC HEART FAILURE (H): Primary | ICD-10-CM

## 2020-04-15 DIAGNOSIS — I42.8 NICM (NONISCHEMIC CARDIOMYOPATHY) (H): ICD-10-CM

## 2020-04-15 PROCEDURE — 99443 ZZC PHYSICIAN TELEPHONE EVALUATION 21-30 MIN: CPT | Performed by: NURSE PRACTITIONER

## 2020-04-15 RX ORDER — LISINOPRIL 10 MG/1
10 TABLET ORAL 2 TIMES DAILY
Qty: 60 TABLET | Refills: 1 | Status: SHIPPED | OUTPATIENT
Start: 2020-04-15 | End: 2020-04-16

## 2020-04-15 NOTE — NURSING NOTE
Updated Ubaldo that he should restart the metoprlol 25 mg daily and start lisinopril 10 mg twice a day.  He will hold off starting the digoxin until clarified by Dr Choi.  Lab appointment arranged for 4/23 at 2:30 and Steph appt for 4/30 at 9:30

## 2020-04-15 NOTE — PROGRESS NOTES
"Ubaldo Velez is a 34 year old male who is being evaluated via a billable telephone visit.      The patient has been notified of following:     \"This telephone visit will be conducted via a call between you and your physician/provider. We have found that certain health care needs can be provided without the need for a physical exam.  This service lets us provide the care you need with a short phone conversation.  If a prescription is necessary we can send it directly to your pharmacy.  If lab work is needed we can place an order for that and you can then stop by our lab to have the test done at a later time.    Telephone visits are billed at different rates depending on your insurance coverage. During this emergency period, for some insurers they may be billed the same as an in-person visit.  Please reach out to your insurance provider with any questions.    If during the course of the call the physician/provider feels a telephone visit is not appropriate, you will not be charged for this service.\"    Patient has given verbal consent for Telephone visit?  Yes    ROS: denies CP, palpitations, SOB, headaches. No swelling, appetite is fine. He isn't concerned about his energy level.     Additional provider notes:  Ubaldo Velez is a 33 year old male with a relevant past medical history including ADHD and polysubstance abuse (ETOH, cocaine, alcohol, meth) NICM with an EF of 10% with moderate to severe MR and TR and multiple apical thrombus. HR averages 80 he says. He had been working out daily. /102 and 158/101. His weight has been 238 lbs. Patient hasn't been taking his medications due to insurance.  He says he has been discussing this with Carrie GRAFF. Medication compliance has been an issue for quite sometime. He was suppose to start Entresto back in October of 2019 . Patient has never started Entresto. He use to take 20 mg Lisinopril BID. He states his new insurance should cover the Entresto, but not for sure. "     Patient should start the   Toprol 25 mg daily  Lisinopril 10 mg  daily  Re-start of Digoxin I would defer to  Dr. Choi on.   We can hold on Hydralazine at this time.  Labs    Follow up next available with Dr. Choi   [facility] clinic one month after that appt.       Phone call duration: 21 minutes    Jean MCGREGORC

## 2020-04-16 DIAGNOSIS — I50.22 CHRONIC SYSTOLIC HEART FAILURE (H): ICD-10-CM

## 2020-04-16 RX ORDER — LISINOPRIL 10 MG/1
20 TABLET ORAL DAILY
Qty: 180 TABLET | Refills: 3 | Status: SHIPPED | OUTPATIENT
Start: 2020-04-16 | End: 2020-04-30

## 2020-04-16 NOTE — PROGRESS NOTES
Updated Ubaldo that he should start lisinopril 10 mg daily for 1 week and then increase to 20 mg daily after 1 week. He thought it would be easier to remember to take his pills like that.  Also told him to stop the diogxin.      Date: 4/16/2020    Time of Call: 11:57 AM     Diagnosis:  Heart Failure     [ VORB ] Ordering provider: Dr Choi      Order: start lisinopril at 10 mg daily for 1 week, increase to 20 mg daily after 1 week.  Stop digoxin     Order received by: Carrie Tanner RN       Follow-up/additional notes: Ubaldo stated understanding.

## 2020-04-21 NOTE — PATIENT INSTRUCTIONS
Carrie RN will call you regarding medications changes and re-starts    Follow up 4/30 with Dr. Choi. [facility] one month after

## 2020-04-23 ENCOUNTER — HOSPITAL ENCOUNTER (OUTPATIENT)
Facility: CLINIC | Age: 35
Setting detail: SPECIMEN
Discharge: HOME | End: 2020-04-23
Admitting: NURSE PRACTITIONER
Payer: MEDICAID

## 2020-04-23 ENCOUNTER — PATIENT OUTREACH (OUTPATIENT)
Dept: CARDIOLOGY | Facility: CLINIC | Age: 35
End: 2020-04-23

## 2020-04-23 DIAGNOSIS — I50.22 CHRONIC SYSTOLIC HEART FAILURE (H): Primary | ICD-10-CM

## 2020-04-23 DIAGNOSIS — I50.22 CHRONIC SYSTOLIC HEART FAILURE (H): ICD-10-CM

## 2020-04-23 LAB
ANION GAP SERPL CALCULATED.3IONS-SCNC: 5 MMOL/L (ref 3–14)
BUN SERPL-MCNC: 14 MG/DL (ref 7–30)
CALCIUM SERPL-MCNC: 9 MG/DL (ref 8.5–10.1)
CHLORIDE SERPL-SCNC: 110 MMOL/L (ref 94–109)
CO2 SERPL-SCNC: 25 MMOL/L (ref 20–32)
CREAT SERPL-MCNC: 0.95 MG/DL (ref 0.66–1.25)
GFR SERPL CREATININE-BSD FRML MDRD: >90 ML/MIN/{1.73_M2}
GLUCOSE SERPL-MCNC: 91 MG/DL (ref 70–99)
POTASSIUM SERPL-SCNC: 4 MMOL/L (ref 3.4–5.3)
SODIUM SERPL-SCNC: 140 MMOL/L (ref 133–144)

## 2020-04-23 PROCEDURE — 36415 COLL VENOUS BLD VENIPUNCTURE: CPT | Performed by: NURSE PRACTITIONER

## 2020-04-23 PROCEDURE — 80048 BASIC METABOLIC PNL TOTAL CA: CPT | Performed by: NURSE PRACTITIONER

## 2020-04-23 NOTE — PROGRESS NOTES
Checked in with Ubaldo today after he got his labs checked.  He only restarted his medications yesterday so no complaints yet,.

## 2020-04-24 NOTE — PROGRESS NOTES
Verified with Jean Yi NP that we would want labs a week after starting medications.  Updated Ubaldo.  Scheduled labs for 4/29 at 2:30    Date: 4/24/2020    Time of Call: 3:48 PM     Diagnosis:  Heart failure     [ VORB ] Ordering provider: Jean Yi NP    Order: BMP     Order received by: Carrie Tanner RN       Follow-up/additional notes: will follow up on labs

## 2020-04-30 ENCOUNTER — VIRTUAL VISIT (OUTPATIENT)
Dept: CARDIOLOGY | Facility: CLINIC | Age: 35
End: 2020-04-30
Attending: INTERNAL MEDICINE
Payer: MEDICAID

## 2020-04-30 VITALS — WEIGHT: 235 LBS | BODY MASS INDEX: 29.22 KG/M2 | HEIGHT: 75 IN

## 2020-04-30 DIAGNOSIS — I50.22 CHRONIC SYSTOLIC HEART FAILURE (H): ICD-10-CM

## 2020-04-30 PROCEDURE — 99443: CPT | Mod: GC | Performed by: INTERNAL MEDICINE

## 2020-04-30 RX ORDER — LISINOPRIL 10 MG/1
40 TABLET ORAL DAILY
Qty: 180 TABLET | Refills: 3 | Status: SHIPPED | OUTPATIENT
Start: 2020-04-30 | End: 2020-09-10

## 2020-04-30 ASSESSMENT — PAIN SCALES - GENERAL: PAINLEVEL: NO PAIN (0)

## 2020-04-30 ASSESSMENT — MIFFLIN-ST. JEOR: SCORE: 2086.58

## 2020-04-30 NOTE — PROGRESS NOTES
"Ubaldo Velez is a 34 year old male who is being evaluated via a billable telephone visit.      The patient has been notified of following:     \"This telephone visit will be conducted via a call between you and your physician/provider. We have found that certain health care needs can be provided without the need for a physical exam.  This service lets us provide the care you need with a short phone conversation.  If a prescription is necessary we can send it directly to your pharmacy.  If lab work is needed we can place an order for that and you can then stop by our lab to have the test done at a later time.    Telephone visits are billed at different rates depending on your insurance coverage. During this emergency period, for some insurers they may be billed the same as an in-person visit.  Please reach out to your insurance provider with any questions.    If during the course of the call the physician/provider feels a telephone visit is not appropriate, you will not be charged for this service.\"    Patient has given verbal consent for Telephone visit?  Yes    How would you like to obtain your AVS? Alexandra     Weight and height were given by the patient and medications were reconciled.     Rosie Russo CMA    9:03 AM                  "

## 2020-04-30 NOTE — PATIENT INSTRUCTIONS
"Cardiology Providers you saw during your visit:  Dr. Choi    Medication changes:  1.  Ok to increase lisinopril to 40 mg daily    Follow up:  1.  Follow up with Jean in 6 weeks   2.  Follow up with Dr Choi in 4 months with labs and an echo  3.  Schedule a Cardiac MRI to evaluate for LV thrombus  Labs:      Please call if you have :  1. Weight gain of more than 2 pounds in a day or 5 pounds in a week  2. Increased shortness of breath, swelling or bloating  3. Dizziness, lightheadedness   4. Any questions or concerns.       Follow the American Heart Association Diet and Lifestyle recommendations:  Limit saturated fat, trans fat, sodium, red meat, sweets and sugar-sweetened beverages. If you choose to eat red meat, compare labels and select the leanest cuts available.  Aim for at least 150 minutes of moderate physical activity or 75 minutes of vigorous physical activity - or an equal combination of both - each week.      During business hours: [phone], press option # 1 to be routed to the [facility] then option # 4 for \"To send a message to your care team\"      After hours, weekends or holidays: On Call Cardiologist- [phone]   option #4 and ask to speak to the on-call Cardiologist. Inform them you are a CORE/heart failure patient at the [facility].      Heart Failure Support Group  [facility]  The Board Room, 8th Floor  [address] [address]     Meetings   1-2 pm  January 6  March 2  May 4  July 6  September 14  November 2      Carrie Tanner RN BSN CHFN  Cardiology Care Coordinator - Heart Failure/ [facility] Clinic  [facility]   Questions and [phone]   First press #1 for the [employer] and then press #4 for \"To send a message to your care team\"    "

## 2020-04-30 NOTE — PROGRESS NOTES
Cardiology Progress Note     Mr. Ubaldo Velez is a 33 year old male with a relevant past medical history including ADHD and polysubstance abuse (ETOH, cocaine, alcohol, meth) who presented in 2019 in cardiogenic shock. At that time, he had an ECHO which showed LVEF of 10% with moderate to severe MR/TR. In addition, he had multiple apical thrombi. He underwent coronary angiogram which showed normal coronary arteries. He was transitioned to oral afterload and AC. From previous note, he has attended chemical dependency and has followed with the CHF clinic. His most recent TTE in 10/2019 showed that his LVEF has increased to 28%. Unfortunately, for the past three months, patient had health insurance issues and was not able to take any of his medications. He recently got health insurance and has been able to take medications for the past week. He is currently on Lisinopril 20mg daily and Metoprolol 25mg daily.     Currently, he is doing well and back to work despite the pandemic. He has no CHF symptoms at this time. He denies shortness of breath, chest pain, orthopnea, PND, syncope. He is able to work without any significant limitations. He does state that heavy exertion does cause some tiredness, but he still has good exercise tolerance. He reports that he is not using illicit drugs at this point. He has not had any hospitalizations since he was last seen in clinic.          [facility]  Past Medical History:   Diagnosis Date     ADHD      Cocaine use      Electronic cigarette use      Methamphetamine use (H)      NICM (nonischemic cardiomyopathy) (H)      Systolic heart failure (H)      Tobacco abuse          Family History   Problem Relation Age of Onset     Chronic Obstructive Pulmonary Disease Father      Multiple Sclerosis Maternal Aunt            ALLERGIES  No Known Allergies      Current Outpatient Medications   Medication Sig Dispense Refill     folic acid (FOLVITE) 1 MG tablet Take 1 tablet (1 mg) by mouth daily 90  tablet 3     lisinopril (ZESTRIL) 10 MG tablet Take 2 tablets (20 mg) by mouth daily 180 tablet 3     metoprolol succinate ER (TOPROL-XL) 25 MG 24 hr tablet Take 1 tablet (25 mg) by mouth daily At bedtime 30 tablet 3     vitamin B1 (THIAMINE) 100 MG tablet Take 1 tablet (100 mg) by mouth daily 90 tablet 1     escitalopram (LEXAPRO) 10 MG tablet Take 1 tablet (10 mg) by mouth daily (Patient not taking: Reported on 4/15/2020) 60 tablet 1     JANTOVEN ANTICOAGULANT 2.5 MG tablet          ROS:  Constitutional: No fever, chills, or sweats. No weight gain/loss.   ENT: No visual disturbance, ear ache, epistaxis, sore throat.   Allergies/Immunologic: Negative.   Respiratory: No cough, hemoptysis.   Cardiovascular: As per HPI.   GI: No nausea, vomiting, hematemesis, melena, or hematochezia.   : No urinary frequency, dysuria, or hematuria.   Integument: Negative.   Psychiatric: Negative.   Neuro: Negative.   Endocrinology: Negative.   Musculoskeletal: Negative.    EXAM  Vital signs:  Phone Visit       LABS    Orders Only on 04/23/2020   Component Date Value Ref Range Status     Sodium 04/23/2020 140  133 - 144 mmol/L Final     Potassium 04/23/2020 4.0  3.4 - 5.3 mmol/L Final     Chloride 04/23/2020 110* 94 - 109 mmol/L Final     Carbon Dioxide 04/23/2020 25  20 - 32 mmol/L Final     Anion Gap 04/23/2020 5  3 - 14 mmol/L Final     Glucose 04/23/2020 91  70 - 99 mg/dL Final     Urea Nitrogen 04/23/2020 14  7 - 30 mg/dL Final     Creatinine 04/23/2020 0.95  0.66 - 1.25 mg/dL Final     GFR Estimate 04/23/2020 >90  >60 mL/min/[1.73_m2] Final    Comment: Non  GFR Calc  Starting 12/18/2018, serum creatinine based estimated GFR (eGFR) will be   calculated using the Chronic Kidney Disease Epidemiology Collaboration   (CKD-EPI) equation.       GFR Estimate If Black 04/23/2020 >90  >60 mL/min/[1.73_m2] Final    Comment:  GFR Calc  Starting 12/18/2018, serum creatinine based estimated GFR (eGFR) will be    calculated using the Chronic Kidney Disease Epidemiology Collaboration   (CKD-EPI) equation.       Calcium 04/23/2020 9.0  8.5 - 10.1 mg/dL Final         MOST RECENT ECHOCARDIOGRAM: 10/2019    Interpretation Summary  Moderate to severe left ventricular systolic dysfunction.. LVEF 28% based on  biplane 2D tracing.  Global hypokinesis.  Borderline right ventricular dilatation and systolic dysfunction.     Compared to 04/24/2019 there has been an improvement in LVEF, no significant  mitral regurgitation appreciated in this study. Apical LV masses/clots no  longer seen.      RHC on 04/2019    Moderately elevated pulmonary artery hypertension.    Right sided filling pressures are normal.    Left sided filling pressures are moderately elevated.    Normal cardiac output level.     1.Nonischemic cardiomyopathy.  2. Normal right-sided and increased left-sided filling pressures.  3. Mild secondary pulmonary hypertension with a mean PAP of 32 mmHg.  4. Normal cardiac output of 4.6 L/min and reduced cardiac index of 2.0.  5. No angiographic evidence of coronary artery disease.       ASSESSMENT AND PLAN  Mr. Ubaldo Velez is a 33 year old male with a relevant past medical history including ADHD and polysubstance abuse (ETOH, cocaine, alcohol, meth) who presented in 2019 in cardiogenic shock who is now on GDMT.     He currently has very limited CHF symptoms. His major issue was insurance and inability to take medications. Fortunately, he does have insurance and is now able to take medications. We will try to increase his GDMT as tolerated and re-assess his LVEF with a Cardiac MRI.     #Non-Ischemic Cardiomyopathy (LVEF 28%)   -NYHA Class 1/AHA Stage C   -Increase Lisinopril to 40 mg   -Continue Metoprolol 25mg daily and plan to increase once ACEI is tolerated.  -He is currently not on Aldactone. He has had previous issues with hyponatremia with aldactone.   -As above, will obtain Cardiac MRI to re-assess LVEF. If he still has  a LVEF <35%, we will continue with medication up titration and then consider ICD if his LVEF still remains poor. If his LVEF >35%, then he will not need ICD at this time.       #LV Thrombus   -This has resolved since last TTE. Will obtain Cardiac MRI to re-assess. He is currently not on any anticoagulation.     #Polysubstance Use:   -Continue to screen for use during outpatient visits.     Follow up in C[facility] in 6 weeks and with Dr. Choi in 4 months with ECHO and labs prior to visit.      Jasiel Nielsen   Cardiology Fellow   Pager: [phone]        I have reviewed the case with the fellow on 4/30/2020 and have conducted a separate virtual encounter with the patient independently.  I agree with the outlined assessment and plan in the fellow's note.     Staff phone call time: 8:50-9:10 am     The rest of a comprehensive physical examination is deferred due to PHE (public health emergency) video visit restrictions.       Maddie Choi MD   Associate Professor of Medicine   Section of Advanced Heart Failure  [facility]   Pager [phone]        CC  Patient Care Team:  Shailesh Gudino DO as PCP - General (Student in organized health care education/training program)  Luna Carlson [facility] as Pharmacist (Pharmacist)  Carrie Tanner, RN as Specialty Care Coordinator (Cardiology)  Luna Rubin, RN as Specialty Care Coordinator (Cardiology)  Stephania Crocker NP as Nurse Practitioner (Cardiology)  Jean Yi APRN CNP as Assigned PCP  [facility]

## 2020-04-30 NOTE — LETTER
"4/30/2020      RE: Ubalod Velez  [address]  [facility] [address]       Dear Colleague,    Thank you for the opportunity to participate in the care of your patient, Ubaldo Velez, at the [facility]. Please see a copy of my visit note below.    Ubaldo Velez is a 34 year old male who is being evaluated via a billable telephone visit.      The patient has been notified of following:     \"This telephone visit will be conducted via a call between you and your physician/provider. We have found that certain health care needs can be provided without the need for a physical exam.  This service lets us provide the care you need with a short phone conversation.  If a prescription is necessary we can send it directly to your pharmacy.  If lab work is needed we can place an order for that and you can then stop by our lab to have the test done at a later time.    Telephone visits are billed at different rates depending on your insurance coverage. During this emergency period, for some insurers they may be billed the same as an in-person visit.  Please reach out to your insurance provider with any questions.    If during the course of the call the physician/provider feels a telephone visit is not appropriate, you will not be charged for this service.\"    Patient has given verbal consent for Telephone visit?  Yes    How would you like to obtain your AVS? MyChart     Weight and height were given by the patient and medications were reconciled.     Rosie Russo CMA    9:03 AM                    Cardiology Progress Note     Mr. Ubaldo Velez is a 33 year old male with a relevant past medical history including ADHD and polysubstance abuse (ETOH, cocaine, alcohol, meth) who presented in 2019 in cardiogenic shock. At that time, he had an ECHO which showed LVEF of 10% with moderate to severe MR/TR. In addition, he had multiple apical thrombi. He underwent " coronary angiogram which showed normal coronary arteries. He was transitioned to oral afterload and AC. From previous note, he has attended chemical dependency and has followed with the CHF clinic. His most recent TTE in 10/2019 showed that his LVEF has increased to 28%. Unfortunately, for the past three months, patient had health insurance issues and was not able to take any of his medications. He recently got health insurance and has been able to take medications for the past week. He is currently on Lisinopril 20mg daily and Metoprolol 25mg daily.     Currently, he is doing well and back to work despite the pandemic. He has no CHF symptoms at this time. He denies shortness of breath, chest pain, orthopnea, PND, syncope. He is able to work without any significant limitations. He does state that heavy exertion does cause some tiredness, but he still has good exercise tolerance. He reports that he is not using illicit drugs at this point. He has not had any hospitalizations since he was last seen in clinic.          [facility]  Past Medical History:   Diagnosis Date     ADHD      Cocaine use      Electronic cigarette use      Methamphetamine use (H)      NICM (nonischemic cardiomyopathy) (H)      Systolic heart failure (H)      Tobacco abuse          Family History   Problem Relation Age of Onset     Chronic Obstructive Pulmonary Disease Father      Multiple Sclerosis Maternal Aunt            ALLERGIES  No Known Allergies      Current Outpatient Medications   Medication Sig Dispense Refill     folic acid (FOLVITE) 1 MG tablet Take 1 tablet (1 mg) by mouth daily 90 tablet 3     lisinopril (ZESTRIL) 10 MG tablet Take 2 tablets (20 mg) by mouth daily 180 tablet 3     metoprolol succinate ER (TOPROL-XL) 25 MG 24 hr tablet Take 1 tablet (25 mg) by mouth daily At bedtime 30 tablet 3     vitamin B1 (THIAMINE) 100 MG tablet Take 1 tablet (100 mg) by mouth daily 90 tablet 1     escitalopram (LEXAPRO) 10 MG tablet Take 1 tablet (10  mg) by mouth daily (Patient not taking: Reported on 4/15/2020) 60 tablet 1     JANTOVEN ANTICOAGULANT 2.5 MG tablet          ROS:  Constitutional: No fever, chills, or sweats. No weight gain/loss.   ENT: No visual disturbance, ear ache, epistaxis, sore throat.   Allergies/Immunologic: Negative.   Respiratory: No cough, hemoptysis.   Cardiovascular: As per HPI.   GI: No nausea, vomiting, hematemesis, melena, or hematochezia.   : No urinary frequency, dysuria, or hematuria.   Integument: Negative.   Psychiatric: Negative.   Neuro: Negative.   Endocrinology: Negative.   Musculoskeletal: Negative.    EXAM  Vital signs:  Phone Visit       LABS    Orders Only on 04/23/2020   Component Date Value Ref Range Status     Sodium 04/23/2020 140  133 - 144 mmol/L Final     Potassium 04/23/2020 4.0  3.4 - 5.3 mmol/L Final     Chloride 04/23/2020 110* 94 - 109 mmol/L Final     Carbon Dioxide 04/23/2020 25  20 - 32 mmol/L Final     Anion Gap 04/23/2020 5  3 - 14 mmol/L Final     Glucose 04/23/2020 91  70 - 99 mg/dL Final     Urea Nitrogen 04/23/2020 14  7 - 30 mg/dL Final     Creatinine 04/23/2020 0.95  0.66 - 1.25 mg/dL Final     GFR Estimate 04/23/2020 >90  >60 mL/min/[1.73_m2] Final    Comment: Non  GFR Calc  Starting 12/18/2018, serum creatinine based estimated GFR (eGFR) will be   calculated using the Chronic Kidney Disease Epidemiology Collaboration   (CKD-EPI) equation.       GFR Estimate If Black 04/23/2020 >90  >60 mL/min/[1.73_m2] Final    Comment:  GFR Calc  Starting 12/18/2018, serum creatinine based estimated GFR (eGFR) will be   calculated using the Chronic Kidney Disease Epidemiology Collaboration   (CKD-EPI) equation.       Calcium 04/23/2020 9.0  8.5 - 10.1 mg/dL Final         MOST RECENT ECHOCARDIOGRAM: 10/2019    Interpretation Summary  Moderate to severe left ventricular systolic dysfunction.. LVEF 28% based on  biplane 2D tracing.  Global hypokinesis.  Borderline right  ventricular dilatation and systolic dysfunction.     Compared to 04/24/2019 there has been an improvement in LVEF, no significant  mitral regurgitation appreciated in this study. Apical LV masses/clots no  longer seen.      RHC on 04/2019    Moderately elevated pulmonary artery hypertension.    Right sided filling pressures are normal.    Left sided filling pressures are moderately elevated.    Normal cardiac output level.     1.Nonischemic cardiomyopathy.  2. Normal right-sided and increased left-sided filling pressures.  3. Mild secondary pulmonary hypertension with a mean PAP of 32 mmHg.  4. Normal cardiac output of 4.6 L/min and reduced cardiac index of 2.0.  5. No angiographic evidence of coronary artery disease.       ASSESSMENT AND PLAN  Mr. Ubaldo Velez is a 33 year old male with a relevant past medical history including ADHD and polysubstance abuse (ETOH, cocaine, alcohol, meth) who presented in 2019 in cardiogenic shock who is now on GDMT.     He currently has very limited CHF symptoms. His major issue was insurance and inability to take medications. Fortunately, he does have insurance and is now able to take medications. We will try to increase his GDMT as tolerated and re-assess his LVEF with a Cardiac MRI.     #Non-Ischemic Cardiomyopathy (LVEF 28%)   -NYHA Class 1/AHA Stage C   -Increase Lisinopril to 40 mg   -Continue Metoprolol 25mg daily and plan to increase once ACEI is tolerated.  -He is currently not on Aldactone. He has had previous issues with hyponatremia with aldactone.   -As above, will obtain Cardiac MRI to re-assess LVEF. If he still has a LVEF <35%, we will continue with medication up titration and then consider ICD if his LVEF still remains poor. If his LVEF >35%, then he will not need ICD at this time.       #LV Thrombus   -This has resolved since last TTE. Will obtain Cardiac MRI to re-assess. He is currently not on any anticoagulation.     #Polysubstance Use:   -Continue to screen  for use during outpatient visits.     Follow up in CORE in 6 weeks and with Dr. Choi in 4 months with ECHO and labs prior to visit.      Jasiel Nielsen   Cardiology Fellow   Pager: [phone]        I have reviewed the case with the fellow on 4/30/2020 and have conducted a separate virtual encounter with the patient independently.  I agree with the outlined assessment and plan in the fellow's note.     Staff phone call time: 8:50-9:10 am     The rest of a comprehensive physical examination is deferred due to PHE (public health emergency) video visit restrictions.       Maddie Choi MD   Associate Professor of Medicine   Section of Advanced Heart Failure  [facility]   Pager [phone]        CC  Patient Care Team:  Shailesh Gudino DO as PCP - General (Student in organized health care education/training program)  Luna Carlson JOSE LUIS as Pharmacist (Pharmacist)  Carrie Tanner, RN as Specialty Care Coordinator (Cardiology)  Luna Rubin, RN as Specialty Care Coordinator (Cardiology)  Stephania Crocker NP as Nurse Practitioner (Cardiology)  Jean Yi APRN CNP as Assigned PCP  [facility]

## 2020-04-30 NOTE — NURSING NOTE
Med Reconcile: Reviewed and verified all current medications with the patient. The updated medication list was printed and given to the patient.     Med changes: increase lisinopril to 40 mg next week    Return Appointment: Patient given instructions regarding scheduling next clinic visit: CMRI to assess for LV thombus, CORE with Jean in 6 weeks, Dr Choi in 4 months with labs and echo    Patient stated he understood all health information given and agreed to call with further questions or concerns.     Carrie Tanner RN

## 2020-06-05 DIAGNOSIS — I50.22 CHRONIC SYSTOLIC HEART FAILURE (H): ICD-10-CM

## 2020-06-05 LAB
ANION GAP SERPL CALCULATED.3IONS-SCNC: 8 MMOL/L (ref 3–14)
BUN SERPL-MCNC: 9 MG/DL (ref 7–30)
CALCIUM SERPL-MCNC: 9.1 MG/DL (ref 8.5–10.1)
CHLORIDE SERPL-SCNC: 106 MMOL/L (ref 94–109)
CO2 SERPL-SCNC: 24 MMOL/L (ref 20–32)
CREAT SERPL-MCNC: 1.01 MG/DL (ref 0.66–1.25)
GFR SERPL CREATININE-BSD FRML MDRD: >90 ML/MIN/{1.73_M2}
GLUCOSE SERPL-MCNC: 93 MG/DL (ref 70–99)
POTASSIUM SERPL-SCNC: 4 MMOL/L (ref 3.4–5.3)
SODIUM SERPL-SCNC: 137 MMOL/L (ref 133–144)

## 2020-06-11 ENCOUNTER — VIRTUAL VISIT (OUTPATIENT)
Dept: CARDIOLOGY | Facility: CLINIC | Age: 35
End: 2020-06-11
Payer: COMMERCIAL

## 2020-06-11 DIAGNOSIS — Z79.01 LONG TERM CURRENT USE OF ANTICOAGULANT: ICD-10-CM

## 2020-06-11 DIAGNOSIS — I50.22 CHRONIC SYSTOLIC HEART FAILURE (H): Primary | ICD-10-CM

## 2020-06-11 PROCEDURE — 99213 OFFICE O/P EST LOW 20 MIN: CPT | Mod: 95 | Performed by: NURSE PRACTITIONER

## 2020-06-11 RX ORDER — METOPROLOL SUCCINATE 25 MG/1
50 TABLET, EXTENDED RELEASE ORAL DAILY
Qty: 30 TABLET | Refills: 3 | Status: SHIPPED | OUTPATIENT
Start: 2020-06-11 | End: 2020-06-11

## 2020-06-11 RX ORDER — METOPROLOL SUCCINATE 25 MG/1
50 TABLET, EXTENDED RELEASE ORAL DAILY
Qty: 60 TABLET | Refills: 3 | Status: SHIPPED | OUTPATIENT
Start: 2020-06-11 | End: 2020-09-10

## 2020-06-11 NOTE — PROGRESS NOTES
"Ubaldo Velez is a 34 year old male who is being evaluated via a billable telephone visit.      The patient has been notified of following:     \"This telephone visit will be conducted via a call between you and your physician/provider. We have found that certain health care needs can be provided without the need for a physical exam.  This service lets us provide the care you need with a short phone conversation.  If a prescription is necessary we can send it directly to your pharmacy.  If lab work is needed we can place an order for that and you can then stop by our lab to have the test done at a later time.    Telephone visits are billed at different rates depending on your insurance coverage. During this emergency period, for some insurers they may be billed the same as an in-person visit.  Please reach out to your insurance provider with any questions.    If during the course of the call the physician/provider feels a telephone visit is not appropriate, you will not be charged for this service.\"    Patient has given verbal consent for Telephone visit?  Yes    What phone number would you like to be contacted at? [phone]    How would you like to obtain your AVS? the[place]    Phone call duration: 23 minutes    Mr. Ubaldo Velez is a 34 year old male with a relevant past medical history including ADHD and polysubstance abuse (ETOH, cocaine, alcohol, meth) who presented in 2019 in cardiogenic shock. At that time, he had an ECHO which showed LVEF of 10% with moderate to severe MR/TR. In addition, he had multiple apical thrombi. He underwent coronary angiogram which showed normal coronary arteries. He was transitioned to oral afterload and AC. From previous note, he has attended chemical dependency and has followed with the CHF clinic. His most recent TTE in 10/2019 showed that his LVEF has increased to 28%    Ubaldo states that he has been feeling well overall.  He says that he has improved much more after " starting back at work.  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  He says he has no shortness of breath at all.  He denies any lower extremity edema he says his activity tolerance is better than before.  He has been taking 25 mg of metoprolol daily he acknowledges that he was on a  higher dose before which was changed as he felt extreme fatigue and some symptoms where he had absolutely no drive within him to do anything.  He said at that point the Lexapro did help however he did not appreciate how he felt with the higher dose of metoprolol which was close to 100 mg a day.  He says currently his heart rate runs in the 80s.  His weight he says has been between 235 and 236 pounds.  He has not been able to check his blood pressures at home.  He and his wife are expecting another child in August.  He is very excited about having another son.       REVIEW OF SYSTEMS:      Constitutional: No fevers or chills  Skin: No new rash or itching  Eyes: No acute change in vision  Ears/Nose/Throat: No purulent rhinorrhea, new hearing loss, or new vertigo  Respiratory: No ZAPATA. No cough or hemoptysis  Cardiovascular: See HPI  Musculoskeletal: No new back pain, neck pain or muscle pain  Neurologic: No new headaches, focal weakness or behavior changes.    No lightheadedness with standing.   Hematologic/Lymphatic/Immunologic: No bleeding, chills, fever, night sweats or weight loss          PHYSICAL EXAMINATION:   No vitals due to virtual visit.     GENERAL: No acute distress.  Lungs: No wheezing, non-labored breathing    MOST RECENT ECHOCARDIOGRAM: 10/2019     Interpretation Summary  Moderate to severe left ventricular systolic dysfunction.. LVEF 28% based on  biplane 2D tracing.  Global hypokinesis.  Borderline right ventricular dilatation and systolic dysfunction.     Compared to 04/24/2019 there has been an improvement in LVEF, no significant  mitral regurgitation appreciated in this study. Apical LV  masses/clots no  longer seen.        RHC on 04/2019    Moderately elevated pulmonary artery hypertension.    Right sided filling pressures are normal.    Left sided filling pressures are moderately elevated.    Normal cardiac output level.     1.Nonischemic cardiomyopathy.  2. Normal right-sided and increased left-sided filling pressures.  3. Mild secondary pulmonary hypertension with a mean PAP of 32 mmHg.  4. Normal cardiac output of 4.6 L/min and reduced cardiac index of 2.0.  5. No angiographic evidence of coronary artery disease.    ASSESSMENT  It was a pleasure to see Ubaldo 34-year-old male in follow-up through a virtual core clinic appointment.  It appears he is doing well and is euvolemic.  We will increase his metoprolol dose to 50 mg daily which he will take in the evening.  Ubaldo has an appointment set up with Dr. Choi in September and he also has some testing at that time.  We will see him back in December 2020.  We have asked Riley to call us with any questions and/or concerns which she verbalized understanding of.       1. Chronic systolic heart failure/HFrEF (EF 28%) secondary to polysubstance abuse   NYHA Symptom Class II  Stage C  ACE-I/ARB/ARNi: continue 40 mg Lisinopril daily  BB Restarting Toprol XL at 50 mg daily.   Aldosterone antagonist:  Discontinued secondary to worsening hyponatremia.   SCD prophylaxis Decision deferred during medication uptitration  Fluid status Euvolemic  Cardiac Rehab: Completed  Sleep Apnea Evaluation: Not indicated  Remote PA Pressure Monitoring: none.   Remote monitoring: Active on Mychart.       2. Polysubstance abuse:  Abstinent        Plan:  Metoprolol increase to 50 mg daily.  September appt with Dr. Choi with testing  Follow up in 6 months from now with CORE clinic.     Jean MCGREGORC

## 2020-06-11 NOTE — LETTER
6/11/2020      RE: Ubaldo Velez  [address]  [facility] [address]       Dear Colleague,    Thank you for the opportunity to participate in the care of your patient, Ubaldo Velez, at the [facility] HEART AT [facility]. Please see a copy of my visit note below.    Ubaldo Velez is a 34 year old male who is being evaluated via a billable telephone visit.        Phone call duration: 23 minutes    Mr. Ubaldo Velez is a 34 year old male with a relevant past medical history including ADHD and polysubstance abuse (ETOH, cocaine, alcohol, meth) who presented in 2019 in cardiogenic shock. At that time, he had an ECHO which showed LVEF of 10% with moderate to severe MR/TR. In addition, he had multiple apical thrombi. He underwent coronary angiogram which showed normal coronary arteries. He was transitioned to oral afterload and AC. From previous note, he has attended chemical dependency and has followed with the CHF clinic. His most recent TTE in 10/2019 showed that his LVEF has increased to 28%    Ubaldo states that he has been feeling well overall.  He says that he has improved much more after starting back at work.  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  He says he has no shortness of breath at all.  He denies any lower extremity edema he says his activity tolerance is better than before.  He has been taking 25 mg of metoprolol daily he acknowledges that he was on a  higher dose before which was changed as he felt extreme fatigue and some symptoms where he had absolutely no drive within him to do anything.  He said at that point the Lexapro did help however he did not appreciate how he felt with the higher dose of metoprolol which was close to 100 mg a day.  He says currently his heart rate runs in the 80s.  His weight he says has been between 235 and 236 pounds.  He has not been able to check his  blood pressures at home.  He and his wife are expecting another child in August.  He is very excited about having another son.       REVIEW OF SYSTEMS:      Constitutional: No fevers or chills  Skin: No new rash or itching  Eyes: No acute change in vision  Ears/Nose/Throat: No purulent rhinorrhea, new hearing loss, or new vertigo  Respiratory: No ZAPATA. No cough or hemoptysis  Cardiovascular: See HPI  Musculoskeletal: No new back pain, neck pain or muscle pain  Neurologic: No new headaches, focal weakness or behavior changes.    No lightheadedness with standing.   Hematologic/Lymphatic/Immunologic: No bleeding, chills, fever, night sweats or weight loss          PHYSICAL EXAMINATION:   No vitals due to virtual visit.     GENERAL: No acute distress.  Lungs: No wheezing, non-labored breathing    MOST RECENT ECHOCARDIOGRAM: 10/2019     Interpretation Summary  Moderate to severe left ventricular systolic dysfunction.. LVEF 28% based on  biplane 2D tracing.  Global hypokinesis.  Borderline right ventricular dilatation and systolic dysfunction.     Compared to 04/24/2019 there has been an improvement in LVEF, no significant  mitral regurgitation appreciated in this study. Apical LV masses/clots no  longer seen.        RHC on 04/2019    Moderately elevated pulmonary artery hypertension.    Right sided filling pressures are normal.    Left sided filling pressures are moderately elevated.    Normal cardiac output level.     1.Nonischemic cardiomyopathy.  2. Normal right-sided and increased left-sided filling pressures.  3. Mild secondary pulmonary hypertension with a mean PAP of 32 mmHg.  4. Normal cardiac output of 4.6 L/min and reduced cardiac index of 2.0.  5. No angiographic evidence of coronary artery disease.    ASSESSMENT  It was a pleasure to see Ubaldo 34-year-old male in follow-up through a virtual core clinic appointment.  It appears he is doing well and is euvolemic.  We will increase his metoprolol dose to 50 mg  daily which he will take in the evening.  Ubaldo has an appointment set up with Dr. Choi in September and he also has some testing at that time.  We will see him back in December 2020.  We have asked Rilye to call us with any questions and/or concerns which she verbalized understanding of.       1. Chronic systolic heart failure/HFrEF (EF 28%) secondary to polysubstance abuse   NYHA Symptom Class II  Stage C  ACE-I/ARB/ARNi: continue 40 mg Lisinopril daily  BB Restarting Toprol XL at 50 mg daily.   Aldosterone antagonist:  Discontinued secondary to worsening hyponatremia.   SCD prophylaxis Decision deferred during medication uptitration  Fluid status Euvolemic  Cardiac Rehab: Completed  Sleep Apnea Evaluation: Not indicated  Remote PA Pressure Monitoring: none.   Remote monitoring: Active on Mychart.       2. Polysubstance abuse:  Abstinent        Plan:  Metoprolol increase to 50 mg daily.  September appt with Dr. Choi with testing  Follow up in 6 months from now with CORE clinic.     Jean FAJARDO NP-C      Please do not hesitate to contact me if you have any questions/concerns.     Sincerely,     SCOTTY Covarrubias CNP

## 2020-06-11 NOTE — PATIENT INSTRUCTIONS
Take your medicines every day, as directed    Changes made today:      Metoprolol increased to 50 mg daily.        Monitor Your Weight and Symptoms    Contact us if you:      Gain 2 pounds in one day or 5 pounds in one week    Feel more short of breath    Notice more leg swelling    Feel lightheadeded   Change your lifestyle    Limit Salt or Sodium:    2000 mg  Limit Fluids:    2000 mL or approximately 64 ounces  Eat a Heart Healthy Diet    Low in saturated fats  Stay Active:    Aim to move at least 150 minutes every  week         To Contact us    During Business Hours:  [phone], option # 1 (University)  Then option # 4 (medical questions)     After hours, weekends or holidays:   [phone], Option #4  Ask to speak to the On-Call Cardiologist. Inform them you are a CORE/heart failure patient at the [facility].     Use [id number] allows you to communicate directly with your heart team through secure messaging.    [employer] can be accessed any time on your phone, computer, or tablet.    If you need assistance, we'd be happy to help!         Keep your Heart Appointments:      September 10th appt with Dr. Choi and testing already scheduled    CORE clinic follow up in 6 months with labs prior.

## 2020-09-10 ENCOUNTER — HOSPITAL ENCOUNTER (OUTPATIENT)
Dept: MRI IMAGING | Facility: CLINIC | Age: 35
End: 2020-09-10
Attending: INTERNAL MEDICINE
Payer: COMMERCIAL

## 2020-09-10 ENCOUNTER — ANCILLARY PROCEDURE (OUTPATIENT)
Dept: CARDIOLOGY | Facility: CLINIC | Age: 35
End: 2020-09-10
Attending: INTERNAL MEDICINE
Payer: COMMERCIAL

## 2020-09-10 VITALS
BODY MASS INDEX: 30.96 KG/M2 | OXYGEN SATURATION: 98 % | WEIGHT: 249 LBS | HEIGHT: 75 IN | SYSTOLIC BLOOD PRESSURE: 162 MMHG | HEART RATE: 115 BPM | DIASTOLIC BLOOD PRESSURE: 103 MMHG

## 2020-09-10 DIAGNOSIS — I50.22 CHRONIC SYSTOLIC HEART FAILURE (H): ICD-10-CM

## 2020-09-10 LAB
ANION GAP SERPL CALCULATED.3IONS-SCNC: 8 MMOL/L (ref 3–14)
BUN SERPL-MCNC: 12 MG/DL (ref 7–30)
CALCIUM SERPL-MCNC: 9.5 MG/DL (ref 8.5–10.1)
CHLORIDE SERPL-SCNC: 109 MMOL/L (ref 94–109)
CO2 SERPL-SCNC: 22 MMOL/L (ref 20–32)
CREAT SERPL-MCNC: 1.07 MG/DL (ref 0.66–1.25)
GFR SERPL CREATININE-BSD FRML MDRD: 89 ML/MIN/{1.73_M2}
GLUCOSE SERPL-MCNC: 103 MG/DL (ref 70–99)
POTASSIUM SERPL-SCNC: 3.9 MMOL/L (ref 3.4–5.3)
SODIUM SERPL-SCNC: 139 MMOL/L (ref 133–144)

## 2020-09-10 PROCEDURE — 75561 CARDIAC MRI FOR MORPH W/DYE: CPT | Mod: 26 | Performed by: INTERNAL MEDICINE

## 2020-09-10 PROCEDURE — A9585 GADOBUTROL INJECTION: HCPCS | Performed by: INTERNAL MEDICINE

## 2020-09-10 PROCEDURE — 80048 BASIC METABOLIC PNL TOTAL CA: CPT | Performed by: INTERNAL MEDICINE

## 2020-09-10 PROCEDURE — 36415 COLL VENOUS BLD VENIPUNCTURE: CPT | Performed by: INTERNAL MEDICINE

## 2020-09-10 PROCEDURE — G0463 HOSPITAL OUTPT CLINIC VISIT: HCPCS | Mod: ZF

## 2020-09-10 PROCEDURE — 99215 OFFICE O/P EST HI 40 MIN: CPT | Mod: ZP | Performed by: INTERNAL MEDICINE

## 2020-09-10 PROCEDURE — 75561 CARDIAC MRI FOR MORPH W/DYE: CPT

## 2020-09-10 PROCEDURE — 25500064 ZZH RX 255 OP 636: Performed by: INTERNAL MEDICINE

## 2020-09-10 RX ORDER — GADOBUTROL 604.72 MG/ML
7.5 INJECTION INTRAVENOUS ONCE
Status: COMPLETED | OUTPATIENT
Start: 2020-09-10 | End: 2020-09-10

## 2020-09-10 RX ORDER — METOPROLOL SUCCINATE 25 MG/1
25 TABLET, EXTENDED RELEASE ORAL DAILY
Qty: 90 TABLET | Refills: 3 | Status: SHIPPED | OUTPATIENT
Start: 2020-09-10 | End: 2020-10-08

## 2020-09-10 RX ORDER — FUROSEMIDE 20 MG
20 TABLET ORAL DAILY
Qty: 90 TABLET | Refills: 3 | Status: SHIPPED | OUTPATIENT
Start: 2020-09-10 | End: 2021-11-23

## 2020-09-10 RX ORDER — LISINOPRIL 20 MG/1
20 TABLET ORAL DAILY
Qty: 90 TABLET | Refills: 3 | Status: SHIPPED | OUTPATIENT
Start: 2020-09-10 | End: 2020-12-14 | Stop reason: ALTCHOICE

## 2020-09-10 RX ADMIN — GADOBUTROL 7.5 ML: 604.72 INJECTION INTRAVENOUS at 14:47

## 2020-09-10 ASSESSMENT — MIFFLIN-ST. JEOR: SCORE: 2150.09

## 2020-09-10 ASSESSMENT — PAIN SCALES - GENERAL: PAINLEVEL: NO PAIN (0)

## 2020-09-10 NOTE — NURSING NOTE
Chief Complaint   Patient presents with     Follow Up     Return Heart Failure;  HF, CMRI, echo and labs prior     Vitals were taken and medications were reconciled.    Phliip West, NEVAEH    4:48 PM

## 2020-09-10 NOTE — LETTER
"9/10/2020      RE: Ubaldo Velez  [address] [place] [address]       Dear Colleague,    Thank you for the opportunity to participate in the care of your patient, Ubaldo Velez, at the [facility]. Please see a copy of my visit note below.      September 10, 2020      Follow up HFrEF:     34 year old male with a relevant past medical history including ADHD and polysubstance abuse (ETOH, cocaine, alcohol, meth) who presented s EF of 10% with apical thrombi and cardiogenic shock.       Right and left heart caths were notable for clean coronary arteries.       After that he was placed on medical therapy and will as well as anticoagulation.  He had a period of time where he had remained abstinent-he had also by his report gone through some chemical dependence treatment.     He has significant functional improvement in him but on medical therapy for some time.  He then lost his insurance and was off of medical therapy for some time he is drinking more alcohol now than he was previously.  He presents today having been off meds for another month and a half.  He has just \"not pick them up\".  He is drinking 6 beers a day he has a new child who arrived 2 weeks ago.  His significant other wants him to quit alcohol and he has not he is drinking heavier on the weekends.  He is not using methamphetamine per his report.  He denies stroke symptoms.  He denies lower extremity edema he does endorse increased abdominal girth.  He has had some shortness of breath with exercise.  He can presently work full-time in a quite physical job.  He denies PND orthopnea.  He has not had syncope.       [facility]  Past Medical History:   Diagnosis Date     ADHD      Cocaine use      Electronic cigarette use      Methamphetamine use (H)      NICM (nonischemic cardiomyopathy) (H)      Systolic heart failure (H)      Tobacco abuse          Family History   Problem Relation Age of " "Onset     Chronic Obstructive Pulmonary Disease Father      Multiple Sclerosis Maternal Aunt        Painting, carpentry   ETOH-presently he is drinking 6 beers a day and more on the weekends  Meth\" 1.5 year -snorter -reports not using presently    Cocaine: some in his history none now    ALLERGIES    Of note the patient is not currently taking any medications-even though he is prescribed many      ROS:  Constitutional: No fever, chills, or sweats.  Weight is up slightly  ENT: No visual disturbance, ear ache, epistaxis, sore throat.   Allergies/Immunologic: Negative.   Respiratory: No cough, hemoptysis.   Cardiovascular: As per HPI.   GI: No nausea, vomiting, hematemesis, melena, or hematochezia.   : No urinary frequency, dysuria, or hematuria.   Integument: Negative.   Psychiatric: Denies overt depression or anxiety  Neuro: Negative.   Endocrinology: Negative.   Musculoskeletal: Negative.    EXAM  BP (!) 162/103 (BP Location: Right arm, Patient Position: Chair, Cuff Size: Adult Large)   Pulse 115   Ht 1.905 m (6' 3\")   Wt 112.9 kg (249 lb)   SpO2 98%   BMI 31.12 kg/m    General: Skin is very red from sunburn  Head: normocephalic, atraumatic  Eyes: anicteric sclera, EOMI-teeth not examined as he is wearing a mask  Neck: no adenopathy  Orophyarynx: moist mucosa, no lesions, dentition intact  Heart: Tachycardic , regular, S1/S2, 2/6 systolic murmur at the apex no gallop, rub, JVP is 14 cm  Lungs: Respirations even and unlabored, lungs clear, no rales or wheezing  Abdomen: soft, non-tender, bowel sounds present, no hepatomegaly  Extremities: no clubbing, cyanosis -extremities are somewhat cool 1+ lower extremity edema  Neurological: normal speech and affect, no gross motor deficits    LABS    Laboratory data shows creatinine 1.0      Echocardiogram from today personally reviewed  Interpretation Summary  Severely (EF 20-30%) reduced left ventricular function is present.  IVC diameter >2.1 cm collapsing <50% with " sniff suggests a high RA pressure  estimated at 15 mmHg or greater.  No pericardial effusion is present.  Global right ventricular function is normal to mildly reduced.  No change from prior.      ASSESSMENT AND PLAN  \34-year-old man with nonischemic cardiomyopathy due to polysubstance abuse.  While he had a period of time where he was more compliant and did stop methamphetamine he has escalated his alcohol intake and has completely stopped taking all medications.     While he is not overtly symptomatic from this (NYHA class II) he is volume over load on exam, stage C.     I am extremely concerned about him.  We had a long discussion today about the fact that he has a definitive substance abuse disorder that is quite refractory.  Despite knowledge of negative impact on his interpersonal relationships and his health he continues to drink alcohol in excessive amounts.  He is not taking his medications.    I have told him that I think this degree of substance abuse is beyond a level of care that I can provide.  I think he needs chemical dependence treatment-if he were ever to be a candidate for mechanical support a transplant he would need to prove compliance both with medical therapy and with abstinence of substances.  He needs to take this seriously.       I have told him that he is on a rapid pathway towards death today if we do not change course.     I am starting with a simple medical regimen of 20 mg of lisinopril, metoprolol XL 25 mg daily as well as 20 of Lasix daily.     I will be performing a alcohol surveillance test as well as tox screen in several weeks when he comes back to his car visit.     If this is positive I am sending him to [facility] Calderon at [facility] to partner with us on substance abuse treatment.     We are going to need to see him a lot more often now to ensure he gets on the right course.  We need to start over with up titration of medical therapy.     I have not yet placed an ICD  because he has not ever reached a point of maximal medical therapy because he is come off medication so many times.  He has not had syncope      For his history of LV clot at his next visit I would encourage adding aspirin 81 mg daily as his LV function remains poor.     For substance abuse disorder see above plan    I will see him back in 4 months time-we will continue up titration medical therapy at that time.      CC  Patient Care Team:  Shailesh Gudino DO as PCP - General (Student in organized health care education/training program)  Luna Carlson RPH as Pharmacist (Pharmacist)  Carrie Tanner, RN as Specialty Care Coordinator (Cardiology)  Luna Rubin, RN as Specialty Care Coordinator (Cardiology)  Stephania Crocker NP as Nurse Practitioner (Cardiology)  Jean Yi APRN CNP as Assigned PCP  [facility]      Please do not hesitate to contact me if you have any questions/concerns.     Sincerely,     Maddie Choi MD

## 2020-09-10 NOTE — PROGRESS NOTES
"  September 10, 2020      Follow up HFrEF:     34 year old male with a relevant past medical history including ADHD and polysubstance abuse (ETOH, cocaine, alcohol, meth) who presented s EF of 10% with apical thrombi and cardiogenic shock.       Right and left heart caths were notable for clean coronary arteries.       After that he was placed on medical therapy and will as well as anticoagulation.  He had a period of time where he had remained abstinent-he had also by his report gone through some chemical dependence treatment.     He has significant functional improvement in him but on medical therapy for some time.  He then lost his insurance and was off of medical therapy for some time he is drinking more alcohol now than he was previously.  He presents today having been off meds for another month and a half.  He has just \"not pick them up\".  He is drinking 6 beers a day he has a new child who arrived 2 weeks ago.  His significant other wants him to quit alcohol and he has not he is drinking heavier on the weekends.  He is not using methamphetamine per his report.  He denies stroke symptoms.  He denies lower extremity edema he does endorse increased abdominal girth.  He has had some shortness of breath with exercise.  He can presently work full-time in a quite physical job.  He denies PND orthopnea.  He has not had syncope.       [facility]  Past Medical History:   Diagnosis Date     ADHD      Cocaine use      Electronic cigarette use      Methamphetamine use (H)      NICM (nonischemic cardiomyopathy) (H)      Systolic heart failure (H)      Tobacco abuse          Family History   Problem Relation Age of Onset     Chronic Obstructive Pulmonary Disease Father      Multiple Sclerosis Maternal Aunt        Painting, carpentry   ETOH-presently he is drinking 6 beers a day and more on the weekends  Meth\" 1.5 year -saraer -reports not using presently    Cocaine: some in his history none now    ALLERGIES    Of note the patient " "is not currently taking any medications-even though he is prescribed many      ROS:  Constitutional: No fever, chills, or sweats.  Weight is up slightly  ENT: No visual disturbance, ear ache, epistaxis, sore throat.   Allergies/Immunologic: Negative.   Respiratory: No cough, hemoptysis.   Cardiovascular: As per HPI.   GI: No nausea, vomiting, hematemesis, melena, or hematochezia.   : No urinary frequency, dysuria, or hematuria.   Integument: Negative.   Psychiatric: Denies overt depression or anxiety  Neuro: Negative.   Endocrinology: Negative.   Musculoskeletal: Negative.    EXAM  BP (!) 162/103 (BP Location: Right arm, Patient Position: Chair, Cuff Size: Adult Large)   Pulse 115   Ht 1.905 m (6' 3\")   Wt 112.9 kg (249 lb)   SpO2 98%   BMI 31.12 kg/m    General: Skin is very red from sunburn  Head: normocephalic, atraumatic  Eyes: anicteric sclera, EOMI-teeth not examined as he is wearing a mask  Neck: no adenopathy  Orophyarynx: moist mucosa, no lesions, dentition intact  Heart: Tachycardic , regular, S1/S2, 2/6 systolic murmur at the apex no gallop, rub, JVP is 14 cm  Lungs: Respirations even and unlabored, lungs clear, no rales or wheezing  Abdomen: soft, non-tender, bowel sounds present, no hepatomegaly  Extremities: no clubbing, cyanosis -extremities are somewhat cool 1+ lower extremity edema  Neurological: normal speech and affect, no gross motor deficits    LABS    Laboratory data shows creatinine 1.0      Echocardiogram from today personally reviewed  Interpretation Summary  Severely (EF 20-30%) reduced left ventricular function is present.  IVC diameter >2.1 cm collapsing <50% with sniff suggests a high RA pressure  estimated at 15 mmHg or greater.  No pericardial effusion is present.  Global right ventricular function is normal to mildly reduced.  No change from prior.      ASSESSMENT AND PLAN  \34-year-old man with nonischemic cardiomyopathy due to polysubstance abuse.  While he had a period of " time where he was more compliant and did stop methamphetamine he has escalated his alcohol intake and has completely stopped taking all medications.     While he is not overtly symptomatic from this (NYHA class II) he is volume over load on exam, stage C.     I am extremely concerned about him.  We had a long discussion today about the fact that he has a definitive substance abuse disorder that is quite refractory.  Despite knowledge of negative impact on his interpersonal relationships and his health he continues to drink alcohol in excessive amounts.  He is not taking his medications.    I have told him that I think this degree of substance abuse is beyond a level of care that I can provide.  I think he needs chemical dependence treatment-if he were ever to be a candidate for mechanical support a transplant he would need to prove compliance both with medical therapy and with abstinence of substances.  He needs to take this seriously.       I have told him that he is on a rapid pathway towards death today if we do not change course.     I am starting with a simple medical regimen of 20 mg of lisinopril, metoprolol XL 25 mg daily as well as 20 of Lasix daily.     I will be performing a alcohol surveillance test as well as tox screen in several weeks when he comes back to his car visit.     If this is positive I am sending him to [facility] Calderon at [facility] to partner with us on substance abuse treatment.     We are going to need to see him a lot more often now to ensure he gets on the right course.  We need to start over with up titration of medical therapy.     I have not yet placed an ICD because he has not ever reached a point of maximal medical therapy because he is come off medication so many times.  He has not had syncope      For his history of LV clot at his next visit I would encourage adding aspirin 81 mg daily as his LV function remains poor.     For substance abuse disorder see above plan    I will  see him back in 4 months time-we will continue up titration medical therapy at that time.      CC  Patient Care Team:  Shailesh Gudino DO as PCP - General (Student in organized health care education/training program)  Luna Carlson RPH as Pharmacist (Pharmacist)  Carrie Tanner, RN as Specialty Care Coordinator (Cardiology)  Luna Rubin, RN as Specialty Care Coordinator (Cardiology)  Stephania Crocker NP as Nurse Practitioner (Cardiology)  Jean Yi APRN CNP as Assigned PCP  [facility]

## 2020-09-10 NOTE — PATIENT INSTRUCTIONS
"Cardiology Providers you saw during your visit:  Dr. Choi    Medication changes:  1. Lisinopril 20 mg daily  2.  Metoprolol 25 mg daily  3.  Lasix 20  Mg daily    Follow up:  1. Follow up with [facility] in 3-4 weeks with labs including alcohol and drug testing  2.  Follow up with Dr Choi 4 month  Labs:  Results for CHRISTINE BARRAGAN (MRN [id number]) as of 9/10/2020 17:21   Ref. Range 9/10/2020 13:07   Sodium Latest Ref Range: 133 - 144 mmol/L 139   Potassium Latest Ref Range: 3.4 - 5.3 mmol/L 3.9   Chloride Latest Ref Range: 94 - 109 mmol/L 109   Carbon Dioxide Latest Ref Range: 20 - 32 mmol/L 22   Urea Nitrogen Latest Ref Range: 7 - 30 mg/dL 12   Creatinine Latest Ref Range: 0.66 - 1.25 mg/dL 1.07   GFR Estimate Latest Ref Range: >60 mL/min/1.73_m2 89   GFR Estimate If Black Latest Ref Range: >60 mL/min/1.73_m2 >90   Calcium Latest Ref Range: 8.5 - 10.1 mg/dL 9.5   Anion Gap Latest Ref Range: 3 - 14 mmol/L 8   Glucose Latest Ref Range: 70 - 99 mg/dL 103 (H)       Please call if you have :  1. Weight gain of more than 2 pounds in a day or 5 pounds in a week  2. Increased shortness of breath, swelling or bloating  3. Dizziness, lightheadedness   4. Any questions or concerns.       Follow the American Heart Association Diet and Lifestyle recommendations:  Limit saturated fat, trans fat, sodium, red meat, sweets and sugar-sweetened beverages. If you choose to eat red meat, compare labels and select the leanest cuts available.  Aim for at least 150 minutes of moderate physical activity or 75 minutes of vigorous physical activity - or an equal combination of both - each week.      During business hours: [phone], press option # 1 to be routed to the [employer] then option # 4 for \"To send a message to your care team\"      After hours, weekends or holidays: On Call Cardiologist- [phone]   option #4 and ask to speak to the on-call Cardiologist. Inform them you are a CORE/heart failure patient at the " "[facility].      Heart Failure Support Group  [facility]  The Board Room, 8th Floor  [address] [address]     Meetings   1-2 pm  January 6  March 2  May 4  July 6  September 14  November 2      Carrie Tanner RN BSN CHFN  Cardiology Care Coordinator - Heart Failure/ [facility]  [facility]   Questions and [phone]   First press #1 for the [employer] and then press #4 for \"To send a message to your care team\"    "

## 2020-09-10 NOTE — NURSING NOTE
Diet: Patient instructed regarding a heart failure healthy diet, including discussion of reduced fat and 2000 mg daily sodium restriction, daily weights, medication purpose and compliance, fluid restrictions and resources for patient and family to access for assistance with heart failure management.       Labs: Patient was given results of the laboratory testing obtained today and patient was instructed about when to return for the next laboratory testing.     Med Reconcile: Reviewed and verified all current medications with the patient. The updated medication list was printed and given to the patient.     Med changes: Lasix 20 mg daily, metoprolol 25 mg daily, lisinopril 20 mg daily    Return Appointment: Patient given instructions regarding scheduling next clinic visit: [facility] in 3-4 weeks with pet and drug testing, [facility] in 4 weeks    Patient stated he understood all health information given and agreed to call with further questions or concerns.     Carrie Tanner RN

## 2020-09-22 ENCOUNTER — PATIENT OUTREACH (OUTPATIENT)
Dept: CARDIOLOGY | Facility: CLINIC | Age: 35
End: 2020-09-22

## 2020-09-22 DIAGNOSIS — I50.22 CHRONIC SYSTOLIC HEART FAILURE (H): ICD-10-CM

## 2020-09-22 NOTE — TELEPHONE ENCOUNTER
Followed up with Ubaldo. He started the medications the day of his appointment and stopped drinking at that time as well.  He is feeling well and has noticed that his heart rate is lower- more like 70's consistently.

## 2020-10-02 DIAGNOSIS — I50.22 CHRONIC SYSTOLIC HEART FAILURE (H): ICD-10-CM

## 2020-10-02 DIAGNOSIS — I50.22 CHRONIC SYSTOLIC HEART FAILURE (H): Primary | ICD-10-CM

## 2020-10-02 LAB
AMPHETAMINES UR QL SCN: NEGATIVE
ANION GAP SERPL CALCULATED.3IONS-SCNC: 6 MMOL/L (ref 3–14)
BARBITURATES UR QL: NEGATIVE
BENZODIAZ UR QL: NEGATIVE
BUN SERPL-MCNC: 13 MG/DL (ref 7–30)
CALCIUM SERPL-MCNC: 9 MG/DL (ref 8.5–10.1)
CANNABINOIDS UR QL SCN: NEGATIVE
CHLORIDE SERPL-SCNC: 103 MMOL/L (ref 94–109)
CO2 SERPL-SCNC: 29 MMOL/L (ref 20–32)
COCAINE UR QL: NEGATIVE
CREAT SERPL-MCNC: 1.09 MG/DL (ref 0.66–1.25)
ETHANOL UR QL SCN: NEGATIVE
GFR SERPL CREATININE-BSD FRML MDRD: 87 ML/MIN/{1.73_M2}
GLUCOSE SERPL-MCNC: 81 MG/DL (ref 70–99)
OPIATES UR QL SCN: NEGATIVE
PCP UR QL SCN: NEGATIVE
POTASSIUM SERPL-SCNC: 4.3 MMOL/L (ref 3.4–5.3)
SODIUM SERPL-SCNC: 137 MMOL/L (ref 133–144)

## 2020-10-02 PROCEDURE — 80307 DRUG TEST PRSMV CHEM ANLYZR: CPT | Mod: 90 | Performed by: PATHOLOGY

## 2020-10-02 PROCEDURE — 80048 BASIC METABOLIC PNL TOTAL CA: CPT | Performed by: PATHOLOGY

## 2020-10-02 PROCEDURE — 99000 SPECIMEN HANDLING OFFICE-LAB: CPT | Performed by: PATHOLOGY

## 2020-10-02 PROCEDURE — 80321 ALCOHOLS BIOMARKERS 1OR 2: CPT | Mod: 90 | Performed by: PATHOLOGY

## 2020-10-02 PROCEDURE — 36415 COLL VENOUS BLD VENIPUNCTURE: CPT | Performed by: PATHOLOGY

## 2020-10-02 PROCEDURE — 80320 DRUG SCREEN QUANTALCOHOLS: CPT | Mod: 90 | Performed by: PATHOLOGY

## 2020-10-02 NOTE — TELEPHONE ENCOUNTER
Checked in with Ubaldo.  He's taking his medications, feeling well, denies symptoms.  He has noticed his heart rates have been  depending on what he is doing during the day.  He is aware of his lab appointment and CORE appointment coming up.

## 2020-10-05 NOTE — TELEPHONE ENCOUNTER
Checked in with Ubaldo and reviewed labs.  He's feeling very well, has not had anything to drink since conversation with Dr Choi.  He's wondering if it is time to start titrating  up medications to the doses he was on before.      He has a core appointment on 10/26- he had to reschedule todays

## 2020-10-08 RX ORDER — METOPROLOL SUCCINATE 25 MG/1
50 TABLET, EXTENDED RELEASE ORAL DAILY
Qty: 180 TABLET | Refills: 3 | Status: SHIPPED | OUTPATIENT
Start: 2020-10-08 | End: 2020-11-10

## 2020-10-08 NOTE — TELEPHONE ENCOUNTER
Date: 10/8/2020    Time of Call: 4:25 PM     Diagnosis:  Heart failure     [ VORB ] Ordering provider: Dr Choi    Order: increase metoprolol to 50 mg daily     Order received by: Carrie Tanner RN       Follow-up/additional notes: updated Ubaldo who was pleased with the increase.

## 2020-10-11 LAB — PETH BLD-MCNC: 364 NG/ML

## 2020-10-22 DIAGNOSIS — I50.22 CHRONIC SYSTOLIC HEART FAILURE (H): Primary | ICD-10-CM

## 2020-10-23 NOTE — TELEPHONE ENCOUNTER
Feeling great on the metoprolol.  Wonders if the last time he was on medication he was actually depressed and blaming the medications for his feelings because so far he is feeling excellent.  He is aware of the appointment on Monday and is hopeful that he will be able to titrate up further on his medications.

## 2020-11-06 ENCOUNTER — PATIENT OUTREACH (OUTPATIENT)
Dept: CARDIOLOGY | Facility: CLINIC | Age: 35
End: 2020-11-06

## 2020-11-06 DIAGNOSIS — I50.22 CHRONIC SYSTOLIC HEART FAILURE (H): Primary | ICD-10-CM

## 2020-11-06 NOTE — TELEPHONE ENCOUNTER
Called Ubaldo to check in.  He had no-showed to his last appointment, states he thought it was today (11/6).  Rescheduled for [facility].  He states that his medications have been going well and he is taking as directed.

## 2020-11-10 ENCOUNTER — VIRTUAL VISIT (OUTPATIENT)
Dept: CARDIOLOGY | Facility: CLINIC | Age: 35
End: 2020-11-10
Attending: NURSE PRACTITIONER
Payer: COMMERCIAL

## 2020-11-10 VITALS
DIASTOLIC BLOOD PRESSURE: 96 MMHG | HEART RATE: 83 BPM | SYSTOLIC BLOOD PRESSURE: 146 MMHG | WEIGHT: 250 LBS | OXYGEN SATURATION: 98 % | BODY MASS INDEX: 31.25 KG/M2

## 2020-11-10 DIAGNOSIS — I50.9 CONGESTIVE HEART FAILURE, UNSPECIFIED HF CHRONICITY, UNSPECIFIED HEART FAILURE TYPE (H): Primary | ICD-10-CM

## 2020-11-10 DIAGNOSIS — I50.9 CONGESTIVE HEART FAILURE, UNSPECIFIED HF CHRONICITY, UNSPECIFIED HEART FAILURE TYPE (H): ICD-10-CM

## 2020-11-10 DIAGNOSIS — I50.22 CHRONIC SYSTOLIC HEART FAILURE (H): ICD-10-CM

## 2020-11-10 LAB
AMPHETAMINES UR QL SCN: NEGATIVE
ANION GAP SERPL CALCULATED.3IONS-SCNC: 3 MMOL/L (ref 3–14)
BARBITURATES UR QL: NEGATIVE
BENZODIAZ UR QL: NEGATIVE
BUN SERPL-MCNC: 18 MG/DL (ref 7–30)
CALCIUM SERPL-MCNC: 9.3 MG/DL (ref 8.5–10.1)
CANNABINOIDS UR QL SCN: NEGATIVE
CHLORIDE SERPL-SCNC: 103 MMOL/L (ref 94–109)
CO2 SERPL-SCNC: 30 MMOL/L (ref 20–32)
COCAINE UR QL: NEGATIVE
CREAT SERPL-MCNC: 1.1 MG/DL (ref 0.66–1.25)
ERYTHROCYTE [DISTWIDTH] IN BLOOD BY AUTOMATED COUNT: 12.2 % (ref 10–15)
ETHANOL UR QL SCN: NEGATIVE
GFR SERPL CREATININE-BSD FRML MDRD: 86 ML/MIN/{1.73_M2}
GLUCOSE SERPL-MCNC: 113 MG/DL (ref 70–99)
HCT VFR BLD AUTO: 52.9 % (ref 40–53)
HGB BLD-MCNC: 18 G/DL (ref 13.3–17.7)
MCH RBC QN AUTO: 33.3 PG (ref 26.5–33)
MCHC RBC AUTO-ENTMCNC: 34 G/DL (ref 31.5–36.5)
MCV RBC AUTO: 98 FL (ref 78–100)
OPIATES UR QL SCN: NEGATIVE
PCP UR QL SCN: NEGATIVE
PLATELET # BLD AUTO: 158 10E9/L (ref 150–450)
POTASSIUM SERPL-SCNC: 4.3 MMOL/L (ref 3.4–5.3)
RBC # BLD AUTO: 5.4 10E12/L (ref 4.4–5.9)
SODIUM SERPL-SCNC: 136 MMOL/L (ref 133–144)
WBC # BLD AUTO: 8.1 10E9/L (ref 4–11)

## 2020-11-10 PROCEDURE — 99213 OFFICE O/P EST LOW 20 MIN: CPT | Mod: 95 | Performed by: NURSE PRACTITIONER

## 2020-11-10 PROCEDURE — 80321 ALCOHOLS BIOMARKERS 1OR 2: CPT | Mod: 90 | Performed by: PATHOLOGY

## 2020-11-10 PROCEDURE — 36415 COLL VENOUS BLD VENIPUNCTURE: CPT | Performed by: PATHOLOGY

## 2020-11-10 PROCEDURE — 80048 BASIC METABOLIC PNL TOTAL CA: CPT | Performed by: PATHOLOGY

## 2020-11-10 PROCEDURE — G0463 HOSPITAL OUTPT CLINIC VISIT: HCPCS | Mod: GT

## 2020-11-10 PROCEDURE — 99000 SPECIMEN HANDLING OFFICE-LAB: CPT | Performed by: PATHOLOGY

## 2020-11-10 PROCEDURE — 85027 COMPLETE CBC AUTOMATED: CPT | Performed by: PATHOLOGY

## 2020-11-10 PROCEDURE — 80323 ALKALOIDS NOS: CPT | Mod: 90 | Performed by: PATHOLOGY

## 2020-11-10 RX ORDER — METOPROLOL SUCCINATE 100 MG/1
100 TABLET, EXTENDED RELEASE ORAL DAILY
Qty: 90 TABLET | Refills: 3 | Status: SHIPPED | OUTPATIENT
Start: 2020-11-10 | End: 2021-05-14

## 2020-11-10 ASSESSMENT — PAIN SCALES - GENERAL: PAINLEVEL: NO PAIN (0)

## 2020-11-10 NOTE — NURSING NOTE
Diet: Patient instructed regarding a heart failure healthy diet, including discussion of reduced fat and 2000 mg daily sodium restriction, daily weights, medication purpose and compliance, fluid restrictions and resources for patient and family to access for assistance with heart failure management.       Labs: Patient was given results of the laboratory testing obtained today and patient was instructed about when to return for the next laboratory testing.     Med Reconcile: Reviewed and verified all current medications with the patient. The updated medication list was printed and given to the patient.     Med changes: Metoprolol 100 daily, aspirin 81 mg daily    Return Appointment: Patient given instructions regarding scheduling next clinic visit: CORE 1 month, [place] in January    Patient stated he understood all health information given and agreed to call with further questions or concerns.     Carrie Tanner RN

## 2020-11-10 NOTE — PROGRESS NOTES
Date: 11/10/2020    Time of Call: 8:03 AM     Diagnosis:  Heart failure     [ VORB ] Ordering provider: Jaci De La Cruz NP      Order: nicotine, cotinine, utox, cbc      Order received by: Carrie Tanner RN       Follow-up/additional notes: lab notified as well

## 2020-11-10 NOTE — PROGRESS NOTES
[facility] -- CORE Follow Up -- Video Visit  November 10, 2020    HPI:   Mr. Ubaldo Velez is a 34yr old male with a history of ADHD, polysubstance abuse (ETOH, cocaine, alcohol, meth, tobacco), and NICM (LVEF 20-30% per TTE 9/2020, domitila 10%) who is being evaluated via video for CORE follow up.    He last saw Dr. Choi 9/10/20, where he reported that he had stopped his medications several weeks prior.  He was advised to restart metoprolol XL 25mg daily, lisinopril 20mg daily, and lasix 20mg daily.  He is being evaluated today for follow up.    Since his last visit, he states that he is doing well.  He remains active, walking at least 8K steps/day and denies exertional concerns.  His breathing has been stable, and he denies SOB, PND, and orthopnea.  He is eating, with good appetite and no nausea, vomiting, diarrhea, and constipation.  He has stopped drinking since his last visit, and notes that his BMs have become more regular.  He has also noted some improvement in his heartburn.  He notes occasional heartburn, which he attributes to diet and takes Tums as needed with relief.  His weight has been stable, and he denies fluid retention.  He has not noted any increase in UOP since restarting lasix.  He is hydrating well, about 96oz/day.  His HRs have come down since he stopped drinking and has resumed taking his medications regularly, noting that his HRs are 90s when active and 70-80s at rest.  He is smoking less, and is down to 6 cigarettes/day.  He otherwise denies chest pain, palpitations, dizziness, falls, headaches, acute vision changes, fevers, chills, cough, sore throat, and signs of bleeding.    PAST MEDICAL HISTORY:  Past Medical History:   Diagnosis Date     ADHD      Cocaine use      Electronic cigarette use      Methamphetamine use (H)      NICM (nonischemic cardiomyopathy) (H)      Systolic heart failure (H)      Tobacco abuse        FAMILY HISTORY:  Family History   Problem Relation  Age of Onset     Chronic Obstructive Pulmonary Disease Father      Multiple Sclerosis Maternal Aunt        SOCIAL HISTORY:  Social History     Socioeconomic History     Marital status: Single     Spouse name: Not on file     Number of children: 1     Years of education: Not on file     Highest education level: Not on file   Occupational History     Occupation: [employer]   Social Needs     Financial resource strain: Not on file     Food insecurity     Worry: Not on file     Inability: Not on file     Transportation needs     Medical: Not on file     Non-medical: Not on file   Tobacco Use     Smoking status: Current Every Day Smoker     Packs/day: 1.00     Years: 15.00     Pack years: 15.00     Smokeless tobacco: Never Used     Tobacco comment:  5 cigarettes daily with vaping   Substance and Sexual Activity     Alcohol use: Not Currently     Comment: 8-10 beers a day, now 1 beer a week.     Drug use: Not Currently     Types: Methamphetamines, Cocaine     Comment: Methamphetamine last used 8 weeks ago     Sexual activity: Not on file   Lifestyle     Physical activity     Days per week: Not on file     Minutes per session: Not on file     Stress: Not on file   Relationships     Social connections     Talks on phone: Not on file     Gets together: Not on file     Attends Zoroastrian service: Not on file     Active member of club or organization: Not on file     Attends meetings of clubs or organizations: Not on file     Relationship status: Not on file     Intimate partner violence     Fear of current or ex partner: Not on file     Emotionally abused: Not on file     Physically abused: Not on file     Forced sexual activity: Not on file   Other Topics Concern     Not on file   Social History Narrative    Single but has long standing SO who he lives with, has one child.  (last updated 4/24/2019)        CURRENT MEDICATIONS:  Current Outpatient Medications   Medication Sig Dispense Refill     escitalopram (LEXAPRO) 10 MG  tablet Take 1 tablet (10 mg) by mouth daily (Patient not taking: Reported on 4/15/2020) 60 tablet 1     folic acid (FOLVITE) 1 MG tablet Take 1 tablet (1 mg) by mouth daily 90 tablet 3     furosemide (LASIX) 20 MG tablet Take 1 tablet (20 mg) by mouth daily 90 tablet 3     JANTOVEN ANTICOAGULANT 2.5 MG tablet        lisinopril (ZESTRIL) 20 MG tablet Take 1 tablet (20 mg) by mouth daily 90 tablet 3     metoprolol succinate ER (TOPROL-XL) 25 MG 24 hr tablet Take 2 tablets (50 mg) by mouth daily At bedtime 180 tablet 3     vitamin B1 (THIAMINE) 100 MG tablet Take 1 tablet (100 mg) by mouth daily 90 tablet 1       ROS:   As per HPI.    EXAM:  BP (!) 146/96   Pulse 83   Wt 113.4 kg (250 lb)   SpO2 98%   BMI 31.25 kg/m    GENERAL: Healthy, alert and no distress  EYES: Eyes grossly normal to inspection.  No discharge or erythema, or obvious scleral/conjunctival abnormalities.  RESP: No audible wheeze, cough, or visible cyanosis.  No visible retractions or increased work of breathing.    SKIN: Visible skin clear. No significant rash, abnormal pigmentation or lesions.  NEURO: Cranial nerves grossly intact.  Mentation and speech appropriate for age.  PSYCH: Mentation appears normal, affect normal/bright, judgement and insight intact, normal speech and appearance well-groomed.    Labs:  CBC RESULTS:  Lab Results   Component Value Date    WBC 8.3 05/07/2019    RBC 5.87 05/07/2019    HGB 17.8 (H) 05/07/2019    HCT 57.3 (H) 05/07/2019    MCV 98 05/07/2019    MCH 30.3 05/07/2019    MCHC 31.1 (L) 05/07/2019    RDW 13.8 05/07/2019     05/07/2019       CMP RESULTS:  Lab Results   Component Value Date     10/02/2020    POTASSIUM 4.3 10/02/2020    CHLORIDE 103 10/02/2020    CO2 29 10/02/2020    ANIONGAP 6 10/02/2020    GLC 81 10/02/2020    BUN 13 10/02/2020    CR 1.09 10/02/2020    GFRESTIMATED 87 10/02/2020    GFRESTBLACK >90 10/02/2020    ARUN 9.0 10/02/2020    BILITOTAL 0.6 04/27/2019    ALBUMIN 2.4 (L) 04/27/2019     ALKPHOS 74 04/27/2019    ALT 49 04/27/2019    AST 41 04/27/2019        INR RESULTS:  Lab Results   Component Value Date    INR 2.27 (H) 10/07/2019       Lab Results   Component Value Date    MAG 2.3 05/07/2019     Lab Results   Component Value Date    NTBNPI 4,655 (H) 04/24/2019     Lab Results   Component Value Date    NTBNP 105 04/14/2020       Assessment and Plan:   Mr. Ubaldo Velez is a 34yr old male with a history of ADHD, polysubstance abuse (ETOH, cocaine, alcohol, meth, tobacco), and NICM (LVEF 20-30% per TTE 9/2020, domitila 10%) who is being evaluated via telephone for CORE follow up.    Labs today show stable electrolytes, renal function, and blood counts.  Note Hgb 18.    Mr. Velez appears well today.  His weight has remained stable, with no change since starting lasix.  His BPs are slightly elevated.  Discussed increasing metoprolol, which he is receptive to --> he notes that he felt less motivated and lower energy when on 100mg in the past, but believes that he was depressed at that time, so is receptive to trial'ing 100mg again.      Given his h/o LV thrombus, advised that he start aspirin 81mg once daily.    Will plan for him to return to clinic in one month, with labs prior.  He prefers in-person visits.    Chronic systolic heart failure secondary to NICM (LVEF 20-30% per TTE 9/2020, domitila 10%)  Stage C, NYHA Class II  - ACEi/ARB/ARNi:  Lisinopril 20mg daily  - Afterload reduction:  n/a  - BB:  Increasing metoprolol XL to 100mg daily  - Aldosterone antagonist:  Deferred while other medications are prioritized  - SCD ppx:  Deferred while medication therapy is optimized --> note that medication compliance has limited medication optimization  - Fluid status:  Stable weight, will continue lasix 20mg once daily and continue to monitor weight trend    Polysubstance abuse  He reports abstinence to alcohol since last visit along with decreased nicotine use (down to 6 cigs/day from 1ppd).  Congratulated him  "on his efforts.  PeTH, nicotine/cotinine, and drug screen from today are in process.  Per Dr. Choi --> If these results are positive, will plan to refer him for chem dep treatment ([facility] Calderon at [facility]) for substance abuse treatment.  He understands that his substance use precludes him from being considered for advanced heart failure therapies (LVAD/transplant).    H/o LV clot (2019)  Starting aspirin 81mg daily today.    FOLLOW UP:  CORE In one month, with labs (including PeTH, tox screen, and nicotine levels)        Ubaldo Velez is a 34 year old male who is being evaluated via a billable video visit.      The patient has been notified of following:     \"This video visit will be conducted via a call between you and your physician/provider. We have found that certain health care needs can be provided without the need for an in-person physical exam.  This service lets us provide the care you need with a video conversation.  If a prescription is necessary we can send it directly to your pharmacy.  If lab work is needed we can place an order for that and you can then stop by our lab to have the test done at a later time.    Video visits are billed at different rates depending on your insurance coverage.  Please reach out to your insurance provider with any questions.    If during the course of the call the physician/provider feels a video visit is not appropriate, you will not be charged for this service.\"    Patient has given verbal consent for Video visit? Yes  How would you like to obtain your AVS? MyChart  If you are dropped from the video visit, the video invite should be resent to: Other e-mail: video visit completed at [facility]  Will anyone else be joining your video visit? No          Video-Visit Details  Video Start Time: 0941  Video End Time: 0959  Originating Location (pt. Location): [facility]  Distant Location (provider location):  home  Platform used for Video Visit: AmWell        The " above was reviewed with Mr. Velez, who verbalized understanding and will call with further questions/concerns.    18 minutes spent with patient, with >50% in counseling and/or coordination of care as described above.      Jaci Narayanan DNP, FNP-BC, CHFN  Advanced Heart Failure Nurse Practitioner  [facility]  Patient Care Team:  Shailesh Gudino, DO as PCP - General (Student in organized health care education/training program)  Luna Carlson [facility] as Pharmacist (Pharmacist)  Carrie Tanner, RN as Specialty Care Coordinator (Cardiology)  Luna Rubin, RN as Specialty Care Coordinator (Cardiology)  Stephania Crocker NP as Nurse Practitioner (Cardiology)  Jean Yi APRN CNP as Assigned PCP  Maddie Choi MD as Assigned Heart and Vascular Provider  JACI NARAYANAN

## 2020-11-10 NOTE — PROGRESS NOTES
"Ubaldo Velez is a 34 year old male who is being evaluated via a billable video visit.      The patient has been notified of following:     \"This video visit will be conducted via a call between you and your physician/provider. We have found that certain health care needs can be provided without the need for an in-person physical exam.  This service lets us provide the care you need with a video conversation.  If a prescription is necessary we can send it directly to your pharmacy.  If lab work is needed we can place an order for that and you can then stop by our lab to have the test done at a later time.    Video visits are billed at different rates depending on your insurance coverage.  Please reach out to your insurance provider with any questions.    If during the course of the call the physician/provider feels a video visit is not appropriate, you will not be charged for this service.\"    Patient has given verbal consent for Video visit? Yes  How would you like to obtain your AVS? MyChart  If you are dropped from the video visit, the video invite should be resent to: Other e-mail: [place] [facility]  Will anyone else be joining your video visit? No  {If patient encounters technical issues they should call [phone] :[phone]}    "

## 2020-11-10 NOTE — PATIENT INSTRUCTIONS
Take your medicines every day, as directed    Changes made today:  o Metoprolol 100 daily  o Aspirin 81 mg daily    Monitor Your Weight and Symptoms    Contact us if you:      Gain 2 pounds in one day or 5 pounds in one week    Feel more short of breath    Notice more leg swelling    Feel lightheadeded   Change your lifestyle    Limit Salt or Sodium:    2000 mg  Limit Fluids:    2000 mL or approximately 64 ounces  Eat a Heart Healthy Diet    Low in saturated fats  Stay Active:    Aim to move at least 150 minutes every  week         To Contact us    During Business Hours:  [phone], option # 1 (University)  Then option # 4 (medical questions)     After hours, weekends or holidays:   [phone], Option #4  Ask to speak to the On-Call Cardiologist. Inform them you are a CORE/heart failure patient at the [place].     Use [employer] allows you to communicate directly with your heart team through secure messaging.    [employer] can be accessed any time on your phone, computer, or tablet.    If you need assistance, we'd be happy to help!         Keep your Heart Appointments:    Follow up in CORE in 1 month    Congratulations on your alcohol cessation and lowering your smoking amounts!

## 2020-11-10 NOTE — LETTER
11/10/2020      RE: Ubaldo Velez  [address] [place] [address]       Dear Colleague,    Thank you for the opportunity to participate in the care of your patient, Ubaldo Velez, at the [facility] [place] at [facility]. Please see a copy of my visit note below.    Advanced Heart Failure Clinic -- CORE Follow Up -- Video Visit  November 10, 2020    HPI:   Mr. Ubaldo Velez is a 34yr old male with a history of ADHD, polysubstance abuse (ETOH, cocaine, alcohol, meth, tobacco), and NICM (LVEF 20-30% per TTE 9/2020, domitila 10%) who is being evaluated via video for CORE follow up.    He last saw Dr. Choi 9/10/20, where he reported that he had stopped his medications several weeks prior.  He was advised to restart metoprolol XL 25mg daily, lisinopril 20mg daily, and lasix 20mg daily.  He is being evaluated today for follow up.    Since his last visit, he states that he is doing well.  He remains active, walking at least 8K steps/day and denies exertional concerns.  His breathing has been stable, and he denies SOB, PND, and orthopnea.  He is eating, with good appetite and no nausea, vomiting, diarrhea, and constipation.  He has stopped drinking since his last visit, and notes that his BMs have become more regular.  He has also noted some improvement in his heartburn.  He notes occasional heartburn, which he attributes to diet and takes Tums as needed with relief.  His weight has been stable, and he denies fluid retention.  He has not noted any increase in UOP since restarting lasix.  He is hydrating well, about 96oz/day.  His HRs have come down since he stopped drinking and has resumed taking his medications regularly, noting that his HRs are 90s when active and 70-80s at rest.  He is smoking less, and is down to 6 cigarettes/day.  He otherwise denies chest pain, palpitations, dizziness, falls, headaches, acute vision changes, fevers, chills,  cough, sore throat, and signs of bleeding.    PAST MEDICAL HISTORY:  Past Medical History:   Diagnosis Date     ADHD      Cocaine use      Electronic cigarette use      Methamphetamine use (H)      NICM (nonischemic cardiomyopathy) (H)      Systolic heart failure (H)      Tobacco abuse        FAMILY HISTORY:  Family History   Problem Relation Age of Onset     Chronic Obstructive Pulmonary Disease Father      Multiple Sclerosis Maternal Aunt        SOCIAL HISTORY:  Social History     Socioeconomic History     Marital status: Single     Spouse name: Not on file     Number of children: 1     Years of education: Not on file     Highest education level: Not on file   Occupational History     Occupation: [employer]   Social Needs     Financial resource strain: Not on file     Food insecurity     Worry: Not on file     Inability: Not on file     Transportation needs     Medical: Not on file     Non-medical: Not on file   Tobacco Use     Smoking status: Current Every Day Smoker     Packs/day: 1.00     Years: 15.00     Pack years: 15.00     Smokeless tobacco: Never Used     Tobacco comment:  5 cigarettes daily with vaping   Substance and Sexual Activity     Alcohol use: Not Currently     Comment: 8-10 beers a day, now 1 beer a week.     Drug use: Not Currently     Types: Methamphetamines, Cocaine     Comment: Methamphetamine last used 8 weeks ago     Sexual activity: Not on file   Lifestyle     Physical activity     Days per week: Not on file     Minutes per session: Not on file     Stress: Not on file   Relationships     Social connections     Talks on phone: Not on file     Gets together: Not on file     Attends Buddhist service: Not on file     Active member of club or organization: Not on file     Attends meetings of clubs or organizations: Not on file     Relationship status: Not on file     Intimate partner violence     Fear of current or ex partner: Not on file     Emotionally abused: Not on file     Physically  abused: Not on file     Forced sexual activity: Not on file   Other Topics Concern     Not on file   Social History Narrative    Single but has long standing SO who he lives with, has one child.  (last updated 4/24/2019)        CURRENT MEDICATIONS:  Current Outpatient Medications   Medication Sig Dispense Refill     escitalopram (LEXAPRO) 10 MG tablet Take 1 tablet (10 mg) by mouth daily (Patient not taking: Reported on 4/15/2020) 60 tablet 1     folic acid (FOLVITE) 1 MG tablet Take 1 tablet (1 mg) by mouth daily 90 tablet 3     furosemide (LASIX) 20 MG tablet Take 1 tablet (20 mg) by mouth daily 90 tablet 3     JANTOVEN ANTICOAGULANT 2.5 MG tablet        lisinopril (ZESTRIL) 20 MG tablet Take 1 tablet (20 mg) by mouth daily 90 tablet 3     metoprolol succinate ER (TOPROL-XL) 25 MG 24 hr tablet Take 2 tablets (50 mg) by mouth daily At bedtime 180 tablet 3     vitamin B1 (THIAMINE) 100 MG tablet Take 1 tablet (100 mg) by mouth daily 90 tablet 1       ROS:   As per HPI.    EXAM:  BP (!) 146/96   Pulse 83   Wt 113.4 kg (250 lb)   SpO2 98%   BMI 31.25 kg/m    GENERAL: Healthy, alert and no distress  EYES: Eyes grossly normal to inspection.  No discharge or erythema, or obvious scleral/conjunctival abnormalities.  RESP: No audible wheeze, cough, or visible cyanosis.  No visible retractions or increased work of breathing.    SKIN: Visible skin clear. No significant rash, abnormal pigmentation or lesions.  NEURO: Cranial nerves grossly intact.  Mentation and speech appropriate for age.  PSYCH: Mentation appears normal, affect normal/bright, judgement and insight intact, normal speech and appearance well-groomed.    Labs:  CBC RESULTS:  Lab Results   Component Value Date    WBC 8.3 05/07/2019    RBC 5.87 05/07/2019    HGB 17.8 (H) 05/07/2019    HCT 57.3 (H) 05/07/2019    MCV 98 05/07/2019    MCH 30.3 05/07/2019    MCHC 31.1 (L) 05/07/2019    RDW 13.8 05/07/2019     05/07/2019       CMP RESULTS:  Lab Results    Component Value Date     10/02/2020    POTASSIUM 4.3 10/02/2020    CHLORIDE 103 10/02/2020    CO2 29 10/02/2020    ANIONGAP 6 10/02/2020    GLC 81 10/02/2020    BUN 13 10/02/2020    CR 1.09 10/02/2020    GFRESTIMATED 87 10/02/2020    GFRESTBLACK >90 10/02/2020    ARUN 9.0 10/02/2020    BILITOTAL 0.6 04/27/2019    ALBUMIN 2.4 (L) 04/27/2019    ALKPHOS 74 04/27/2019    ALT 49 04/27/2019    AST 41 04/27/2019        INR RESULTS:  Lab Results   Component Value Date    INR 2.27 (H) 10/07/2019       Lab Results   Component Value Date    MAG 2.3 05/07/2019     Lab Results   Component Value Date    NTBNPI 4,655 (H) 04/24/2019     Lab Results   Component Value Date    NTBNP 105 04/14/2020       Assessment and Plan:   Mr. Ubaldo Velez is a 34yr old male with a history of ADHD, polysubstance abuse (ETOH, cocaine, alcohol, meth, tobacco), and NICM (LVEF 20-30% per TTE 9/2020, domitila 10%) who is being evaluated via telephone for CORE follow up.    Labs today show stable electrolytes, renal function, and blood counts.  Note Hgb 18.    Mr. Velez appears well today.  His weight has remained stable, with no change since starting lasix.  His BPs are slightly elevated.  Discussed increasing metoprolol, which he is receptive to --> he notes that he felt less motivated and lower energy when on 100mg in the past, but believes that he was depressed at that time, so is receptive to trial'ing 100mg again.      Given his h/o LV thrombus, advised that he start aspirin 81mg once daily.    Will plan for him to return to clinic in one month, with labs prior.  He prefers in-person visits.    Chronic systolic heart failure secondary to NICM (LVEF 20-30% per TTE 9/2020, domitila 10%)  Stage C, NYHA Class II  - ACEi/ARB/ARNi:  Lisinopril 20mg daily  - Afterload reduction:  n/a  - BB:  Increasing metoprolol XL to 100mg daily  - Aldosterone antagonist:  Deferred while other medications are prioritized  - SCD ppx:  Deferred while medication  "therapy is optimized --> note that medication compliance has limited medication optimization  - Fluid status:  Stable weight, will continue lasix 20mg once daily and continue to monitor weight trend    Polysubstance abuse  He reports abstinence to alcohol since last visit along with decreased nicotine use (down to 6 cigs/day from 1ppd).  Congratulated him on his efforts.  PeTH, nicotine/cotinine, and drug screen from today are in process.  Per Dr. Choi --> If these results are positive, will plan to refer him for chem dep treatment ([facility] at [facility]) for substance abuse treatment.  He understands that his substance use precludes him from being considered for advanced heart failure therapies (LVAD/transplant).    H/o LV clot (2019)  Starting aspirin 81mg daily today.    FOLLOW UP:  CORE In one month, with labs (including PeTH, tox screen, and nicotine levels)    Ubaldo Velez is a 34 year old male who is being evaluated via a billable video visit.      The patient has been notified of following:     \"This video visit will be conducted via a call between you and your physician/provider. We have found that certain health care needs can be provided without the need for an in-person physical exam.  This service lets us provide the care you need with a video conversation.  If a prescription is necessary we can send it directly to your pharmacy.  If lab work is needed we can place an order for that and you can then stop by our lab to have the test done at a later time.    Video visits are billed at different rates depending on your insurance coverage.  Please reach out to your insurance provider with any questions.    If during the course of the call the physician/provider feels a video visit is not appropriate, you will not be charged for this service.\"    Patient has given verbal consent for Video visit? Yes  How would you like to obtain your AVS? MyChart  If you are dropped from the video visit, the " video invite should be resent to: Other e-mail: video visit completed at [facility]  Will anyone else be joining your video visit? No          Video-Visit Details  Video Start Time: 0941  Video End Time: 0959  Originating Location (pt. Location): [facility]  Distant Location (provider location):  home  Platform used for Video Visit: [employer]      The above was reviewed with Mr. Velez, who verbalized understanding and will call with further questions/concerns.    18 minutes spent with patient, with >50% in counseling and/or coordination of care as described above.      Jaci Narayanan DNP, FNP-BC, CHFN  Advanced Heart Failure Nurse Practitioner  [facility]  Patient Care Team:  Shailesh Gudino DO as PCP - General (Student in organized health care education/training program)  Luna Carlson RPH as Pharmacist (Pharmacist)  Carrie Tanner, RN as Specialty Care Coordinator (Cardiology)  Luna Rubin, RN as Specialty Care Coordinator (Cardiology)  Stephania Crocker NP as Nurse Practitioner (Cardiology)  Jean Yi APRN CNP as Assigned PCP  Maddie Choi MD as Assigned Heart and Vascular Provider  JACI NARAYANAN      Please do not hesitate to contact me if you have any questions/concerns.     Sincerely,     Jaci Narayanan NP

## 2020-11-11 LAB — PETH BLD-MCNC: 474 NG/ML

## 2020-11-12 DIAGNOSIS — I50.22 CHRONIC SYSTOLIC HEART FAILURE (H): Primary | ICD-10-CM

## 2020-11-16 LAB
COTININE SERPL-MCNC: 109.2 NG/ML
NICOTINE SERPL-MCNC: 11.9 NG/ML

## 2020-11-20 ENCOUNTER — PATIENT OUTREACH (OUTPATIENT)
Dept: CARDIOLOGY | Facility: CLINIC | Age: 35
End: 2020-11-20

## 2020-11-20 NOTE — TELEPHONE ENCOUNTER
Followed up with Ubaldo regarding metoprolol increase.  He had noticed no side effects and is feeling well.      We also discussed his recent labs with positive ETOH results and Dr Choi recommendation for chemical dependency.  He understands the reasoning behind it but asks that if he can continue to be monitored with labs for now, as he now understands that no drinking means NO drinking at any time.  He would prefer to not have an extra commitment at this time- he has a new born, an older child and a job.

## 2020-11-24 NOTE — TELEPHONE ENCOUNTER
Discussed Ubaldo's thoughts with Dr Choi. She feels that Chem Dep is important for his continued heart health but will monitor PETH for now.  He is grateful for the compromise and will continue to abstain from alcohol but is aware that if he continues to have positive results that this will need to be readdressed.

## 2020-12-07 DIAGNOSIS — I50.21 ACUTE SYSTOLIC CONGESTIVE HEART FAILURE (H): Primary | ICD-10-CM

## 2020-12-10 ENCOUNTER — TELEPHONE (OUTPATIENT)
Dept: CARDIOLOGY | Facility: CLINIC | Age: 35
End: 2020-12-10

## 2020-12-10 ENCOUNTER — OFFICE VISIT (OUTPATIENT)
Dept: CARDIOLOGY | Facility: CLINIC | Age: 35
End: 2020-12-10
Attending: INTERNAL MEDICINE
Payer: COMMERCIAL

## 2020-12-10 VITALS
WEIGHT: 254 LBS | SYSTOLIC BLOOD PRESSURE: 138 MMHG | OXYGEN SATURATION: 99 % | DIASTOLIC BLOOD PRESSURE: 87 MMHG | HEART RATE: 77 BPM | HEIGHT: 75 IN | BODY MASS INDEX: 31.58 KG/M2

## 2020-12-10 DIAGNOSIS — I50.22 CHRONIC SYSTOLIC HEART FAILURE (H): ICD-10-CM

## 2020-12-10 DIAGNOSIS — Z79.01 LONG TERM CURRENT USE OF ANTICOAGULANT: ICD-10-CM

## 2020-12-10 DIAGNOSIS — I50.22 CHRONIC SYSTOLIC HEART FAILURE (H): Primary | ICD-10-CM

## 2020-12-10 DIAGNOSIS — I50.21 ACUTE SYSTOLIC CONGESTIVE HEART FAILURE (H): ICD-10-CM

## 2020-12-10 DIAGNOSIS — Z91.148 NONCOMPLIANCE WITH MEDICATIONS: ICD-10-CM

## 2020-12-10 DIAGNOSIS — F19.11 HISTORY OF SUBSTANCE ABUSE (H): ICD-10-CM

## 2020-12-10 LAB
AMPHETAMINES UR QL SCN: NEGATIVE
ANION GAP SERPL CALCULATED.3IONS-SCNC: 4 MMOL/L (ref 3–14)
BARBITURATES UR QL: NEGATIVE
BENZODIAZ UR QL: NEGATIVE
BUN SERPL-MCNC: 15 MG/DL (ref 7–30)
CALCIUM SERPL-MCNC: 9.3 MG/DL (ref 8.5–10.1)
CANNABINOIDS UR QL SCN: NEGATIVE
CHLORIDE SERPL-SCNC: 104 MMOL/L (ref 94–109)
CO2 SERPL-SCNC: 29 MMOL/L (ref 20–32)
COCAINE UR QL: NEGATIVE
CREAT SERPL-MCNC: 1.13 MG/DL (ref 0.66–1.25)
ETHANOL UR QL SCN: NEGATIVE
GFR SERPL CREATININE-BSD FRML MDRD: 83 ML/MIN/{1.73_M2}
GLUCOSE SERPL-MCNC: 125 MG/DL (ref 70–99)
OPIATES UR QL SCN: NEGATIVE
PCP UR QL SCN: NEGATIVE
POTASSIUM SERPL-SCNC: 4.6 MMOL/L (ref 3.4–5.3)
SODIUM SERPL-SCNC: 137 MMOL/L (ref 133–144)

## 2020-12-10 PROCEDURE — 99214 OFFICE O/P EST MOD 30 MIN: CPT | Performed by: NURSE PRACTITIONER

## 2020-12-10 PROCEDURE — 80323 ALKALOIDS NOS: CPT | Mod: 90 | Performed by: PATHOLOGY

## 2020-12-10 PROCEDURE — 80320 DRUG SCREEN QUANTALCOHOLS: CPT | Mod: 90 | Performed by: PATHOLOGY

## 2020-12-10 PROCEDURE — 80307 DRUG TEST PRSMV CHEM ANLYZR: CPT | Mod: 90 | Performed by: PATHOLOGY

## 2020-12-10 PROCEDURE — 36415 COLL VENOUS BLD VENIPUNCTURE: CPT | Performed by: PATHOLOGY

## 2020-12-10 PROCEDURE — G0463 HOSPITAL OUTPT CLINIC VISIT: HCPCS

## 2020-12-10 PROCEDURE — 80048 BASIC METABOLIC PNL TOTAL CA: CPT | Performed by: PATHOLOGY

## 2020-12-10 PROCEDURE — 80321 ALCOHOLS BIOMARKERS 1OR 2: CPT | Mod: 90 | Performed by: PATHOLOGY

## 2020-12-10 RX ORDER — SACUBITRIL AND VALSARTAN 49; 51 MG/1; MG/1
1 TABLET, FILM COATED ORAL 2 TIMES DAILY
Qty: 60 TABLET | Refills: 3 | Status: SHIPPED | OUTPATIENT
Start: 2020-12-10 | End: 2021-01-14

## 2020-12-10 ASSESSMENT — MIFFLIN-ST. JEOR: SCORE: 2167.77

## 2020-12-10 ASSESSMENT — PAIN SCALES - GENERAL: PAINLEVEL: NO PAIN (0)

## 2020-12-10 NOTE — PATIENT INSTRUCTIONS
Take your medicines every day, as directed    Changes made today:  o Margarita has ordered Entresto, but it looks like it is requiring a Prior Auth for the insurance company, we will start this process and let you know when and if it is approved and ready to pick-up.  o Well we let you know if can't get entresto, then we will likely double your lisinopril. Wait to hear from us.  o Labs BMP in 2 weeks if/when we switch you to Entresto  o If you begin having palpations again, please contact us   Monitor Your Weight and Symptoms    Contact us if you:      Gain 2 pounds in one day or 5 pounds in one week    Feel more short of breath    Notice more leg swelling    Feel lightheadeded   Change your lifestyle    Limit Salt or Sodium:    2000 mg  Limit Fluids:    2000 mL or approximately 64 ounces  Eat a Heart Healthy Diet    Low in saturated fats  Stay Active:    Aim to move at least 150 minutes every  week         To Contact us    During Business Hours:  [phone], option # 1 (University)  Then option # 4 (medical questions)     After hours, weekends or holidays:   [phone], Option #4  Ask to speak to the On-Call Cardiologist. Inform them you are a CORE/heart failure patient at the [facility].     Use [employer] allows you to communicate directly with your heart team through secure messaging.    [employer] can be accessed any time on your phone, computer, or tablet.    If you need assistance, we'd be happy to help!         Keep your Heart Appointments:    Follow-up with Dr. Choi as scheduled on 1/14

## 2020-12-10 NOTE — TELEPHONE ENCOUNTER
PA Initiation    Medication: entresto 49-51MG -   Insurance Company: [employer] - Phone [phone] Fax [phone]  Pharmacy Filling the Rx: [facility] [place], [place] - [address] [place] [address] [phone]  Filling Pharmacy Phone:    Filling Pharmacy Fax:    Start Date: 12/10/2020

## 2020-12-10 NOTE — TELEPHONE ENCOUNTER
[facility] Prior Authorization Team Request    Medication: entresto 49-51mg tas  Dosing: one tab twice daily  Qty: 60  Day Supply: 30   NDC (required for Medicaid members): [address]-18     Insurance   BIN: [id number]  [id number]  Grp: [id number]  ID: [id number]    CoverMyMeds Key (if applicable):     Additional documentation:       Filling Pharmacy: [facility]  Phone Number: [phone]  Contact:    Pharmacy NPI (required for Medicaid members): [id number]

## 2020-12-10 NOTE — LETTER
"12/10/2020      RE: Ubaldo Velez  [address] [place] [address]       Dear Colleague,    Thank you for the opportunity to participate in the care of your patient, Ubaldo Velez, at the [facility] [place] [facility]. Please see a copy of my visit note below.    HPI:   Mr. Velez is a 35 year old male with a past medical history including history of ADHD, polysubstance abuse (ETOH, cocaine, alcohol, meth, tobacco), and NICM (LVEF 20-30% per TTE 9/2020, domitila 10%, LVIDd 6.7cm) who presented to [facility] for follow-up. He last saw Dr. Choi 9/10/20, where he reported that he had stopped his medications several weeks prior.  He was advised to restart metoprolol XL 25mg daily, lisinopril 20mg daily, and lasix 20mg daily. He was seen 11/10 and Toprol was increased at that time.  He was also started on aspirin at that time due to history of LV thrombus.    Since last clinic visit he reports no significant changes.  He reports zero alcohol use recently.  Says this is new within the last few weeks.  Is feeling well.  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  His weight has been stable.  He denies any lower extremity him, orthopnea, PND.  Few weeks ago he had several hours of feeling \"extra heartbeats \".  This was shortly after he quit drinking.  He has not had any recurrence of palpitations in the last few weeks.  Denies any presyncope or syncope.      PAST MEDICAL HISTORY:  Past Medical History:   Diagnosis Date     ADHD      Cocaine use      Electronic cigarette use      Methamphetamine use (H)      NICM (nonischemic cardiomyopathy) (H)      Systolic heart failure (H)      Tobacco abuse        FAMILY HISTORY:  Family History   Problem Relation Age of Onset     Chronic Obstructive Pulmonary Disease Father      Multiple Sclerosis Maternal Aunt        SOCIAL HISTORY:  Social History "     Socioeconomic History     Marital status: Single     Spouse name: Not on file     Number of children: 1     Years of education: Not on file     Highest education level: Not on file   Occupational History     Occupation: [employer]   Social Needs     Financial resource strain: Not on file     Food insecurity     Worry: Not on file     Inability: Not on file     Transportation needs     Medical: Not on file     Non-medical: Not on file   Tobacco Use     Smoking status: Current Every Day Smoker     Packs/day: 1.00     Years: 15.00     Pack years: 15.00     Smokeless tobacco: Never Used     Tobacco comment:  5 cigarettes daily with vaping   Substance and Sexual Activity     Alcohol use: Not Currently     Comment: 8-10 beers a day, now 1 beer a week.     Drug use: Not Currently     Types: Methamphetamines, Cocaine     Comment: Methamphetamine last used 8 weeks ago     Sexual activity: Not on file   Lifestyle     Physical activity     Days per week: Not on file     Minutes per session: Not on file     Stress: Not on file   Relationships     Social connections     Talks on phone: Not on file     Gets together: Not on file     Attends Pentecostalism service: Not on file     Active member of club or organization: Not on file     Attends meetings of clubs or organizations: Not on file     Relationship status: Not on file     Intimate partner violence     Fear of current or ex partner: Not on file     Emotionally abused: Not on file     Physically abused: Not on file     Forced sexual activity: Not on file   Other Topics Concern     Not on file   Social History Narrative    Single but has long standing SO who he lives with, has one child.  (last updated 4/24/2019)        CURRENT MEDICATIONS:       aspirin (ASA) 81 MG EC tablet, Take 1 tablet (81 mg) by mouth daily       furosemide (LASIX) 20 MG tablet, Take 1 tablet (20 mg) by mouth daily       lisinopril (ZESTRIL) 20 MG tablet, Take 1 tablet (20 mg) by mouth daily        "metoprolol succinate ER (TOPROL-XL) 100 MG 24 hr tablet, Take 1 tablet (100 mg) by mouth daily At bedtime    No current facility-administered medications on file prior to visit.       ROS:   Refer to HPI    EXAM:  /87 (BP Location: Right arm, Patient Position: Chair, Cuff Size: Adult Large)   Pulse 77   Ht 1.905 m (6' 3\")   Wt 115.2 kg (254 lb)   SpO2 99%   BMI 31.75 kg/m       GENERAL: Appears comfortable, in no acute distress.   HEENT: Eye symmetrical, no discharge or icterus bilaterally. Mucous membranes moist and without lesions.  CV: RRR, +S1S2, no murmur, rub, or gallop. JVP not visible at 75 degrees.   RESPIRATORY: Respirations regular, even, and unlabored. Lungs CTA throughout.   GI: Soft and non distended with normoactive bowel sounds present in all quadrants. No tenderness, rebound, guarding. No hepatomegaly.   EXTREMITIES: No peripheral edema. 2+ bilateral pedal pulses.   NEUROLOGIC: Alert and oriented x 3. No focal deficits.   MUSCULOSKELETAL: No joint swelling or tenderness.   SKIN: No jaundice. No rashes or lesions.     Labs, reviewed with patient in clinic today:  CBC RESULTS:  Lab Results   Component Value Date    WBC 8.1 11/10/2020    RBC 5.40 11/10/2020    HGB 18.0 (H) 11/10/2020    HCT 52.9 11/10/2020    MCV 98 11/10/2020    MCH 33.3 (H) 11/10/2020    MCHC 34.0 11/10/2020    RDW 12.2 11/10/2020     11/10/2020       CMP RESULTS:  Lab Results   Component Value Date     12/10/2020    POTASSIUM 4.6 12/10/2020    CHLORIDE 104 12/10/2020    CO2 29 12/10/2020    ANIONGAP 4 12/10/2020     (H) 12/10/2020    BUN 15 12/10/2020    CR 1.13 12/10/2020    GFRESTIMATED 83 12/10/2020    GFRESTBLACK >90 12/10/2020    ARUN 9.3 12/10/2020    BILITOTAL 0.6 04/27/2019    ALBUMIN 2.4 (L) 04/27/2019    ALKPHOS 74 04/27/2019    ALT 49 04/27/2019    AST 41 04/27/2019        INR RESULTS:  Lab Results   Component Value Date    INR 2.27 (H) 10/07/2019       Lab Results   Component Value Date    " MAG 2.3 05/07/2019     Lab Results   Component Value Date    NTBNPI 4,655 (H) 04/24/2019     Lab Results   Component Value Date    NTBNP 105 04/14/2020       Diagnostics:  TTE 9/10/20  Severely (EF 20-30%) reduced left ventricular function is present.  IVC diameter >2.1 cm collapsing <50% with sniff suggests a high RA pressure  estimated at 15 mmHg or greater.  No pericardial effusion is present.  Global right ventricular function is normal to mildly reduced.  No change from prior.    cMRI 9/10/20  1. The left ventricle is moderately dilated with normal wall thickness. There is moderate diffuse  hypokinesis with a small apical aneurysm. The global systolic function is moderately reduced. The LVEF is  34%.  2. The right ventricle is normal in cavity size. The global systolic function is mildly reduced. The RVEF  is 41 %.   3. Both atria are normal in size.  4. There is no significant valvular disease.   5. There is focal area of transmural late gadolinium enhancement of the apical aneurysm.   7. There is no pericardial effusion or thickening.  8. There is no intracardiac thrombus.     CONCLUSIONS:   Non ischemic cardiomyopathy with a  small apical aneurysm.   Moderately reduced left ventricular function with mildly reduced right ventricular function and no  intracardiac thrombus.      RHC/cor angio 4/29/19  Left Main   The vessel was visualized by selective angiography and is moderate in size. There was 0% vessel disease.   Left Anterior Descending   The vessel was visualized by selective angiography and is moderate in size. There was 0% vessel disease.   First Diagonal Branch   The vessel is large.   Second Diagonal Branch   The vessel is small.   Left Circumflex   The vessel was visualized by selective angiography and is moderate in size. There was 0% vessel disease.   First Obtuse Marginal Branch   The vessel is small.   Second Obtuse Marginal Branch   The vessel is large.   Right Coronary Artery   The vessel was  "visualized by selective angiography and is moderate in size. There was 0% vessel disease.       RA   Mean: 3 mmHg     PA   Systolic:40 mmHg   Diasotlic: 26 mmHg   Mean: 32 mmHg   HR: 112 bpm    PCW   A-wave: 25 mmHg   V-wave: 24 mmHg   Mean: 22 mmHg   HR: 112 bpm    Cardiac Output   CO Ayanna: 4.57 L/min    CI Ayanna: 1.96 L/min/m2    CO TD: 4.9 L/min    CI TD: 2.1 L/min/m2    Assessment and Plan:   Mr. Velez is a 35 year old male with a history of chronic systolic heart failure due to nonischemic cardiomyopathy, felt to be secondary to substance use.  Per patient he is recently sober of all substances.  His PeTH test, nicotine and cotinine, and urine drug screen are pending today.  He is aware he needs to be sober for some amount of time to be considered for advanced therapies.  Discussed chem dep at length today and the rationale for our recommending he enroll in case he needs advanced therapies - given his long standing history.  He \"does not think he needs it \"at this time but will consider.     # Chronic systolic heart failure/HFrEF secondary to NICM (EF 20-30%, domitila 10%)    Stage C. NYHA Class II.    - Fluid status: euvolemic continue lasix 20 mg daily  - ACEi/ARB/ARNi: change lisinopril 20mg daily to Entresto 49/51 mg bid, start insurance process today - needs ACEi washout period, if denied, increase lisinopril to 40 mg daily - repeat BMP in two weeks after change  - BB: Metoprolol XL 100mg daily  - Aldosterone antagonist: deferred while other medications are prioritized  - SCD ppx: deferred while medication therapy is optimized --> note that medication compliance has limited medication optimization, repeat echo 3 months after GDMT achieved, referral to EP at that time if EF<35%     # Polysubstance abuse  He reports abstinence to alcohol since last visit, states today that one month ago he was still drinking but a lot. PeTH, nicotine/cotinine, and drug screen from today are in process.  Per Dr. Choi --> " If these results are positive, will plan to refer him for chem dep treatment ([facility]) for substance abuse treatment.  He understands that his substance use precludes him from being considered for advanced heart failure therapies (LVAD/transplant). Continues to decline chem dep.  -needs random drug screens out of [facility] clinic  -repeat PeTH at each clinic visit    # Palpitations  Isolated episodes a few weeks ago in the setting of ETOH detox. He does not have an ICD but no history of documented VT. Patient to call clinic with any recurrence of symptoms and we will plan for Holter or Zio at that time to monitor given risk of ventricular arrhythmia.     # H/o LV thrombus (2019)  Not noted on echocardiogram or MRI 9/2020.   -aspirin 81mg daily, will discuss indication with Dr Choi      Follow up next month with Dr Choi as scheduled.     25 minutes spent face-to-face with patient, >50% in counseling and/or coordination of care as described above    Margarita Soto DNP, NP-C  12/10/2020          MARIAMA FORBES      Please do not hesitate to contact me if you have any questions/concerns.     Sincerely,     SCOTTY Dash CNP

## 2020-12-11 NOTE — TELEPHONE ENCOUNTER
Prior Authorization Approval    Authorization Effective Date: 9/10/2020  Authorization Expiration Date: 12/10/2021  Medication: entresto 49-51MG - [place] approved  Approved Dose/Quantity:   Reference #: [id number]   Insurance Company: [employer] - Phone [phone] Fax [phone]  Expected CoPay:       CoPay Card Available:      Foundation Assistance Needed:    Which Pharmacy is filling the prescription (Not needed for infusion/clinic administered): [facility] [place] [id number] [facility] [id number]  Pharmacy Notified: Yes  Patient Notified: No-Pharmacy will contact

## 2020-12-14 ENCOUNTER — PATIENT OUTREACH (OUTPATIENT)
Dept: CARDIOLOGY | Facility: CLINIC | Age: 35
End: 2020-12-14

## 2020-12-14 DIAGNOSIS — I50.22 CHRONIC SYSTOLIC HEART FAILURE (H): Primary | ICD-10-CM

## 2020-12-14 LAB — PETH BLD-MCNC: 63 NG/ML

## 2020-12-14 NOTE — TELEPHONE ENCOUNTER
Entresto approved.  I called Pharmacy and the cost to Pt will be $3  Called Ubaldo to discuss the plan.  Ubaldo takes his lisinopril at nighttime.  He agrees and understands the washout period needed to stop lisinopril and start Entresto.   Communicated the via phone, but also sending via [employer].     Instructions for 36hr  washout period from lisinopril and starting Entresto.   1.  Your last dose of Lisinopril was last night 12/13/2020  2.  No lisinopril or Entresto today 12/14/2020.  3.  Start Entresto (take 1 tab) Tuesday evening 12/15/2020.   4. On Wednesday 12/16/2020 begin taking Entresto twice daily; 1 tab in the AM and 1 tab in the PM.  5.  Labs scheduled for 12/23/2020 at 3pm.    Ubaldo verbalizes understanding and agrees with plan of care. Juany Robb RN

## 2020-12-23 DIAGNOSIS — I50.22 CHRONIC SYSTOLIC HEART FAILURE (H): ICD-10-CM

## 2020-12-23 LAB
ANION GAP SERPL CALCULATED.3IONS-SCNC: 5 MMOL/L (ref 3–14)
BUN SERPL-MCNC: 14 MG/DL (ref 7–30)
CALCIUM SERPL-MCNC: 9.2 MG/DL (ref 8.5–10.1)
CHLORIDE SERPL-SCNC: 105 MMOL/L (ref 94–109)
CO2 SERPL-SCNC: 31 MMOL/L (ref 20–32)
CREAT SERPL-MCNC: 1.13 MG/DL (ref 0.66–1.25)
GFR SERPL CREATININE-BSD FRML MDRD: 83 ML/MIN/{1.73_M2}
GLUCOSE SERPL-MCNC: 86 MG/DL (ref 70–99)
POTASSIUM SERPL-SCNC: 4.4 MMOL/L (ref 3.4–5.3)
SODIUM SERPL-SCNC: 141 MMOL/L (ref 133–144)

## 2020-12-23 PROCEDURE — 36415 COLL VENOUS BLD VENIPUNCTURE: CPT | Performed by: PATHOLOGY

## 2020-12-23 PROCEDURE — 80048 BASIC METABOLIC PNL TOTAL CA: CPT | Performed by: PATHOLOGY

## 2020-12-24 LAB
COTININE SERPL-MCNC: 181.3 NG/ML
NICOTINE SERPL-MCNC: 18.8 NG/ML

## 2020-12-28 ENCOUNTER — PATIENT OUTREACH (OUTPATIENT)
Dept: CARDIOLOGY | Facility: CLINIC | Age: 35
End: 2020-12-28

## 2020-12-28 NOTE — TELEPHONE ENCOUNTER
"Called to check in with Ubaldo after we recently started him on Entresto 49/51 and rechecked labs a week after starting.  Ubaldo states he' been \"feeling pretty good.\" States he doesn't notice feeling any different with the med change; denies any dizziness or lightheadedness or change in weights. We discussed that sometimes starting Entresto helps to diurese also and in some cases patients can decrease or stop their loop diuretic; Ubaldo does not feel different; denies dry mouth, etc - will continue current regimen. Reviewed his labs with him.  We discussed that ideally we'd like to get him on the next step of Entresto if possible, but would need to monitor his BPs to avoid hypotension.  Ubaldo states he does not have a BP cuff at home but will look into getting one this week.  When he does, he agrees to do a BP reading every couple of days and log them.  We agree I'll call him next week to check in about this.     Juany Robb RN   "

## 2021-01-03 ENCOUNTER — HEALTH MAINTENANCE LETTER (OUTPATIENT)
Age: 36
End: 2021-01-03

## 2021-01-04 DIAGNOSIS — I50.22 CHRONIC SYSTOLIC HEART FAILURE (H): Primary | ICD-10-CM

## 2021-01-13 NOTE — PROGRESS NOTES
"     Cardiology Advanced Heart Failure Clinic     January 14, 2021    HPI:   Mr. Velez is a 35 year old male with a past medical history including history of ADHD, polysubstance abuse (ETOH, cocaine, alcohol, meth, tobacco), and NICM (LVEF 20-30% per TTE 9/2020, domitila 10%, LVIDd 6.7cm) who presents today for follow-up.     He last saw Dr. Choi 9/10/20, where he reported that he had stopped his medications several weeks prior.  He was advised to restart metoprolol XL 25mg daily, lisinopril 20mg daily, and lasix 20mg daily. He was seen 11/10 and Toprol was increased at that time. In December 2020, he was switched from ACEI to Entresto. He was also started on aspirin due to history of LV thrombus.    Since last clinic visit he reports no significant changes.  He reports continued abstinence from alcohol use. Is feeling well.  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  His weight has been stable.  He denies any lower extremity him, orthopnea, PND.  Few weeks ago he had several hours of feeling \"extra heartbeats.\"  This was shortly after he quit drinking.  He has not had any recurrence of palpitations in the last few weeks.  Denies any presyncope or syncope.      PAST MEDICAL HISTORY:  Past Medical History:   Diagnosis Date     ADHD      Cocaine use      Electronic cigarette use      Methamphetamine use (H)      NICM (nonischemic cardiomyopathy) (H)      Systolic heart failure (H)      Tobacco abuse        FAMILY HISTORY:  Family History   Problem Relation Age of Onset     Chronic Obstructive Pulmonary Disease Father      Multiple Sclerosis Maternal Aunt        SOCIAL HISTORY:  Social History     Socioeconomic History     Marital status: Single     Spouse name: Not on file     Number of children: 1     Years of education: Not on file     Highest education level: Not on file   Occupational History     Occupation: [employer]   Social Needs     " Financial resource strain: Not on file     Food insecurity     Worry: Not on file     Inability: Not on file     Transportation needs     Medical: Not on file     Non-medical: Not on file   Tobacco Use     Smoking status: Current Every Day Smoker     Packs/day: 1.00     Years: 15.00     Pack years: 15.00     Smokeless tobacco: Never Used     Tobacco comment:  5 cigarettes daily with vaping   Substance and Sexual Activity     Alcohol use: Not Currently     Comment: 8-10 beers a day, now 1 beer a week.     Drug use: Not Currently     Types: Methamphetamines, Cocaine     Comment: Methamphetamine last used 8 weeks ago     Sexual activity: Not on file   Lifestyle     Physical activity     Days per week: Not on file     Minutes per session: Not on file     Stress: Not on file   Relationships     Social connections     Talks on phone: Not on file     Gets together: Not on file     Attends Zoroastrianism service: Not on file     Active member of club or organization: Not on file     Attends meetings of clubs or organizations: Not on file     Relationship status: Not on file     Intimate partner violence     Fear of current or ex partner: Not on file     Emotionally abused: Not on file     Physically abused: Not on file     Forced sexual activity: Not on file   Other Topics Concern     Not on file   Social History Narrative    Single but has long standing SO who he lives with, has one child.  (last updated 4/24/2019)        CURRENT MEDICATIONS:       aspirin (ASA) 81 MG EC tablet, Take 1 tablet (81 mg) by mouth daily       furosemide (LASIX) 20 MG tablet, Take 1 tablet (20 mg) by mouth daily       metoprolol succinate ER (TOPROL-XL) 100 MG 24 hr tablet, Take 1 tablet (100 mg) by mouth daily At bedtime       sacubitril-valsartan (ENTRESTO) 49-51 MG per tablet, Take 1 tablet by mouth 2 times daily    No current facility-administered medications on file prior to visit.       ROS:   Refer to HPI    EXAM:  /77   Pulse 75   Ht  "1.905 m (6' 3\")   Wt 110.5 kg (243 lb 8 oz)   SpO2 99%   BMI 30.44 kg/m       GENERAL: Appears comfortable, in no acute distress.   HEENT: Eye symmetrical, no discharge or icterus bilaterally. Mucous membranes moist and without lesions.  CV: RRR, +S1S2, no murmur, rub, or gallop. JVP at clavicle with HJR at 90 degrees.   RESPIRATORY: Respirations regular, even, and unlabored. Lungs CTA throughout.   GI: Soft and non distended with normoactive bowel sounds present in all quadrants. No tenderness, rebound, guarding. No hepatomegaly.   EXTREMITIES: No peripheral edema. 2+ bilateral pedal pulses.   NEUROLOGIC: Alert and oriented x 3. No focal deficits.   MUSCULOSKELETAL: No joint swelling or tenderness.   SKIN: No jaundice. No rashes or lesions.     Labs, reviewed with patient in clinic today:  CBC RESULTS:  Lab Results   Component Value Date    WBC 8.1 11/10/2020    RBC 5.40 11/10/2020    HGB 18.0 (H) 11/10/2020    HCT 52.9 11/10/2020    MCV 98 11/10/2020    MCH 33.3 (H) 11/10/2020    MCHC 34.0 11/10/2020    RDW 12.2 11/10/2020     11/10/2020       CMP RESULTS:  Lab Results   Component Value Date     01/14/2021    POTASSIUM 4.3 01/14/2021    CHLORIDE 105 01/14/2021    CO2 30 01/14/2021    ANIONGAP 2 (L) 01/14/2021     (H) 01/14/2021    BUN 14 01/14/2021    CR 1.00 01/14/2021    GFRESTIMATED >90 01/14/2021    GFRESTBLACK >90 01/14/2021    ARUN 9.1 01/14/2021    BILITOTAL 0.6 04/27/2019    ALBUMIN 2.4 (L) 04/27/2019    ALKPHOS 74 04/27/2019    ALT 49 04/27/2019    AST 41 04/27/2019        INR RESULTS:  Lab Results   Component Value Date    INR 2.27 (H) 10/07/2019       Lab Results   Component Value Date    MAG 2.3 05/07/2019     Lab Results   Component Value Date    NTBNPI 4,655 (H) 04/24/2019     Lab Results   Component Value Date    NTBNP 31 01/14/2021       Diagnostics:  TTE 9/10/20  Severely (EF 20-30%) reduced left ventricular function is present.  IVC diameter >2.1 cm collapsing <50% with " sniff suggests a high RA pressure  estimated at 15 mmHg or greater.  No pericardial effusion is present.  Global right ventricular function is normal to mildly reduced.  No change from prior.    cMRI 9/10/20  1. The left ventricle is moderately dilated with normal wall thickness. There is moderate diffuse  hypokinesis with a small apical aneurysm. The global systolic function is moderately reduced. The LVEF is  34%.  2. The right ventricle is normal in cavity size. The global systolic function is mildly reduced. The RVEF  is 41 %.   3. Both atria are normal in size.  4. There is no significant valvular disease.   5. There is focal area of transmural late gadolinium enhancement of the apical aneurysm.   7. There is no pericardial effusion or thickening.  8. There is no intracardiac thrombus.     CONCLUSIONS:   Non ischemic cardiomyopathy with a  small apical aneurysm.   Moderately reduced left ventricular function with mildly reduced right ventricular function and no  intracardiac thrombus.      RHC/cor angio 4/29/19  Left Main   The vessel was visualized by selective angiography and is moderate in size. There was 0% vessel disease.   Left Anterior Descending   The vessel was visualized by selective angiography and is moderate in size. There was 0% vessel disease.   First Diagonal Branch   The vessel is large.   Second Diagonal Branch   The vessel is small.   Left Circumflex   The vessel was visualized by selective angiography and is moderate in size. There was 0% vessel disease.   First Obtuse Marginal Branch   The vessel is small.   Second Obtuse Marginal Branch   The vessel is large.   Right Coronary Artery   The vessel was visualized by selective angiography and is moderate in size. There was 0% vessel disease.       RA   Mean: 3 mmHg     PA   Systolic:40 mmHg   Diasotlic: 26 mmHg   Mean: 32 mmHg   HR: 112 bpm    PCW   A-wave: 25 mmHg   V-wave: 24 mmHg   Mean: 22 mmHg   HR: 112 bpm    Cardiac Output   CO Ayanna:  4.57 L/min    CI Ayanna: 1.96 L/min/m2    CO TD: 4.9 L/min    CI TD: 2.1 L/min/m2    Assessment and Plan:   Mr. Velez is a 35 year old male with a history of chronic systolic heart failure due to nonischemic cardiomyopathy, felt to be secondary to substance use.  Per patient he is recently sober of all substances.  His PeTH test, nicotine and cotinine, and urine drug screen are pending today.  He is aware he needs to be sober for some amount of time to be considered for advanced therapies. In the past have discussed chem dep but he did not think he needed it.    # Chronic systolic heart failure/HFrEF secondary to NICM (EF 20-30%, domitila 10%)    Stage C. NYHA Class II.    - Fluid status: euvolemic continue lasix 20 mg daily  - ACEi/ARB/ARNi: continue Entresto 49/51 mg bid, increase to  BID today  - BB: Metoprolol XL 100mg daily, increase to 150mg daily in 2 weeks if tolerating entresto and aldactone  - Aldosterone antagonist: start 25mg BID today  - SGLT2: deferred while other medications are prioritized  - SCD ppx: deferred while medication therapy is optimized --> note that medication compliance has limited medication optimization, repeat echo 3 months after GDMT achieved, referral to EP at that time if EF<35% (QRS 96ms)     # Polysubstance abuse  He reports abstinence to alcohol since last visit, states today that one month ago he was still drinking but a lot. PeTH, nicotine/cotinine, and drug screen from today are in process. However, at last visit in December drug screen was negative, PeTH was significantly reduced to 63 from 474 on last check. However, nicotine and cotinine were still elevated. If these results are still positive, he needs referral to chem dep treatment ([facility] at [facility]) for substance abuse treatment.  He understands that his substance use precludes him from being considered for advanced heart failure therapies (LVAD/transplant). Continues to decline chem dep.  -needs  random drug screens out of [facility]  -repeat PeTH at each clinic visit    # Palpitations  Isolated episodes a few weeks ago in the setting of ETOH detox. He does not have an ICD but no history of documented VT. Patient to call clinic with any recurrence of symptoms and we will plan for Holter or Zio at that time to monitor given risk of ventricular arrhythmia.  - no further episodes     # H/o LV thrombus (2019)  Not noted on echocardiogram or MRI 9/2020.   - hold off on further AC at this time    Follow up: BMP in 2-3 weeks after med increase  3-4 months with Dr. Choi with TTE    Selena Randle MD  Cardiology Fellow    CC  SABINE, MARIAMA VAZQUEZ

## 2021-01-14 ENCOUNTER — OFFICE VISIT (OUTPATIENT)
Dept: CARDIOLOGY | Facility: CLINIC | Age: 36
End: 2021-01-14
Attending: INTERNAL MEDICINE
Payer: COMMERCIAL

## 2021-01-14 VITALS
HEIGHT: 75 IN | BODY MASS INDEX: 30.28 KG/M2 | OXYGEN SATURATION: 99 % | SYSTOLIC BLOOD PRESSURE: 139 MMHG | DIASTOLIC BLOOD PRESSURE: 77 MMHG | HEART RATE: 75 BPM | WEIGHT: 243.5 LBS

## 2021-01-14 DIAGNOSIS — I50.22 CHRONIC SYSTOLIC HEART FAILURE (H): ICD-10-CM

## 2021-01-14 LAB
AMPHETAMINES UR QL SCN: NEGATIVE
ANION GAP SERPL CALCULATED.3IONS-SCNC: 2 MMOL/L (ref 3–14)
BARBITURATES UR QL: NEGATIVE
BENZODIAZ UR QL: NEGATIVE
BUN SERPL-MCNC: 14 MG/DL (ref 7–30)
CALCIUM SERPL-MCNC: 9.1 MG/DL (ref 8.5–10.1)
CANNABINOIDS UR QL SCN: NEGATIVE
CHLORIDE SERPL-SCNC: 105 MMOL/L (ref 94–109)
CO2 SERPL-SCNC: 30 MMOL/L (ref 20–32)
COCAINE UR QL: NEGATIVE
CREAT SERPL-MCNC: 1 MG/DL (ref 0.66–1.25)
ETHANOL UR QL SCN: NEGATIVE
GFR SERPL CREATININE-BSD FRML MDRD: >90 ML/MIN/{1.73_M2}
GLUCOSE SERPL-MCNC: 129 MG/DL (ref 70–99)
NT-PROBNP SERPL-MCNC: 31 PG/ML (ref 0–125)
OPIATES UR QL SCN: NEGATIVE
PCP UR QL SCN: NEGATIVE
POTASSIUM SERPL-SCNC: 4.3 MMOL/L (ref 3.4–5.3)
SODIUM SERPL-SCNC: 137 MMOL/L (ref 133–144)

## 2021-01-14 PROCEDURE — 80323 ALKALOIDS NOS: CPT | Mod: 90 | Performed by: PATHOLOGY

## 2021-01-14 PROCEDURE — 99214 OFFICE O/P EST MOD 30 MIN: CPT | Mod: GC | Performed by: INTERNAL MEDICINE

## 2021-01-14 PROCEDURE — 83880 ASSAY OF NATRIURETIC PEPTIDE: CPT | Performed by: PATHOLOGY

## 2021-01-14 PROCEDURE — 80321 ALCOHOLS BIOMARKERS 1OR 2: CPT | Mod: 90 | Performed by: PATHOLOGY

## 2021-01-14 PROCEDURE — 36415 COLL VENOUS BLD VENIPUNCTURE: CPT | Performed by: PATHOLOGY

## 2021-01-14 PROCEDURE — 80048 BASIC METABOLIC PNL TOTAL CA: CPT | Performed by: PATHOLOGY

## 2021-01-14 RX ORDER — SPIRONOLACTONE 25 MG/1
25 TABLET ORAL DAILY
Qty: 90 TABLET | Refills: 3 | Status: SHIPPED | OUTPATIENT
Start: 2021-01-14 | End: 2022-03-22

## 2021-01-14 RX ORDER — SACUBITRIL AND VALSARTAN 97; 103 MG/1; MG/1
1 TABLET, FILM COATED ORAL 2 TIMES DAILY
Qty: 180 TABLET | Refills: 3 | Status: SHIPPED | OUTPATIENT
Start: 2021-01-14 | End: 2022-06-22

## 2021-01-14 ASSESSMENT — MIFFLIN-ST. JEOR: SCORE: 2120.14

## 2021-01-14 ASSESSMENT — PAIN SCALES - GENERAL: PAINLEVEL: NO PAIN (0)

## 2021-01-14 NOTE — PATIENT INSTRUCTIONS
"Cardiology Providers you saw during your visit:  Dr. Choi    Medication changes:  1.  Increase Entresto to  mg twice a day  2.  Start Spironolactone 25 mg daily  Follow up:  1.  Check labs one week after starting Sprionolactone  2.  Follow up with Dr Choi in 4 months with echo and labs  3.  Monthly lab testing for alcohol and drug use   4.  We may increase metoprolol in a few weeks if you tolerate the increase in Entresto.    Labs:  Results for CHRISTINE BARRAGAN (MRN [id number]) as of 1/14/2021 09:32   Ref. Range 1/14/2021 08:48   Sodium Latest Ref Range: 133 - 144 mmol/L 137   Potassium Latest Ref Range: 3.4 - 5.3 mmol/L 4.3   Chloride Latest Ref Range: 94 - 109 mmol/L 105   Carbon Dioxide Latest Ref Range: 20 - 32 mmol/L 30   Urea Nitrogen Latest Ref Range: 7 - 30 mg/dL 14   Creatinine Latest Ref Range: 0.66 - 1.25 mg/dL 1.00   GFR Estimate Latest Ref Range: >60 mL/min/1.73_m2 >90   GFR Estimate If Black Latest Ref Range: >60 mL/min/1.73_m2 >90   Calcium Latest Ref Range: 8.5 - 10.1 mg/dL 9.1   Anion Gap Latest Ref Range: 3 - 14 mmol/L 2 (L)   N-Terminal Pro Bnp Latest Ref Range: 0 - 125 pg/mL 31   Glucose Latest Ref Range: 70 - 99 mg/dL 129 (H)       Please call if you have :  1. Weight gain of more than 2 pounds in a day or 5 pounds in a week  2. Increased shortness of breath, swelling or bloating  3. Dizziness, lightheadedness   4. Any questions or concerns.       Follow the American Heart Association Diet and Lifestyle recommendations:  Limit saturated fat, trans fat, sodium, red meat, sweets and sugar-sweetened beverages. If you choose to eat red meat, compare labels and select the leanest cuts available.  Aim for at least 150 minutes of moderate physical activity or 75 minutes of vigorous physical activity - or an equal combination of both - each week.      During business hours: [phone], press option # 1 to be routed to the [facility] then option # 4 for \"To send a message to your care " "team\"      After hours, weekends or holidays: On Call Cardiologist- [phone]   option #4 and ask to speak to the on-call Cardiologist. Inform them you are a CORE/heart failure patient at the [facility].      Heart Failure Support Group  [facility]  The Board Room, 8th Floor  [address] [address]     Meetings   1-2 pm  January 6  March 2  May 4  July 6  September 14  November 2      Carrie Tanner RN BSN CHFN  Cardiology Care Coordinator - Heart Failure/ C.O.R.E. Clinic  [facility]   Questions and [phone]   First press #1 for the [facility] and then press #4 for \"To send a message to your care team\"    Patient Education     Spironolactone Oral tablet  What is this medicine?  SPIRONOLACTONE (jaden on oh LAK tone) is a diuretic. It helps you make more urine and to lose excess water from your body. This medicine is used to treat high blood pressure, and edema or swelling from heart, kidney, or liver disease. It is also used to treat patients who make too much aldosterone or have low potassium.  This medicine may be used for other purposes; ask your health care provider or pharmacist if you have questions.  What should I tell my health care provider before I take this medicine?  They need to know if you have any of these conditions:    high blood level of potassium    kidney disease or trouble making urine    liver disease    an unusual or allergic reaction to spironolactone, other medicines, foods, dyes, or preservatives    pregnant or trying to get pregnant    breast-feeding  How should I use this medicine?  Take this medicine by mouth with a drink of water. Follow the directions on your prescription label. You can take it with or without food. If it upsets your stomach, take it with food. Do not take your medicine more often than directed. Remember that you will need to pass more urine after taking this medicine. Do " not take your doses at a time of day that will cause you problems. Do not take at bedtime.  Talk to your pediatrician regarding the use of this medicine in children. While this drug may be prescribed for selected conditions, precautions do apply.  Overdosage: If you think you have taken too much of this medicine contact a poison control center or emergency room at once.  NOTE: This medicine is only for you. Do not share this medicine with others.  What if I miss a dose?  If you miss a dose, take it as soon as you can. If it is almost time for your next dose, take only that dose. Do not take double or extra doses.  What may interact with this medicine?  Do not take this medicine with any of the following medications:    eplerenone  This medicine may also interact with the following medications:    corticosteroids    digoxin    lithium    medicines for high blood pressure like ACE inhibitors    skeletal muscle relaxants like tubocurarine    NSAIDs, medicines for pain and inflammation, like ibuprofen or naproxen    potassium products like salt substitute or supplements    pressor amines like norepinephrine    some diuretics  This list may not describe all possible interactions. Give your health care provider a list of all the medicines, herbs, non-prescription drugs, or dietary supplements you use. Also tell them if you smoke, drink alcohol, or use illegal drugs. Some items may interact with your medicine.  What should I watch for while using this medicine?  Visit your doctor or health care professional for regular checks on your progress. Check your blood pressure as directed. Ask your doctor what your blood pressure should be, and when you should contact them.  You may need to be on a special diet while taking this medicine. Ask your doctor. Also, ask how many glasses of fluid you need to drink a day. You must not get dehydrated.  This medicine may make you feel confused, dizzy or lightheaded. Drinking alcohol and  taking some medicines can make this worse. Do not drive, use machinery, or do anything that needs mental alertness until you know how this medicine affects you. Do not sit or stand up quickly.  What side effects may I notice from receiving this medicine?  Side effects that you should report to your doctor or health care professional as soon as possible:    allergic reactions such as skin rash or itching, hives, swelling of the lips, mouth, tongue, or throat    black or tarry stools    fast, irregular heartbeat    fever    muscle pain, cramps    numbness, tingling in hands or feet    trouble breathing    trouble passing urine    unusual bleeding    unusually weak or tired  Side effects that usually do not require medical attention (report to your doctor or health care professional if they continue or are bothersome):    change in voice or hair growth    confusion    dizzy, drowsy    dry mouth, increased thirst    enlarged or tender breasts    headache    irregular menstrual periods    sexual difficulty, unable to have an erection    stomach upset  This list may not describe all possible side effects. Call your doctor for medical advice about side effects. You may report side effects to FDA at 0-173-FDA-8069.  Where should I keep my medicine?  Keep out of the reach of children.  Store below 25 degrees C (77 degrees F). Throw away any unused medicine after the expiration date.  NOTE:This sheet is a summary. It may not cover all possible information. If you have questions about this medicine, talk to your doctor, pharmacist, or health care provider. Copyright  2016 Gold Standard

## 2021-01-14 NOTE — LETTER
"1/14/2021      RE: Ubaldo Velez  [address] [place] [address]       Dear Colleague,    Thank you for the opportunity to participate in the care of your patient, Ubaldo Velez, at the [facility] [place] [facility]. Please see a copy of my visit note below.         Cardiology Advanced Heart Failure Clinic     January 14, 2021    HPI:   Mr. Velez is a 35 year old male with a past medical history including history of ADHD, polysubstance abuse (ETOH, cocaine, alcohol, meth, tobacco), and NICM (LVEF 20-30% per TTE 9/2020, domitila 10%, LVIDd 6.7cm) who presents today for follow-up.     He last saw Dr. Choi 9/10/20, where he reported that he had stopped his medications several weeks prior.  He was advised to restart metoprolol XL 25mg daily, lisinopril 20mg daily, and lasix 20mg daily. He was seen 11/10 and Toprol was increased at that time. In December 2020, he was switched from ACEI to Entresto. He was also started on aspirin due to history of LV thrombus.    Since last clinic visit he reports no significant changes.  He reports continued abstinence from alcohol use. Is feeling well.  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  His weight has been stable.  He denies any lower extremity him, orthopnea, PND.  Few weeks ago he had several hours of feeling \"extra heartbeats.\"  This was shortly after he quit drinking.  He has not had any recurrence of palpitations in the last few weeks.  Denies any presyncope or syncope.      PAST MEDICAL HISTORY:  Past Medical History:   Diagnosis Date     ADHD      Cocaine use      Electronic cigarette use      Methamphetamine use (H)      NICM (nonischemic cardiomyopathy) (H)      Systolic heart failure (H)      Tobacco abuse        FAMILY HISTORY:  Family History   Problem Relation Age of Onset     Chronic Obstructive Pulmonary Disease " Father      Multiple Sclerosis Maternal Aunt        SOCIAL HISTORY:  Social History     Socioeconomic History     Marital status: Single     Spouse name: Not on file     Number of children: 1     Years of education: Not on file     Highest education level: Not on file   Occupational History     Occupation: [employer]   Social Needs     Financial resource strain: Not on file     Food insecurity     Worry: Not on file     Inability: Not on file     Transportation needs     Medical: Not on file     Non-medical: Not on file   Tobacco Use     Smoking status: Current Every Day Smoker     Packs/day: 1.00     Years: 15.00     Pack years: 15.00     Smokeless tobacco: Never Used     Tobacco comment:  5 cigarettes daily with vaping   Substance and Sexual Activity     Alcohol use: Not Currently     Comment: 8-10 beers a day, now 1 beer a week.     Drug use: Not Currently     Types: Methamphetamines, Cocaine     Comment: Methamphetamine last used 8 weeks ago     Sexual activity: Not on file   Lifestyle     Physical activity     Days per week: Not on file     Minutes per session: Not on file     Stress: Not on file   Relationships     Social connections     Talks on phone: Not on file     Gets together: Not on file     Attends Confucianism service: Not on file     Active member of club or organization: Not on file     Attends meetings of clubs or organizations: Not on file     Relationship status: Not on file     Intimate partner violence     Fear of current or ex partner: Not on file     Emotionally abused: Not on file     Physically abused: Not on file     Forced sexual activity: Not on file   Other Topics Concern     Not on file   Social History Narrative    Single but has long standing SO who he lives with, has one child.  (last updated 4/24/2019)        CURRENT MEDICATIONS:       aspirin (ASA) 81 MG EC tablet, Take 1 tablet (81 mg) by mouth daily       furosemide (LASIX) 20 MG tablet, Take 1 tablet (20 mg) by mouth daily        "metoprolol succinate ER (TOPROL-XL) 100 MG 24 hr tablet, Take 1 tablet (100 mg) by mouth daily At bedtime       sacubitril-valsartan (ENTRESTO) 49-51 MG per tablet, Take 1 tablet by mouth 2 times daily    No current facility-administered medications on file prior to visit.       ROS:   Refer to HPI    EXAM:  /77   Pulse 75   Ht 1.905 m (6' 3\")   Wt 110.5 kg (243 lb 8 oz)   SpO2 99%   BMI 30.44 kg/m       GENERAL: Appears comfortable, in no acute distress.   HEENT: Eye symmetrical, no discharge or icterus bilaterally. Mucous membranes moist and without lesions.  CV: RRR, +S1S2, no murmur, rub, or gallop. JVP at clavicle with HJR at 90 degrees.   RESPIRATORY: Respirations regular, even, and unlabored. Lungs CTA throughout.   GI: Soft and non distended with normoactive bowel sounds present in all quadrants. No tenderness, rebound, guarding. No hepatomegaly.   EXTREMITIES: No peripheral edema. 2+ bilateral pedal pulses.   NEUROLOGIC: Alert and oriented x 3. No focal deficits.   MUSCULOSKELETAL: No joint swelling or tenderness.   SKIN: No jaundice. No rashes or lesions.     Labs, reviewed with patient in clinic today:  CBC RESULTS:  Lab Results   Component Value Date    WBC 8.1 11/10/2020    RBC 5.40 11/10/2020    HGB 18.0 (H) 11/10/2020    HCT 52.9 11/10/2020    MCV 98 11/10/2020    MCH 33.3 (H) 11/10/2020    MCHC 34.0 11/10/2020    RDW 12.2 11/10/2020     11/10/2020       CMP RESULTS:  Lab Results   Component Value Date     01/14/2021    POTASSIUM 4.3 01/14/2021    CHLORIDE 105 01/14/2021    CO2 30 01/14/2021    ANIONGAP 2 (L) 01/14/2021     (H) 01/14/2021    BUN 14 01/14/2021    CR 1.00 01/14/2021    GFRESTIMATED >90 01/14/2021    GFRESTBLACK >90 01/14/2021    ARUN 9.1 01/14/2021    BILITOTAL 0.6 04/27/2019    ALBUMIN 2.4 (L) 04/27/2019    ALKPHOS 74 04/27/2019    ALT 49 04/27/2019    AST 41 04/27/2019        INR RESULTS:  Lab Results   Component Value Date    INR 2.27 (H) 10/07/2019 "       Lab Results   Component Value Date    MAG 2.3 05/07/2019     Lab Results   Component Value Date    NTBNPI 4,655 (H) 04/24/2019     Lab Results   Component Value Date    NTBNP 31 01/14/2021       Diagnostics:  TTE 9/10/20  Severely (EF 20-30%) reduced left ventricular function is present.  IVC diameter >2.1 cm collapsing <50% with sniff suggests a high RA pressure  estimated at 15 mmHg or greater.  No pericardial effusion is present.  Global right ventricular function is normal to mildly reduced.  No change from prior.    cMRI 9/10/20  1. The left ventricle is moderately dilated with normal wall thickness. There is moderate diffuse  hypokinesis with a small apical aneurysm. The global systolic function is moderately reduced. The LVEF is  34%.  2. The right ventricle is normal in cavity size. The global systolic function is mildly reduced. The RVEF  is 41 %.   3. Both atria are normal in size.  4. There is no significant valvular disease.   5. There is focal area of transmural late gadolinium enhancement of the apical aneurysm.   7. There is no pericardial effusion or thickening.  8. There is no intracardiac thrombus.     CONCLUSIONS:   Non ischemic cardiomyopathy with a  small apical aneurysm.   Moderately reduced left ventricular function with mildly reduced right ventricular function and no  intracardiac thrombus.      RHC/cor angio 4/29/19  Left Main   The vessel was visualized by selective angiography and is moderate in size. There was 0% vessel disease.   Left Anterior Descending   The vessel was visualized by selective angiography and is moderate in size. There was 0% vessel disease.   First Diagonal Branch   The vessel is large.   Second Diagonal Branch   The vessel is small.   Left Circumflex   The vessel was visualized by selective angiography and is moderate in size. There was 0% vessel disease.   First Obtuse Marginal Branch   The vessel is small.   Second Obtuse Marginal Branch   The vessel is  large.   Right Coronary Artery   The vessel was visualized by selective angiography and is moderate in size. There was 0% vessel disease.       RA   Mean: 3 mmHg     PA   Systolic:40 mmHg   Diasotlic: 26 mmHg   Mean: 32 mmHg   HR: 112 bpm    PCW   A-wave: 25 mmHg   V-wave: 24 mmHg   Mean: 22 mmHg   HR: 112 bpm    Cardiac Output   CO Ayanna: 4.57 L/min    CI Ayanna: 1.96 L/min/m2    CO TD: 4.9 L/min    CI TD: 2.1 L/min/m2    Assessment and Plan:   Mr. Velez is a 35 year old male with a history of chronic systolic heart failure due to nonischemic cardiomyopathy, felt to be secondary to substance use.  Per patient he is recently sober of all substances.  His PeTH test, nicotine and cotinine, and urine drug screen are pending today.  He is aware he needs to be sober for some amount of time to be considered for advanced therapies. In the past have discussed chem dep but he did not think he needed it.    # Chronic systolic heart failure/HFrEF secondary to NICM (EF 20-30%, domitila 10%)    Stage C. NYHA Class II.    - Fluid status: euvolemic continue lasix 20 mg daily  - ACEi/ARB/ARNi: continue Entresto 49/51 mg bid, increase to  BID today  - BB: Metoprolol XL 100mg daily, increase to 150mg daily in 2 weeks if tolerating entresto and aldactone  - Aldosterone antagonist: start 25mg BID today  - SGLT2: deferred while other medications are prioritized  - SCD ppx: deferred while medication therapy is optimized --> note that medication compliance has limited medication optimization, repeat echo 3 months after GDMT achieved, referral to EP at that time if EF<35% (QRS 96ms)     # Polysubstance abuse  He reports abstinence to alcohol since last visit, states today that one month ago he was still drinking but a lot. PeTH, nicotine/cotinine, and drug screen from today are in process. However, at last visit in December drug screen was negative, PeTH was significantly reduced to 63 from 474 on last check. However, nicotine and  cotinine were still elevated. If these results are still positive, he needs referral to chem dep treatment ([facility] Calderon at [facility]) for substance abuse treatment.  He understands that his substance use precludes him from being considered for advanced heart failure therapies (LVAD/transplant). Continues to decline chem dep.  -needs random drug screens out of CORE clinic  -repeat PeTH at each clinic visit    # Palpitations  Isolated episodes a few weeks ago in the setting of ETOH detox. He does not have an ICD but no history of documented VT. Patient to call clinic with any recurrence of symptoms and we will plan for Holter or Zio at that time to monitor given risk of ventricular arrhythmia.  - no further episodes     # H/o LV thrombus (2019)  Not noted on echocardiogram or MRI 9/2020.   - hold off on further AC at this time    Follow up: BMP in 2-3 weeks after med increase  3-4 months with Dr. Regalado with TTE    Selena Randle MD  Cardiology Fellow    CC  MARIAMA REGALADO    Attestation signed by Mariama Regalado MD at 1/14/2021  9:53 PM:  I have seen and examined the patient with the house staff on January 14, 2021  and agree with the outlined assessment and plan.      Mariama Regalado MD  Associate Professor of Medicine   [facility] Division of Cardiology         Please do not hesitate to contact me if you have any questions/concerns.     Sincerely,     Mariama Regalado MD

## 2021-01-14 NOTE — NURSING NOTE
Diet: Patient instructed regarding a heart failure healthy diet, including discussion of reduced fat and 2000 mg daily sodium restriction, daily weights, medication purpose and compliance, fluid restrictions and resources for patient and family to access for assistance with heart failure management.       Labs: Patient was given results of the laboratory testing obtained today and patient was instructed about when to return for the next laboratory testing.     Med Reconcile: Reviewed and verified all current medications with the patient. The updated medication list was printed and given to the patient.     Med changes: increase Entresto to  mg BID, Start spironolactone 25 mg daily, in three weeks may increase metoprolol to 150 mg daily     Return Appointment: Patient given instructions regarding scheduling next clinic visit: labs in 1 week after starting Spironolactone, monthly PETH and drug testing (will hold off on nicotine testing as is still smoking/vaping), follow up with Dr Choi in 4 months with labs and echo    Patient stated he understood all health information given and agreed to call with further questions or concerns.     Carrie Tanner RN

## 2021-01-14 NOTE — NURSING NOTE
Chief Complaint   Patient presents with     Follow Up     heart failure follow up      Vitals were taken and medications were reconciled.     Surekha Russo RMA  9:13 AM

## 2021-01-20 LAB — PETH BLD-MCNC: NEGATIVE NG/ML

## 2021-01-21 ENCOUNTER — PATIENT OUTREACH (OUTPATIENT)
Dept: CARDIOLOGY | Facility: CLINIC | Age: 36
End: 2021-01-21

## 2021-01-21 NOTE — TELEPHONE ENCOUNTER
"Called Ubaldo to follow up on med changes and noted that he had canceled labs for today.      He states the medication changes are going well and he's feeling \"fine.\"  Denies adverse effects.    He reports that he received a letter from his insurance agency stating that diagnostic testing and labs would not be covered.  Will reach out to finical to investigate.    "

## 2021-01-25 LAB
COTININE SERPL-MCNC: 154.9 NG/ML
NICOTINE SERPL-MCNC: 20.2 NG/ML

## 2021-01-25 NOTE — TELEPHONE ENCOUNTER
Discussed with luis, who stated:   The patient will need to contact his insurance company for further details about the letter received. The insurance provider could go over other options for additional coverage.     Attempted to reach Ubaldo renee to leave message

## 2021-02-02 NOTE — TELEPHONE ENCOUNTER
"Connected with Ubaldo, advised him to call his insurance company to find out what exactly they were not covering and why and what he can do.      Ubaldo also stated he's \"feeling good\" with the medication  "

## 2021-02-18 DIAGNOSIS — I50.22 CHRONIC SYSTOLIC HEART FAILURE (H): ICD-10-CM

## 2021-02-18 LAB
AMPHETAMINES UR QL SCN: NEGATIVE
ANION GAP SERPL CALCULATED.3IONS-SCNC: 2 MMOL/L (ref 3–14)
BARBITURATES UR QL: NEGATIVE
BENZODIAZ UR QL: NEGATIVE
BUN SERPL-MCNC: 15 MG/DL (ref 7–30)
CALCIUM SERPL-MCNC: 9.1 MG/DL (ref 8.5–10.1)
CANNABINOIDS UR QL SCN: NEGATIVE
CHLORIDE SERPL-SCNC: 109 MMOL/L (ref 94–109)
CO2 SERPL-SCNC: 31 MMOL/L (ref 20–32)
COCAINE UR QL: NEGATIVE
CREAT SERPL-MCNC: 1.05 MG/DL (ref 0.66–1.25)
ETHANOL UR QL SCN: NEGATIVE
GFR SERPL CREATININE-BSD FRML MDRD: >90 ML/MIN/{1.73_M2}
GLUCOSE SERPL-MCNC: 82 MG/DL (ref 70–99)
OPIATES UR QL SCN: NEGATIVE
PCP UR QL SCN: NEGATIVE
POTASSIUM SERPL-SCNC: 4.3 MMOL/L (ref 3.4–5.3)
SODIUM SERPL-SCNC: 142 MMOL/L (ref 133–144)

## 2021-02-18 PROCEDURE — 80320 DRUG SCREEN QUANTALCOHOLS: CPT | Mod: 90 | Performed by: PATHOLOGY

## 2021-02-18 PROCEDURE — 80307 DRUG TEST PRSMV CHEM ANLYZR: CPT | Mod: 90 | Performed by: PATHOLOGY

## 2021-02-18 PROCEDURE — 36415 COLL VENOUS BLD VENIPUNCTURE: CPT | Performed by: PATHOLOGY

## 2021-02-18 PROCEDURE — 80321 ALCOHOLS BIOMARKERS 1OR 2: CPT | Mod: 90 | Performed by: PATHOLOGY

## 2021-02-18 PROCEDURE — 99000 SPECIMEN HANDLING OFFICE-LAB: CPT | Performed by: PATHOLOGY

## 2021-02-18 PROCEDURE — 80048 BASIC METABOLIC PNL TOTAL CA: CPT | Performed by: PATHOLOGY

## 2021-02-20 LAB — PETH BLD-MCNC: NEGATIVE NG/ML

## 2021-02-26 ENCOUNTER — PATIENT OUTREACH (OUTPATIENT)
Dept: CARDIOLOGY | Facility: CLINIC | Age: 36
End: 2021-02-26

## 2021-02-26 DIAGNOSIS — I50.22 CHRONIC SYSTOLIC HEART FAILURE (H): Primary | ICD-10-CM

## 2021-02-26 NOTE — TELEPHONE ENCOUNTER
Reviewed labs with Ubaldo, congratulated him on his negative labs.  He was pleased to hear and reiterates that he is doing everything as directed, so he was not surprised.  Scheduled March lab appointment.

## 2021-04-12 DIAGNOSIS — I50.22 CHRONIC SYSTOLIC HEART FAILURE (H): ICD-10-CM

## 2021-04-12 LAB
AMPHETAMINES UR QL SCN: NEGATIVE
BARBITURATES UR QL: NEGATIVE
BENZODIAZ UR QL: NEGATIVE
CANNABINOIDS UR QL SCN: NEGATIVE
COCAINE UR QL: NEGATIVE
ETHANOL UR QL SCN: NEGATIVE
OPIATES UR QL SCN: NEGATIVE
PCP UR QL SCN: NEGATIVE

## 2021-04-12 PROCEDURE — 80320 DRUG SCREEN QUANTALCOHOLS: CPT | Performed by: PATHOLOGY

## 2021-04-12 PROCEDURE — 80321 ALCOHOLS BIOMARKERS 1OR 2: CPT | Mod: 90 | Performed by: PATHOLOGY

## 2021-04-12 PROCEDURE — 99000 SPECIMEN HANDLING OFFICE-LAB: CPT | Performed by: PATHOLOGY

## 2021-04-12 PROCEDURE — 36415 COLL VENOUS BLD VENIPUNCTURE: CPT | Performed by: PATHOLOGY

## 2021-04-12 PROCEDURE — 80307 DRUG TEST PRSMV CHEM ANLYZR: CPT | Performed by: PATHOLOGY

## 2021-04-14 LAB — PETH BLD-MCNC: NEGATIVE NG/ML

## 2021-04-15 ENCOUNTER — PATIENT OUTREACH (OUTPATIENT)
Dept: CARDIOLOGY | Facility: CLINIC | Age: 36
End: 2021-04-15

## 2021-04-15 DIAGNOSIS — I50.22 CHRONIC SYSTOLIC HEART FAILURE (H): Primary | ICD-10-CM

## 2021-04-25 ENCOUNTER — HEALTH MAINTENANCE LETTER (OUTPATIENT)
Age: 36
End: 2021-04-25

## 2021-05-14 ENCOUNTER — OFFICE VISIT (OUTPATIENT)
Dept: CARDIOLOGY | Facility: CLINIC | Age: 36
End: 2021-05-14
Attending: INTERNAL MEDICINE
Payer: COMMERCIAL

## 2021-05-14 VITALS
OXYGEN SATURATION: 99 % | DIASTOLIC BLOOD PRESSURE: 83 MMHG | WEIGHT: 242 LBS | BODY MASS INDEX: 31.06 KG/M2 | SYSTOLIC BLOOD PRESSURE: 154 MMHG | HEIGHT: 74 IN | HEART RATE: 72 BPM

## 2021-05-14 DIAGNOSIS — I50.22 CHRONIC SYSTOLIC HEART FAILURE (H): ICD-10-CM

## 2021-05-14 LAB
AMPHETAMINES UR QL SCN: NEGATIVE
ANION GAP SERPL CALCULATED.3IONS-SCNC: 7 MMOL/L (ref 3–14)
BARBITURATES UR QL: NEGATIVE
BENZODIAZ UR QL: NEGATIVE
BUN SERPL-MCNC: 13 MG/DL (ref 7–30)
CALCIUM SERPL-MCNC: 9.1 MG/DL (ref 8.5–10.1)
CANNABINOIDS UR QL SCN: NEGATIVE
CHLORIDE SERPL-SCNC: 105 MMOL/L (ref 94–109)
CO2 SERPL-SCNC: 26 MMOL/L (ref 20–32)
COCAINE UR QL: NEGATIVE
CREAT SERPL-MCNC: 1.06 MG/DL (ref 0.66–1.25)
ETHANOL UR QL SCN: NEGATIVE
GFR SERPL CREATININE-BSD FRML MDRD: 90 ML/MIN/{1.73_M2}
GLUCOSE SERPL-MCNC: 140 MG/DL (ref 70–99)
NT-PROBNP SERPL-MCNC: 25 PG/ML (ref 0–125)
OPIATES UR QL SCN: NEGATIVE
PCP UR QL SCN: NEGATIVE
POTASSIUM SERPL-SCNC: 4.1 MMOL/L (ref 3.4–5.3)
SODIUM SERPL-SCNC: 138 MMOL/L (ref 133–144)

## 2021-05-14 PROCEDURE — 80307 DRUG TEST PRSMV CHEM ANLYZR: CPT | Performed by: PATHOLOGY

## 2021-05-14 PROCEDURE — 99214 OFFICE O/P EST MOD 30 MIN: CPT | Performed by: INTERNAL MEDICINE

## 2021-05-14 PROCEDURE — 80048 BASIC METABOLIC PNL TOTAL CA: CPT | Performed by: PATHOLOGY

## 2021-05-14 PROCEDURE — 83880 ASSAY OF NATRIURETIC PEPTIDE: CPT | Performed by: PATHOLOGY

## 2021-05-14 PROCEDURE — 80320 DRUG SCREEN QUANTALCOHOLS: CPT | Performed by: PATHOLOGY

## 2021-05-14 PROCEDURE — 36415 COLL VENOUS BLD VENIPUNCTURE: CPT | Performed by: PATHOLOGY

## 2021-05-14 RX ORDER — METOPROLOL SUCCINATE 100 MG/1
150 TABLET, EXTENDED RELEASE ORAL DAILY
Qty: 135 TABLET | Refills: 3 | Status: SHIPPED | OUTPATIENT
Start: 2021-05-14 | End: 2021-06-14

## 2021-05-14 ASSESSMENT — MIFFLIN-ST. JEOR: SCORE: 2097.45

## 2021-05-14 ASSESSMENT — PAIN SCALES - GENERAL: PAINLEVEL: NO PAIN (0)

## 2021-05-14 NOTE — PROGRESS NOTES
May 14, 2021    HF clinic note     Follow up NICM     Cardiac history    ADHD and polysubstance abuse (ETOH, cocaine, alcohol, meth) who presented in 2019 in cardiogenic shock. At that time, he had an ECHO which showed LVEF of 10% with moderate to severe MR/TR. In addition, he had multiple apical thrombi. He underwent coronary angiogram which showed normal coronary arteries. He was transitioned to oral afterload and AC. Then attended chemical dep has followed with the CHF clinic. His most recent TTE in 10/2019 showed that his LVEF has increased to 28%.     The had a period of insurance lapsing - was taking less medicine, drinking some alcohol, now back to taking medications and completely substance free. Confirmed with testing.     Right now he is feeling really well,  back to work full time.     He denies shortness of breath, chest pain, orthopnea, PND, syncope. He is able to work without any significant limitations. He does state that heavy exertion does cause some tiredness, but he still has good exercise tolerance.     Blood pressures at home have not been low.        PMH  Past Medical History:   Diagnosis Date     ADHD      Cocaine use      Electronic cigarette use      Methamphetamine use (H)      NICM (nonischemic cardiomyopathy) (H)      Systolic heart failure (H)      Tobacco abuse          Family History   Problem Relation Age of Onset     Chronic Obstructive Pulmonary Disease Father      Multiple Sclerosis Maternal Aunt          Current Outpatient Medications   Medication Sig Dispense Refill     aspirin (ASA) 81 MG EC tablet Take 1 tablet (81 mg) by mouth daily       furosemide (LASIX) 20 MG tablet Take 1 tablet (20 mg) by mouth daily 90 tablet 3     metoprolol succinate ER (TOPROL-XL) 100 MG 24 hr tablet Take 1 tablet (100 mg) by mouth daily At bedtime 90 tablet 3     sacubitril-valsartan (ENTRESTO)  MG per tablet Take 1 tablet by mouth 2 times daily 180 tablet 3     spironolactone  "(ALDACTONE) 25 MG tablet Take 1 tablet (25 mg) by mouth daily 90 tablet 3       ROS:  Constitutional: No fever, chills, or sweats. No weight gain/loss.   ENT: No visual disturbance, ear ache, epistaxis, sore throat.   Allergies/Immunologic: Negative.   Respiratory: No cough, hemoptysis.   Cardiovascular: As per HPI.   GI: No nausea, vomiting, hematemesis, melena, or hematochezia.   : No urinary frequency, dysuria, or hematuria.   Integument: Negative.   Psychiatric: Negative.   Neuro: Negative.   Endocrinology: Negative.   Musculoskeletal: Negative.      EXAM:  BP (!) 154/83 (BP Location: Right arm, Patient Position: Chair, Cuff Size: Adult Large)   Pulse 72   Ht 1.88 m (6' 2\")   Wt 109.8 kg (242 lb)   SpO2 99%   BMI 31.07 kg/m    General: appears comfortable, alert and articulate  Head: normocephalic, atraumatic  Eyes: anicteric sclera, EOMI  Neck: no adenopathy  Orophyarynx: moist mucosa, no lesions, dentition intact  Heart: PMI diffuse,trace systolic murmur at LLSB, regular, S1/S2, rub, estimated JVP 9 cm   Lungs: clear, no rales or wheezing  Abdomen: soft, non-tender, bowel sounds present, no hepatosplenomegaly  Extremities: no clubbing, cyanosis or edema  Neurological: normal speech and affect, no gross motor deficits      Echo 9/20   Interpretation Summary  Severely (EF 20-30%) reduced left ventricular function is present.  IVC diameter >2.1 cm collapsing <50% with sniff suggests a high RA pressure  estimated at 15 mmHg or greater.  No pericardial effusion is present.  Global right ventricular function is normal to mildly reduced.  No change from prior.    Labs reviewed today   Normal renal function   Normal bnp       ASSESSMENT AND PLAN  Substance induced CM- history of cardiogenic shock in 2019 now abstinent on GDMT.     Overall doing very, very well. We are proud of the progress he has made     -NYHA Class 1/AHA Stage C   -now on max entresto, metoprolol 100 xl daily, aldactone 25     Increasing " metoprolol to 150 XL daily today.  In 1 month we will increase to 200 XL daily.  He has blood pressure room for this.     Going to have a his repeat echocardiogram in the next week or 2.  If his ejection fraction is about 35% no need for ICD.     History of LV thrombus: Resolved on medical therapy now off anticoagulation    History of polysubstance abuse: Now in complete remission, encouragement provided, will continue outpatient testing to verify      I will see him in 6 months time.  Sooner as needed.                         CC  Patient Care Team:  Shailesh Gudino DO as PCP - General (Student in organized health care education/training program)  Luna Carlson RPH as Pharmacist (Pharmacist)  Carrie Tanner, RN as Specialty Care Coordinator (Cardiology)  Luna Rubin, RN as Specialty Care Coordinator (Cardiology)  Stephania Crocker NP as Nurse Practitioner (Cardiology)  Maddie Choi MD as Assigned Heart and Vascular Provider  [facility]

## 2021-05-14 NOTE — LETTER
5/14/2021      RE: Ubaldo Velez  [address] [place] [address]       Dear Colleague,    Thank you for the opportunity to participate in the care of your patient, Ubaldo Velez, at the [facility] [place] [facility]. Please see a copy of my visit note below.        May 14, 2021    HF clinic note     Follow up NICM     Cardiac history    ADHD and polysubstance abuse (ETOH, cocaine, alcohol, meth) who presented in 2019 in cardiogenic shock. At that time, he had an ECHO which showed LVEF of 10% with moderate to severe MR/TR. In addition, he had multiple apical thrombi. He underwent coronary angiogram which showed normal coronary arteries. He was transitioned to oral afterload and AC. Then attended chemical dep has followed with the CHF clinic. His most recent TTE in 10/2019 showed that his LVEF has increased to 28%.     The had a period of insurance lapsing - was taking less medicine, drinking some alcohol, now back to taking medications and completely substance free. Confirmed with testing.     Right now he is feeling really well,  back to work full time.     He denies shortness of breath, chest pain, orthopnea, PND, syncope. He is able to work without any significant limitations. He does state that heavy exertion does cause some tiredness, but he still has good exercise tolerance.     Blood pressures at home have not been low.        PMH  Past Medical History:   Diagnosis Date     ADHD      Cocaine use      Electronic cigarette use      Methamphetamine use (H)      NICM (nonischemic cardiomyopathy) (H)      Systolic heart failure (H)      Tobacco abuse          Family History   Problem Relation Age of Onset     Chronic Obstructive Pulmonary Disease Father      Multiple Sclerosis Maternal Aunt          Current Outpatient Medications   Medication Sig Dispense Refill     aspirin (ASA) 81 MG EC tablet Take 1 tablet (81 mg) by mouth  "daily       furosemide (LASIX) 20 MG tablet Take 1 tablet (20 mg) by mouth daily 90 tablet 3     metoprolol succinate ER (TOPROL-XL) 100 MG 24 hr tablet Take 1 tablet (100 mg) by mouth daily At bedtime 90 tablet 3     sacubitril-valsartan (ENTRESTO)  MG per tablet Take 1 tablet by mouth 2 times daily 180 tablet 3     spironolactone (ALDACTONE) 25 MG tablet Take 1 tablet (25 mg) by mouth daily 90 tablet 3       ROS:  Constitutional: No fever, chills, or sweats. No weight gain/loss.   ENT: No visual disturbance, ear ache, epistaxis, sore throat.   Allergies/Immunologic: Negative.   Respiratory: No cough, hemoptysis.   Cardiovascular: As per HPI.   GI: No nausea, vomiting, hematemesis, melena, or hematochezia.   : No urinary frequency, dysuria, or hematuria.   Integument: Negative.   Psychiatric: Negative.   Neuro: Negative.   Endocrinology: Negative.   Musculoskeletal: Negative.      EXAM:  BP (!) 154/83 (BP Location: Right arm, Patient Position: Chair, Cuff Size: Adult Large)   Pulse 72   Ht 1.88 m (6' 2\")   Wt 109.8 kg (242 lb)   SpO2 99%   BMI 31.07 kg/m    General: appears comfortable, alert and articulate  Head: normocephalic, atraumatic  Eyes: anicteric sclera, EOMI  Neck: no adenopathy  Orophyarynx: moist mucosa, no lesions, dentition intact  Heart: PMI diffuse,trace systolic murmur at LLSB, regular, S1/S2, rub, estimated JVP 9 cm   Lungs: clear, no rales or wheezing  Abdomen: soft, non-tender, bowel sounds present, no hepatosplenomegaly  Extremities: no clubbing, cyanosis or edema  Neurological: normal speech and affect, no gross motor deficits      Echo 9/20   Interpretation Summary  Severely (EF 20-30%) reduced left ventricular function is present.  IVC diameter >2.1 cm collapsing <50% with sniff suggests a high RA pressure  estimated at 15 mmHg or greater.  No pericardial effusion is present.  Global right ventricular function is normal to mildly reduced.  No change from prior.    Labs reviewed " today   Normal renal function   Normal bnp       ASSESSMENT AND PLAN  Substance induced CM- history of cardiogenic shock in 2019 now abstinent on GDMT.     Overall doing very, very well. We are proud of the progress he has made     -NYHA Class 1/AHA Stage C   -now on max entresto, metoprolol 100 xl daily, aldactone 25     Increasing metoprolol to 150 XL daily today.  In 1 month we will increase to 200 XL daily.  He has blood pressure room for this.     Going to have a his repeat echocardiogram in the next week or 2.  If his ejection fraction is about 35% no need for ICD.     History of LV thrombus: Resolved on medical therapy now off anticoagulation    History of polysubstance abuse: Now in complete remission, encouragement provided, will continue outpatient testing to verify      I will see him in 6 months time.  Sooner as needed.     CC  Patient Care Team:  Shailesh Gudino DO as PCP - General (Student in organized health care education/training program)  Luna Carlson RPH as Pharmacist (Pharmacist)  Carrie Tanner, RN as Specialty Care Coordinator (Cardiology)  Luna Rubin, RN as Specialty Care Coordinator (Cardiology)  Stephania Crocker NP as Nurse Practitioner (Cardiology)  [facility]

## 2021-05-14 NOTE — NURSING NOTE
Chief Complaint   Patient presents with     Follow Up     Return HF, HFrEF, labs and echo prior      Vitals were taken and medications were reconciled.     Surekha Russo RMA  10:19 AM

## 2021-05-14 NOTE — NURSING NOTE
Diet: Patient instructed regarding a heart failure healthy diet, including discussion of reduced fat and 2000 mg daily sodium restriction, daily weights, medication purpose and compliance, fluid restrictions and resources for patient and family to access for assistance with heart failure management.       Labs: Patient was given results of the laboratory testing obtained today and patient was instructed about when to return for the next laboratory testing.     Med Reconcile: Reviewed and verified all current medications with the patient. The updated medication list was printed and given to the patient.     Med changes: increase metoprolol to 150 mg daily, in a month if feeling well increase to 200 mg daily    Return Appointment: Patient given instructions regarding scheduling next clinic visit: reschedule Steph montoya in 6 months with labs and echo    Patient stated he understood all health information given and agreed to call with further questions or concerns.     Carrie Tanner RN

## 2021-05-14 NOTE — PATIENT INSTRUCTIONS
"Cardiology Providers you saw during your visit:  Dr. Choi    Medication changes:  1.  Increase Metoprolol to 150 mg daily, if you are feeling well in a month, will increase to 200 mg daily    Follow up:  1.  Get echo rescheduled within the next month  2.  Follow up with Dr Choi in 6 months  Labs:  Results for CHRISTINE BARRAGAN (MRN [id number]) as of 5/14/2021 10:51   Ref. Range 5/14/2021 10:00   Sodium Latest Ref Range: 133 - 144 mmol/L 138   Potassium Latest Ref Range: 3.4 - 5.3 mmol/L 4.1   Chloride Latest Ref Range: 94 - 109 mmol/L 105   Carbon Dioxide Latest Ref Range: 20 - 32 mmol/L 26   Urea Nitrogen Latest Ref Range: 7 - 30 mg/dL 13   Creatinine Latest Ref Range: 0.66 - 1.25 mg/dL 1.06   GFR Estimate Latest Ref Range: >60 mL/min/1.73_m2 90   GFR Estimate If Black Latest Ref Range: >60 mL/min/1.73_m2 >90   Calcium Latest Ref Range: 8.5 - 10.1 mg/dL 9.1   Anion Gap Latest Ref Range: 3 - 14 mmol/L 7   N-Terminal Pro Bnp Latest Ref Range: 0 - 125 pg/mL 25   Glucose Latest Ref Range: 70 - 99 mg/dL 140 (H)       Please call if you have :  1. Weight gain of more than 2 pounds in a day or 5 pounds in a week  2. Increased shortness of breath, swelling or bloating  3. Dizziness, lightheadedness   4. Any questions or concerns.       Follow the American Heart Association Diet and Lifestyle recommendations:  Limit saturated fat, trans fat, sodium, red meat, sweets and sugar-sweetened beverages. If you choose to eat red meat, compare labels and select the leanest cuts available.  Aim for at least 150 minutes of moderate physical activity or 75 minutes of vigorous physical activity - or an equal combination of both - each week.      During business hours: [phone], press option # 1 to be routed to the [facility] then option # 4 for \"To send a message to your care team\"      After hours, weekends or holidays: On Call Cardiologist- [phone]   option #4 and ask to speak to the on-call Cardiologist. Inform them " "you are a CORE/heart failure patient at the [facility].      Heart Failure Support Group  [facility]  The Board Room, 8th Floor  [address] [address]     Meetings   1-2 pm  , 1-2 p.m.  , 1-2 p.m.  , 1-2 p.m.  , 1-2 p.m.  May 3rd, 1-2 p.m.  , 1-2 p.m.  , 1-2 p.m.  , 1-2 p.m.  , 1-2 p.m.  , 1-2 p.m.  , 1-2 p.m.  , 1-2 p.m.      Carrie Tanner RN BSN CHFN  Cardiology Care Coordinator - Heart Failure/ [facility] Clinic  [facility]   Questions and [phone]   First press #1 for the [facility] and then press #4 for \"To send a message to your care team\"    "

## 2021-05-18 LAB — PETH BLD-MCNC: NEGATIVE NG/ML

## 2021-05-24 ENCOUNTER — ANCILLARY PROCEDURE (OUTPATIENT)
Dept: CARDIOLOGY | Facility: CLINIC | Age: 36
End: 2021-05-24
Attending: INTERNAL MEDICINE
Payer: COMMERCIAL

## 2021-05-24 DIAGNOSIS — I50.22 CHRONIC SYSTOLIC HEART FAILURE (H): ICD-10-CM

## 2021-05-24 PROCEDURE — 93306 TTE W/DOPPLER COMPLETE: CPT | Performed by: STUDENT IN AN ORGANIZED HEALTH CARE EDUCATION/TRAINING PROGRAM

## 2021-05-25 ENCOUNTER — PATIENT OUTREACH (OUTPATIENT)
Dept: CARDIOLOGY | Facility: CLINIC | Age: 36
End: 2021-05-25

## 2021-05-25 DIAGNOSIS — I50.22 CHRONIC SYSTOLIC HEART FAILURE (H): ICD-10-CM

## 2021-05-25 NOTE — TELEPHONE ENCOUNTER
"Reviewed Echo results with Ubaldo, he was pleased to hear the increase.  He is feeling well with the metoprolol increase \"I'm not feeling any different.\"  He is wondering about substance abuse testing.  He has had negative drug and Peth testing since January but his insurance has been fighting paying for it.      Will review with Dr Choi regarding frequency of testing.      "

## 2021-05-28 LAB
COTININE SERPL-MCNC: 245.9 NG/ML
NICOTINE SERPL-MCNC: 22.7 NG/ML

## 2021-06-14 RX ORDER — METOPROLOL SUCCINATE 100 MG/1
200 TABLET, EXTENDED RELEASE ORAL DAILY
Qty: 180 TABLET | Refills: 3 | Status: SHIPPED | OUTPATIENT
Start: 2021-06-14 | End: 2022-03-22

## 2021-06-14 NOTE — TELEPHONE ENCOUNTER
"Ubaldo is still feeling well at Metoprolol 150 mg \"like nothing changed\" and so he will proceed with planned increase of metoprolol to 200 mg daily.        "

## 2021-06-30 DIAGNOSIS — I50.22 CHRONIC SYSTOLIC HEART FAILURE (H): Primary | ICD-10-CM

## 2021-06-30 NOTE — TELEPHONE ENCOUNTER
Ubaldo is feeling well on Metoprolol 200 mg daily.  He notices no differences since increasing the dose.      Ubaldo reports that 2-3 times a week he forgets to take his evening dose of Entresto and is wondering if he can take it at 4-5 pm when he is in the bathroom after work.  Agreed with him that it was important to get the evening dose in and verified with the pharmacy that there was no reason that he needed to take 12 hours apart.

## 2021-10-10 ENCOUNTER — HEALTH MAINTENANCE LETTER (OUTPATIENT)
Age: 36
End: 2021-10-10

## 2021-11-12 ENCOUNTER — TELEPHONE (OUTPATIENT)
Dept: CARDIOLOGY | Facility: CLINIC | Age: 36
End: 2021-11-12
Payer: COMMERCIAL

## 2021-11-12 NOTE — TELEPHONE ENCOUNTER
Prior Authorization Retail Medication Request    Medication/Dose: Entresto  mg  ICD code (if different than what is on RX):    Previously Tried and Failed:    Rationale:      Insurance Name:  [employer]  Insurance ID:  [id number]      Pharmacy Information (if different than what is on RX)  Name:    Phone:

## 2021-11-17 NOTE — TELEPHONE ENCOUNTER
Central Prior Authorization Team   Phone: [phone]      PA Initiation    Medication: Entresto  mg-PA initiated  Insurance Company: [employer] [facility] - Phone [phone] Fax [phone]  Pharmacy Filling the Rx: [facility] [place], [place] - [address] [place] [address] [phone]  Filling Pharmacy Phone: [phone]  Filling Pharmacy Fax:    Start Date: 11/17/2021

## 2021-11-18 ENCOUNTER — OFFICE VISIT (OUTPATIENT)
Dept: CARDIOLOGY | Facility: CLINIC | Age: 36
End: 2021-11-18
Attending: INTERNAL MEDICINE
Payer: COMMERCIAL

## 2021-11-18 ENCOUNTER — LAB (OUTPATIENT)
Dept: LAB | Facility: CLINIC | Age: 36
End: 2021-11-18
Payer: COMMERCIAL

## 2021-11-18 VITALS
WEIGHT: 237.8 LBS | HEART RATE: 70 BPM | SYSTOLIC BLOOD PRESSURE: 151 MMHG | BODY MASS INDEX: 30.53 KG/M2 | OXYGEN SATURATION: 99 % | DIASTOLIC BLOOD PRESSURE: 92 MMHG

## 2021-11-18 DIAGNOSIS — I50.22 CHRONIC SYSTOLIC HEART FAILURE (H): ICD-10-CM

## 2021-11-18 LAB
AMPHETAMINES UR QL SCN: NORMAL
ANION GAP SERPL CALCULATED.3IONS-SCNC: 5 MMOL/L (ref 3–14)
BARBITURATES UR QL: NORMAL
BENZODIAZ UR QL: NORMAL
BUN SERPL-MCNC: 15 MG/DL (ref 7–30)
CALCIUM SERPL-MCNC: 9.2 MG/DL (ref 8.5–10.1)
CANNABINOIDS UR QL SCN: NORMAL
CHLORIDE BLD-SCNC: 106 MMOL/L (ref 94–109)
CO2 SERPL-SCNC: 28 MMOL/L (ref 20–32)
COCAINE UR QL: NORMAL
CREAT SERPL-MCNC: 1.01 MG/DL (ref 0.66–1.25)
ETHANOL UR QL SCN: NORMAL
GFR SERPL CREATININE-BSD FRML MDRD: >90 ML/MIN/1.73M2
GLUCOSE BLD-MCNC: 105 MG/DL (ref 70–99)
NT-PROBNP SERPL-MCNC: 25 PG/ML (ref 0–125)
OPIATES UR QL SCN: NORMAL
PCP UR QL SCN: NORMAL
POTASSIUM BLD-SCNC: 4.4 MMOL/L (ref 3.4–5.3)
SODIUM SERPL-SCNC: 139 MMOL/L (ref 133–144)

## 2021-11-18 PROCEDURE — 80321 ALCOHOLS BIOMARKERS 1OR 2: CPT | Mod: 90 | Performed by: PATHOLOGY

## 2021-11-18 PROCEDURE — 80307 DRUG TEST PRSMV CHEM ANLYZR: CPT | Performed by: INTERNAL MEDICINE

## 2021-11-18 PROCEDURE — 99214 OFFICE O/P EST MOD 30 MIN: CPT | Mod: GC | Performed by: INTERNAL MEDICINE

## 2021-11-18 PROCEDURE — G0463 HOSPITAL OUTPT CLINIC VISIT: HCPCS

## 2021-11-18 PROCEDURE — 80048 BASIC METABOLIC PNL TOTAL CA: CPT | Performed by: PATHOLOGY

## 2021-11-18 PROCEDURE — 99000 SPECIMEN HANDLING OFFICE-LAB: CPT | Performed by: PATHOLOGY

## 2021-11-18 PROCEDURE — 36415 COLL VENOUS BLD VENIPUNCTURE: CPT | Performed by: PATHOLOGY

## 2021-11-18 PROCEDURE — 83880 ASSAY OF NATRIURETIC PEPTIDE: CPT | Performed by: PATHOLOGY

## 2021-11-18 RX ORDER — DAPAGLIFLOZIN 10 MG/1
10 TABLET, FILM COATED ORAL DAILY
Qty: 90 TABLET | Refills: 3 | Status: SHIPPED | OUTPATIENT
Start: 2021-11-18 | End: 2022-11-22

## 2021-11-18 ASSESSMENT — PAIN SCALES - GENERAL: PAINLEVEL: NO PAIN (0)

## 2021-11-18 NOTE — LETTER
"11/18/2021      RE: Ubaldo Velez  [address] [place] [address]       Dear Colleague,    Thank you for the opportunity to participate in the care of your patient, Ubaldo Velez, at the [facility]. Please see a copy of my visit note below.         Cardiology Advanced Heart Failure Clinic     November 18, 2021     HPI:   Mr. Velez is a 35 year old male with a past medical history including history of ADHD, polysubstance abuse (ETOH, cocaine, alcohol, meth, tobacco), and NICM (LVEF 35-40%, LVIDd 6.0cm per TTE 5/2021, domitila 10%, LVIDd 6.7cm) who presents today for follow-up.     He last saw Dr. Choi 1/14/2021. Since last clinic visit he reports no significant changes. He feels \"normal.\" He reports continued abstinence from alcohol use. Is feeling well.  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 Without any exertional chest pain, shortness of breath, lightheadedness.  His weight has been stable.  He denies any lower extremity him, orthopnea, PND.  Few weeks ago he had several hours of feeling \"extra heartbeats.\"  This was shortly after he quit drinking.  He has not had any recurrence of palpitations in the last few weeks.  Denies any presyncope or syncope.    Current Regimen:  Aspirin 81mg daily  Metoprolol succinate 200mg daily  Sacubatril-valsartan 97-103mg bid  Spironolactone 25mg daily  Furosemide 20mg daily    PAST MEDICAL HISTORY:  Past Medical History:   Diagnosis Date     ADHD      Cocaine use      Electronic cigarette use      Methamphetamine use (H)      NICM (nonischemic cardiomyopathy) (H)      Systolic heart failure (H)      Tobacco abuse        FAMILY HISTORY:  Family History   Problem Relation Age of Onset     Chronic Obstructive Pulmonary Disease Father      Multiple Sclerosis Maternal Aunt        SOCIAL " HISTORY:  Unchanged from prior    CURRENT MEDICATIONS:  aspirin (ASA) 81 MG EC tablet, Take 1 tablet (81 mg) by mouth daily  furosemide (LASIX) 20 MG tablet, Take 1 tablet (20 mg) by mouth daily  metoprolol succinate ER (TOPROL-XL) 100 MG 24 hr tablet, Take 2 tablets (200 mg) by mouth daily At bedtime  sacubitril-valsartan (ENTRESTO)  MG per tablet, Take 1 tablet by mouth 2 times daily  spironolactone (ALDACTONE) 25 MG tablet, Take 1 tablet (25 mg) by mouth daily    No current facility-administered medications on file prior to visit.      ROS:   Refer to HPI    EXAM:  There were no vitals taken for this visit.     GENERAL: Appears comfortable, in no acute distress.   HEENT: Eye symmetrical, no discharge or icterus bilaterally. Mucous membranes moist and without lesions.  CV: RRR, +S1S2, no murmur, rub, or gallop. JVP at clavicle with HJR at 90 degrees.   RESPIRATORY: Respirations regular, even, and unlabored. Lungs CTA throughout.   GI: Soft and non distended with normoactive bowel sounds present in all quadrants. No tenderness, rebound, guarding. No hepatomegaly.   EXTREMITIES: No peripheral edema. 2+ bilateral pedal pulses.   NEUROLOGIC: Alert and oriented x 3. No focal deficits.   MUSCULOSKELETAL: No joint swelling or tenderness.   SKIN: No jaundice. No rashes or lesions.     Labs, reviewed with patient in clinic today:  CBC RESULTS:  Lab Results   Component Value Date    WBC 8.1 11/10/2020    RBC 5.40 11/10/2020    HGB 18.0 (H) 11/10/2020    HCT 52.9 11/10/2020    MCV 98 11/10/2020    MCH 33.3 (H) 11/10/2020    MCHC 34.0 11/10/2020    RDW 12.2 11/10/2020     11/10/2020       CMP RESULTS:  Lab Results   Component Value Date     05/14/2021    POTASSIUM 4.1 05/14/2021    CHLORIDE 105 05/14/2021    CO2 26 05/14/2021    ANIONGAP 7 05/14/2021     (H) 05/14/2021    BUN 13 05/14/2021    CR 1.06 05/14/2021    GFRESTIMATED 90 05/14/2021    GFRESTBLACK >90 05/14/2021    ARUN 9.1 05/14/2021     BILITOTAL 0.6 04/27/2019    ALBUMIN 2.4 (L) 04/27/2019    ALKPHOS 74 04/27/2019    ALT 49 04/27/2019    AST 41 04/27/2019        INR RESULTS:  Lab Results   Component Value Date    INR 2.27 (H) 10/07/2019       Lab Results   Component Value Date    MAG 2.3 05/07/2019     Lab Results   Component Value Date    NTBNPI 4,655 (H) 04/24/2019     Lab Results   Component Value Date    NTBNP 25 05/14/2021       Diagnostics:  TTE 5/24/21  Interpretation Summary  Moderately (EF 35-40%) reduced left ventricular function is present. Mild left  ventricular dilation is present.  The right ventricle is normal size. RV function is low normal.  There is no significant valvular disease.  No pericardial effusion is present.     This study was compared with the study from 9/10/2020. LV function improved,  the LV cavity is less dilated, the LVIDd decreased from 6.7 cm to 6.0 cm on  current study.    cMRI 9/10/20  1. The left ventricle is moderately dilated with normal wall thickness. There is moderate diffuse  hypokinesis with a small apical aneurysm. The global systolic function is moderately reduced. The LVEF is  34%.  2. The right ventricle is normal in cavity size. The global systolic function is mildly reduced. The RVEF  is 41 %.   3. Both atria are normal in size.  4. There is no significant valvular disease.   5. There is focal area of transmural late gadolinium enhancement of the apical aneurysm.   7. There is no pericardial effusion or thickening.  8. There is no intracardiac thrombus.     CONCLUSIONS:   Non ischemic cardiomyopathy with a  small apical aneurysm.   Moderately reduced left ventricular function with mildly reduced right ventricular function and no  intracardiac thrombus.      RHC/cor angio 4/29/19  Left Main   The vessel was visualized by selective angiography and is moderate in size. There was 0% vessel disease.   Left Anterior Descending   The vessel was visualized by selective angiography and is moderate in  size. There was 0% vessel disease.   First Diagonal Branch   The vessel is large.   Second Diagonal Branch   The vessel is small.   Left Circumflex   The vessel was visualized by selective angiography and is moderate in size. There was 0% vessel disease.   First Obtuse Marginal Branch   The vessel is small.   Second Obtuse Marginal Branch   The vessel is large.   Right Coronary Artery   The vessel was visualized by selective angiography and is moderate in size. There was 0% vessel disease.       RA   Mean: 3 mmHg     PA   Systolic:40 mmHg   Diasotlic: 26 mmHg   Mean: 32 mmHg   HR: 112 bpm    PCW   A-wave: 25 mmHg   V-wave: 24 mmHg   Mean: 22 mmHg   HR: 112 bpm    Cardiac Output   CO Ayanna: 4.57 L/min    CI Ayanna: 1.96 L/min/m2    CO TD: 4.9 L/min    CI TD: 2.1 L/min/m2    Assessment and Plan:   Mr. Velez is a 35 year old male with a history of chronic systolic heart failure due to nonischemic cardiomyopathy, felt to be secondary to substance use.  Per patient he is recently sober of all substances.  His PeTH test, nicotine and cotinine, and urine drug screen are pending today.  He is aware he needs to be sober for some amount of time to be considered for advanced therapies. In the past have discussed chem dep but he did not think he needed it. He is doing well and tolerating his current regimen of medications which are all at goal. Will add SGLT2i and follow up in 6 months.     # Chronic systolic heart failure/HFrEF secondary to NICM (EF 35-40%, domitila 10%)    Stage C. NYHA Class II.     - Fluid status: euvolemic continue lasix 20 mg daily  - ACEi/ARB/ARNi: continue sacubatril-valsartan  BID   - BB: Metoprolol succainte 200mg daily   - Aldosterone antagonist: 25mg daily   - SGLT2: start dapaglifloxin 10mg daily  - SCD ppx: recovered >35%, deferred for the time being      # Polysubstance abuse  He reports abstinence to alcohol since last visit, states today that one month ago he was still drinking but a lot.  PeTH, nicotine/cotinine, and drug screen from today are in process. However, at visit in December drug screen was negative, PeTH was significantly reduced to 63 from 474 on last check. However, nicotine and cotinine were still elevated. If these results are still positive, he needs referral to chem dep treatment (Ever Calderon at [facility]) for substance abuse treatment.  He understands that his substance use precludes him from being considered for advanced heart failure therapies (LVAD/transplant). Continues to decline chem dep.  -needs random drug screens out of [facility]  -repeat PeTH at each clinic visit    # Palpitations  Isolated episodes a few weeks ago in the setting of ETOH detox. He does not have an ICD but no history of documented VT. Patient to call clinic with any recurrence of symptoms and we will plan for Holter or Zio at that time to monitor given risk of ventricular arrhythmia.  - no further episodes     # H/o LV thrombus (2019)  Not noted on echocardiogram or MRI 9/2020 or echo 5/2021  - hold off on further AC at this time    Follow up: 6 months with Dr. Choi with Echo    Rafi Peters MD   Cardiology Fellow, PGY-5        Attestation signed by Maddie Choi MD at 11/19/2021  4:38 PM:  I have seen and examined the patient with the house staff on November 18, 2021  and agree with the outlined assessment and plan.      Maddie Choi MD  Associate Professor of Medicine   [facility] Division of Cardiology

## 2021-11-18 NOTE — PROGRESS NOTES
"     Cardiology Advanced Heart Failure Clinic     November 18, 2021     HPI:   Mr. Velez is a 35 year old male with a past medical history including history of ADHD, polysubstance abuse (ETOH, cocaine, alcohol, meth, tobacco), and NICM (LVEF 35-40%, LVIDd 6.0cm per TTE 5/2021, domitila 10%, LVIDd 6.7cm) who presents today for follow-up.     He last saw Dr. Choi 1/14/2021. Since last clinic visit he reports no significant changes. He feels \"normal.\" He reports continued abstinence from alcohol use. Is feeling well.  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 Without any exertional chest pain, shortness of breath, lightheadedness.  His weight has been stable.  He denies any lower extremity him, orthopnea, PND.  Few weeks ago he had several hours of feeling \"extra heartbeats.\"  This was shortly after he quit drinking.  He has not had any recurrence of palpitations in the last few weeks.  Denies any presyncope or syncope.    Current Regimen:  Aspirin 81mg daily  Metoprolol succinate 200mg daily  Sacubatril-valsartan 97-103mg bid  Spironolactone 25mg daily  Furosemide 20mg daily    PAST MEDICAL HISTORY:  Past Medical History:   Diagnosis Date     ADHD      Cocaine use      Electronic cigarette use      Methamphetamine use (H)      NICM (nonischemic cardiomyopathy) (H)      Systolic heart failure (H)      Tobacco abuse        FAMILY HISTORY:  Family History   Problem Relation Age of Onset     Chronic Obstructive Pulmonary Disease Father      Multiple Sclerosis Maternal Aunt        SOCIAL HISTORY:  Unchanged from prior    CURRENT MEDICATIONS:  aspirin (ASA) 81 MG EC tablet, Take 1 tablet (81 mg) by mouth daily  furosemide (LASIX) 20 MG tablet, Take 1 tablet (20 mg) by mouth daily  metoprolol succinate ER (TOPROL-XL) 100 MG 24 hr tablet, Take 2 tablets (200 mg) by mouth daily At bedtime  sacubitril-valsartan (ENTRESTO)  MG " per tablet, Take 1 tablet by mouth 2 times daily  spironolactone (ALDACTONE) 25 MG tablet, Take 1 tablet (25 mg) by mouth daily    No current facility-administered medications on file prior to visit.      ROS:   Refer to HPI    EXAM:  There were no vitals taken for this visit.     GENERAL: Appears comfortable, in no acute distress.   HEENT: Eye symmetrical, no discharge or icterus bilaterally. Mucous membranes moist and without lesions.  CV: RRR, +S1S2, no murmur, rub, or gallop. JVP at clavicle with HJR at 90 degrees.   RESPIRATORY: Respirations regular, even, and unlabored. Lungs CTA throughout.   GI: Soft and non distended with normoactive bowel sounds present in all quadrants. No tenderness, rebound, guarding. No hepatomegaly.   EXTREMITIES: No peripheral edema. 2+ bilateral pedal pulses.   NEUROLOGIC: Alert and oriented x 3. No focal deficits.   MUSCULOSKELETAL: No joint swelling or tenderness.   SKIN: No jaundice. No rashes or lesions.     Labs, reviewed with patient in clinic today:  CBC RESULTS:  Lab Results   Component Value Date    WBC 8.1 11/10/2020    RBC 5.40 11/10/2020    HGB 18.0 (H) 11/10/2020    HCT 52.9 11/10/2020    MCV 98 11/10/2020    MCH 33.3 (H) 11/10/2020    MCHC 34.0 11/10/2020    RDW 12.2 11/10/2020     11/10/2020       CMP RESULTS:  Lab Results   Component Value Date     05/14/2021    POTASSIUM 4.1 05/14/2021    CHLORIDE 105 05/14/2021    CO2 26 05/14/2021    ANIONGAP 7 05/14/2021     (H) 05/14/2021    BUN 13 05/14/2021    CR 1.06 05/14/2021    GFRESTIMATED 90 05/14/2021    GFRESTBLACK >90 05/14/2021    ARUN 9.1 05/14/2021    BILITOTAL 0.6 04/27/2019    ALBUMIN 2.4 (L) 04/27/2019    ALKPHOS 74 04/27/2019    ALT 49 04/27/2019    AST 41 04/27/2019        INR RESULTS:  Lab Results   Component Value Date    INR 2.27 (H) 10/07/2019       Lab Results   Component Value Date    MAG 2.3 05/07/2019     Lab Results   Component Value Date    NTBNPI 4655 (H) 04/24/2019     Lab  Results   Component Value Date    NTBNP 25 05/14/2021       Diagnostics:  TTE 5/24/21  Interpretation Summary  Moderately (EF 35-40%) reduced left ventricular function is present. Mild left  ventricular dilation is present.  The right ventricle is normal size. RV function is low normal.  There is no significant valvular disease.  No pericardial effusion is present.     This study was compared with the study from 9/10/2020. LV function improved,  the LV cavity is less dilated, the LVIDd decreased from 6.7 cm to 6.0 cm on  current study.    cMRI 9/10/20  1. The left ventricle is moderately dilated with normal wall thickness. There is moderate diffuse  hypokinesis with a small apical aneurysm. The global systolic function is moderately reduced. The LVEF is  34%.  2. The right ventricle is normal in cavity size. The global systolic function is mildly reduced. The RVEF  is 41 %.   3. Both atria are normal in size.  4. There is no significant valvular disease.   5. There is focal area of transmural late gadolinium enhancement of the apical aneurysm.   7. There is no pericardial effusion or thickening.  8. There is no intracardiac thrombus.     CONCLUSIONS:   Non ischemic cardiomyopathy with a  small apical aneurysm.   Moderately reduced left ventricular function with mildly reduced right ventricular function and no  intracardiac thrombus.      RHC/cor angio 4/29/19  Left Main   The vessel was visualized by selective angiography and is moderate in size. There was 0% vessel disease.   Left Anterior Descending   The vessel was visualized by selective angiography and is moderate in size. There was 0% vessel disease.   First Diagonal Branch   The vessel is large.   Second Diagonal Branch   The vessel is small.   Left Circumflex   The vessel was visualized by selective angiography and is moderate in size. There was 0% vessel disease.   First Obtuse Marginal Branch   The vessel is small.   Second Obtuse Marginal Branch   The  vessel is large.   Right Coronary Artery   The vessel was visualized by selective angiography and is moderate in size. There was 0% vessel disease.       RA   Mean: 3 mmHg     PA   Systolic:40 mmHg   Diasotlic: 26 mmHg   Mean: 32 mmHg   HR: 112 bpm    PCW   A-wave: 25 mmHg   V-wave: 24 mmHg   Mean: 22 mmHg   HR: 112 bpm    Cardiac Output   CO Ayanna: 4.57 L/min    CI Ayanna: 1.96 L/min/m2    CO TD: 4.9 L/min    CI TD: 2.1 L/min/m2    Assessment and Plan:   Mr. Velez is a 35 year old male with a history of chronic systolic heart failure due to nonischemic cardiomyopathy, felt to be secondary to substance use.  Per patient he is recently sober of all substances.  His PeTH test, nicotine and cotinine, and urine drug screen are pending today.  He is aware he needs to be sober for some amount of time to be considered for advanced therapies. In the past have discussed chem dep but he did not think he needed it. He is doing well and tolerating his current regimen of medications which are all at goal. Will add SGLT2i and follow up in 6 months.     # Chronic systolic heart failure/HFrEF secondary to NICM (EF 35-40%, domitila 10%)    Stage C. NYHA Class II.     - Fluid status: euvolemic continue lasix 20 mg daily  - ACEi/ARB/ARNi: continue sacubatril-valsartan  BID   - BB: Metoprolol succainte 200mg daily   - Aldosterone antagonist: 25mg daily   - SGLT2: start dapaglifloxin 10mg daily  - SCD ppx: recovered >35%, deferred for the time being      # Polysubstance abuse  He reports abstinence to alcohol since last visit, states today that one month ago he was still drinking but a lot. PeTH, nicotine/cotinine, and drug screen from today are in process. However, at visit in December drug screen was negative, PeTH was significantly reduced to 63 from 474 on last check. However, nicotine and cotinine were still elevated. If these results are still positive, he needs referral to chem dep treatment ([facility] Calderon at [facility])  for substance abuse treatment.  He understands that his substance use precludes him from being considered for advanced heart failure therapies (LVAD/transplant). Continues to decline chem dep.  -needs random drug screens out of [facility]  -repeat PeTH at each clinic visit    # Palpitations  Isolated episodes a few weeks ago in the setting of ETOH detox. He does not have an ICD but no history of documented VT. Patient to call clinic with any recurrence of symptoms and we will plan for Holter or Zio at that time to monitor given risk of ventricular arrhythmia.  - no further episodes     # H/o LV thrombus (2019)  Not noted on echocardiogram or MRI 9/2020 or echo 5/2021  - hold off on further AC at this time    Follow up: 6 months with Dr. Choi with Echo    Rafi Peters MD   Cardiology Fellow, PGY-5

## 2021-11-18 NOTE — NURSING NOTE
Diet: Patient instructed regarding a heart failure healthy diet, including discussion of reduced fat and 2000 mg daily sodium restriction, daily weights, medication purpose and compliance, fluid restrictions and resources for patient and family to access for assistance with heart failure management.       Labs: Patient was given results of the laboratory testing obtained today and patient was instructed about when to return for the next laboratory testing.     Med Reconcile: Reviewed and verified all current medications with the patient. The updated medication list was printed and given to the patient.     Med changes: Farxiaga 10 mg Daily    Return Appointment: Patient given instructions regarding scheduling next clinic visit: [facility] in 6 months with an echo and labs    Patient stated he understood all health information given and agreed to call with further questions or concerns.     Carrie Tanner RN

## 2021-11-18 NOTE — PATIENT INSTRUCTIONS
"Cardiology Providers you saw during your visit:  Dr. Choi    Medication changes:  1.  Farxiaga 10 mg daily    Follow up:  1.  Make sure to call me if the Farxiaga is too expensive!    2.  Follow up with Dr Choi in 6 months with labs and echo  3.  Keep up the good work but make sure you call if you start noticing new symptoms (like not recovering as quickly, resting heart rate increasing)!  4.  Keep avoiding alcohol.     Labs:  Results for CHRISTINE BARRAGAN (MRN [id number]) as of 11/18/2021 09:38   Ref. Range 11/18/2021 09:00   Sodium Latest Ref Range: 133 - 144 mmol/L 139   Potassium Latest Ref Range: 3.4 - 5.3 mmol/L 4.4   Chloride Latest Ref Range: 94 - 109 mmol/L 106   Carbon Dioxide Latest Ref Range: 20 - 32 mmol/L 28   Urea Nitrogen Latest Ref Range: 7 - 30 mg/dL 15   Creatinine Latest Ref Range: 0.66 - 1.25 mg/dL 1.01   GFR Estimate Latest Ref Range: >60 mL/min/1.73m2 >90   Calcium Latest Ref Range: 8.5 - 10.1 mg/dL 9.2   Anion Gap Latest Ref Range: 3 - 14 mmol/L 5   N-Terminal Pro Bnp Latest Ref Range: 0 - 125 pg/mL 25   Glucose Latest Ref Range: 70 - 99 mg/dL 105 (H)       Please call if you have :  1. Weight gain of more than 2 pounds in a day or 5 pounds in a week  2. Increased shortness of breath, swelling or bloating  3. Dizziness, lightheadedness   4. Any questions or concerns.       Follow the American Heart Association Diet and Lifestyle recommendations:  Limit saturated fat, trans fat, sodium, red meat, sweets and sugar-sweetened beverages. If you choose to eat red meat, compare labels and select the leanest cuts available.  Aim for at least 150 minutes of moderate physical activity or 75 minutes of vigorous physical activity - or an equal combination of both - each week.      During business hours: [phone], press option # 1 to be routed to the [facility] then option # 4 for \"To send a message to your care team\"      After hours, weekends or holidays: On Call Cardiologist- [phone] " "  option #4 and ask to speak to the on-call Cardiologist. Inform them you are a CORE/heart failure patient at the [facility].      Heart Failure Support Group  [facility]  The Board Room, 8th Floor  [address] [id number]     Meetings   1-2 pm  , 1-2 p.m.  , 1-2 p.m.  , 1-2 p.m.  , 1-2 p.m.  May 3rd, 1-2 p.m.  , 1-2 p.m.  , 1-2 p.m.  , 1-2 p.m.  , 1-2 p.m.  , 1-2 p.m.  , 1-2 p.m.  , 1-2 p.m.      Carrie Tanner RN BSN CHFN  Cardiology Care Coordinator - Heart Failure/ [facility].O.[facility] Clinic  [facility]   Questions and [phone]   First press #1 for the [facility] and then press #4 for \"To send a message to your care team\"    Patient Education     Dapagliflozin Oral tablet  What is this medicine?  DAPAGLIFLOZIN (DAP a gli FLOE zin) helps to treat type 2 diabetes. It helps to control blood sugar. Treatment is combined with diet and exercise.  This medicine may be used for other purposes; ask your health care provider or pharmacist if you have questions.  What should I tell my health care provider before I take this medicine?  They need to know if you have any of these conditions:    bladder cancer    dehydration    diabetic ketoacidosis    diet low in salt    eating less due to illness, surgery, dieting, or any other reason    having surgery    high cholesterol    history of pancreatitis or pancreas problems    history of yeast infection of the penis or vagina    if you often drink alcohol    infections in the bladder, kidneys, or urinary tract    kidney disease    low blood pressure    on hemodialysis    problems urinating    type 1 diabetes    uncircumcised male    an unusual or allergic reaction to dapagliflozin, other medicines, foods, dyes, or preservatives    pregnant or trying to get " pregnant    breast-feeding  How should I use this medicine?  Take this medicine by mouth with a glass of water. Follow the directions on the prescription label. You can take it with or without food. If it upsets your stomach, take it with food. Take this medicine in the morning. Take your dose at the same time each day. Do not take more often than directed. Do not stop taking except on your doctor's advice.  A special MedGuide will be given to you by the pharmacist with each prescription and refill. Be sure to read this information carefully each time.  Talk to your pediatrician regarding the use of this medicine in children. Special care may be needed.  Overdosage: If you think you've taken too much of this medicine contact a poison control center or emergency room at once.  NOTE: This medicine is only for you. Do not share this medicine with others.  What if I miss a dose?  If you miss a dose, take it as soon as you can. If it is almost time for your next dose, take only that dose. Do not take double or extra doses.  What may interact with this medicine?  Do not take this medicine with any of the following medications:    gatifloxacin  This medicine may also interact with the following medications:    alcohol    certain medicines for blood pressure, heart disease    diuretics    insulin    nateglinide    pioglitazone    quinolone antibiotics like ciprofloxacin, levofloxacin, ofloxacin    repaglinide    some herbal dietary supplements    steroid medicines like prednisone or cortisone    sulfonylureas like glimepiride, glipizide, glyburide    thyroid medicine  This list may not describe all possible interactions. Give your health care provider a list of all the medicines, herbs, non-prescription drugs, or dietary supplements you use. Also tell them if you smoke, drink alcohol, or use illegal drugs. Some items may interact with your medicine.  What should I watch for while using this medicine?  Visit your doctor or  health care professional for regular checks on your progress.  This medicine can cause a serious condition in which there is too much acid in the blood. If you develop nausea, vomiting, stomach pain, unusual tiredness, or breathing problems, stop taking this medicine and call your doctor right away. If possible, use a ketone dipstick to check for ketones in your urine.  A test called the HbA1C (A1C) will be monitored. This is a simple blood test. It measures your blood sugar control over the last 2 to 3 months. You will receive this test every 3 to 6 months.  Learn how to check your blood sugar. Learn the symptoms of low and high blood sugar and how to manage them.  Always carry a quick-source of sugar with you in case you have symptoms of low blood sugar. Examples include hard sugar candy or glucose tablets. Make sure others know that you can choke if you eat or drink when you develop serious symptoms of low blood sugar, such as seizures or unconsciousness. They must get medical help at once.  Tell your doctor or health care professional if you have high blood sugar. You might need to change the dose of your medicine. If you are sick or exercising more than usual, you might need to change the dose of your medicine.  Do not skip meals. Ask your doctor or health care professional if you should avoid alcohol. Many nonprescription cough and cold products contain sugar or alcohol. These can affect blood sugar.  Wear a medical ID bracelet or chain, and carry a card that describes your disease and details of your medicine and dosage times.  What side effects may I notice from receiving this medicine?  Side effects that you should report to your doctor or health care professional as soon as possible:    allergic reactions like skin rash, itching or hives, swelling of the face, lips, or tongue    breathing problems    dizziness    feeling faint or lightheaded, falls    muscle weakness    nausea, vomiting, unusual stomach  upset or pain    signs and symptoms of low blood sugar such as feeling anxious, confusion, dizziness, increased hunger, unusually weak or tired, sweating, shakiness, cold, irritable, headache, blurred vision, fast heartbeat, loss of consciousness    signs and symptoms of a urinary tract infection, such as fever, chills, a burning feeling when urinating, blood in the urine, back pain    trouble passing urine or change in the amount of urine, including an urgent need to urinate more often, in larger amounts, or at night    penile discharge, itching, or pain in men    unusual tiredness    vaginal discharge, itching, or odor in women  Side effects that usually do not require medical attention (Report these to your doctor or health care professional if they continue or are bothersome.):    constipation    mild increase in urination    stuffy or runny nose    sore throat    thirsty  This list may not describe all possible side effects. Call your doctor for medical advice about side effects. You may report side effects to FDA at 0-698-CZW-4763.  Where should I keep my medicine?  Keep out of the reach of children.  Store at room temperature between 15 and 30 degrees C (59 and 86 degrees F). Throw away any unused medicine after the expiration date.  NOTE: This sheet is a summary. It may not cover all possible information. If you have questions about this medicine, talk to your doctor, pharmacist, or health care provider.  NOTE:This sheet is a summary. It may not cover all possible information. If you have questions about this medicine, talk to your doctor, pharmacist, or health care provider. Copyright  2016 Gold Standard

## 2021-11-18 NOTE — TELEPHONE ENCOUNTER
Prior Authorization Not Needed per Insurance-Pharmacy shown on Rx is not trying to fill this. There is no Rx in his [facility] profile    Medication: Entresto  mg-PA Not Needed  Insurance Company: [employer] - Phone [phone] Fax [phone]  Expected CoPay:      Pharmacy Filling the Rx: [facility] [place], [place] - [address] [place] [address] [phone]  Pharmacy Notified: No  Patient Notified: No

## 2021-11-18 NOTE — NURSING NOTE
Chief Complaint   Patient presents with     Follow Up     11/18/2021:  Steph Return HF, HFrEF, labs prior      Vitals were taken and medications reconciled.    Mickey Shepherd, EMT  9:15 AM

## 2021-11-22 LAB — PETH BLD-MCNC: 51 NG/ML

## 2021-11-23 DIAGNOSIS — I50.22 CHRONIC SYSTOLIC HEART FAILURE (H): ICD-10-CM

## 2021-11-23 RX ORDER — FUROSEMIDE 20 MG
20 TABLET ORAL DAILY
Qty: 90 TABLET | Refills: 0 | Status: SHIPPED | OUTPATIENT
Start: 2021-11-23 | End: 2022-02-22

## 2021-11-23 NOTE — TELEPHONE ENCOUNTER
furosemide (LASIX) 20 MG tablet      Last Written Prescription Date:  9/10/20  Last Fill Quantity: 9o,   # refills: 3  Last Office Visit : 11/18/21  Future Office visit:  5/25/22    Routing refill request to provider for review/approval because:  Blood pressure out of range   BP Readings from Last 3 Encounters:   11/18/21 (!) 151/92   05/14/21 (!) 154/83   01/14/21 139/77       90 day refill provided per protocol, routed to cardiology

## 2021-12-02 NOTE — PLAN OF CARE
[id number].  D: Patient was admitted for LE edema and 20lbs weight gain and LV apical mass-like clots.    I/A: Patient is known to have Polysubstance issues and will eventually need workup to evaluate for coronary disease.  Patient is ST in 110s and on room air ad david and on heparing gtt at 2100units/hr and 10 is therapeutical.    P: Will continue to monitor and notify MD of status changes.   Surgical Assessment Completed on: 02-Dec-2021 13:26

## 2022-01-01 NOTE — PROGRESS NOTES
"HPI:   Mr. Velez is a 35 year old male with a past medical history including history of ADHD, polysubstance abuse (ETOH, cocaine, alcohol, meth, tobacco), and NICM (LVEF 20-30% per TTE 9/2020, domitila 10%, LVIDd 6.7cm) who presented to [facility] for follow-up. He last saw Dr. Choi 9/10/20, where he reported that he had stopped his medications several weeks prior.  He was advised to restart metoprolol XL 25mg daily, lisinopril 20mg daily, and lasix 20mg daily. He was seen 11/10 and Toprol was increased at that time.  He was also started on aspirin at that time due to history of LV thrombus.    Since last clinic visit he reports no significant changes.  He reports zero alcohol use recently.  Says this is new within the last few weeks.  Is feeling well.  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  His weight has been stable.  He denies any lower extremity him, orthopnea, PND.  Few weeks ago he had several hours of feeling \"extra heartbeats \".  This was shortly after he quit drinking.  He has not had any recurrence of palpitations in the last few weeks.  Denies any presyncope or syncope.      PAST MEDICAL HISTORY:  Past Medical History:   Diagnosis Date     ADHD      Cocaine use      Electronic cigarette use      Methamphetamine use (H)      NICM (nonischemic cardiomyopathy) (H)      Systolic heart failure (H)      Tobacco abuse        FAMILY HISTORY:  Family History   Problem Relation Age of Onset     Chronic Obstructive Pulmonary Disease Father      Multiple Sclerosis Maternal Aunt        SOCIAL HISTORY:  Social History     Socioeconomic History     Marital status: Single     Spouse name: Not on file     Number of children: 1     Years of education: Not on file     Highest education level: Not on file   Occupational History     Occupation: [employer]   Social Needs     Financial resource strain: Not on file     Food insecurity     Worry: Not on file     " "Inability: Not on file     Transportation needs     Medical: Not on file     Non-medical: Not on file   Tobacco Use     Smoking status: Current Every Day Smoker     Packs/day: 1.00     Years: 15.00     Pack years: 15.00     Smokeless tobacco: Never Used     Tobacco comment:  5 cigarettes daily with vaping   Substance and Sexual Activity     Alcohol use: Not Currently     Comment: 8-10 beers a day, now 1 beer a week.     Drug use: Not Currently     Types: Methamphetamines, Cocaine     Comment: Methamphetamine last used 8 weeks ago     Sexual activity: Not on file   Lifestyle     Physical activity     Days per week: Not on file     Minutes per session: Not on file     Stress: Not on file   Relationships     Social connections     Talks on phone: Not on file     Gets together: Not on file     Attends Evangelical service: Not on file     Active member of club or organization: Not on file     Attends meetings of clubs or organizations: Not on file     Relationship status: Not on file     Intimate partner violence     Fear of current or ex partner: Not on file     Emotionally abused: Not on file     Physically abused: Not on file     Forced sexual activity: Not on file   Other Topics Concern     Not on file   Social History Narrative    Single but has long standing SO who he lives with, has one child.  (last updated 4/24/2019)        CURRENT MEDICATIONS:       aspirin (ASA) 81 MG EC tablet, Take 1 tablet (81 mg) by mouth daily       furosemide (LASIX) 20 MG tablet, Take 1 tablet (20 mg) by mouth daily       lisinopril (ZESTRIL) 20 MG tablet, Take 1 tablet (20 mg) by mouth daily       metoprolol succinate ER (TOPROL-XL) 100 MG 24 hr tablet, Take 1 tablet (100 mg) by mouth daily At bedtime    No current facility-administered medications on file prior to visit.       ROS:   Refer to HPI    EXAM:  /87 (BP Location: Right arm, Patient Position: Chair, Cuff Size: Adult Large)   Pulse 77   Ht 1.905 m (6' 3\")   Wt 115.2 kg " (254 lb)   SpO2 99%   BMI 31.75 kg/m       GENERAL: Appears comfortable, in no acute distress.   HEENT: Eye symmetrical, no discharge or icterus bilaterally. Mucous membranes moist and without lesions.  CV: RRR, +S1S2, no murmur, rub, or gallop. JVP not visible at 75 degrees.   RESPIRATORY: Respirations regular, even, and unlabored. Lungs CTA throughout.   GI: Soft and non distended with normoactive bowel sounds present in all quadrants. No tenderness, rebound, guarding. No hepatomegaly.   EXTREMITIES: No peripheral edema. 2+ bilateral pedal pulses.   NEUROLOGIC: Alert and oriented x 3. No focal deficits.   MUSCULOSKELETAL: No joint swelling or tenderness.   SKIN: No jaundice. No rashes or lesions.     Labs, reviewed with patient in clinic today:  CBC RESULTS:  Lab Results   Component Value Date    WBC 8.1 11/10/2020    RBC 5.40 11/10/2020    HGB 18.0 (H) 11/10/2020    HCT 52.9 11/10/2020    MCV 98 11/10/2020    MCH 33.3 (H) 11/10/2020    MCHC 34.0 11/10/2020    RDW 12.2 11/10/2020     11/10/2020       CMP RESULTS:  Lab Results   Component Value Date     12/10/2020    POTASSIUM 4.6 12/10/2020    CHLORIDE 104 12/10/2020    CO2 29 12/10/2020    ANIONGAP 4 12/10/2020     (H) 12/10/2020    BUN 15 12/10/2020    CR 1.13 12/10/2020    GFRESTIMATED 83 12/10/2020    GFRESTBLACK >90 12/10/2020    ARUN 9.3 12/10/2020    BILITOTAL 0.6 04/27/2019    ALBUMIN 2.4 (L) 04/27/2019    ALKPHOS 74 04/27/2019    ALT 49 04/27/2019    AST 41 04/27/2019        INR RESULTS:  Lab Results   Component Value Date    INR 2.27 (H) 10/07/2019       Lab Results   Component Value Date    MAG 2.3 05/07/2019     Lab Results   Component Value Date    NTBNPI 4,655 (H) 04/24/2019     Lab Results   Component Value Date    NTBNP 105 04/14/2020       Diagnostics:  TTE 9/10/20  Severely (EF 20-30%) reduced left ventricular function is present.  IVC diameter >2.1 cm collapsing <50% with sniff suggests a high RA pressure  estimated at 15  mmHg or greater.  No pericardial effusion is present.  Global right ventricular function is normal to mildly reduced.  No change from prior.    cMRI 9/10/20  1. The left ventricle is moderately dilated with normal wall thickness. There is moderate diffuse  hypokinesis with a small apical aneurysm. The global systolic function is moderately reduced. The LVEF is  34%.  2. The right ventricle is normal in cavity size. The global systolic function is mildly reduced. The RVEF  is 41 %.   3. Both atria are normal in size.  4. There is no significant valvular disease.   5. There is focal area of transmural late gadolinium enhancement of the apical aneurysm.   7. There is no pericardial effusion or thickening.  8. There is no intracardiac thrombus.     CONCLUSIONS:   Non ischemic cardiomyopathy with a  small apical aneurysm.   Moderately reduced left ventricular function with mildly reduced right ventricular function and no  intracardiac thrombus.      RHC/cor angio 4/29/19  Left Main   The vessel was visualized by selective angiography and is moderate in size. There was 0% vessel disease.   Left Anterior Descending   The vessel was visualized by selective angiography and is moderate in size. There was 0% vessel disease.   First Diagonal Branch   The vessel is large.   Second Diagonal Branch   The vessel is small.   Left Circumflex   The vessel was visualized by selective angiography and is moderate in size. There was 0% vessel disease.   First Obtuse Marginal Branch   The vessel is small.   Second Obtuse Marginal Branch   The vessel is large.   Right Coronary Artery   The vessel was visualized by selective angiography and is moderate in size. There was 0% vessel disease.       RA   Mean: 3 mmHg     PA   Systolic:40 mmHg   Diasotlic: 26 mmHg   Mean: 32 mmHg   HR: 112 bpm    PCW   A-wave: 25 mmHg   V-wave: 24 mmHg   Mean: 22 mmHg   HR: 112 bpm    Cardiac Output   CO Ayanna: 4.57 L/min    CI Ayanna: 1.96 L/min/m2    CO TD: 4.9  "L/min    CI TD: 2.1 L/min/m2    Assessment and Plan:   Mr. Velez is a 35 year old male with a history of chronic systolic heart failure due to nonischemic cardiomyopathy, felt to be secondary to substance use.  Per patient he is recently sober of all substances.  His PeTH test, nicotine and cotinine, and urine drug screen are pending today.  He is aware he needs to be sober for some amount of time to be considered for advanced therapies.  Discussed chem dep at length today and the rationale for our recommending he enroll in case he needs advanced therapies - given his long standing history.  He \"does not think he needs it \"at this time but will consider.     # Chronic systolic heart failure/HFrEF secondary to NICM (EF 20-30%, domitila 10%)    Stage C. NYHA Class II.    - Fluid status: euvolemic continue lasix 20 mg daily  - ACEi/ARB/ARNi: change lisinopril 20mg daily to Entresto 49/51 mg bid, start insurance process today - needs ACEi washout period, if denied, increase lisinopril to 40 mg daily - repeat BMP in two weeks after change  - BB: Metoprolol XL 100mg daily  - Aldosterone antagonist: deferred while other medications are prioritized  - SCD ppx: deferred while medication therapy is optimized --> note that medication compliance has limited medication optimization, repeat echo 3 months after GDMT achieved, referral to EP at that time if EF<35%     # Polysubstance abuse  He reports abstinence to alcohol since last visit, states today that one month ago he was still drinking but a lot. PeTH, nicotine/cotinine, and drug screen from today are in process.  Per Dr. Choi --> If these results are positive, will plan to refer him for chem dep treatment ([facility] at [facility]) for substance abuse treatment.  He understands that his substance use precludes him from being considered for advanced heart failure therapies (LVAD/transplant). Continues to decline chem dep.  -needs random drug screens out of CORE " clinic  -repeat PeTH at each clinic visit    # Palpitations  Isolated episodes a few weeks ago in the setting of ETOH detox. He does not have an ICD but no history of documented VT. Patient to call clinic with any recurrence of symptoms and we will plan for Holter or Zio at that time to monitor given risk of ventricular arrhythmia.     # H/o LV thrombus (2019)  Not noted on echocardiogram or MRI 9/2020.   -aspirin 81mg daily, will discuss indication with Dr Choi      Follow up next month with Dr Choi as scheduled.     25 minutes spent face-to-face with patient, >50% in counseling and/or coordination of care as described above    Margarita Soto, [place], NP-C  12/10/2020          MARIAMA FORBES   Statement Selected

## 2022-02-21 DIAGNOSIS — I50.22 CHRONIC SYSTOLIC HEART FAILURE (H): ICD-10-CM

## 2022-02-22 RX ORDER — FUROSEMIDE 20 MG
20 TABLET ORAL DAILY
Qty: 90 TABLET | Refills: 0 | Status: SHIPPED | OUTPATIENT
Start: 2022-02-22 | End: 2023-02-17

## 2022-02-22 NOTE — TELEPHONE ENCOUNTER
Last Clinic Visit: 11/18/2021  [facility]    Appointment note:  - Fluid status: euvolemic continue lasix 20 mg daily      Kathleen M Doege RN

## 2022-03-16 ENCOUNTER — TELEPHONE (OUTPATIENT)
Dept: CARDIOLOGY | Facility: CLINIC | Age: 37
End: 2022-03-16
Payer: COMMERCIAL

## 2022-03-16 NOTE — TELEPHONE ENCOUNTER
Is there a medical reason why the patient is unable to use the 200mg tablets once daily instead of two 100mg tablets once daily.  If provider is okay with using the 200mg tablets please send new script to pharmacy. If provider wants to use the 100mg tablets please provide a letter of medical necessity explaining why patient is unable to use the 200mg tablet.

## 2022-03-16 NOTE — TELEPHONE ENCOUNTER
Prior Authorization Retail Medication Request    Medication/Dose: Metoprolol ER Succinate 100MG tablets   ICD code (if different than what is on RX):    Previously Tried and Failed:    Rationale:      Insurance Name:  Blue plus [facility]  Insurance ID:  [id number]    Pharmacy Information (if different than what is on RX)  Name:  [facility]  Phone:  [phone] Fax: [phone]

## 2022-03-22 ENCOUNTER — MYC MEDICAL ADVICE (OUTPATIENT)
Dept: CARDIOLOGY | Facility: CLINIC | Age: 37
End: 2022-03-22
Payer: COMMERCIAL

## 2022-03-22 DIAGNOSIS — I50.22 CHRONIC SYSTOLIC HEART FAILURE (H): ICD-10-CM

## 2022-03-22 RX ORDER — SPIRONOLACTONE 25 MG/1
25 TABLET ORAL DAILY
Qty: 90 TABLET | Refills: 3 | Status: SHIPPED | OUTPATIENT
Start: 2022-03-22 | End: 2023-03-19

## 2022-03-22 RX ORDER — METOPROLOL SUCCINATE 100 MG/1
200 TABLET, EXTENDED RELEASE ORAL DAILY
Qty: 180 TABLET | Refills: 3 | Status: SHIPPED | OUTPATIENT
Start: 2022-03-22 | End: 2022-03-23

## 2022-03-23 RX ORDER — METOPROLOL SUCCINATE 200 MG/1
200 TABLET, EXTENDED RELEASE ORAL DAILY
Qty: 90 TABLET | Refills: 1 | Status: SHIPPED | OUTPATIENT
Start: 2022-03-23 | End: 2022-09-20

## 2022-05-21 ENCOUNTER — HEALTH MAINTENANCE LETTER (OUTPATIENT)
Age: 37
End: 2022-05-21

## 2022-05-25 ENCOUNTER — ANCILLARY PROCEDURE (OUTPATIENT)
Dept: CARDIOLOGY | Facility: CLINIC | Age: 37
End: 2022-05-25
Attending: INTERNAL MEDICINE
Payer: COMMERCIAL

## 2022-05-25 ENCOUNTER — LAB (OUTPATIENT)
Dept: LAB | Facility: CLINIC | Age: 37
End: 2022-05-25
Attending: INTERNAL MEDICINE
Payer: COMMERCIAL

## 2022-05-25 VITALS
HEART RATE: 79 BPM | SYSTOLIC BLOOD PRESSURE: 142 MMHG | OXYGEN SATURATION: 99 % | WEIGHT: 229 LBS | BODY MASS INDEX: 28.47 KG/M2 | HEIGHT: 75 IN | DIASTOLIC BLOOD PRESSURE: 82 MMHG

## 2022-05-25 DIAGNOSIS — I50.22 CHRONIC SYSTOLIC HEART FAILURE (H): ICD-10-CM

## 2022-05-25 LAB
ANION GAP SERPL CALCULATED.3IONS-SCNC: 8 MMOL/L (ref 3–14)
BI-PLANE LVEF ECHO: NORMAL
BUN SERPL-MCNC: 16 MG/DL (ref 7–30)
CALCIUM SERPL-MCNC: 9.5 MG/DL (ref 8.5–10.1)
CHLORIDE BLD-SCNC: 103 MMOL/L (ref 94–109)
CO2 SERPL-SCNC: 27 MMOL/L (ref 20–32)
CREAT SERPL-MCNC: 1.03 MG/DL (ref 0.66–1.25)
GFR SERPL CREATININE-BSD FRML MDRD: >90 ML/MIN/1.73M2
GLUCOSE BLD-MCNC: 89 MG/DL (ref 70–99)
LVEF ECHO: NORMAL
NT-PROBNP SERPL-MCNC: 39 PG/ML (ref 0–450)
POTASSIUM BLD-SCNC: 3.9 MMOL/L (ref 3.4–5.3)
SODIUM SERPL-SCNC: 138 MMOL/L (ref 133–144)

## 2022-05-25 PROCEDURE — 83880 ASSAY OF NATRIURETIC PEPTIDE: CPT | Performed by: PATHOLOGY

## 2022-05-25 PROCEDURE — 80048 BASIC METABOLIC PNL TOTAL CA: CPT | Performed by: PATHOLOGY

## 2022-05-25 PROCEDURE — 99214 OFFICE O/P EST MOD 30 MIN: CPT | Mod: 25 | Performed by: INTERNAL MEDICINE

## 2022-05-25 PROCEDURE — G0463 HOSPITAL OUTPT CLINIC VISIT: HCPCS | Mod: 25

## 2022-05-25 PROCEDURE — 36415 COLL VENOUS BLD VENIPUNCTURE: CPT | Performed by: PATHOLOGY

## 2022-05-25 PROCEDURE — 93306 TTE W/DOPPLER COMPLETE: CPT | Performed by: INTERNAL MEDICINE

## 2022-05-25 ASSESSMENT — PAIN SCALES - GENERAL: PAINLEVEL: NO PAIN (0)

## 2022-05-25 NOTE — PROGRESS NOTES
May 25, 2022      HF clinic note     Follow up NICM     Cardiac history    ADHD and polysubstance abuse (ETOH, cocaine, alcohol, meth) who presented in 2019 in cardiogenic shock. At that time, he had an ECHO which showed LVEF of 10% with moderate to severe MR/TR. In addition, he had multiple apical thrombi. He underwent coronary angiogram which showed normal coronary arteries. He was transitioned to oral afterload and AC. Then attended chemical dep has followed with the CHF clinic. His most recent TTE in 10/2019 showed that his LVEF has increased to 28%.     The had a period of insurance lapsing - was taking less medicine, drinking some alcohol, now back to taking medications and completely substance free. Confirmed with testing.     Right now he is feeling really well,  back to work full time.     He denies shortness of breath, chest pain, orthopnea, PND, syncope. He is able to work without any significant limitations. He does state that heavy exertion does cause some tiredness, but he still has good exercise tolerance.     Blood pressures at home have not been low.        PMH  Past Medical History:   Diagnosis Date     ADHD      Cocaine use      Electronic cigarette use      Methamphetamine use (H)      NICM (nonischemic cardiomyopathy) (H)      Systolic heart failure (H)      Tobacco abuse          Family History   Problem Relation Age of Onset     Chronic Obstructive Pulmonary Disease Father      Multiple Sclerosis Maternal Aunt          Current Outpatient Medications   Medication Sig Dispense Refill     aspirin (ASA) 81 MG EC tablet Take 1 tablet (81 mg) by mouth daily       dapagliflozin (FARXIGA) 10 MG TABS tablet Take 1 tablet (10 mg) by mouth daily 90 tablet 3     furosemide (LASIX) 20 MG tablet Take 1 tablet (20 mg) by mouth daily 90 tablet 0     furosemide (LASIX) 20 MG tablet Take 1 tablet (20 mg) by mouth daily 90 tablet 3     metoprolol succinate ER (TOPROL-XL) 200 MG 24 hr tablet Take 1 tablet  "(200 mg) by mouth daily At bedtime 90 tablet 1     sacubitril-valsartan (ENTRESTO)  MG per tablet Take 1 tablet by mouth 2 times daily 180 tablet 3     spironolactone (ALDACTONE) 25 MG tablet Take 1 tablet (25 mg) by mouth daily 90 tablet 3       ROS:  Constitutional: No fever, chills, or sweats. No weight gain/loss.   ENT: No visual disturbance, ear ache, epistaxis, sore throat.   Allergies/Immunologic: Negative.   Respiratory: No cough, hemoptysis.   Cardiovascular: As per HPI.   GI: No nausea, vomiting, hematemesis, melena, or hematochezia.   : No urinary frequency, dysuria, or hematuria.   Integument: Negative.   Psychiatric: Negative.   Neuro: Negative.   Endocrinology: Negative.   Musculoskeletal: Negative.      EXAM:    BP (!) 142/82 (BP Location: Right arm, Patient Position: Chair, Cuff Size: Adult Large)   Pulse 79   Ht 1.909 m (6' 3.16\")   Wt 103.9 kg (229 lb)   SpO2 99%   BMI 28.50 kg/m      General: appears comfortable, alert and articulate  Head: normocephalic, atraumatic  Eyes: anicteric sclera, EOMI  Neck: no adenopathy  Orophyarynx: moist mucosa, no lesions, dentition intact  Heart: PMI diffuse,trace systolic murmur at LLSB, regular, S1/S2, rub, estimated JVP 9 cm   Lungs: clear, no rales or wheezing  Abdomen: soft, non-tender, bowel sounds present, no hepatosplenomegaly  Extremities: no clubbing, cyanosis or edema  Neurological: normal speech and affect, no gross motor deficits                ASSESSMENT AND PLAN  Substance induced CM- history of cardiogenic shock in 2019 now abstinent on GDMT.     Overall doing very, very well. We are proud of the progress he has made     -NYHA Class 1/AHA Stage C   -now on max entresto, metoprolol 100 xl daily, aldactone 25     Increasing metoprolol to 150 XL daily today.  In 1 month we will increase to 200 XL daily.  He has blood pressure room for this.     Going to have a his repeat echocardiogram in the next week or 2.  If his ejection fraction is " about 35% no need for ICD.     History of LV thrombus: Resolved on medical therapy now off anticoagulation    History of polysubstance abuse: Now in complete remission, encouragement provided, will continue outpatient testing to verify      I will see him in 6 months time.  Sooner as needed.                 CC  Patient Care Team:  Shailesh Gudino DO as PCP - General (Student in organized health care education/training program)  Luna Carlson RPH as Pharmacist (Pharmacist)  Carrie Tanner RN as Specialty Care Coordinator (Cardiology)  Luna Rubin RN as Specialty Care Coordinator (Cardiology)  Stephania Crocker NP as Nurse Practitioner (Cardiology)  Maddie Choi MD as Assigned Heart and Vascular Provider  Shailesh Gudino DO as Assigned PCP  [facility]

## 2022-05-25 NOTE — LETTER
5/25/2022    RE: Ubaldo Velez  [address] [place] [address]     Dear Colleague,    Thank you for the opportunity to participate in the care of your patient, Ubaldo Velez, at the [facility] [place] [facility]. Please see a copy of my visit note below.        May 25, 2022      HF clinic note     Follow up NICM     Cardiac history    ADHD and polysubstance abuse (ETOH, cocaine, alcohol, meth) who presented in 2019 in cardiogenic shock. At that time, he had an ECHO which showed LVEF of 10% with moderate to severe MR/TR. In addition, he had multiple apical thrombi. He underwent coronary angiogram which showed normal coronary arteries. He was transitioned to oral afterload and AC. Then attended chemical dep has followed with the CHF clinic. His most recent TTE in 10/2019 showed that his LVEF has increased to 28%.     The had a period of insurance lapsing - was taking less medicine, drinking some alcohol, now back to taking medications and completely substance free. Confirmed with testing.     Right now he is feeling really well,  back to work full time.     He denies shortness of breath, chest pain, orthopnea, PND, syncope. He is able to work without any significant limitations. He does state that heavy exertion does cause some tiredness, but he still has good exercise tolerance.     Blood pressures at home have not been low.        PMH  Past Medical History:   Diagnosis Date     ADHD      Cocaine use      Electronic cigarette use      Methamphetamine use (H)      NICM (nonischemic cardiomyopathy) (H)      Systolic heart failure (H)      Tobacco abuse          Family History   Problem Relation Age of Onset     Chronic Obstructive Pulmonary Disease Father      Multiple Sclerosis Maternal Aunt          Current Outpatient Medications   Medication Sig Dispense Refill     aspirin (ASA) 81 MG EC tablet Take 1 tablet (81 mg) by mouth daily   "     dapagliflozin (FARXIGA) 10 MG TABS tablet Take 1 tablet (10 mg) by mouth daily 90 tablet 3     furosemide (LASIX) 20 MG tablet Take 1 tablet (20 mg) by mouth daily 90 tablet 0     furosemide (LASIX) 20 MG tablet Take 1 tablet (20 mg) by mouth daily 90 tablet 3     metoprolol succinate ER (TOPROL-XL) 200 MG 24 hr tablet Take 1 tablet (200 mg) by mouth daily At bedtime 90 tablet 1     sacubitril-valsartan (ENTRESTO)  MG per tablet Take 1 tablet by mouth 2 times daily 180 tablet 3     spironolactone (ALDACTONE) 25 MG tablet Take 1 tablet (25 mg) by mouth daily 90 tablet 3       ROS:  Constitutional: No fever, chills, or sweats. No weight gain/loss.   ENT: No visual disturbance, ear ache, epistaxis, sore throat.   Allergies/Immunologic: Negative.   Respiratory: No cough, hemoptysis.   Cardiovascular: As per HPI.   GI: No nausea, vomiting, hematemesis, melena, or hematochezia.   : No urinary frequency, dysuria, or hematuria.   Integument: Negative.   Psychiatric: Negative.   Neuro: Negative.   Endocrinology: Negative.   Musculoskeletal: Negative.      EXAM:    BP (!) 142/82 (BP Location: Right arm, Patient Position: Chair, Cuff Size: Adult Large)   Pulse 79   Ht 1.909 m (6' 3.16\")   Wt 103.9 kg (229 lb)   SpO2 99%   BMI 28.50 kg/m      General: appears comfortable, alert and articulate  Head: normocephalic, atraumatic  Eyes: anicteric sclera, EOMI  Neck: no adenopathy  Orophyarynx: moist mucosa, no lesions, dentition intact  Heart: PMI diffuse,trace systolic murmur at LLSB, regular, S1/S2, rub, estimated JVP 9 cm   Lungs: clear, no rales or wheezing  Abdomen: soft, non-tender, bowel sounds present, no hepatosplenomegaly  Extremities: no clubbing, cyanosis or edema  Neurological: normal speech and affect, no gross motor deficits                ASSESSMENT AND PLAN  Substance induced CM- history of cardiogenic shock in 2019 now abstinent on GDMT.     Overall doing very, very well. We are proud of the " progress he has made     -NYHA Class 1/AHA Stage C   -now on max entresto, metoprolol 100 xl daily, aldactone 25     Increasing metoprolol to 150 XL daily today.  In 1 month we will increase to 200 XL daily.  He has blood pressure room for this.     Going to have a his repeat echocardiogram in the next week or 2.  If his ejection fraction is about 35% no need for ICD.     History of LV thrombus: Resolved on medical therapy now off anticoagulation    History of polysubstance abuse: Now in complete remission, encouragement provided, will continue outpatient testing to verify    I will see him in 6 months time.  Sooner as needed.     Please do not hesitate to contact me if you have any questions/concerns.     Sincerely,     Maddie Choi MD    CC  Patient Care Team:  Shailesh Gudino DO as PCP - General (Student in organized health care education/training program)  Luna Carlson RPH as Pharmacist (Pharmacist)  Carrie Tanner, RN as Specialty Care Coordinator (Cardiology)  Luna Rubin RN as Specialty Care Coordinator (Cardiology)  Stephania Crocker NP as Nurse Practitioner (Cardiology)  Maddie Choi MD as Assigned Heart and Vascular Provider  Shailesh Gudino DO as Assigned PCP  [facility]

## 2022-05-25 NOTE — NURSING NOTE
Diet: Patient instructed regarding a heart failure healthy diet, including discussion of reduced fat and 2000 mg daily sodium restriction, daily weights, medication purpose and compliance, fluid restrictions and resources for patient and family to access for assistance with heart failure management.       Labs: Patient was given results of the laboratory testing obtained today and patient was instructed about when to return for the next laboratory testing.     Med Reconcile: Reviewed and verified all current medications with the patient. The updated medication list was printed and given to the patient.     Med changes: no changes     Return Appointment: Patient given instructions regarding scheduling next clinic visit: Dr Choi in a year with labs and echo    Patient stated he understood all health information given and agreed to call with further questions or concerns.     Carrie Tanner RN

## 2022-05-25 NOTE — NURSING NOTE
Chief Complaint   Patient presents with     Follow Up     Steph, RHF, labs and echo prior       Vitals were taken and medications reconciled.    Cresencio Dillard, EMT  3:32 PM

## 2022-06-18 DIAGNOSIS — I50.22 CHRONIC SYSTOLIC HEART FAILURE (H): ICD-10-CM

## 2022-06-22 NOTE — TELEPHONE ENCOUNTER
sacubitril-valsartan (ENTRESTO)  MG per tablet  Take 1 tablet by mouth 2 times daily     Last Written Prescription Date:  1/14/21  Last Fill Quantity: 180,   # refills: 3  Last Office Visit : 5/25/22  - Increasing metoprolol to 150 XL daily today.  In 1 month we will increase to 200 XL daily.  He has blood pressure room for this.   - I will see him in 6 months time.  Sooner as needed.   Future Office visit:  none    Routing refill request to provider for review/approval because:  BP > 140/90 on 5/25/22. Per protocol, 90 day melina refill.  Early refill requests.  Please advise.     05/25/22 (!) 142/82

## 2022-06-24 RX ORDER — SACUBITRIL AND VALSARTAN 97; 103 MG/1; MG/1
1 TABLET, FILM COATED ORAL 2 TIMES DAILY
Qty: 180 TABLET | Refills: 1 | Status: SHIPPED | OUTPATIENT
Start: 2022-06-24 | End: 2022-12-06

## 2022-09-17 DIAGNOSIS — I50.22 CHRONIC SYSTOLIC HEART FAILURE (H): ICD-10-CM

## 2022-09-18 ENCOUNTER — HEALTH MAINTENANCE LETTER (OUTPATIENT)
Age: 37
End: 2022-09-18

## 2022-09-20 RX ORDER — METOPROLOL SUCCINATE 200 MG/1
TABLET, EXTENDED RELEASE ORAL
Qty: 90 TABLET | Refills: 3 | Status: SHIPPED | OUTPATIENT
Start: 2022-09-20 | End: 2023-02-21

## 2022-09-20 NOTE — TELEPHONE ENCOUNTER
METOPROLOL ER SUCCINATE 200MG TABS  Last Written Prescription Date:  3/23/22  Last Fill Quantity: 90,   # refills: 1  Last Office Visit : 5/25/22  Future Office visit:  6 months       05/25/22 (!) 142/82 reviewed at card appt- no med changes.   11/18/21 (!) 151/92   05/14/21 (!) 154/83

## 2022-11-17 DIAGNOSIS — I50.22 CHRONIC SYSTOLIC HEART FAILURE (H): ICD-10-CM

## 2022-11-22 RX ORDER — DAPAGLIFLOZIN 10 MG/1
10 TABLET, FILM COATED ORAL DAILY
Qty: 90 TABLET | Refills: 3 | Status: SHIPPED | OUTPATIENT
Start: 2022-11-22 | End: 2023-11-24

## 2022-11-22 NOTE — TELEPHONE ENCOUNTER
FARXIGA 10MG TABLETS     Last Written Prescription Date:  11/18/21  Last Fill Quantity: 90,   # refills: 3  Last Office Visit : 5/25/22  Future Office visit:  none    Routing refill request to provider for review/approval because:  Drug not on the Cardiology refill protocol   Overdue A1c  Hemoglobin A1C   Date Value Ref Range Status   04/25/2019 5.5 0 - 5.6 % Final     Comment:     Normal <5.7% Prediabetes 5.7-6.4%  Diabetes 6.5% or higher - adopted from ADA   consensus guidelines.

## 2022-12-01 DIAGNOSIS — I50.22 CHRONIC SYSTOLIC HEART FAILURE (H): ICD-10-CM

## 2022-12-06 RX ORDER — SACUBITRIL AND VALSARTAN 97; 103 MG/1; MG/1
TABLET, FILM COATED ORAL
Qty: 180 TABLET | Refills: 1 | Status: SHIPPED | OUTPATIENT
Start: 2022-12-06 | End: 2023-05-01

## 2022-12-06 NOTE — TELEPHONE ENCOUNTER
ENTRESTO 97-103MG TABLETS   Last Written Prescription Date:  6/24/22  Last Fill Quantity: 180,   # refills: 1   Last Office Visit : 5/25/22  Future Office visit:  none  Patient given instructions regarding scheduling next clinic visit: Dr Choi in a year with labs and echo     BP Readings from Last 3 Encounters:   05/25/22 (!) 142/82 - no med changes/ Card appt   11/18/21 (!) 151/92   05/14/21 (!) 154/83

## 2022-12-22 NOTE — PROGRESS NOTES
PRERNA Akhtar from Cardiovascular [facility] called reporting pt is discontinuing his Warfarin. Inactivating pt from our services.    PAST SURGICAL HISTORY:  H/O: hysterectomy Robotic

## 2023-02-17 ENCOUNTER — OFFICE VISIT (OUTPATIENT)
Dept: FAMILY MEDICINE | Facility: CLINIC | Age: 38
End: 2023-02-17
Payer: COMMERCIAL

## 2023-02-17 VITALS
RESPIRATION RATE: 12 BRPM | WEIGHT: 222.1 LBS | TEMPERATURE: 98.4 F | DIASTOLIC BLOOD PRESSURE: 82 MMHG | BODY MASS INDEX: 27.61 KG/M2 | HEIGHT: 75 IN | OXYGEN SATURATION: 100 % | HEART RATE: 75 BPM | SYSTOLIC BLOOD PRESSURE: 142 MMHG

## 2023-02-17 DIAGNOSIS — I10 ESSENTIAL HYPERTENSION: ICD-10-CM

## 2023-02-17 DIAGNOSIS — Z30.09 ENCOUNTER FOR VASECTOMY COUNSELING: Primary | ICD-10-CM

## 2023-02-17 DIAGNOSIS — B07.8 OTHER VIRAL WARTS: ICD-10-CM

## 2023-02-17 DIAGNOSIS — I50.21 ACUTE SYSTOLIC CONGESTIVE HEART FAILURE (H): ICD-10-CM

## 2023-02-17 PROCEDURE — 17110 DESTRUCTION B9 LES UP TO 14: CPT | Performed by: FAMILY MEDICINE

## 2023-02-17 PROCEDURE — 99204 OFFICE O/P NEW MOD 45 MIN: CPT | Mod: 25 | Performed by: FAMILY MEDICINE

## 2023-02-17 RX ORDER — AMLODIPINE BESYLATE 5 MG/1
5 TABLET ORAL DAILY
Qty: 90 TABLET | Refills: 1 | Status: SHIPPED | OUTPATIENT
Start: 2023-02-17 | End: 2023-02-21

## 2023-02-17 NOTE — PATIENT INSTRUCTIONS
Ask how common complications for chronic swelling is.   Recovery is fairly easy.   Due to your heart history they may ask for pre-op.

## 2023-02-17 NOTE — PROGRESS NOTES
"  Assessment & Plan     Encounter for vasectomy counseling  Patient desires sterilization. Discussed risks and benefits, alternatives. Expected side effects. Patient referred to urology.   - Adult Urology  Referral    Essential hypertension  Chronic, uncontrolled. Elevated at last visit, per patient is slightly elevated at home. On entresto, so unable to do ace-I. Will recommend CCB due to minimal lab monitoring. Recommend one month follow up.   - amLODIPine (NORVASC) 5 MG tablet  Dispense: 90 tablet; Refill: 1    Acute systolic congestive heart failure (H)  Resolved, chronic CHF with normal EF at this time. Agree with current medical management of entresto, metoprolol, dapagliflozin.     Other viral warts  Chronic, resistant to OTC therapy. Cryotherapy today. Tolerated well.   - DESTRUCT BENIGN LESION, UP TO 14      Review of the result(s) of each unique test - prior echo, SCr  Prescription drug management  I spent a total of 18 minutes on the day of the visit.   Time spent doing chart review, history and exam, documentation and further activities per the note       BMI:   Estimated body mass index is 27.76 kg/m  as calculated from the following:    Height as of this encounter: 1.905 m (6' 3\").    Weight as of this encounter: 100.7 kg (222 lb 1.6 oz).   Weight management plan: Discussed healthy diet and exercise guidelines    See Patient Instructions    Return in about 4 weeks (around 3/17/2023) for Just send a my chart message, goal <130/80.    Smiley Gilliland DO  [facility] BETITO Conner is a 37 year old, presenting for the following health issues:  Consult      History of Present Illness       Reason for visit:  Vasectomy    He eats 2-3 servings of fruits and vegetables daily.He consumes 0 sweetened beverage(s) daily.He exercises with enough effort to increase his heart rate 30 to 60 minutes per day.  He exercises with enough effort to increase his heart rate 5 days per " "week.   He is taking medications regularly.     Vasectomy Consult  -Patient has decided for vasectomy  -Patient understands that it is not reversible  -Patient started taking athletic greens with vitamins, nutrients.     BP    Review of Systems   Constitutional, HEENT, cardiovascular, pulmonary, gi and gu systems are negative, except as otherwise noted.      Objective    BP (!) 142/82 (BP Location: Right arm, Patient Position: Sitting, Cuff Size: Adult Regular)   Pulse 75   Temp 98.4  F (36.9  C) (Oral)   Resp 12   Ht 1.905 m (6' 3\")   Wt 100.7 kg (222 lb 1.6 oz)   SpO2 100%   BMI 27.76 kg/m    Body mass index is 27.76 kg/m .  Physical Exam   GENERAL: healthy, alert and no distress  EYES: Eyes grossly normal to inspection, PERRL and conjunctivae and sclerae normal  NECK: no adenopathy, no asymmetry, masses, or scars and thyroid normal to palpation  MS: no gross musculoskeletal defects noted, no edema  SKIN: no suspicious lesions or rashes and warts    Ancillary Procedure on 05/25/2022   Component Date Value Ref Range Status     LVEF  05/25/2022 55-60%   Final     Biplane LVEF 05/25/2022 56%   Final     Last Comprehensive Metabolic Panel:  Sodium   Date Value Ref Range Status   05/25/2022 138 133 - 144 mmol/L Final   05/14/2021 138 133 - 144 mmol/L Final     Potassium   Date Value Ref Range Status   05/25/2022 3.9 3.4 - 5.3 mmol/L Final   05/14/2021 4.1 3.4 - 5.3 mmol/L Final     Chloride   Date Value Ref Range Status   05/25/2022 103 94 - 109 mmol/L Final   05/14/2021 105 94 - 109 mmol/L Final     Carbon Dioxide   Date Value Ref Range Status   05/14/2021 26 20 - 32 mmol/L Final     Carbon Dioxide (CO2)   Date Value Ref Range Status   05/25/2022 27 20 - 32 mmol/L Final     Anion Gap   Date Value Ref Range Status   05/25/2022 8 3 - 14 mmol/L Final   05/14/2021 7 3 - 14 mmol/L Final     Glucose   Date Value Ref Range Status   05/25/2022 89 70 - 99 mg/dL Final   05/14/2021 140 (H) 70 - 99 mg/dL Final     Urea " Nitrogen   Date Value Ref Range Status   05/25/2022 16 7 - 30 mg/dL Final   05/14/2021 13 7 - 30 mg/dL Final     Creatinine   Date Value Ref Range Status   05/25/2022 1.03 0.66 - 1.25 mg/dL Final   05/14/2021 1.06 0.66 - 1.25 mg/dL Final     GFR Estimate   Date Value Ref Range Status   05/25/2022 >90 >60 mL/min/1.73m2 Final     Comment:     Effective December 21, 2021 eGFRcr in adults is calculated using the 2021 CKD-EPI creatinine equation which includes age and gender (Sari et al., NEJM, DOI: 10.1056/BFQSsg0163618)   05/14/2021 90 >60 mL/min/[1.73_m2] Final     Comment:     Non  GFR Calc  Starting 12/18/2018, serum creatinine based estimated GFR (eGFR) will be   calculated using the Chronic Kidney Disease Epidemiology Collaboration   (CKD-EPI) equation.       Calcium   Date Value Ref Range Status   05/25/2022 9.5 8.5 - 10.1 mg/dL Final   05/14/2021 9.1 8.5 - 10.1 mg/dL Final

## 2023-02-20 ENCOUNTER — MYC MEDICAL ADVICE (OUTPATIENT)
Dept: CARDIOLOGY | Facility: CLINIC | Age: 38
End: 2023-02-20
Payer: COMMERCIAL

## 2023-02-20 DIAGNOSIS — I50.22 CHRONIC SYSTOLIC HEART FAILURE (H): Primary | ICD-10-CM

## 2023-02-20 NOTE — CONFIDENTIAL NOTE
Date: 2/20/2023    Time of Call: 5:20 PM     Diagnosis:  Heart failure     [ VORB ] Ordering provider: Dr Choi    Order: Stop metoprolol, start Coreg 25 mg BID, stop NOrvasc     Order received by: Carrie Tanner RN       Follow-up/additional notes: sent mychart to Ubaldo

## 2023-02-21 RX ORDER — CARVEDILOL 25 MG/1
25 TABLET ORAL 2 TIMES DAILY WITH MEALS
Qty: 180 TABLET | Refills: 3 | Status: SHIPPED | OUTPATIENT
Start: 2023-02-21 | End: 2024-02-19

## 2023-03-16 DIAGNOSIS — I50.22 CHRONIC SYSTOLIC HEART FAILURE (H): ICD-10-CM

## 2023-03-19 RX ORDER — SPIRONOLACTONE 25 MG/1
25 TABLET ORAL DAILY
Qty: 90 TABLET | Refills: 0 | Status: SHIPPED | OUTPATIENT
Start: 2023-03-19 | End: 2023-06-23

## 2023-03-19 NOTE — TELEPHONE ENCOUNTER
spironolactone (ALDACTONE) 25 MG tablet     Last Written Prescription Date: 3/22/22  Last Fill Quantity: 90   # refills: 3  Last Office Visit : 5/25/22  Future Office visit: 5/1/23    high med alert  Warnings Override History for spironolactone (ALDACTONE) 25 MG tablet [[id number]]    Overridden by Susanne Velazquez RN on Dec 6, 2022 8:23 AM   Drug-Drug   1. ANGIOTENSIN II RECEPTOR ANTAGONISTS / POTASSIUM-SPARING DIURETICS [Level: Major] [Reason: Tolerated medication/side effects in past]   Other Orders: ENTRESTO  MG per tablet          Routing refill request to provider for review/approval because: bp > 140/90

## 2023-04-10 ENCOUNTER — VIRTUAL VISIT (OUTPATIENT)
Dept: UROLOGY | Facility: CLINIC | Age: 38
End: 2023-04-10
Attending: FAMILY MEDICINE
Payer: COMMERCIAL

## 2023-04-10 DIAGNOSIS — Z30.09 ENCOUNTER FOR VASECTOMY COUNSELING: ICD-10-CM

## 2023-04-10 PROCEDURE — 99203 OFFICE O/P NEW LOW 30 MIN: CPT | Mod: VID | Performed by: UROLOGY

## 2023-04-10 NOTE — LETTER
[facility]  [address]  [place] [address]  [phone]          April 10, 2023    Ubaldo Velez                                                                                                                     [address] [place] [address]            Dear Ubaldo,    Please bring your signed/dated medical assistance consent form to your appointment or we will not be able to perform your procedure,   Please call our office if you have questions, [phone].      Sincerely,     [facility]

## 2023-04-10 NOTE — PATIENT INSTRUCTIONS
[place] [facility]  [address] [place]  [place] [address]     BRING SIGNED/DATED MA FORM TO THIS APPOINTMENT OR WE WILL NOT BE ABLE TO PERFORM YOUR PROCEDURE>    Your Vasectomy is scheduled 6/5/23 arrive 8:15.  Please call [phone] if you need to reschedule this appointment or if you have any questions.     Preparation for Vasectomy:  Eat and drink normally prior to appointment.   Shave the hair away from the base of your penis and around your testicles.  Wear snug underwear the day of the vasectomy to support your testicles.  Do not take aspirin, ibuprofen, advil, motrin, aleve products one week prior to your vasectomy.        General Vasectomy Information    Vasectomy is a surgery.  If it is successful, it will be impossible for you to ever father children.  The following information regards the male sterilization done by an operation called a vasectomy.  This is done in the physician's office.    The operation done to sterilize the male is easier, safer and much less expensive than the operation done to sterilize the woman.    Sterilization should be considered permanent.  For most males, once the operation is done, it can never me undone.  There are attempts made occasionally to reconnect the tubes that have been cut during the procedure, but this is very difficult and expensive and works only about 50-70% of the time.  In order for any of the physicians in our clinic to do a vasectomy, we require that you consider this a permanent form a sterilization.    A vasectomy can be done at any time, but it is best to think about having it done when you can take at least one day off from work and any excess activities.    Your decision to have a vasectomy should only be made with the following facts clear in mind.    1. First, a vasectomy is only one of several means of birth control.  Many form of temporary contraception are available.  If you have any questions about other methods, please discuss this with your  physician.    2. A vasectomy may be unsuccessful in approximately one out of 1000 couples per year.  This occurs when the tubes which are cut during the procedure reconnect themselves.  Sterility cannot be guaranteed.    3. You should be aware that it is the current belief of the medical profession that a vasectomy procedure does not alter a male physically, physiologically or sexually.  Because each person is a unique individual, there is always the possibility of an adverse phychiatric reaction.  This can be best avoided by being very comfortable in your own mind that you want to have this done, and that you do not want to father any children in the future.  If this is not clear in your mind, this should be further discussed with your physician.    4. You will not notice a change in the volume of your ejaculate since sperm is a very small amount of the semen and it is only the sperm that is stopped from entering the ejaculate after a vasectomy.  Your prostate and seminal vesicle glands really supply most of the semen and this is not at all decreased after a vasectomy.  Also there is no effect on the male hormones.    5. You do not become sterile immediately following a vasectomy due to the fact that there is still sperm remaining in your system that must be eliminated by ejaculation.  For this reason, your sexual partner could still become pregnant for a period of time following the vasectomy operation.  It is necessary that contraceptive measures be used until you receive confirmation from your physician that you are sterile.  It takes approximately 12 ejaculations to clear the semen of sperm, but this can differ in different men.  For this reason, it is very important that your semen be checked for sperm before you are considered sterile, and this should be done approximately 12 weeks after your vasectomy.      6. Vasectomy has risks and benefits.  Among the risks are possible complications resulting from the  procedure.  These risks include but are not limited to:   A.  Bleeding, infection, or hematoma occuring during or in the recovery period   from the procedure.   B.  Sperm granuloma or a small pea to walnut sized lump which is a collection of   scar tissue and sperm in your scrotal sack and remains permanently   C.  There may be an increased risk of prostate cancer although the data is   unclear.

## 2023-04-10 NOTE — PROGRESS NOTES
Ubaldo is a 37 year old who is being evaluated via a billable video visit.      How would you like to obtain your AVS? MyChart  If the video visit is dropped, the invitation should be resent by: Text to cell phone: [phone]  Will anyone else be joining your video visit? No              Subjective   Ubaldo is a 37 year old, presenting for the following health issues:  Video Visit    HPI     Patient was requested to be seen by Dr. Gilliland for vasectomy consult.  He has 2 kids.      Review of Systems   Constitutional, HEENT, cardiovascular, pulmonary, gi and gu systems are negative, except as otherwise noted.      Objective           Vitals:  No vitals were obtained today due to virtual visit.    Physical Exam   GENERAL: Healthy, alert and no distress  EYES: Eyes grossly normal to inspection.  No discharge or erythema, or obvious scleral/conjunctival abnormalities.  RESP: No audible wheeze, cough, or visible cyanosis.  No visible retractions or increased work of breathing.    SKIN: Visible skin clear. No significant rash, abnormal pigmentation or lesions.  NEURO: Cranial nerves grossly intact.  Mentation and speech appropriate for age.  PSYCH: Mentation appears normal, affect normal/bright, judgement and insight intact, normal speech and appearance well-groomed.        Discussed    That vasectomy is permanent method of birth control.    That vasectomy can fail due to recanalization of the vas even many months/years later.    That he needs 2 negative sperm checks to be considered sterile    That there are other methods that are not permanent and also that the sperm can be frozen for later use.    How the technique is performed, risks of infection, bleeding, damage to the testes vessels and testes atrophy    Long term complication such as chronic and difficult to treat testes pain and questionable increase incidence of prostate cancer    That the procedure can be done at the clinic or hospital OR        Plan:    Stop  Aspirin  Will schedule Vasectomy in the future          Video-Visit Details    Type of service:  Video Visit   Video Start Time:   Video End Time:    Originating Location (pt. Location): Home    Distant Location (provider location):  On-site  Platform used for Video Visit: Sheridan

## 2023-04-13 DIAGNOSIS — I50.22 CHRONIC SYSTOLIC HEART FAILURE (H): Primary | ICD-10-CM

## 2023-05-01 ENCOUNTER — OFFICE VISIT (OUTPATIENT)
Dept: CARDIOLOGY | Facility: CLINIC | Age: 38
End: 2023-05-01
Attending: INTERNAL MEDICINE
Payer: COMMERCIAL

## 2023-05-01 ENCOUNTER — LAB (OUTPATIENT)
Dept: LAB | Facility: CLINIC | Age: 38
End: 2023-05-01
Attending: INTERNAL MEDICINE
Payer: COMMERCIAL

## 2023-05-01 VITALS
OXYGEN SATURATION: 98 % | DIASTOLIC BLOOD PRESSURE: 88 MMHG | SYSTOLIC BLOOD PRESSURE: 148 MMHG | WEIGHT: 219.2 LBS | HEART RATE: 65 BPM | BODY MASS INDEX: 27.4 KG/M2

## 2023-05-01 DIAGNOSIS — I50.22 CHRONIC SYSTOLIC HEART FAILURE (H): ICD-10-CM

## 2023-05-01 DIAGNOSIS — Z11.59 NEED FOR HEPATITIS C SCREENING TEST: ICD-10-CM

## 2023-05-01 DIAGNOSIS — I50.21 ACUTE SYSTOLIC CONGESTIVE HEART FAILURE (H): ICD-10-CM

## 2023-05-01 LAB
ALT SERPL W P-5'-P-CCNC: 19 U/L (ref 10–50)
ANION GAP SERPL CALCULATED.3IONS-SCNC: 9 MMOL/L (ref 7–15)
BI-PLANE LVEF ECHO: NORMAL
BUN SERPL-MCNC: 15.3 MG/DL (ref 6–20)
CALCIUM SERPL-MCNC: 9.9 MG/DL (ref 8.6–10)
CHLORIDE SERPL-SCNC: 102 MMOL/L (ref 98–107)
CHOLEST SERPL-MCNC: 137 MG/DL
CREAT SERPL-MCNC: 1.18 MG/DL (ref 0.67–1.17)
DEPRECATED HCO3 PLAS-SCNC: 29 MMOL/L (ref 22–29)
DIGOXIN SERPL-MCNC: <0.4 NG/ML (ref 0.6–2)
ERYTHROCYTE [DISTWIDTH] IN BLOOD BY AUTOMATED COUNT: 12.4 % (ref 10–15)
GFR SERPL CREATININE-BSD FRML MDRD: 81 ML/MIN/1.73M2
GLUCOSE SERPL-MCNC: 123 MG/DL (ref 70–99)
HCT VFR BLD AUTO: 52.5 % (ref 40–53)
HCV AB SERPL QL IA: NONREACTIVE
HDLC SERPL-MCNC: 40 MG/DL
HGB BLD-MCNC: 17.6 G/DL (ref 13.3–17.7)
HOLD SPECIMEN: NORMAL
LDLC SERPL CALC-MCNC: 46 MG/DL
MCH RBC QN AUTO: 31 PG (ref 26.5–33)
MCHC RBC AUTO-ENTMCNC: 33.5 G/DL (ref 31.5–36.5)
MCV RBC AUTO: 93 FL (ref 78–100)
NONHDLC SERPL-MCNC: 97 MG/DL
NT-PROBNP SERPL-MCNC: <36 PG/ML (ref 0–450)
PLATELET # BLD AUTO: 182 10E3/UL (ref 150–450)
POTASSIUM SERPL-SCNC: 4 MMOL/L (ref 3.4–5.3)
RBC # BLD AUTO: 5.67 10E6/UL (ref 4.4–5.9)
SODIUM SERPL-SCNC: 140 MMOL/L (ref 136–145)
TRIGL SERPL-MCNC: 254 MG/DL
TSH SERPL DL<=0.005 MIU/L-ACNC: 3.01 UIU/ML (ref 0.3–4.2)
WBC # BLD AUTO: 6.8 10E3/UL (ref 4–11)

## 2023-05-01 PROCEDURE — 84443 ASSAY THYROID STIM HORMONE: CPT | Performed by: PATHOLOGY

## 2023-05-01 PROCEDURE — 83880 ASSAY OF NATRIURETIC PEPTIDE: CPT | Performed by: PATHOLOGY

## 2023-05-01 PROCEDURE — 80048 BASIC METABOLIC PNL TOTAL CA: CPT | Performed by: PATHOLOGY

## 2023-05-01 PROCEDURE — 93306 TTE W/DOPPLER COMPLETE: CPT | Performed by: INTERNAL MEDICINE

## 2023-05-01 PROCEDURE — 36415 COLL VENOUS BLD VENIPUNCTURE: CPT | Performed by: PATHOLOGY

## 2023-05-01 PROCEDURE — 80162 ASSAY OF DIGOXIN TOTAL: CPT | Performed by: FAMILY MEDICINE

## 2023-05-01 PROCEDURE — 80061 LIPID PANEL: CPT | Performed by: PATHOLOGY

## 2023-05-01 PROCEDURE — 99214 OFFICE O/P EST MOD 30 MIN: CPT | Mod: 25 | Performed by: INTERNAL MEDICINE

## 2023-05-01 PROCEDURE — 86803 HEPATITIS C AB TEST: CPT | Performed by: FAMILY MEDICINE

## 2023-05-01 PROCEDURE — 84460 ALANINE AMINO (ALT) (SGPT): CPT | Performed by: PATHOLOGY

## 2023-05-01 PROCEDURE — 85027 COMPLETE CBC AUTOMATED: CPT | Performed by: PATHOLOGY

## 2023-05-01 PROCEDURE — G0463 HOSPITAL OUTPT CLINIC VISIT: HCPCS | Performed by: INTERNAL MEDICINE

## 2023-05-01 RX ORDER — SACUBITRIL AND VALSARTAN 97; 103 MG/1; MG/1
1 TABLET, FILM COATED ORAL 2 TIMES DAILY
Qty: 180 TABLET | Refills: 1 | Status: SHIPPED | OUTPATIENT
Start: 2023-05-01 | End: 2024-01-03

## 2023-05-01 NOTE — LETTER
5/1/2023      RE: Ubaldo Velez  [address] [place] [address]       Dear Colleague,    Thank you for the opportunity to participate in the care of your patient, Ubaldo Velez, at the [facility] [place] [facility]. Please see a copy of my visit note below.      May 1, 2023    HF clinic note     Follow up NICM     Cardiac history    ADHD and polysubstance abuse (ETOH, cocaine, alcohol, meth) who presented in 2019 in cardiogenic shock. At that time, he had an ECHO which showed LVEF of 10% with moderate to severe MR/TR. In addition, he had multiple apical thrombi. He underwent coronary angiogram which showed normal coronary arteries. He was transitioned to oral afterload and AC. Then attended chemical dep has followed with the CHF clinic. His most recent TTE in 10/2019 showed that his LVEF has increased to 28%.     They had a period of insurance lapsing - was taking less medicine, drinking some alcohol, now back to taking medications and completely substance free. Confirmed with testing.     Patient has made some great improvements to his health. He states his breathing is doing okay, but comments that when he tries to fall asleep he will jolt awake. He thinks he might have sleep apnea. Exercise tolerance is good, denies palpitations, dyspnea or dizziness. When he started carvedilol he was getting lightheadedness but it's now improved.        PMH  Past Medical History:   Diagnosis Date    ADHD     Cocaine use     Electronic cigarette use     Methamphetamine use (H)     NICM (nonischemic cardiomyopathy) (H)     Systolic heart failure (H)     Tobacco abuse          Family History   Problem Relation Age of Onset    Chronic Obstructive Pulmonary Disease Father     Multiple Sclerosis Maternal Aunt        Current Outpatient Medications   Medication Sig Dispense Refill    carvedilol (COREG) 25 MG tablet Take 1 tablet (25 mg) by mouth 2  times daily (with meals) 180 tablet 3    dapagliflozin (FARXIGA) 10 MG TABS tablet Take 1 tablet (10 mg) by mouth daily 90 tablet 3    sacubitril-valsartan (ENTRESTO)  MG per tablet Take 1 tablet by mouth 2 times daily 180 tablet 1    spironolactone (ALDACTONE) 25 MG tablet Take 1 tablet (25 mg) by mouth daily 90 tablet 0       ROS:  Constitutional: No fever, chills, or sweats.weight loss, intentional    ENT: No visual disturbance, ear ache, epistaxis, sore throat.   Allergies/Immunologic: Negative.   Respiratory: No cough, hemoptysis.   Cardiovascular: As per HPI.   GI: No nausea, vomiting, hematemesis, melena, or hematochezia.   : No urinary frequency, dysuria, or hematuria.   Integument: Negative.   Psychiatric: Negative.   Neuro: Negative.   Endocrinology: Negative.   Musculoskeletal: Negative.    EXAM:  BP (!) 148/88 (BP Location: Right arm, Patient Position: Sitting, Cuff Size: Adult Regular)   Pulse 65   Wt 99.4 kg (219 lb 3.2 oz)   SpO2 98%   BMI 27.40 kg/m    General: appears comfortable, alert and articulate  Head: normocephalic, atraumatic  Eyes: anicteric sclera, EOMI  Neck: no adenopathy  Oropharynx: moist mucosa, no lesions, dentition intact  Heart: PMI diffuse,trace systolic murmur at LLSB, regular, S1/S2, rub, estimated JVP 9 cm   Lungs: clear, no rales or wheezing  Abdomen: soft, non-tender, bowel sounds present, no hepatosplenomegaly  Extremities: no clubbing, cyanosis or edema  Neurological: normal speech and affect, no gross motor deficits    DATA REVIEW:    CHEMISTRY personally reviewed 05/01/2023:     Latest Reference Range & Units 05/01/23 08:59   Creatinine 0.67 - 1.17 mg/dL 1.18 (H)   GFR Estimate >60 mL/min/1.73m2 81   Glucose 70 - 99 mg/dL 123 (H)     ECHOCARDIOGRAM 05/01/2023 personally reviewed:    Mild left ventricular dilation is present.LVIDd 58mm.  Biplane LVEF is 56%.  Right ventricular function, chamber size, wall motion, and thickness are  normal.  The inferior vena  cava is normal.  No pericardial effusion is present.      ASSESSMENT AND PLAN  Substance induced CM- history of cardiogenic shock in 2019 now abstinent on GDMT.     Overall doing very, very well. We are proud of the progress he has made     -NYHA Class I/AHA Stage C   -now on max entresto, carvedilol, aldactone 25, and SGL2-I     SCD prevention:no ICD needed     History of LV thrombus: Resolved on medical therapy now off anticoagulation    History of polysubstance abuse: Now in complete remission, encouragement provided, will continue outpatient testing to verify    Possible sleep apnea symptoms, ordering a sleep study.      Ordering 90 day supplies of GDMT all supplied today.     Return to clinic in year with an echo or sooner as needed.    RTC 1 year with an echo     Scribe Disclosure:   I, Alka Choi, am serving as a scribe; to document services personally performed by Maddie Choi MD -based on data collection and the provider's statements to me.     Provider Disclosure:  I agree with above History, Review of Systems, Physical exam and Plan.  I have reviewed the content of the documentation and have edited it as needed. I have personally performed the services documented here and the documentation accurately represents those services and the decisions I have made.      Electronically signed by:    Maddie Choi MD      CC  Patient Care Team:  Smiley Gilliland DO as PCP - General (Family Medicine)  Luna Carlson [facility] as Pharmacist (Pharmacist)  Carrie Tanner, RN as Specialty Care Coordinator (Cardiology)  Luna Rubin, RN as Specialty Care Coordinator (Cardiology)  Stephania Crocker NP as Nurse Practitioner (Cardiology)  Maddie Choi MD as Assigned Heart and Vascular Provider  Madan Spencer MD as MD (Urology)  Smiley Gilliland DO as Assigned PCP  Madan Spencer MD as Assigned Surgical Provider  [facility]'s goals for this  visit include: Has made a lot of positive changes over the last year.     He requests these members of his care team be copied on today's visit information: PCP    PCP: Smiley Gilliland    Referring Provider:  SCOTTY Enrique [facility]  [address] [place] [address]        Do you need any medication refills at today's visit? No.    Carlitos Matthews, EMT  Clinic Support  [facility]    [phone]    Employed by [facility] [employer]            Please do not hesitate to contact me if you have any questions/concerns.     Sincerely,     Maddie Choi MD

## 2023-05-01 NOTE — PATIENT INSTRUCTIONS
"No medication changes.     You can ask your pharmacy to \"line up\" all of your heart failure medications.     We have ordered a sleep study to be scheduled.     Your labs and echo look great. Keep up the good work.     Maddie Choi MD    "

## 2023-05-01 NOTE — PROGRESS NOTES
May 1, 2023    HF clinic note     Follow up NICM     Cardiac history    ADHD and polysubstance abuse (ETOH, cocaine, alcohol, meth) who presented in 2019 in cardiogenic shock. At that time, he had an ECHO which showed LVEF of 10% with moderate to severe MR/TR. In addition, he had multiple apical thrombi. He underwent coronary angiogram which showed normal coronary arteries. He was transitioned to oral afterload and AC. Then attended chemical dep has followed with the CHF clinic. His most recent TTE in 10/2019 showed that his LVEF has increased to 28%.     They had a period of insurance lapsing - was taking less medicine, drinking some alcohol, now back to taking medications and completely substance free. Confirmed with testing.     Patient has made some great improvements to his health. He states his breathing is doing okay, but comments that when he tries to fall asleep he will jolt awake. He thinks he might have sleep apnea. Exercise tolerance is good, denies palpitations, dyspnea or dizziness. When he started carvedilol he was getting lightheadedness but it's now improved.        PMH  Past Medical History:   Diagnosis Date     ADHD      Cocaine use      Electronic cigarette use      Methamphetamine use (H)      NICM (nonischemic cardiomyopathy) (H)      Systolic heart failure (H)      Tobacco abuse          Family History   Problem Relation Age of Onset     Chronic Obstructive Pulmonary Disease Father      Multiple Sclerosis Maternal Aunt        Current Outpatient Medications   Medication Sig Dispense Refill     carvedilol (COREG) 25 MG tablet Take 1 tablet (25 mg) by mouth 2 times daily (with meals) 180 tablet 3     dapagliflozin (FARXIGA) 10 MG TABS tablet Take 1 tablet (10 mg) by mouth daily 90 tablet 3     sacubitril-valsartan (ENTRESTO)  MG per tablet Take 1 tablet by mouth 2 times daily 180 tablet 1     spironolactone (ALDACTONE) 25 MG tablet Take 1 tablet (25 mg) by mouth daily 90 tablet 0        ROS:  Constitutional: No fever, chills, or sweats.weight loss, intentional    ENT: No visual disturbance, ear ache, epistaxis, sore throat.   Allergies/Immunologic: Negative.   Respiratory: No cough, hemoptysis.   Cardiovascular: As per HPI.   GI: No nausea, vomiting, hematemesis, melena, or hematochezia.   : No urinary frequency, dysuria, or hematuria.   Integument: Negative.   Psychiatric: Negative.   Neuro: Negative.   Endocrinology: Negative.   Musculoskeletal: Negative.    EXAM:  BP (!) 148/88 (BP Location: Right arm, Patient Position: Sitting, Cuff Size: Adult Regular)   Pulse 65   Wt 99.4 kg (219 lb 3.2 oz)   SpO2 98%   BMI 27.40 kg/m    General: appears comfortable, alert and articulate  Head: normocephalic, atraumatic  Eyes: anicteric sclera, EOMI  Neck: no adenopathy  Oropharynx: moist mucosa, no lesions, dentition intact  Heart: PMI diffuse,trace systolic murmur at LLSB, regular, S1/S2, rub, estimated JVP 9 cm   Lungs: clear, no rales or wheezing  Abdomen: soft, non-tender, bowel sounds present, no hepatosplenomegaly  Extremities: no clubbing, cyanosis or edema  Neurological: normal speech and affect, no gross motor deficits    DATA REVIEW:    CHEMISTRY personally reviewed 05/01/2023:     Latest Reference Range & Units 05/01/23 08:59   Creatinine 0.67 - 1.17 mg/dL 1.18 (H)   GFR Estimate >60 mL/min/1.73m2 81   Glucose 70 - 99 mg/dL 123 (H)     ECHOCARDIOGRAM 05/01/2023 personally reviewed:    Mild left ventricular dilation is present.LVIDd 58mm.  Biplane LVEF is 56%.  Right ventricular function, chamber size, wall motion, and thickness are  normal.  The inferior vena cava is normal.  No pericardial effusion is present.      ASSESSMENT AND PLAN  Substance induced CM- history of cardiogenic shock in 2019 now abstinent on GDMT.     Overall doing very, very well. We are proud of the progress he has made     -NYHA Class I/AHA Stage C   -now on max entresto, carvedilol, aldactone 25, and SGL2-I      SCD prevention:no ICD needed     History of LV thrombus: Resolved on medical therapy now off anticoagulation    History of polysubstance abuse: Now in complete remission, encouragement provided, will continue outpatient testing to verify    Possible sleep apnea symptoms, ordering a sleep study.      Ordering 90 day supplies of GDMT all supplied today.     Return to clinic in year with an echo or sooner as needed.    RTC 1 year with an echo     Scribe Disclosure:   I, Alka Choi, am serving as a scribe; to document services personally performed by Maddie Choi MD -based on data collection and the provider's statements to me.     Provider Disclosure:  I agree with above History, Review of Systems, Physical exam and Plan.  I have reviewed the content of the documentation and have edited it as needed. I have personally performed the services documented here and the documentation accurately represents those services and the decisions I have made.      Electronically signed by:    Maddie Choi MD        Patient Care Team:  Smiley Gilliland DO as PCP - General (Family Medicine)  Luna Carlson [facility] as Pharmacist (Pharmacist)  Carrie Tanner, RN as Specialty Care Coordinator (Cardiology)  Luna Rubin, RN as Specialty Care Coordinator (Cardiology)  Stephania Crocker NP as Nurse Practitioner (Cardiology)  Maddie Choi MD as Assigned Heart and Vascular Provider  Madan Spencer MD as MD (Urology)  Smiley Gilliland DO as Assigned PCP  Madan Spencer MD as Assigned Surgical Provider  [facility]

## 2023-05-01 NOTE — PROGRESS NOTES
Ubaldo Velez's goals for this visit include: Has made a lot of positive changes over the last year.     He requests these members of his care team be copied on today's visit information: PCP    PCP: Smiley Gilliland    Referring Provider:  SCOTTY Enrique [facility]  [address] [place] [address]        Do you need any medication refills at today's visit? No.    Carlitos Matthews, EMT  Clinic Support  [facility]    [phone]    Employed by [facility] [employer]

## 2023-05-03 NOTE — RESULT ENCOUNTER NOTE
Dear Ubaldo,     Here are your recent results which are within the expected range. I am glad you are off the digoxin, as I was concerned that therapies were low! Good job on the cholesterol as well!    Please continue with your current plan of care and let us know if you have any questions or concerns.    Regards,  Smiley Gilliland, DO

## 2023-05-05 ENCOUNTER — TELEPHONE (OUTPATIENT)
Dept: CARDIOLOGY | Facility: CLINIC | Age: 38
End: 2023-05-05
Payer: COMMERCIAL

## 2023-06-05 ENCOUNTER — OFFICE VISIT (OUTPATIENT)
Dept: UROLOGY | Facility: CLINIC | Age: 38
End: 2023-06-05
Payer: COMMERCIAL

## 2023-06-05 DIAGNOSIS — Z30.2 ENCOUNTER FOR STERILIZATION: Primary | ICD-10-CM

## 2023-06-05 PROCEDURE — 99000 SPECIMEN HANDLING OFFICE-LAB: CPT | Performed by: UROLOGY

## 2023-06-05 PROCEDURE — 55250 REMOVAL OF SPERM DUCT(S): CPT | Performed by: UROLOGY

## 2023-06-05 PROCEDURE — 88302 TISSUE EXAM BY PATHOLOGIST: CPT | Performed by: PATHOLOGY

## 2023-06-07 LAB
PATH REPORT.COMMENTS IMP SPEC: NORMAL
PATH REPORT.COMMENTS IMP SPEC: NORMAL
PATH REPORT.FINAL DX SPEC: NORMAL
PATH REPORT.GROSS SPEC: NORMAL
PATH REPORT.MICROSCOPIC SPEC OTHER STN: NORMAL
PATH REPORT.RELEVANT HX SPEC: NORMAL
PHOTO IMAGE: NORMAL

## 2023-06-15 DIAGNOSIS — I50.22 CHRONIC SYSTOLIC HEART FAILURE (H): ICD-10-CM

## 2023-06-15 NOTE — ADDENDUM NOTE
Chantal is a 49 year old  female who presents for annual exam.     Besides routine health maintenance, she has no other health concerns today.    HPI:  The patient's PCP is Dr. Mateusz Juarez MD.  Patient presents for a breast & pelvic exam today. PAP smear today. She has seen her PCP within the last month. Plans to schedule colonoscopy in [place] this . Has mammogram scheduled today. Will plan for DEXA scan next year. Chantal is unsure if she is experiencing menopause. Reports feeling symptoms of hot flashes last summer, but has not had any more symptoms since. If symptoms arise and cause distress encouraged her to reach out.     GYNECOLOGIC HISTORY:    No LMP recorded (lmp unknown). Patient has had a hysterectomy.    Her current contraception method is: hysterectomy.  She  reports that she has never smoked. She has never used smokeless tobacco.    Patient is not sexually active.  STD testing offered?  Declined     Last PHQ-9 score on record =       6/15/2023     8:50 AM   PHQ-9 SCORE   PHQ-9 Total Score 0     Last GAD7 score on record =       6/15/2023     8:50 AM   RICHARD-7 SCORE   Total Score 0     Alcohol Score = 1    HEALTH MAINTENANCE:  Overdue    Never   Done ADVANCE CARE PLANNING (Every 5 Years)     Never   Done HEPATITIS B IMMUNIZATION (1 of 3 - 3-dose series)     Never   Done COLORECTAL CANCER SCREENING (COLONOSCOPY - Preferred) (Every 10 Years)     Never   Done HIV SCREENING (Once)     Never   Done HEPATITIS C SCREENING (Once)     2022 COVID-19 Vaccine (4 - Pfizer series)  Last completed: Dec 29, 2021     SEP 17   2022 ZOSTER IMMUNIZATION (1 of 2)     Upcoming    SEP 1   2023 INFLUENZA VACCINE (Season Ended)  Last completed: Oct 12, 2011     DEC 4   2024 LIPID (Every 5 Years)  Last completed: Dec 4, 2019     EDVIN 21   2025 HPV TEST (Once)  Last completed:  PAP (Every 5 Years)  Last completed:  DTAP/TDAP/TD IMMUNIZATION (3 - Td  Encounter addended by: Lei Kraus, [facility] on: 6/10/2019 1:26 PM   Actions taken: Sign clinical note or Tdap)  Last completed: 2015       Health maintenance updated:  Yes, pt will schedule colonoscopy.    HISTORY:  OB History    Para Term  AB Living   0 0 0 0 0 0   SAB IAB Ectopic Multiple Live Births   0 0 0 0 0       Patient Active Problem List   Diagnosis     Hypertension     Intramural leiomyoma of uterus     Past Surgical History:   Procedure Laterality Date     CYSTOSCOPY N/A 2019    Procedure: CYSTOSCOPY;  Surgeon: Shari Shah MD;  Location:  OR     DILATION AND CURETTAGE      resection of fibroid-operative hysteroscopy, D and c and resection of submucosal fibroid     ENT SURGERY      ear surgery, Ear tubes, tympanoplasty as child     GYN SURGERY      ovarian cyst surgery     HYSTEROSCOPY, LAPAROSCOPY, COMBINED  2011    Procedure:COMBINED HYSTEROSCOPY, LAPAROSCOPY; diagnostic hysterectomy, dilation and curattage, laparoscopy left paraovarian cystectomy.; Surgeon:JASMYNE MICHAEL; Location: [facility]     LAPAROSCOPIC HYSTERECTOMY SUPRACERVICAL N/A 2019    Procedure: LAPAROSCOPIC SUPRACERVICAL HYSTERECTOMY, BILATERAL SALPINGECTOMY, DIAGNOSTIC CYSTOSCOPY;  Surgeon: Shari Shah MD;  Location:  OR     LAPAROSCOPIC SALPINGECTOMY Bilateral 2019    Procedure: LAPAROSCOPIC BILATERAL SALPINGECTOMY;  Surgeon: Shari Shah MD;  Location:  OR      Social History     Tobacco Use     Smoking status: Never     Smokeless tobacco: Never   Vaping Use     Vaping status: Not on file   Substance Use Topics     Alcohol use: Yes     Alcohol/week: 0.0 standard drinks of alcohol     Comment: OCCASIONAL      Problem (# of Occurrences) Relation (Name,Age of Onset)    Diabetes (1) Maternal Grandmother    Heart Disease (1) Sister (34): sister had heart attack at age 34    Hypertension (1) Other: not specified    Osteoporosis (1) Mother    Cerebrovascular Disease (3) Other: aunt, Father:  of stroke-unspecified if father, aunt or  "PGF, Paternal Grandfather    Hyperlipidemia (1) Other: not specified    Colon Polyps (1) Mother    Colon Cancer (1) Maternal Grandmother            Current Outpatient Medications   Medication Sig     atorvastatin (LIPITOR) 20 MG tablet      Cholecalciferol (VITAMIN D3 PO) Take 2,000 Units by mouth every morning      lisinopril-hydrochlorothiazide (PRINZIDE,ZESTORETIC) 20-12.5 MG per tablet Take 1 tablet by mouth every morning      Multiple Vitamin (MULTIVITAMIN OR) Take 1 tablet by mouth every morning      Omega-3 Fatty Acids (FISH OIL) 1200 MG capsule Take 1,200 mg by mouth every morning      No current facility-administered medications for this visit.     No Known Allergies    Past medical, surgical, social and family histories were reviewed and updated in EPIC.    ROS:   12 point review of systems negative other than symptoms noted below or in the HPI.    EXAM:  /88   Ht 1.664 m (5' 5.5\")   Wt 81.2 kg (179 lb)   LMP  (LMP Unknown)   BMI 29.33 kg/m     BMI: Body mass index is 29.33 kg/m .    PHYSICAL EXAM:  Constitutional:   Appearance: Well nourished, well developed, alert, in no acute distress  Breasts: Inspection of Breasts:  No lymphadenopathy present., Palpation of Breasts and Axillae:  No masses present on palpation, no breast tenderness., Axillary Lymph Nodes:  No lymphadenopathy present. and No nodularity, asymmetry or nipple discharge bilaterally.  Skin:  General Inspection:  No rashes present, no lesions present, no areas of  discoloration  Neurologic:    Mental Status:  Oriented X3.  Normal strength and tone, mentation intact and speech normal.    Psychiatric:   Mentation appears normal and affect normal/bright.         Pelvic Exam:  External Genitalia:     Normal appearance for age, no discharge present, no tenderness present, no inflammatory lesions present, color normal  Vagina:     Normal vaginal vault without central or paravaginal defects, no discharge present, no inflammatory lesions " present, no masses present   Urethral Meatus:  No erythema or lesions present  Cervix:     Appearance healthy, no lesions present, nontender to palpation, no bleeding present  Uterus:     Surgically absent  Adnexa:     No adnexal tenderness present, no adnexal masses present  Perineum:     Perineum within normal limits, no evidence of trauma, no rashes or skin lesions present  Anus:     Anus within normal limits, no hemorrhoids present  Pubic Hair:     Normal pubic hair distribution for age  Genitalia and Groin:     No rashes present, no lesions present, no areas of discoloration, no masses present    ASSESSMENT:  49 year old female with satisfactory annual exam.    ICD-10-CM    1. Encounter for gynecological examination without abnormal finding  Z01.419 Pap screen with HPV - recommended age 30 - 65 years      2. Screen for colon cancer  Z12.11 Colonoscopy Screening  Referral          PLAN:  (Z01.419) Encounter for gynecological examination without abnormal finding  (primary encounter diagnosis)  Plan: Pap screen with HPV - recommended age 30 - 65         years    (Z12.11) Screen for colon cancer  Comment: Patient will schedule at her convenience this fall.  Plan: Colonoscopy Screening  Referral    Has mammogram today.      Jennifer CASSIDY, DNP student  I was present with the student who participated in the service and in the documentation of the note. I have verified the history and personally performed the physical exam and medical decision-making. I agree with the assessment and plan of care as documented in the note.      JANIE Galindo CNP

## 2023-06-20 NOTE — TELEPHONE ENCOUNTER
spironolactone (ALDACTONE) 25 MG tablet      Last Written Prescription Date:  3/19/2023  Last Fill Quantity: 90,   # refills: 0  Last [place] : [place]  [facility] [place] visit:  none    Routing refill request to provider for review/approval because:  Failed medication protocol: abnormal lab  - Creatinine (H)    Creatinine   Date Value Ref Range Status   05/01/2023 1.18 (H) 0.67 - 1.17 mg/dL Final   05/14/2021 1.06 0.66 - 1.25 mg/dL Final

## 2023-06-23 ENCOUNTER — MYC REFILL (OUTPATIENT)
Dept: CARDIOLOGY | Facility: CLINIC | Age: 38
End: 2023-06-23
Payer: COMMERCIAL

## 2023-06-23 ENCOUNTER — MYC MEDICAL ADVICE (OUTPATIENT)
Dept: CARDIOLOGY | Facility: CLINIC | Age: 38
End: 2023-06-23
Payer: COMMERCIAL

## 2023-06-23 ENCOUNTER — HOSPITAL ENCOUNTER (EMERGENCY)
Facility: HOSPITAL | Age: 38
Discharge: HOME | End: 2023-06-24
Attending: EMERGENCY MEDICINE | Admitting: EMERGENCY MEDICINE
Payer: COMMERCIAL

## 2023-06-23 ENCOUNTER — APPOINTMENT (OUTPATIENT)
Dept: RADIOLOGY | Facility: HOSPITAL | Age: 38
End: 2023-06-23
Attending: EMERGENCY MEDICINE
Payer: COMMERCIAL

## 2023-06-23 ENCOUNTER — NURSE TRIAGE (OUTPATIENT)
Dept: CARDIOLOGY | Facility: CLINIC | Age: 38
End: 2023-06-23
Payer: COMMERCIAL

## 2023-06-23 VITALS
TEMPERATURE: 102.7 F | OXYGEN SATURATION: 95 % | RESPIRATION RATE: 20 BRPM | BODY MASS INDEX: 25.87 KG/M2 | SYSTOLIC BLOOD PRESSURE: 125 MMHG | HEART RATE: 112 BPM | WEIGHT: 207 LBS | DIASTOLIC BLOOD PRESSURE: 54 MMHG

## 2023-06-23 DIAGNOSIS — I50.22 CHRONIC SYSTOLIC HEART FAILURE (H): ICD-10-CM

## 2023-06-23 DIAGNOSIS — J18.9 PNEUMONIA OF RIGHT LOWER LOBE DUE TO INFECTIOUS ORGANISM: ICD-10-CM

## 2023-06-23 DIAGNOSIS — R00.0 TACHYCARDIA: ICD-10-CM

## 2023-06-23 LAB
ANION GAP SERPL CALCULATED.3IONS-SCNC: 13 MMOL/L (ref 7–15)
BASOPHILS # BLD AUTO: 0 10E3/UL (ref 0–0.2)
BASOPHILS NFR BLD AUTO: 0 %
BUN SERPL-MCNC: 14.2 MG/DL (ref 6–20)
CALCIUM SERPL-MCNC: 9.9 MG/DL (ref 8.6–10)
CHLORIDE SERPL-SCNC: 101 MMOL/L (ref 98–107)
CREAT SERPL-MCNC: 1.04 MG/DL (ref 0.67–1.17)
D DIMER PPP FEU-MCNC: <0.27 UG/ML FEU (ref 0–0.5)
DEPRECATED HCO3 PLAS-SCNC: 23 MMOL/L (ref 22–29)
EOSINOPHIL # BLD AUTO: 0.1 10E3/UL (ref 0–0.7)
EOSINOPHIL NFR BLD AUTO: 1 %
ERYTHROCYTE [DISTWIDTH] IN BLOOD BY AUTOMATED COUNT: 12 % (ref 10–15)
GFR SERPL CREATININE-BSD FRML MDRD: >90 ML/MIN/1.73M2
GLUCOSE SERPL-MCNC: 142 MG/DL (ref 70–99)
HCT VFR BLD AUTO: 43.4 % (ref 40–53)
HGB BLD-MCNC: 15.2 G/DL (ref 13.3–17.7)
IMM GRANULOCYTES # BLD: 0.4 10E3/UL
IMM GRANULOCYTES NFR BLD: 2 %
LACTATE SERPL-SCNC: 1.1 MMOL/L (ref 0.7–2)
LYMPHOCYTES # BLD AUTO: 0.9 10E3/UL (ref 0.8–5.3)
LYMPHOCYTES NFR BLD AUTO: 4 %
MAGNESIUM SERPL-MCNC: 1.7 MG/DL (ref 1.7–2.3)
MCH RBC QN AUTO: 31.5 PG (ref 26.5–33)
MCHC RBC AUTO-ENTMCNC: 35 G/DL (ref 31.5–36.5)
MCV RBC AUTO: 90 FL (ref 78–100)
MONOCYTES # BLD AUTO: 2 10E3/UL (ref 0–1.3)
MONOCYTES NFR BLD AUTO: 9 %
NEUTROPHILS # BLD AUTO: 20.6 10E3/UL (ref 1.6–8.3)
NEUTROPHILS NFR BLD AUTO: 84 %
NRBC # BLD AUTO: 0 10E3/UL
NRBC BLD AUTO-RTO: 0 /100
NT-PROBNP SERPL-MCNC: 267 PG/ML (ref 0–450)
PLATELET # BLD AUTO: 182 10E3/UL (ref 150–450)
POTASSIUM SERPL-SCNC: 3.8 MMOL/L (ref 3.4–5.3)
RBC # BLD AUTO: 4.82 10E6/UL (ref 4.4–5.9)
SODIUM SERPL-SCNC: 137 MMOL/L (ref 136–145)
TROPONIN T SERPL HS-MCNC: <6 NG/L
WBC # BLD AUTO: 24 10E3/UL (ref 4–11)

## 2023-06-23 PROCEDURE — 85004 AUTOMATED DIFF WBC COUNT: CPT | Performed by: EMERGENCY MEDICINE

## 2023-06-23 PROCEDURE — 85379 FIBRIN DEGRADATION QUANT: CPT | Performed by: EMERGENCY MEDICINE

## 2023-06-23 PROCEDURE — 80048 BASIC METABOLIC PNL TOTAL CA: CPT | Performed by: EMERGENCY MEDICINE

## 2023-06-23 PROCEDURE — 93005 ELECTROCARDIOGRAM TRACING: CPT | Performed by: STUDENT IN AN ORGANIZED HEALTH CARE EDUCATION/TRAINING PROGRAM

## 2023-06-23 PROCEDURE — 87637 SARSCOV2&INF A&B&RSV AMP PRB: CPT | Performed by: EMERGENCY MEDICINE

## 2023-06-23 PROCEDURE — 83605 ASSAY OF LACTIC ACID: CPT | Performed by: EMERGENCY MEDICINE

## 2023-06-23 PROCEDURE — 96360 HYDRATION IV INFUSION INIT: CPT

## 2023-06-23 PROCEDURE — 83880 ASSAY OF NATRIURETIC PEPTIDE: CPT | Performed by: EMERGENCY MEDICINE

## 2023-06-23 PROCEDURE — 250N000013 HC RX MED GY IP 250 OP 250 PS 637: Performed by: EMERGENCY MEDICINE

## 2023-06-23 PROCEDURE — 36415 COLL VENOUS BLD VENIPUNCTURE: CPT | Performed by: EMERGENCY MEDICINE

## 2023-06-23 PROCEDURE — C9803 HOPD COVID-19 SPEC COLLECT: HCPCS

## 2023-06-23 PROCEDURE — 71046 X-RAY EXAM CHEST 2 VIEWS: CPT

## 2023-06-23 PROCEDURE — 96361 HYDRATE IV INFUSION ADD-ON: CPT

## 2023-06-23 PROCEDURE — 258N000003 HC RX IP 258 OP 636: Performed by: EMERGENCY MEDICINE

## 2023-06-23 PROCEDURE — 99285 EMERGENCY DEPT VISIT HI MDM: CPT | Mod: 25

## 2023-06-23 PROCEDURE — 84484 ASSAY OF TROPONIN QUANT: CPT | Performed by: EMERGENCY MEDICINE

## 2023-06-23 PROCEDURE — 83735 ASSAY OF MAGNESIUM: CPT | Performed by: EMERGENCY MEDICINE

## 2023-06-23 RX ORDER — SPIRONOLACTONE 25 MG/1
25 TABLET ORAL DAILY
Qty: 90 TABLET | Refills: 0 | Status: CANCELLED | OUTPATIENT
Start: 2023-06-23

## 2023-06-23 RX ORDER — ACETAMINOPHEN 325 MG/1
650 TABLET ORAL ONCE
Status: COMPLETED | OUTPATIENT
Start: 2023-06-24 | End: 2023-06-23

## 2023-06-23 RX ORDER — DOXYCYCLINE 100 MG/1
100 CAPSULE ORAL ONCE
Status: COMPLETED | OUTPATIENT
Start: 2023-06-23 | End: 2023-06-23

## 2023-06-23 RX ORDER — DOXYCYCLINE 100 MG/1
100 CAPSULE ORAL 2 TIMES DAILY
Qty: 14 CAPSULE | Refills: 0 | Status: SHIPPED | OUTPATIENT
Start: 2023-06-23 | End: 2023-06-30

## 2023-06-23 RX ORDER — SPIRONOLACTONE 25 MG/1
25 TABLET ORAL DAILY
Qty: 90 TABLET | Refills: 3 | Status: SHIPPED | OUTPATIENT
Start: 2023-06-23 | End: 2024-03-29

## 2023-06-23 RX ADMIN — SODIUM CHLORIDE 500 ML: 9 INJECTION, SOLUTION INTRAVENOUS at 21:24

## 2023-06-23 RX ADMIN — SODIUM CHLORIDE 500 ML: 9 INJECTION, SOLUTION INTRAVENOUS at 22:27

## 2023-06-23 RX ADMIN — ACETAMINOPHEN 650 MG: 325 TABLET ORAL at 23:47

## 2023-06-23 RX ADMIN — DOXYCYCLINE HYCLATE 100 MG: 100 CAPSULE ORAL at 23:04

## 2023-06-23 ASSESSMENT — ACTIVITIES OF DAILY LIVING (ADL)
ADLS_ACUITY_SCORE: 35
ADLS_ACUITY_SCORE: 35

## 2023-06-23 NOTE — TELEPHONE ENCOUNTER
"Received a call from the call center to discuss heart rate.  Spoke to Ubaldo, who says he is a heart failure patient.  He says he has been out of spironolactone, and took his last pill on Monday of this week.  He says his heart rate at rest has been 64-65 bpm, or 84-90 bpm when working per his [employer] watch.  He says today he had to leave work because his HR was 105-110, and was concerned.  He says it has not been <95 bpm today.  He denies any chest pain, dizziness, shortness of breath.   He does not check BP at home.  He says his weight yesterday was 206.7 lbs.   He did not weigh himself today so weighed himself while on the call and was 210 lbs.  He says he randomly weighs himself instead of weighing first thing in the morning after urinating.  No ED recommendation at this time.  Advised a message will be sent to [place] [facility] for follow up.  1. DESCRIPTION: \"Please describe your heart rate or heartbeat that you are having\" (e.g., fast/slow, regular/irregular, skipped or extra beats, \"palpitations\") faster than normal  2. ONSET: \"When did it start?\" (Minutes, hours or days) increasing as the week has gone on  3. DURATION: \"How long does it last\" (e.g., seconds, minutes, hours)  4. PATTERN \"Does it come and go, or has it been constant since it started?\" \"Does it get worse with exertion?\" \"Are you feeling it now?\"  5. TAP: \"Using your hand, can you tap out what you are feeling on a chair or table in front of you, so that I can hear?\" (Note: not all patients can do this)   6. HEART RATE: \"Can you tell me your heart rate?\" \"How many beats in 15 seconds?\" (Note: not all patients can do this) see above  7. RECURRENT SYMPTOM: \"Have you ever had this before?\" If Yes, ask: \"When was the last time?\" and \"What happened that time?\"   8. CAUSE: \"What do you think is causing the palpitations?\"  9. CARDIAC HISTORY: \"Do you have any history of heart disease?\" (e.g., heart attack, angina, bypass surgery, angioplasty, arrhythmia) " "CHF  10. OTHER SYMPTOMS: \"Do you have any other symptoms?\" (e.g., dizziness, chest pain, sweating, difficulty breathing) denies  11. PREGNANCY: \"Is there any chance you are pregnant?\" \"When was your last menstrual period?\"    "

## 2023-06-23 NOTE — TELEPHONE ENCOUNTER
spironolactone (ALDACTONE) 25 MG tablet: addressed in 6-15-23 RF encounter-resent as high priority

## 2023-06-24 LAB
ATRIAL RATE - MUSE: 128 BPM
DIASTOLIC BLOOD PRESSURE - MUSE: NORMAL MMHG
FLUAV RNA SPEC QL NAA+PROBE: NEGATIVE
FLUBV RNA RESP QL NAA+PROBE: NEGATIVE
INTERPRETATION ECG - MUSE: NORMAL
P AXIS - MUSE: 54 DEGREES
PR INTERVAL - MUSE: 160 MS
QRS DURATION - MUSE: 90 MS
QT - MUSE: 280 MS
QTC - MUSE: 408 MS
R AXIS - MUSE: -49 DEGREES
RSV RNA SPEC NAA+PROBE: NEGATIVE
SARS-COV-2 RNA RESP QL NAA+PROBE: NEGATIVE
SYSTOLIC BLOOD PRESSURE - MUSE: NORMAL MMHG
T AXIS - MUSE: 47 DEGREES
VENTRICULAR RATE- MUSE: 128 BPM

## 2023-06-24 NOTE — ED TRIAGE NOTES
Pt has hx heart failure with EF of 56%. Ran out of spirolactone 3 days ago. States BP has been high. And feels heart racing. No sob. No chest pain. Does have pain in kidneys. No n/v or dizziness. No cp or sob

## 2023-06-24 NOTE — DISCHARGE INSTRUCTIONS
You were seen in the Emergency Department today for elevated heart rate.  Your lab work showed an elevated white blood cell count. Your imaging studies showed a right-sided lower lobe pneumonia.   You were prescribed doxycycline for the infection. You should take all medications as prescribed.  Follow up with your primary care physician to ensure resolution of symptoms. Return if you have new or worsening symptoms.

## 2023-06-24 NOTE — ED NOTES
EMERGENCY DEPARTMENT SIGN OUT NOTE        ED COURSE AND MEDICAL DECISION MAKING  Patient was signed out to me by Dr Yani Yanez at 10:39 PM  11:37 PM We discussed the plan for discharge and the patient is agreeable. Reviewed supportive cares, symptomatic treatment, outpatient follow up, and reasons to return to the Emergency Department. All questions and concerns were addressed. Patient to be discharged by ED RN.      Ubaldo Velez is a 37 year old male who presents to the emergency department via walk-in for evaluation of rapid heart rate.  He reports that he left work early today and was supposed to take his spironolactone but had missed a few days.  He finally got up from the pharmacy and took it and felt like his heart rate was coming down and then it started going back up.  He took his carvedilol early to see if that would help but was still noticing elevated heart rate at 115 at rest in the 120s to 130s with any activity.  He is not having any chest pain or shortness of breath or cough.  He says he feels like he can feel his kidneys and reports little back discomfort.  He complains of a head cold but not having a significant cough or shortness of breath.  He does not know if he has been having fevers.  On arrival he had a heart rate of 143.    At time of sign out, dimer pending.    Patient was reevaluated after signout.  His chest x-ray has demonstrated right lower lobe pneumonia which seems to fit with his clinical presentation.  He did develop a fever here to 102.  His tachycardia continues to improve and he was given some antipyretics.  Antibiotics were ordered from previous provider.  Followed up on D-dimer which was within normal limits.  Reevaluated patient and discussed the care plan again and he was in agreement.  Clinic follow-up was advised.  Patient was discharged in stable condition with improving tachycardia.    FINAL IMPRESSION    1. Pneumonia of right lower lobe due to infectious organism     2. Tachycardia        ED MEDS  Medications   0.9% sodium chloride BOLUS (0 mLs Intravenous Stopped 6/23/23 2204)   0.9% sodium chloride BOLUS (0 mLs Intravenous Stopped 6/23/23 2304)   doxycycline hyclate (VIBRAMYCIN) capsule 100 mg (100 mg Oral $Given 6/23/23 2304)       LAB  Labs Ordered and Resulted from Time of ED Arrival to Time of ED Departure   BASIC METABOLIC PANEL - Abnormal       Result Value    Sodium 137      Potassium 3.8      Chloride 101      Carbon Dioxide (CO2) 23      Anion Gap 13      Urea Nitrogen 14.2      Creatinine 1.04      Calcium 9.9      Glucose 142 (*)     GFR Estimate >90     CBC WITH PLATELETS AND DIFFERENTIAL - Abnormal    WBC Count 24.0 (*)     RBC Count 4.82      Hemoglobin 15.2      Hematocrit 43.4      MCV 90      MCH 31.5      MCHC 35.0      RDW 12.0      Platelet Count 182      % Neutrophils 84      % Lymphocytes 4      % Monocytes 9      % Eosinophils 1      % Basophils 0      % Immature Granulocytes 2      NRBCs per 100 WBC 0      Absolute Neutrophils 20.6 (*)     Absolute Lymphocytes 0.9      Absolute Monocytes 2.0 (*)     Absolute Eosinophils 0.1      Absolute Basophils 0.0      Absolute Immature Granulocytes 0.4      Absolute NRBCs 0.0     TROPONIN T, HIGH SENSITIVITY - Normal    Troponin T, High Sensitivity <6     MAGNESIUM - Normal    Magnesium 1.7     NT PROBNP INPATIENT - Normal    N terminal Pro BNP Inpatient 267     LACTIC ACID WHOLE BLOOD - Normal    Lactic Acid 1.1     D DIMER QUANTITATIVE - Normal    D-Dimer Quantitative <0.27         RADIOLOGY    Chest XR,  PA & LAT   Final Result   IMPRESSION: Mild opacity in the right lower lung suspicious for pneumonia. Left lung clear. No pneumothorax or pleural effusion. Normal heart size. There are old rib fractures posteriorly bilaterally.          DISCHARGE MEDS  New Prescriptions    DOXYCYCLINE HYCLATE (VIBRAMYCIN) 100 MG CAPSULE    Take 1 capsule (100 mg) by mouth 2 times daily for 7 days       Gil RUIZ am  serving as a scribe to document services personally performed by Dr. Delio Garcia, based on my observation and the provider's statements to me. I, Delio Garcia MD attest that Gil Anh is acting in a scribe capacity, has observed my performance of the services and has documented them in accordance with my direction.    Delio Garcia M.D.  Emergency Medicine  [facility] EMERGENCY DEPARTMENT  [address] [place] [id number] [phone]  Dept: [phone]     Delio Garcia MD  06/24/23 5240

## 2023-06-24 NOTE — ED PROVIDER NOTES
EMERGENCY DEPARTMENT ENCOUNTER      NAME: Ubaldo Velez  AGE: 37 year old male  YOB: 1985  MRN: [id number]  EVALUATION DATE & TIME: 6/23/2023  8:46 PM    PCP: Smiley Gilliland    ED PROVIDER: Yani Yanez M.D.      Chief Complaint   Patient presents with     Tachycardia     FINAL IMPRESSION:  1. Pneumonia of right lower lobe due to infectious organism    2. Tachycardia        ED COURSE & MEDICAL DECISION MAKING:    Pertinent Labs & Imaging studies reviewed. (See chart for details)  ED Course as of 06/24/23 2259 Fri Jun 23, 2023 2118 Presents with tachycardia.  He took his carvedilol.  He took his spironolactone.  Heart rate was still quite elevated when we got here although on the EKG is down to 128.  It looks like sinus.  I can order a little bit of fluid in case he is dehydrated.  He has no other associated symptoms.  We will see what his lab work looks like and then can determine a plan from there.  I am hoping that we can get him feeling better enough to potentially go home and get his heart rate down but it will just depend on how he responds.  He is in agreement with the plan.   2201 His heart rate is in the 1 teens which is better than when he came in.  I Saniya give him a little more fluid.  White count was 24.  He says he has a head cold but otherwise doing fine.  Is not short of breath.  I will get a quick chest x-ray on him and we will check a lactate.  I think if those look okay and his heart rate continues come down a little bit he should build go home later.  He is feeling much better after we got the blood work on him.   2212 I will add on a D-dimer as well since patient is persistently tachycardic.  We will just make sure were not missing something.   2246 The patient's chest x-ray shows a right lower lobe pneumonia.  I will get him started on doxycycline.  I discussed the results with him.  We are still waiting on the D-dimer to come back.  Patient was signed out at change of  shift to Dr. Garcia pending D-dimer results and final disposition.     8:57 PM I met with the patient, obtained history, performed an initial exam, and discussed options and plan for diagnostics and treatment here in the ED.    Medical Decision Making    History:    Supplemental history from: none  External Record(s) reviewed: Reviewed the patient's echocardiogram from 5/1/2023.  He had an EF of 56% with mild left ventricular dilatation.  I also reviewed the cardiology note from 5/1/2023.  Patient has a history of ADHD and polysubstances use with cocaine, alcohol, meth who had presented in 2019 in cardiogenic shock.  At that time he had an echo which showed an ejection fraction of 10%.  Work Up:    Emergent/Severe conditions considered and evaluated for: Heart failure, ACS, myocarditis, pericarditis    I independently reviewed and interpreted EKG and chest x-ray which showed right lower lobe pneumonia.  See full radiology report for all details    In additional to work up documented, I considered the following work up: CT angiogram but D-dimer was negative    Medications given that require intensive monitoring for toxicity: None    External consultation:    Discussion of management with another provider: None    Complicating factors:    Care impacted by chronic illness: tobacco use, congestive heart failure    Care affected by social determinants of health: cocaine use, methamphetamine use, alcohol use    Disposition considerations: Considered admission but patient had a good cause for his symptoms and we were able to manage as an outpatient.  Prescriptions considered/prescribed: Doxycycline    At the conclusion of the encounter I discussed  the results of all of the tests and the disposition with patient.   All questions were answered.  The patient acknowledged understanding and was involved in the decision making regarding the overall care plan.      I discussed with patient the utility, limitations and findings  of the exam/interventions/studies done during this visit as well as the list of differential diagnosis and symptoms to monitor/return to ER for.  Additional verbal discharge instructions were provided.     MEDICATIONS GIVEN IN THE EMERGENCY:  Medications   0.9% sodium chloride BOLUS (0 mLs Intravenous Stopped 6/23/23 2204)   0.9% sodium chloride BOLUS (0 mLs Intravenous Stopped 6/23/23 2304)   doxycycline hyclate (VIBRAMYCIN) capsule 100 mg (100 mg Oral $Given 6/23/23 2304)   acetaminophen (TYLENOL) tablet 650 mg (650 mg Oral $Given 6/23/23 2347)       NEW PRESCRIPTIONS STARTED AT TODAY'S ER VISIT  Discharge Medication List as of 6/23/2023 11:52 PM      START taking these medications    Details   doxycycline hyclate (VIBRAMYCIN) 100 MG capsule Take 1 capsule (100 mg) by mouth 2 times daily for 7 days, Disp-14 capsule, R-0, E-Prescribe                =================================================================    HPI    Triage Note: Pt has hx heart failure with EF of 56%. Ran out of spirolactone 3 days ago. States BP has been high. And feels heart racing. No sob. No chest pain. Does have pain in kidneys. No n/v or dizziness. No cp or sob    Patient information was obtained from: patient    Use of : N/A      Ubaldo MALIN Agustin is a 37 year old male who presents to the emergency department via walk-in for evaluation of rapid heart rate.  He reports that he left work early today and was supposed to take his spironolactone but had missed a few days.  He finally got up from the pharmacy and took it and felt like his heart rate was coming down and then it started going back up.  He took his carvedilol early to see if that would help but was still noticing elevated heart rate at 115 at rest in the 120s to 130s with any activity.  He is not having any chest pain or shortness of breath or cough.  He says he feels like he can feel his kidneys and reports little back discomfort.  He complains of a head cold but not  having a significant cough or shortness of breath.  He does not know if he has been having fevers.  On arrival he had a heart rate of 143.    PAST MEDICAL HISTORY:  Past Medical History:   Diagnosis Date     ADHD      Cocaine use      Electronic cigarette use      Methamphetamine use (H)      NICM (nonischemic cardiomyopathy) (H)      Systolic heart failure (H)      Tobacco abuse        PAST SURGICAL HISTORY:  Past Surgical History:   Procedure Laterality Date     CV CORONARY ANGIOGRAM N/A 4/29/2019    Procedure: CV CORONARY ANGIOGRAM;  Surgeon: Reggie Hua MD;  Location:  HEART CARDIAC CATH LAB     CV RIGHT HEART CATH MEASUREMENTS RECORDED N/A 4/29/2019    Procedure: Right Heart Cath;  Surgeon: Reggie Hua MD;  Location:  HEART CARDIAC CATH LAB     EXTRACTION(S) DENTAL      [place] teeth     HAND SURGERY Left     Laceration left index finger       CURRENT MEDICATIONS:    No current facility-administered medications for this encounter.    Current Outpatient Medications:      doxycycline hyclate (VIBRAMYCIN) 100 MG capsule, Take 1 capsule (100 mg) by mouth 2 times daily for 7 days, Disp: 14 capsule, Rfl: 0     carvedilol (COREG) 25 MG tablet, Take 1 tablet (25 mg) by mouth 2 times daily (with meals), Disp: 180 tablet, Rfl: 3     dapagliflozin (FARXIGA) 10 MG TABS tablet, Take 1 tablet (10 mg) by mouth daily, Disp: 90 tablet, Rfl: 3     sacubitril-valsartan (ENTRESTO)  MG per tablet, Take 1 tablet by mouth 2 times daily, Disp: 180 tablet, Rfl: 1     spironolactone (ALDACTONE) 25 MG tablet, Take 1 tablet (25 mg) by mouth daily, Disp: 90 tablet, Rfl: 3    ALLERGIES:  Allergies   Allergen Reactions     Amoxicillin Hives       FAMILY HISTORY:  Family History   Problem Relation Age of Onset     Chronic Obstructive Pulmonary Disease Father      Multiple Sclerosis Maternal Aunt        SOCIAL HISTORY:   Social History     Socioeconomic History     Marital status: Single     Number of children: 1    Occupational History     Occupation: [employer]   Tobacco Use     Smoking status: Former     Packs/day: 1.00     Years: 15.00     Pack years: 15.00     Types: Cigarettes     Smokeless tobacco: Never     Tobacco comments:     vaping only   Substance and Sexual Activity     Alcohol use: Not Currently     Comment: 8-10 beers a day, now 1 beer a week.     Drug use: Not Currently     Types: Methamphetamines, Cocaine     Comment: Methamphetamine last used 8 weeks ago   Social History Narrative    Single but has long standing SO who he lives with, has one child.  (last updated 4/24/2019)        PHYSICAL EXAM    VITAL SIGNS: /54   Pulse 112   Temp (!) 102.7  F (39.3  C) (Oral)   Resp 20   Wt 93.9 kg (207 lb)   SpO2 95%   BMI 25.87 kg/m     GENERAL: Awake, alert, answering questions appropriately, no acute distress  SPEECH:  Easy to understand speech, Normal volume and yuriy  PULMONARY: No respiratory distress, Lungs clear to auscultation bilaterally  CARDIOVASCULAR: Regular tachycardic rate and rhythm, Distal pulses present and normal.  ABDOMINAL: Soft, Nondistended, Nontender, No rebound or guarding, No palpable masses  EXTREMITIES: No lower extremity edema.  PSYCH: Normal mood and affect     LAB:  All pertinent labs reviewed and interpreted.  Results for orders placed or performed during the hospital encounter of 06/23/23   Chest XR,  PA & LAT    Impression    IMPRESSION: Mild opacity in the right lower lung suspicious for pneumonia. Left lung clear. No pneumothorax or pleural effusion. Normal heart size. There are old rib fractures posteriorly bilaterally.   Basic metabolic panel   Result Value Ref Range    Sodium 137 136 - 145 mmol/L    Potassium 3.8 3.4 - 5.3 mmol/L    Chloride 101 98 - 107 mmol/L    Carbon Dioxide (CO2) 23 22 - 29 mmol/L    Anion Gap 13 7 - 15 mmol/L    Urea Nitrogen 14.2 6.0 - 20.0 mg/dL    Creatinine 1.04 0.67 - 1.17 mg/dL    Calcium 9.9 8.6 - 10.0 mg/dL    Glucose 142 (H) 70 - 99  mg/dL    GFR Estimate >90 >60 mL/min/1.73m2   Result Value Ref Range    Troponin T, High Sensitivity <6 <=22 ng/L   Result Value Ref Range    Magnesium 1.7 1.7 - 2.3 mg/dL   Nt probnp inpatient (BNP)   Result Value Ref Range    N terminal Pro BNP Inpatient 267 0 - 450 pg/mL   CBC with platelets and differential   Result Value Ref Range    WBC Count 24.0 (H) 4.0 - 11.0 10e3/uL    RBC Count 4.82 4.40 - 5.90 10e6/uL    Hemoglobin 15.2 13.3 - 17.7 g/dL    Hematocrit 43.4 40.0 - 53.0 %    MCV 90 78 - 100 fL    MCH 31.5 26.5 - 33.0 pg    MCHC 35.0 31.5 - 36.5 g/dL    RDW 12.0 10.0 - 15.0 %    Platelet Count 182 150 - 450 10e3/uL    % Neutrophils 84 %    % Lymphocytes 4 %    % Monocytes 9 %    % Eosinophils 1 %    % Basophils 0 %    % Immature Granulocytes 2 %    NRBCs per 100 WBC 0 <1 /100    Absolute Neutrophils 20.6 (H) 1.6 - 8.3 10e3/uL    Absolute Lymphocytes 0.9 0.8 - 5.3 10e3/uL    Absolute Monocytes 2.0 (H) 0.0 - 1.3 10e3/uL    Absolute Eosinophils 0.1 0.0 - 0.7 10e3/uL    Absolute Basophils 0.0 0.0 - 0.2 10e3/uL    Absolute Immature Granulocytes 0.4 <=0.4 10e3/uL    Absolute NRBCs 0.0 10e3/uL   Lactic acid whole blood   Result Value Ref Range    Lactic Acid 1.1 0.7 - 2.0 mmol/L   D dimer quantitative   Result Value Ref Range    D-Dimer Quantitative <0.27 0.00 - 0.50 ug/mL FEU   Symptomatic Influenza A/B, RSV, & SARS-CoV2 PCR (COVID-19) Nose    Specimen: Nose; Swab   Result Value Ref Range    Influenza A PCR Negative Negative    Influenza B PCR Negative Negative    RSV PCR Negative Negative    SARS CoV2 PCR Negative Negative   ECG 12-LEAD WITH MUSE (LHE)   Result Value Ref Range    Systolic Blood Pressure  mmHg    Diastolic Blood Pressure  mmHg    Ventricular Rate 128 BPM    Atrial Rate 128 BPM    NE Interval 160 ms    QRS Duration 90 ms     ms    QTc 408 ms    P Axis 54 degrees    R AXIS -49 degrees    T Axis 47 degrees    Interpretation ECG       Sinus tachycardia  Possible Left atrial enlargement  Left  anterior fascicular block  Abnormal ECG  When compared with ECG of 25-APR-2019 13:08,  Left anterior fascicular block is now Present  Minimal criteria for Anterior infarct are no longer Present  Nonspecific T wave abnormality no longer evident in Inferior leads  T wave inversion no longer evident in Lateral leads  Confirmed by SEE ED PROVIDER NOTE FOR, ECG INTERPRETATION ([id number]),  CATHERINE BRYSON ([id number]) on 6/24/2023 12:10:15 AM         RADIOLOGY:  Chest XR,  PA & LAT   Final Result   IMPRESSION: Mild opacity in the right lower lung suspicious for pneumonia. Left lung clear. No pneumothorax or pleural effusion. Normal heart size. There are old rib fractures posteriorly bilaterally.              EKG:    Date and time: June 23, 2023 at 2051  Rate: 128 bpm  Rhythm: Sinus tachycardia  WI interval: 160 ms  QRS interval: 90 ms  QT/QTc: 280/408 ms  ST changes or T wave changes: No acute ST or T wave abnormalities  Change from prior ECG: Nonspecific T wave abnormalities no longer present from previous EKG.  I have independently reviewed and interpreted this EKG.     Yani Yanez M.D.  Emergency Medicine  [facility] EMERGENCY DEPARTMENT  [address] [facility] [address]  [phone]  Dept: [phone]       Yani Yanez MD  06/24/23 6532

## 2023-06-27 RX ORDER — SPIRONOLACTONE 25 MG/1
25 TABLET ORAL DAILY
Qty: 90 TABLET | Refills: 3 | OUTPATIENT
Start: 2023-06-27

## 2023-07-30 ENCOUNTER — HEALTH MAINTENANCE LETTER (OUTPATIENT)
Age: 38
End: 2023-07-30

## 2023-11-09 ENCOUNTER — TELEPHONE (OUTPATIENT)
Dept: SLEEP MEDICINE | Facility: CLINIC | Age: 38
End: 2023-11-09

## 2023-11-09 ENCOUNTER — OFFICE VISIT (OUTPATIENT)
Dept: SLEEP MEDICINE | Facility: CLINIC | Age: 38
End: 2023-11-09
Attending: INTERNAL MEDICINE
Payer: COMMERCIAL

## 2023-11-09 VITALS
HEART RATE: 80 BPM | OXYGEN SATURATION: 100 % | WEIGHT: 212.25 LBS | BODY MASS INDEX: 26.39 KG/M2 | HEIGHT: 75 IN | SYSTOLIC BLOOD PRESSURE: 145 MMHG | DIASTOLIC BLOOD PRESSURE: 82 MMHG

## 2023-11-09 DIAGNOSIS — R06.83 SNORING: Primary | ICD-10-CM

## 2023-11-09 DIAGNOSIS — G47.63 SLEEP RELATED BRUXISM: ICD-10-CM

## 2023-11-09 DIAGNOSIS — I10 PRIMARY HYPERTENSION: ICD-10-CM

## 2023-11-09 DIAGNOSIS — I50.22 CHRONIC SYSTOLIC HEART FAILURE (H): ICD-10-CM

## 2023-11-09 PROCEDURE — 99204 OFFICE O/P NEW MOD 45 MIN: CPT | Performed by: INTERNAL MEDICINE

## 2023-11-09 ASSESSMENT — SLEEP AND FATIGUE QUESTIONNAIRES
HOW LIKELY ARE YOU TO NOD OFF OR FALL ASLEEP WHEN YOU ARE A PASSENGER IN A CAR FOR AN HOUR WITHOUT A BREAK: WOULD NEVER DOZE
HOW LIKELY ARE YOU TO NOD OFF OR FALL ASLEEP WHILE WATCHING TV: SLIGHT CHANCE OF DOZING
HOW LIKELY ARE YOU TO NOD OFF OR FALL ASLEEP WHILE SITTING AND READING: WOULD NEVER DOZE
HOW LIKELY ARE YOU TO NOD OFF OR FALL ASLEEP IN A CAR, WHILE STOPPED FOR A FEW MINUTES IN TRAFFIC: WOULD NEVER DOZE
HOW LIKELY ARE YOU TO NOD OFF OR FALL ASLEEP WHILE LYING DOWN TO REST IN THE AFTERNOON WHEN CIRCUMSTANCES PERMIT: MODERATE CHANCE OF DOZING
HOW LIKELY ARE YOU TO NOD OFF OR FALL ASLEEP WHILE SITTING QUIETLY AFTER LUNCH WITHOUT ALCOHOL: WOULD NEVER DOZE
HOW LIKELY ARE YOU TO NOD OFF OR FALL ASLEEP WHILE SITTING INACTIVE IN A PUBLIC PLACE: WOULD NEVER DOZE
HOW LIKELY ARE YOU TO NOD OFF OR FALL ASLEEP WHILE SITTING AND TALKING TO SOMEONE: WOULD NEVER DOZE

## 2023-11-09 NOTE — PROGRESS NOTES
Additional 15 minutes on the date of service was spent performing the following:    -Preparing to see the patient  -Obtaining and/or reviewing separately obtained history   -Ordering medications, tests, or procedures   -Documenting clinical information in the electronic or other health record     Assessment and Plan:    In summary Ubaldo Velez is a 37 year old year old male here for sleep disturbance.  1.  Snoring/Heart Failure/Hypertension/Sleep-Related Bruxism   Ubaldo Velez has high risk for obstructive sleep apnea based on the history of snoring, hypertension and a crowded airway. I educated the patient on the underlying pathophysiology of obstructive sleep apnea. We reviewed the risks associated with sleep apnea, including increased cardiovascular risk and overall death. We talked about treatments briefly. I recommend getting an split-night nocturnal polysomnography. The patient should return to the clinic to discuss results and treatment option in a patient-centered approach.    History of present illness:    He is a 37 year old male who comes to the clinic with a chief complaint of snoring that has been going on for more than a year.  While the patient denies any episodes of witnessed apnea he has been told that he does have loud snoring during sleep.  Complicating matters further is the fact that he has a history of heart failure and high blood pressure that is currently being treated.  His cardiologist wants him to get ruled out for obstructive sleep apnea.  The patient denies any episodes of excessive daytime sleepiness or fatigue.  He does have a history of grinding his teeth during sleep.     Sleep-Wake Cycle:      TIME IN BED:    1) Work/School Days:    Do you work or go to school? Yes   What time do you usually get into bed? 930pm   About how long does it take you to fall asleep? 30-45 min   How often do you have trouble falling asleep? 2-3   How often do you wake up during the night? 1   Do you  work days/evenings/nights/rotating shifts? Days   What wakes you up at night? Use the bathroom   How often do you have trouble falling back to sleep? never   About how long does it take to fall back to sleep? 5 min or so   What do you usually do if you have trouble getting back to sleep? listen to podcast   What time do you usually get out of bed to start your day? 8am   Do you use an alarm? Yes   2) Weekends/Non-work Days/All Other Days    What time do you usually get into bed? 11pm   About how long does it take you to fall asleep? same   What time do you usually get out of bed to start your day? 9-930am   Do you use an alarm? No   SLEEP NEED    On average, about how much sleep do you think you get? 6-7 hrs   About how much sleep do you think you need? 8 hrs   SLEEP POSITION    Which sleep positions do you prefer? Side   Do you do any of the following activities in bed? Use phone, computer, or tablet   How often do you take a nap on purpose? 1   About how long are your naps? 1hr   Do you feel better after naps? Yes   How often do you doze off unintentionally? 0   Have you ever had a driving accident or near-miss due to sleepiness/drowsiness? No     Stop Bang Questionnaire    Question 11/9/2023  3:01 PM CST - Filed by Patient   Do you SNORE loudly (louder than talking or loud enough to be heard through closed doors)? Yes   Do you often feel TIRED, fatigued, or sleepy during daytime? No   Has anyone OBSERVED you stop breathing during your sleep? No   Do you have or are you being treated for high blood PRESSURE? Yes   BMI more than 35kg/m2? No   AGE over 50 years old? No   NECK circumference > 16 inches (40cm)? Yes   GENDER: Male? Yes   STOP-BANG Total Score (range: 0 - 8) 3 (High risk of HARPER) Critical      ROCKY:  ROCKY Total Score: 9  Total score - [place]: 3 (11/9/2023  2:59 PM)     Patient told to return in one week after the sleep study is interpreted.    Patient Active Problem List   Diagnosis    Acute systolic  congestive heart failure (H)    Elevation of cardiac enzymes    Methamphetamine use (H)    Cocaine use    ADHD (attention deficit hyperactivity disorder)    Acute bronchitis    CHF (congestive heart failure) (H)    Acute on chronic HFrEF (heart failure with reduced ejection fraction) (H)    Embolism and thrombosis (H)    Long term current use of anticoagulant    Digital nerve laceration, finger    Left ventricular mural thrombus       Past Medical History  Past Medical History:   Diagnosis Date    ADHD     Cocaine use     Electronic cigarette use     Methamphetamine use (H)     NICM (nonischemic cardiomyopathy) (H)     Systolic heart failure (H)     Tobacco abuse         Past Surgical History  Past Surgical History:   Procedure Laterality Date    CV CORONARY ANGIOGRAM N/A 4/29/2019    Procedure: CV CORONARY ANGIOGRAM;  Surgeon: Reggie Hua MD;  Location:  HEART CARDIAC CATH LAB    CV RIGHT HEART CATH MEASUREMENTS RECORDED N/A 4/29/2019    Procedure: Right Heart Cath;  Surgeon: Reggie Hua MD;  Location:  HEART CARDIAC CATH LAB    EXTRACTION(S) DENTAL      [place] teeth    HAND SURGERY Left     Laceration left index finger        Meds  Current Outpatient Medications   Medication Sig Dispense Refill    carvedilol (COREG) 25 MG tablet Take 1 tablet (25 mg) by mouth 2 times daily (with meals) 180 tablet 3    dapagliflozin (FARXIGA) 10 MG TABS tablet Take 1 tablet (10 mg) by mouth daily 90 tablet 3    sacubitril-valsartan (ENTRESTO)  MG per tablet Take 1 tablet by mouth 2 times daily 180 tablet 1    spironolactone (ALDACTONE) 25 MG tablet Take 1 tablet (25 mg) by mouth daily 90 tablet 3        Allergies  Amoxicillin     Social History  Social History     Socioeconomic History    Marital status: Single     Spouse name: Not on file    Number of children: 1    Years of education: Not on file    Highest education level: Not on file   Occupational History    Occupation: [employer]   Tobacco Use  "   Smoking status: Former     Packs/day: 1.00     Years: 15.00     Additional pack years: 0.00     Total pack years: 15.00     Types: Cigarettes    Smokeless tobacco: Never    Tobacco comments:     vaping only   Substance and Sexual Activity    Alcohol use: Not Currently     Comment: 8-10 beers a day, now 1 beer a week.    Drug use: Not Currently     Types: Methamphetamines, Cocaine     Comment: Methamphetamine last used 8 weeks ago    Sexual activity: Not on file   Other Topics Concern    Not on file   Social History Narrative    Single but has long standing SO who he lives with, has one child.  (last updated 4/24/2019)      Social Determinants of Health     Financial Resource Strain: Not on file   Food Insecurity: Not on file   Transportation Needs: Not on file   Physical Activity: Not on file   Stress: Not on file   Social Connections: Not on file   Interpersonal Safety: Not on file   Housing Stability: Not on file        Family History  Family History   Problem Relation Age of Onset    Chronic Obstructive Pulmonary Disease Father     Multiple Sclerosis Maternal Aunt        Review of Systems:  Constitutional: Negative except as noted in HPI.   Eyes: Negative except as noted in HPI.   ENT: Negative except as noted in HPI.   Cardiovascular: Negative except as noted in HPI.   Respiratory: Negative except as noted in HPI.   Gastrointestinal: Negative except as noted in HPI.   Genitourinary: Negative except as noted in HPI.   Musculoskeletal: Negative except as noted in HPI.   Integumentary: Negative except as noted in HPI.   Neurological: Negative except as noted in HPI.   Psychiatric: Negative except as noted in HPI.   Endocrine: Negative except as noted in HPI.   Hematologic/Lymphatic: Negative except as noted in HPI.      Physical Exam:  BP (!) 145/82   Pulse 80   Ht 1.892 m (6' 2.5\")   Wt 96.3 kg (212 lb 4 oz)   SpO2 100%   BMI 26.89 kg/m    BMI:Body mass index is 26.89 kg/m .   GEN: NAD, appropriate for " "age  Head: Normocephalic.  EYES: PERRLA, EOMI  ENT: Oropharynx is clear, Ng class 3+ airway. Uvula is intact  Nasal mucosa is moist without erythema  Neck : Thyroid is within normal limits.   CV: Regular rate and rhythm, S1 & S2 positive.  LUNGS: Bilateral breathsounds heard.   ABDOMEN: Positive bowel sounds in all quadrants, soft, no rebound or guarding  MUSCULOSKELETAL: No leg swelling  SKIN: warm, dry, no rashes  Neurological: Alert, Gait is normal. Strength 5/5 in all extremities.  Psych: normal mood, normal affect     Labs/Studies:     No results found for: \"PH\", \"PHARTERIAL\", \"PO2\", \"[id number]\", \"SAT\", \"PCO2\", \"HCO3\", \"BASEEXCESS\", \"KETURAH\", \"BEB\"  Lab Results   Component Value Date    TSH 3.01 05/01/2023    TSH 1.55 04/25/2019     Lab Results   Component Value Date     (H) 06/23/2023     (H) 05/01/2023     Lab Results   Component Value Date    HGB 15.2 06/23/2023    HGB 17.6 05/01/2023     Lab Results   Component Value Date    BUN 14.2 06/23/2023    BUN 15.3 05/01/2023    CR 1.04 06/23/2023    CR 1.18 (H) 05/01/2023     Lab Results   Component Value Date    AST 41 04/27/2019    AST 48 (H) 04/26/2019    ALT 19 05/01/2023    ALT 49 04/27/2019    ALKPHOS 74 04/27/2019    ALKPHOS 81 04/26/2019    BILITOTAL 0.6 04/27/2019    BILITOTAL 0.6 04/26/2019     Lab Results   Component Value Date    UAMP Screen Negative 11/18/2021    UAMP Negative 05/14/2021    UBARB Screen Negative 11/18/2021    UBARB Negative 05/14/2021    BENZODIAZEUR Negative 05/14/2021    UCANN Screen Negative 11/18/2021    UCANN Negative 05/14/2021    UCOC Screen Negative 11/18/2021    UCOC Negative 05/14/2021    OPIT Screen Negative 11/18/2021    OPIT Negative 05/14/2021    UPCP Screen Negative 11/18/2021    UPCP Negative 05/14/2021         Patient verbalized understanding of these issues, agrees with the plan and all questions were answered today. Patient was given an opportuntity to voice any other symptoms or concerns not " listed above. Patient did not have any other symptoms or concerns.         Joe Urbina DO,   Board Certified in Internal Medicine and Sleep Medicine    (Note created with Dragon voice recognition and unintended spelling errors and word substitutions may occur)

## 2023-11-09 NOTE — NURSING NOTE
"Chief Complaint   Patient presents with    Consult     Chronic systolic heart failure (H)    Sleep Problem     Wake up heart racing x 3-4 times week in the last 6 months        Initial BP (!) 145/82   Pulse 80   Ht 1.892 m (6' 2.5\")   Wt 96.3 kg (212 lb 4 oz)   SpO2 100%   BMI 26.89 kg/m   Estimated body mass index is 26.89 kg/m  as calculated from the following:    Height as of this encounter: 1.892 m (6' 2.5\").    Weight as of this encounter: 96.3 kg (212 lb 4 oz).    Medication Reconciliation: complete    Neck circumference:  42 centimeters.    DME:     [facility]  [facility]    "

## 2023-11-09 NOTE — PATIENT INSTRUCTIONS
Patient education: What is a sleep study?     What is a sleep study? -- A sleep study is a test that measures how well you sleep and checks for sleep problems. For some sleep studies, you stay overnight in a sleep lab at a hospital or sleep center.     What happens during a sleep study? -- Before you go to sleep, a technician attaches small, sticky patches called  electrodes  to your head, chest, and legs. He or she will also place a small tube beneath your nose and might wrap 1 or 2 belts around your chest.   Each of these items has wires that connect to monitors. The monitors record your movement, brain activity, breathing, and other body functions while you sleep.  If you have a history of trouble falling asleep, your doctor might prescribe a medicine to help you fall asleep in the lab. If you have never taken the medicine before, your doctor might ask you take it on a night before your sleep study to see how it affects you.   Why might my doctor order a sleep study? -- Your doctor will order a sleep study if he or she thinks you have sleep apnea or a different condition that makes you:   ?Have sudden jerking leg movements while you sleep, called  periodic limb movements.    ?Feel very sleepy during the day and fall asleep all of a sudden, called  narcolepsy.    ?Have trouble falling asleep or staying asleep over a long period of time, called  chronic insomnia.    ?Do odd things while you sleep, such as walking.  How should I prepare for a sleep study? -- On the day of your sleep study, you should:   ?Avoid alcohol   ?Avoid drinking coffee, tea, sodas, and other drinks that have caffeine in the afternoon and evening   ?Take all of your regular medicines     The cost of care estimate line is [phone]. They are able to give the patient an estimate of the charges and also an estimate of their insurance coverage/patient responsibility.   After your sleep study is performed, please call us at [phone] or  [phone]  to schedule for a follow up to review the results of the sleep study.    Please bring one tab of low dose melatonin 3 mg or less to the night of the study.    Melatonin intake is completely voluntary.    You may take own melatonin after arrival to sleep center. Do not drive or operate machinery after intake of melatonin.     Please make every effort you can to sleep on your back with one pillow behind your head.    Please also call your insurance company next week to see if your sleep study is approved and find out your co-pay.

## 2023-11-19 DIAGNOSIS — I50.22 CHRONIC SYSTOLIC HEART FAILURE (H): ICD-10-CM

## 2023-11-20 DIAGNOSIS — I50.22 CHRONIC SYSTOLIC HEART FAILURE (H): ICD-10-CM

## 2023-11-22 NOTE — TELEPHONE ENCOUNTER
dapagliflozin (FARXIGA) 10 MG TABS tablet 90 tablet 3 11/22/2022       Last Office Visit: 5/1/23  Future Office visit:   none      Routing refill request to provider for review/approval because:  Not on cardiology refill protocol    Dalila Garcia RN

## 2023-11-24 RX ORDER — DAPAGLIFLOZIN 10 MG/1
10 TABLET, FILM COATED ORAL DAILY
Qty: 90 TABLET | Refills: 3 | OUTPATIENT
Start: 2023-11-24

## 2023-11-24 RX ORDER — DAPAGLIFLOZIN 10 MG/1
10 TABLET, FILM COATED ORAL DAILY
Qty: 90 TABLET | Refills: 3 | Status: SHIPPED | OUTPATIENT
Start: 2023-11-24 | End: 2024-03-14

## 2023-11-24 NOTE — TELEPHONE ENCOUNTER
dapagliflozin (FARXIGA) 10 MG TABS tablet 90 tablet 3 11/24/2023     Above refill sent in a different encounter, refusal for current one sent to pharmacy.    Dalila Garcia RN

## 2023-12-20 NOTE — TELEPHONE ENCOUNTER
"SBAR  Name:   Ubaldo Velez   YOB: 1985 Age:  33 year old Gender:  male   Referral Source: Self   Referral JESÚS: N/A   Insurance: Medicaid - sending in paperwork to [facility] today.      Precipitating Event: Treatment due to pressure from family members to get help and Medical providers     DOC: Alcohol    Additional abused substances: Cocaine/Crack, Meth/Amphetamines and Nicotine     Medical:   Past Medical History:   Diagnosis Date     ADHD      Cocaine use      Methamphetamine use (H)      Has current heart failure, most likely do to polysubstance use.    Mental Health: No current clinical mental health issues     Prior Detox admissions: No prior IP detoxification admission(s).    Prior CD treatments: No prior CD treatment(s).     Psychosocial history:   Living with a partner    1 minor child(maria de jesus)    Stable housing and no concerns    Good support network, History of legal issues and Stable finances     [place] Suicide Risk Status:  Past month: 0. - Very Low Risk:  Evaluation Counselors:  Document in Epic / LRNAR to counselor \"Very Low Risk\".      Treatment Counselors:  Reassess upon admission as applicable, assess weekly in progress notes under Dim[facility] 3 and summarize in Discharge / Treatment summary under [facility].    Past 24 hours: 0. - Very Low Risk:  Evaluation Counselors:  Document in Epic / SBAR to counselor \"Very Low Risk\".      Treatment Counselors:  Reassess upon admission as applicable, assess weekly in progress notes under [facility] 3 and summarize in Discharge / Treatment summary under [facility].     Additional Info as needed: Pt admitted to cardiology unit to due heart failure. Will need to complete treatment in order to be a canidate for transplant if he ends up needing one.          " none

## 2024-01-03 ENCOUNTER — MYC REFILL (OUTPATIENT)
Dept: CARDIOLOGY | Facility: CLINIC | Age: 39
End: 2024-01-03
Payer: MEDICAID

## 2024-01-03 DIAGNOSIS — I50.22 CHRONIC SYSTOLIC HEART FAILURE (H): ICD-10-CM

## 2024-01-03 RX ORDER — SACUBITRIL AND VALSARTAN 97; 103 MG/1; MG/1
1 TABLET, FILM COATED ORAL 2 TIMES DAILY
Qty: 180 TABLET | Refills: 1 | Status: SHIPPED | OUTPATIENT
Start: 2024-01-03 | End: 2024-04-01

## 2024-01-03 NOTE — TELEPHONE ENCOUNTER
5/1/2023  [facility]  Cardi[facility] Disease   Plan RTc one year with ECHO    sacubitril-valsartan (ENTRESTO)  MG per tablet 180 tablet 1 5/1/2023 -- No   Sig - Route: Take 1 tablet by mouth 2 times daily   Overridden by Maddie Choi MD on May 1, 2023 10:15 AM

## 2024-02-13 DIAGNOSIS — I50.22 CHRONIC SYSTOLIC HEART FAILURE (H): ICD-10-CM

## 2024-02-19 RX ORDER — CARVEDILOL 25 MG/1
25 TABLET ORAL 2 TIMES DAILY WITH MEALS
Qty: 180 TABLET | Refills: 0 | Status: SHIPPED | OUTPATIENT
Start: 2024-02-19 | End: 2024-03-29

## 2024-03-14 DIAGNOSIS — I50.22 CHRONIC SYSTOLIC HEART FAILURE (H): ICD-10-CM

## 2024-03-14 RX ORDER — DAPAGLIFLOZIN 10 MG/1
10 TABLET, FILM COATED ORAL DAILY
Qty: 90 TABLET | Refills: 3 | Status: SHIPPED | OUTPATIENT
Start: 2024-03-14 | End: 2024-09-12

## 2024-03-29 ENCOUNTER — MYC MEDICAL ADVICE (OUTPATIENT)
Dept: CARDIOLOGY | Facility: CLINIC | Age: 39
End: 2024-03-29
Payer: MEDICAID

## 2024-03-29 DIAGNOSIS — I50.22 CHRONIC SYSTOLIC HEART FAILURE (H): ICD-10-CM

## 2024-03-29 RX ORDER — CARVEDILOL 25 MG/1
25 TABLET ORAL 2 TIMES DAILY WITH MEALS
Qty: 180 TABLET | Refills: 0 | Status: SHIPPED | OUTPATIENT
Start: 2024-03-29 | End: 2024-08-23

## 2024-03-29 RX ORDER — SPIRONOLACTONE 25 MG/1
25 TABLET ORAL DAILY
Qty: 90 TABLET | Refills: 3 | Status: SHIPPED | OUTPATIENT
Start: 2024-03-29

## 2024-03-29 NOTE — TELEPHONE ENCOUNTER
Ubaldo now has Medical assistance but not full coverage.  He's been having trouble getting his medications    Entresto - out of 3 weeks, maybe 4 weeks- but been taking a half dose to spread it out.    Spironolactone - about a month $650/90  Farxiga- about a month but just got patient assistance.   Coreg- has enough of- doable, $250/90 days.      Date: 4/1/2024    Time of Call: 8:16 AM     Diagnosis:  Heart failure      [ VORB ] Ordering provider: Dr Choi    Order: Stop Entresto, start Lisinopril 40 mg daily      Order received by: Carrie Tanner, RN       Follow-up/additional notes: sent Lisinopril and Coreg to [facility] $4 program. [place] on patient assistance.  Spironolactone using goodrx

## 2024-04-01 RX ORDER — LISINOPRIL 20 MG/1
40 TABLET ORAL DAILY
Qty: 180 TABLET | Refills: 3 | Status: SHIPPED | OUTPATIENT
Start: 2024-04-01

## 2024-07-03 DIAGNOSIS — I50.22 CHRONIC SYSTOLIC HEART FAILURE (H): Primary | ICD-10-CM

## 2024-07-05 ENCOUNTER — TELEPHONE (OUTPATIENT)
Dept: CARDIOLOGY | Facility: CLINIC | Age: 39
End: 2024-07-05
Payer: MEDICAID

## 2024-07-05 NOTE — TELEPHONE ENCOUNTER
7/8/2024 6:03PM lAexsandra Barone  Patient confirmed rescheduled appointment:  Date: 9/12/2024  Time: 1:30PM  Visit type: Return Heart Failure  Provider: Dr. Choi  [place] [address], [place] [address]  Testing/imaging: Labs (1st Floor Imaging) prior at 1PM- may reschedule depending on echo  Additional notes: 7/8 Rescheduled RHF w/ Dr. Choi 9/12 w/ labs prior. Echo order does not extend to 9/12 so sent a message to Carrie and told pt I would follow-up w/ pt tomorrow to schedule echo on 9/12. LEILA Barone 7/8/2024 6:03PM        7/5/2024 2:16PM Alexsandra Barone  Patient has not yet confirmed rescheduled appointment:  Date: 7/11/2024  Time: 4PM  Visit type: Return Heart Failure  Provider: Dr. Castro (Dr. Choi not available)  Location: [address], [address]rd [place] [address]  Testing/imaging: Labs (1st Floor Imaging) prior at 2:30PM, Echo (3rd Floor D&T) prior at 3PM  Additional notes: 7/5 LVM and MYC to let pt know Dr. Choi is not available and Dr. Castro will be filling in for RHF 7/11. Pt has not confirmed appt. LEILA   Alexsandra Barone 7/5/2024 2:16PM

## 2024-08-21 ENCOUNTER — MYC REFILL (OUTPATIENT)
Dept: CARDIOLOGY | Facility: CLINIC | Age: 39
End: 2024-08-21
Payer: MEDICAID

## 2024-08-21 DIAGNOSIS — I50.22 CHRONIC SYSTOLIC HEART FAILURE (H): ICD-10-CM

## 2024-08-23 DIAGNOSIS — I50.22 CHRONIC SYSTOLIC HEART FAILURE (H): ICD-10-CM

## 2024-08-23 RX ORDER — CARVEDILOL 25 MG/1
25 TABLET ORAL 2 TIMES DAILY WITH MEALS
Qty: 180 TABLET | Refills: 1 | Status: SHIPPED | OUTPATIENT
Start: 2024-08-23

## 2024-08-24 RX ORDER — CARVEDILOL 25 MG/1
25 TABLET ORAL 2 TIMES DAILY WITH MEALS
Start: 2024-08-24

## 2024-08-24 NOTE — TELEPHONE ENCOUNTER
carvedilol (COREG) 25 MG tablet   Disp-180 tablet, R-1,   Start: 08/23/2024 5/1/2023  [facility]  Cardiovascular Disease    Signed yesterday refused.

## 2024-09-12 ENCOUNTER — MYC REFILL (OUTPATIENT)
Dept: CARDIOLOGY | Facility: CLINIC | Age: 39
End: 2024-09-12

## 2024-09-12 DIAGNOSIS — I50.22 CHRONIC SYSTOLIC HEART FAILURE (H): ICD-10-CM

## 2024-09-16 NOTE — TELEPHONE ENCOUNTER
dapagliflozin (FARXIGA) 10 MG TABS tablet 90 tablet 3 3/14/2024 -- No   Sig - Route: Take 1 tablet (10 mg) by mouth daily - Oral   Class: Local Print   Order: [id number]     Last Script was printed -should have refills available        Pt states..Other - [employer] prescription assistance from their website....they mail it to me directly

## 2024-09-17 RX ORDER — DAPAGLIFLOZIN 10 MG/1
10 TABLET, FILM COATED ORAL DAILY
Qty: 90 TABLET | Refills: 3 | Status: SHIPPED | OUTPATIENT
Start: 2024-09-17

## 2024-09-22 ENCOUNTER — HEALTH MAINTENANCE LETTER (OUTPATIENT)
Age: 39
End: 2024-09-22

## 2025-03-15 DIAGNOSIS — I50.22 CHRONIC SYSTOLIC HEART FAILURE (H): ICD-10-CM

## 2025-03-19 ENCOUNTER — MYC REFILL (OUTPATIENT)
Dept: CARDIOLOGY | Facility: CLINIC | Age: 40
End: 2025-03-19
Payer: MEDICAID

## 2025-03-19 DIAGNOSIS — I50.22 CHRONIC SYSTOLIC HEART FAILURE (H): ICD-10-CM

## 2025-03-19 NOTE — TELEPHONE ENCOUNTER
Last Written Prescription:   Disp Refills Start End GILMAR   carvedilol (COREG) 25 MG tablet 180 tablet 1 8/23/2024 -- No   Sig - Route: TAKE 1 TABLET BY MOUTH TWICE DAILY WITH MEALS - Oral     ----------------------  Last Visit Date: 5/1/2023  [facility]      Future Visit Date: 0  ----------------------      Refill decision: Medication unable to be refilled by RN due to: Pt not seen within past 12 months, No FOV or FOV exceeds timeframe per protocol      BP Readings from Last 3 Encounters:   11/09/23 (!) 145/82   06/23/23 125/54   05/01/23 (!) 148/88       Request from pharmacy:  Requested Prescriptions   Pending Prescriptions Disp Refills    carvedilol (COREG) 25 MG tablet [Pharmacy Med Name: Carvedilol 25 MG Oral Tablet] 180 tablet 0     Sig: TAKE 1 TABLET BY MOUTH TWICE DAILY WITH MEALS       Beta-Blockers Protocol Failed - 3/19/2025  4:15 PM        Failed - Most recent blood pressure under 140/90 in past 12 months     BP Readings from Last 3 Encounters:   11/09/23 (!) 145/82   06/23/23 125/54   05/01/23 (!) 148/88       No data recorded            Failed - Recent (12 mo) or future (90 days) visit within the authorizing provider's specialty     The patient must have completed an in-person or virtual visit within the past 12 months or has a future visit scheduled within the next 90 days with the authorizing provider s specialty.  Urgent care and e-visits do not qualify as an office visit for this protocol.          Passed - Patient is age 6 or older        Passed - Medication is active on med list and the sig matches. RN to manually verify dose and sig if red X/fail.     If the protocol passes (green check), you do not need to verify med dose and sig.    A prescription matches if they are the same clinical intention.    For Example: once daily and every morning are the same.    The protocol can not identify upper and lower case letters as matching and will fail.     For Example: Take 1  tablet (50 mg) by mouth daily     TAKE 1 TABLET (50 MG) BY MOUTH DAILY    For all fails (red x), verify dose and sig.    If the refill does match what is on file, the RN can still proceed to approve the refill request.       If they do not match, route to the appropriate provider.             Passed - Medication indicated for associated diagnosis     Medication is associated with one or more of the following diagnoses:     Hypertension (HTN)   Atrial fibrillation/flutter   Angina   ASCVD   Migraine   Heart Failure   Tremor   Anxiety   Ocular hypertension   Glaucoma   PTSD   Obstructive hypertrophic cardiomyopathy   History of myocarditis   Palpitations   POTS (postural orthostatic tachycardia syndrome)   SVT (supraventricular tachycardia)   Hyperthyroidism   Tachycardia   Acute non-st segment elevation myocardial infarction   Subsequent non-st segment elevation myocardial infarction   Ischemic myocardial dysfunction

## 2025-03-20 ENCOUNTER — CARE COORDINATION (OUTPATIENT)
Dept: CARDIOLOGY | Facility: CLINIC | Age: 40
End: 2025-03-20
Payer: MEDICAID

## 2025-03-20 NOTE — PROGRESS NOTES
Received refill requests for Ubaldo. Able to reach Ubaldo today to discuss.  Has not been seen in clinic in almost 2 years, no labs or imaging in almost 2 years.      He has no insurance and has been avoiding coming to clinic due to cost.  He is wondering what the cost of labs, echo and a visit will be.      Will ask [facility] to arrange for FAN visit and provide Ubaldo with cost of care number.

## 2025-03-21 DIAGNOSIS — I50.22 CHRONIC SYSTOLIC HEART FAILURE (H): ICD-10-CM

## 2025-03-21 RX ORDER — CARVEDILOL 25 MG/1
25 TABLET ORAL 2 TIMES DAILY WITH MEALS
Qty: 180 TABLET | Refills: 0 | OUTPATIENT
Start: 2025-03-21

## 2025-03-21 RX ORDER — CARVEDILOL 25 MG/1
25 TABLET ORAL 2 TIMES DAILY WITH MEALS
Qty: 180 TABLET | Refills: 1 | OUTPATIENT
Start: 2025-03-21

## 2025-03-21 NOTE — TELEPHONE ENCOUNTER
spironolactone (ALDACTONE) 25 MG tablet 90 tablet 3 3/29/2024     lisinopril (ZESTRIL) 20 MG tablet 180 tablet 3 4/1/2024     carvedilol (COREG) 25 MG tablet 180 tablet 1 3/21/2025   Addressed in a different encounter.       ----------------------  Last Visit Date: 5/1/23  Future Visit Date: none  ----------------------  Return to clinic in year with an echo or sooner as needed.   RTC 1 year with an echo       Refill decision: Medication unable to be refilled by RN due to: Pt not seen within past 12 months, No FOV or FOV exceeds timeframe per protocol   and Overdue labs/test:      Dalila Garcia RN  [facility] Triage & Med Refill Team       Request from pharmacy:  Requested Prescriptions   Pending Prescriptions Disp Refills    spironolactone (ALDACTONE) 25 MG tablet 90 tablet 3     Sig: Take 1 tablet (25 mg) by mouth daily.       Diuretics (Including Combos) Protocol Failed - 3/21/2025  2:28 PM        Failed - Most recent blood pressure under 140/90 in past 12 months     BP Readings from Last 3 Encounters:   11/09/23 (!) 145/82   06/23/23 125/54   05/01/23 (!) 148/88       No data recorded            Failed - Potassium level on file in past 12 months        Failed - Has GFR on file in past 12 months and most recent value is normal        Failed - Recent (12 mo) or future (90 days) visit within the authorizing provider's specialty     The patient must have completed an in-person or virtual visit within the past 12 months or has a future visit scheduled within the next 90 days with the authorizing provider s specialty.  Urgent care and e-visits do not qualify as an office visit for this protocol.          Passed - Medication is active on med list and the sig matches. RN to manually verify dose and sig if red X/fail.     If the protocol passes (green check), you do not need to verify med dose and sig.    A prescription matches if they are the same clinical intention.    For Example: once daily and  every morning are the same.    The protocol can not identify upper and lower case letters as matching and will fail.     For Example: Take 1 tablet (50 mg) by mouth daily     TAKE 1 TABLET (50 MG) BY MOUTH DAILY    For all fails (red x), verify dose and sig.    If the refill does match what is on file, the RN can still proceed to approve the refill request.       If they do not match, route to the appropriate provider.             Passed - Medication indicated for associated diagnosis     Medication is associated with one or more of the following diagnoses:     Hypertension   Heart Failure   Hyperaldosteronism   Acne   Hirsutism   Hypokalemia   Hepatic Cirrhosis   Nephrotic Syndrome   Myocardial Infarction   Transgender Female   Cardiomyopathy  Congenital Heart Disease  Diastolic Dysfunction          Passed - Patient is age 18 or older          lisinopril (ZESTRIL) 20 MG tablet 180 tablet 3     Sig: Take 2 tablets (40 mg) by mouth daily.       ACE Inhibitors (Including Combos) Protocol Failed - 3/21/2025  2:28 PM        Failed - Most recent blood pressure under 140/90 in past 12 months- Clinicial or Patient Reported     BP Readings from Last 3 Encounters:   11/09/23 (!) 145/82   06/23/23 125/54   05/01/23 (!) 148/88       No data recorded            Failed - Recent (12 mo) or future (90 days) visit within the authorizing provider's specialty     The patient must have completed an in-person or virtual visit within the past 12 months or has a future visit scheduled within the next 90 days with the authorizing provider s specialty.  Urgent care and e-visits do not qualify as an office visit for this protocol.          Failed - Most recent GFR on file in the past 12 months >30        Failed - Normal serum potassium on file in past 12 months     Recent Labs   Lab Test 06/23/23  2118   POTASSIUM 3.8             Passed - Medication is active on med list and the sig matches. RN to manually verify dose and sig if red X/fail.      If the protocol passes (green check), you do not need to verify med dose and sig.    A prescription matches if they are the same clinical intention.    For Example: once daily and every morning are the same.    The protocol can not identify upper and lower case letters as matching and will fail.     For Example: Take 1 tablet (50 mg) by mouth daily     TAKE 1 TABLET (50 MG) BY MOUTH DAILY    For all fails (red x), verify dose and sig.    If the refill does match what is on file, the RN can still proceed to approve the refill request.       If they do not match, route to the appropriate provider.             Passed - Medication indicated for associated diagnosis     Medication is associated with one or more of the following diagnoses:     Chronic Kidney Disease (CKD)   Coronary Artery Disease (CAD)   Diabetes   Heart Failure (HF)   Hypertension (HTN)   Nephropathy   History of myocarditis   Tachycardia induced cardiomyopathy   STEMI (ST elevation myocardial infarction)   Spontaneous dissection of coronary artery   Status post percutaneous transluminal coronary angioplasty            Passed - Patient is age 18 or older          carvedilol (COREG) 25 MG tablet 180 tablet 1     Sig: Take 1 tablet (25 mg) by mouth 2 times daily (with meals).       Beta-Blockers Protocol Failed - 3/21/2025  2:28 PM        Failed - Most recent blood pressure under 140/90 in past 12 months     BP Readings from Last 3 Encounters:   11/09/23 (!) 145/82   06/23/23 125/54   05/01/23 (!) 148/88       No data recorded            Failed - Recent (12 mo) or future (90 days) visit within the authorizing provider's specialty     The patient must have completed an in-person or virtual visit within the past 12 months or has a future visit scheduled within the next 90 days with the authorizing provider s specialty.  Urgent care and e-visits do not qualify as an office visit for this protocol.          Passed - Patient is age 6 or older        Passed  - Medication is active on med list and the sig matches. RN to manually verify dose and sig if red X/fail.     If the protocol passes (green check), you do not need to verify med dose and sig.    A prescription matches if they are the same clinical intention.    For Example: once daily and every morning are the same.    The protocol can not identify upper and lower case letters as matching and will fail.     For Example: Take 1 tablet (50 mg) by mouth daily     TAKE 1 TABLET (50 MG) BY MOUTH DAILY    For all fails (red x), verify dose and sig.    If the refill does match what is on file, the RN can still proceed to approve the refill request.       If they do not match, route to the appropriate provider.             Passed - Medication indicated for associated diagnosis     Medication is associated with one or more of the following diagnoses:     Hypertension (HTN)   Atrial fibrillation/flutter   Angina   ASCVD   Migraine   Heart Failure   Tremor   Anxiety   Ocular hypertension   Glaucoma   PTSD   Obstructive hypertrophic cardiomyopathy   History of myocarditis   Palpitations   POTS (postural orthostatic tachycardia syndrome)   SVT (supraventricular tachycardia)   Hyperthyroidism   Tachycardia   Acute non-st segment elevation myocardial infarction   Subsequent non-st segment elevation myocardial infarction   Ischemic myocardial dysfunction

## 2025-03-22 RX ORDER — SPIRONOLACTONE 25 MG/1
25 TABLET ORAL DAILY
Qty: 90 TABLET | Refills: 0 | Status: SHIPPED | OUTPATIENT
Start: 2025-03-22

## 2025-03-22 RX ORDER — LISINOPRIL 20 MG/1
40 TABLET ORAL DAILY
Qty: 180 TABLET | Refills: 0 | Status: SHIPPED | OUTPATIENT
Start: 2025-03-22

## 2025-03-26 RX ORDER — LISINOPRIL 20 MG/1
40 TABLET ORAL DAILY
Qty: 180 TABLET | Refills: 0 | OUTPATIENT
Start: 2025-03-26

## 2025-03-26 RX ORDER — SPIRONOLACTONE 25 MG/1
25 TABLET ORAL DAILY
Qty: 90 TABLET | Refills: 0 | OUTPATIENT
Start: 2025-03-26

## 2025-03-26 NOTE — TELEPHONE ENCOUNTER
spironolactone (ALDACTONE) 25 MG tablet and lisinopril      The original prescription was reordered on 3/22/2025 by Carrie Tanner RN.

## 2025-04-07 ENCOUNTER — TELEPHONE (OUTPATIENT)
Dept: CARDIOLOGY | Facility: CLINIC | Age: 40
End: 2025-04-07
Payer: MEDICAID

## 2025-04-07 NOTE — TELEPHONE ENCOUNTER
[facility] Call Center    Phone Message    May a detailed message be left on voicemail: yes     Reason for Call: Other: Patient called to schedule annual follow up. He thought he needed an echocardiogram with this as well. Please review and contact patent to schedule as needed.       Action Taken: Other: cardiology    Travel Screening: Not Applicable   supraclavicular      Date of Service:

## 2025-04-09 NOTE — TELEPHONE ENCOUNTER
Patient Contacted for the patient to call back and schedule the following:    Appointment type: RTN HF  Provider: SABINE  Return date: 9/4/2025  Specialty phone number: [phone] OPT 1  Additional appointment(s) needed: ECHO AND LABS  Additonal Notes: ANNUAL FOLLOW-UP

## 2025-07-03 ENCOUNTER — MYC REFILL (OUTPATIENT)
Dept: CARDIOLOGY | Facility: CLINIC | Age: 40
End: 2025-07-03
Payer: MEDICAID

## 2025-07-03 DIAGNOSIS — I50.22 CHRONIC SYSTOLIC HEART FAILURE (H): ICD-10-CM

## 2025-07-07 NOTE — TELEPHONE ENCOUNTER
Last Written Prescription:   Disp Refills Start End GILMAR   spironolactone (ALDACTONE) 25 MG tablet 90 tablet 0 3/22/2025 -- No   Sig - Route: Take 1 tablet (25 mg) by mouth daily. - Oral      Disp Refills Start End GILMAR   lisinopril (ZESTRIL) 20 MG tablet 180 tablet 0 3/22/2025 -- No   Sig - Route: Take 2 tablets (40 mg) by mouth daily. - Oral     carvedilol (COREG) 25 MG tablet 180 tablet 1 3/21/2025 -- No   Sig - Route: Take 1 tablet (25 mg) by mouth 2 times daily (with meals). - Oral     ----------------------  Last Visit Date: 5/1/2023  [facility]      Future Visit Date:    9/4/2025 9:00 AM (30 min)  Ne   Arrive by:  8:45 AM   RETURN HEART FAILURE   [facility] ([facility])   Maddie Choi MD     ----------------------  BP Readings from Last 3 Encounters:   11/09/23 (!) 145/82   06/23/23 125/54   05/01/23 (!) 148/88   Last Comprehensive Metabolic Panel:  Lab Results   Component Value Date     06/23/2023    POTASSIUM 3.8 06/23/2023    CHLORIDE 101 06/23/2023    CO2 23 06/23/2023    ANIONGAP 13 06/23/2023     (H) 06/23/2023    BUN 14.2 06/23/2023    CR 1.04 06/23/2023    GFRESTIMATED >90 06/23/2023    ARUN 9.9 06/23/2023         Refill decision:   [] Medication refilled per  Medication Refill in Ambulatory Care  policy.  [x] Medication unable to be refilled by RN due to: Pt not seen within past 12 months, No FOV or FOV exceeds timeframe per protocol   and Overdue labs/test:  BP, BMP            Request from pharmacy:  Requested Prescriptions   Pending Prescriptions Disp Refills    spironolactone (ALDACTONE) 25 MG tablet 90 tablet 0     Sig: Take 1 tablet (25 mg) by mouth daily.       Diuretics (Including Combos) Protocol Failed - 7/7/2025  3:40 PM        Failed - Most recent blood pressure under 140/90 in past 12 months     BP Readings from Last 3 Encounters:   11/09/23 (!) 145/82   06/23/23 125/54   05/01/23 (!) 148/88       No data recorded            Failed - Potassium level  on file in past 12 months        Failed - Has GFR on file in past 12 months and most recent value is normal        Failed - Recent (12 month) or future (90 days) visit with authorizing provider's specialty (provided they have been seen in the past 15 months)     The patient must have completed an in-person or virtual visit within the past 12 months or has a future visit scheduled within the next 90 days with the authorizing provider s specialty.  Urgent care and e-visits do not qualify as an office visit for this protocol.          Passed - Medication is active on med list and the sig matches. RN to manually verify dose and sig if red X/fail.     If the protocol passes (green check), you do not need to verify med dose and sig.    A prescription matches if they are the same clinical intention.    For Example: once daily and every morning are the same.    The protocol can not identify upper and lower case letters as matching and will fail.     For Example: Take 1 tablet (50 mg) by mouth daily     TAKE 1 TABLET (50 MG) BY MOUTH DAILY    For all fails (red x), verify dose and sig.    If the refill does match what is on file, the RN can still proceed to approve the refill request.       If they do not match, route to the appropriate provider.             Passed - Medication indicated for associated diagnosis     Medication is associated with one or more of the following diagnoses:     Hypertension   Heart Failure   Hyperaldosteronism   Acne   Hirsutism   Hypokalemia   Hepatic Cirrhosis   Nephrotic Syndrome   Myocardial Infarction   Transgender Female   Cardiomyopathy  Congenital Heart Disease  Diastolic Dysfunction          Passed - Patient is age 18 or older          lisinopril (ZESTRIL) 20 MG tablet 180 tablet 0     Sig: Take 2 tablets (40 mg) by mouth daily.       ACE Inhibitors (Including Combos) Protocol Failed - 7/7/2025  3:40 PM        Failed - Most recent blood pressure under 140/90 in past 12 months- Clinicial  or Patient Reported     BP Readings from Last 3 Encounters:   11/09/23 (!) 145/82   06/23/23 125/54   05/01/23 (!) 148/88       No data recorded            Failed - Recent (12 month) or future (90 days) visit with authorizing provider's specialty (provided they have been seen in the past 15 months)     The patient must have completed an in-person or virtual visit within the past 12 months or has a future visit scheduled within the next 90 days with the authorizing provider s specialty.  Urgent care and e-visits do not qualify as an office visit for this protocol.          Failed - Most recent GFR on file in the past 12 months >30        Failed - Normal serum potassium on file in past 12 months     Recent Labs   Lab Test 06/23/23  2118   POTASSIUM 3.8             Passed - Medication is active on med list and the sig matches. RN to manually verify dose and sig if red X/fail.     If the protocol passes (green check), you do not need to verify med dose and sig.    A prescription matches if they are the same clinical intention.    For Example: once daily and every morning are the same.    The protocol can not identify upper and lower case letters as matching and will fail.     For Example: Take 1 tablet (50 mg) by mouth daily     TAKE 1 TABLET (50 MG) BY MOUTH DAILY    For all fails (red x), verify dose and sig.    If the refill does match what is on file, the RN can still proceed to approve the refill request.       If they do not match, route to the appropriate provider.             Passed - Medication indicated for associated diagnosis     Medication is associated with one or more of the following diagnoses:     Chronic Kidney Disease (CKD)   Coronary Artery Disease (CAD)   Diabetes   Heart Failure (HF)   Hypertension (HTN)   Nephropathy   History of myocarditis   Tachycardia induced cardiomyopathy   STEMI (ST elevation myocardial infarction)   Spontaneous dissection of coronary artery   Status post percutaneous  transluminal coronary angioplasty   Cardiomyopathy            Passed - Patient is age 18 or older          carvedilol (COREG) 25 MG tablet 180 tablet 1     Sig: Take 1 tablet (25 mg) by mouth 2 times daily (with meals).       Beta-Blockers Protocol Failed - 7/7/2025  3:40 PM        Failed - Most recent blood pressure under 140/90 in past 12 months     BP Readings from Last 3 Encounters:   11/09/23 (!) 145/82   06/23/23 125/54   05/01/23 (!) 148/88       No data recorded            Failed - Recent (12 month) or future (90 days) visit with authorizing provider's specialty (provided they have been seen in the past 15 months)     The patient must have completed an in-person or virtual visit within the past 12 months or has a future visit scheduled within the next 90 days with the authorizing provider s specialty.  Urgent care and e-visits do not qualify as an office visit for this protocol.          Passed - Patient is age 6 or older        Passed - Medication is active on med list and the sig matches. RN to manually verify dose and sig if red X/fail.     If the protocol passes (green check), you do not need to verify med dose and sig.    A prescription matches if they are the same clinical intention.    For Example: once daily and every morning are the same.    The protocol can not identify upper and lower case letters as matching and will fail.     For Example: Take 1 tablet (50 mg) by mouth daily     TAKE 1 TABLET (50 MG) BY MOUTH DAILY    For all fails (red x), verify dose and sig.    If the refill does match what is on file, the RN can still proceed to approve the refill request.       If they do not match, route to the appropriate provider.             Passed - Medication indicated for associated diagnosis     Medication is associated with one or more of the following diagnoses:     Hypertension (HTN)   Atrial fibrillation/flutter   Angina   ASCVD   Migraine   Heart Failure   Tremor   Anxiety   Ocular  hypertension   Glaucoma   PTSD   Obstructive hypertrophic cardiomyopathy   History of myocarditis   Palpitations   POTS (postural orthostatic tachycardia syndrome)   SVT (supraventricular tachycardia)   Hyperthyroidism   Tachycardia   Acute non-st segment elevation myocardial infarction   Subsequent non-st segment elevation myocardial infarction   Ischemic myocardial dysfunction

## 2025-07-10 DIAGNOSIS — I50.22 CHRONIC SYSTOLIC HEART FAILURE (H): ICD-10-CM

## 2025-07-10 RX ORDER — LISINOPRIL 20 MG/1
40 TABLET ORAL DAILY
Qty: 180 TABLET | Refills: 0 | OUTPATIENT
Start: 2025-07-10

## 2025-07-10 RX ORDER — CARVEDILOL 25 MG/1
25 TABLET ORAL 2 TIMES DAILY WITH MEALS
Qty: 180 TABLET | Refills: 1 | Status: SHIPPED | OUTPATIENT
Start: 2025-07-10

## 2025-07-10 RX ORDER — SPIRONOLACTONE 25 MG/1
25 TABLET ORAL DAILY
Qty: 90 TABLET | Refills: 0 | Status: SHIPPED | OUTPATIENT
Start: 2025-07-10

## 2025-07-10 RX ORDER — SPIRONOLACTONE 25 MG/1
25 TABLET ORAL DAILY
Qty: 90 TABLET | Refills: 0 | OUTPATIENT
Start: 2025-07-10

## 2025-07-10 RX ORDER — LISINOPRIL 20 MG/1
40 TABLET ORAL DAILY
Qty: 180 TABLET | Refills: 0 | Status: SHIPPED | OUTPATIENT
Start: 2025-07-10

## 2025-09-04 ENCOUNTER — OFFICE VISIT (OUTPATIENT)
Dept: CARDIOLOGY | Facility: CLINIC | Age: 40
End: 2025-09-04
Attending: INTERNAL MEDICINE

## 2025-09-04 ENCOUNTER — LAB (OUTPATIENT)
Dept: LAB | Facility: CLINIC | Age: 40
End: 2025-09-04
Attending: INTERNAL MEDICINE

## 2025-09-04 VITALS
HEART RATE: 66 BPM | BODY MASS INDEX: 25.89 KG/M2 | WEIGHT: 204.4 LBS | OXYGEN SATURATION: 100 % | SYSTOLIC BLOOD PRESSURE: 137 MMHG | DIASTOLIC BLOOD PRESSURE: 88 MMHG

## 2025-09-04 DIAGNOSIS — I50.22 CHRONIC SYSTOLIC HEART FAILURE (H): ICD-10-CM

## 2025-09-04 DIAGNOSIS — I50.22 CHRONIC SYSTOLIC HEART FAILURE (H): Primary | ICD-10-CM

## 2025-09-04 LAB
ANION GAP SERPL CALCULATED.3IONS-SCNC: 13 MMOL/L (ref 7–15)
BUN SERPL-MCNC: 21.2 MG/DL (ref 6–20)
CALCIUM SERPL-MCNC: 9.6 MG/DL (ref 8.8–10.4)
CHLORIDE SERPL-SCNC: 100 MMOL/L (ref 98–107)
CREAT SERPL-MCNC: 0.95 MG/DL (ref 0.67–1.17)
EGFRCR SERPLBLD CKD-EPI 2021: >90 ML/MIN/1.73M2
GLUCOSE SERPL-MCNC: 112 MG/DL (ref 70–99)
HCO3 SERPL-SCNC: 25 MMOL/L (ref 22–29)
NT-PROBNP SERPL-MCNC: 43 PG/ML (ref 0–93)
POTASSIUM SERPL-SCNC: 4.6 MMOL/L (ref 3.4–5.3)
SODIUM SERPL-SCNC: 138 MMOL/L (ref 135–145)

## 2025-09-04 PROCEDURE — 99213 OFFICE O/P EST LOW 20 MIN: CPT | Performed by: INTERNAL MEDICINE

## 2025-09-04 ASSESSMENT — PAIN SCALES - GENERAL: PAINLEVEL_OUTOF10: NO PAIN (0)

## (undated) DEVICE — Device

## (undated) DEVICE — MANIFOLD KIT ANGIO AUTOMATED 014613

## (undated) DEVICE — INTRO GLIDESHEATH SLENDER 6FR 10X45CM 60-1060

## (undated) DEVICE — PACK HEART LEFT CUSTOM

## (undated) DEVICE — CATH JACKY 5FR 3.5 CURVE 40-5023

## (undated) DEVICE — KIT HAND CONTROL ACIST 014644 AR-P54

## (undated) DEVICE — TUBING PRESSURE 30"

## (undated) DEVICE — SLEEVE TR BAND RADIAL COMPRESSION DEVICE 24CM TRB24-REG

## (undated) DEVICE — INTRO SHEATH 7FRX10CM PINNACLE RSS702

## (undated) RX ORDER — HEPARIN SODIUM 1000 [USP'U]/ML
INJECTION, SOLUTION INTRAVENOUS; SUBCUTANEOUS
Status: DISPENSED
Start: 2019-04-29

## (undated) RX ORDER — LIDOCAINE HYDROCHLORIDE 10 MG/ML
INJECTION, SOLUTION EPIDURAL; INFILTRATION; INTRACAUDAL; PERINEURAL
Status: DISPENSED
Start: 2019-04-25

## (undated) RX ORDER — NITROGLYCERIN 5 MG/ML
VIAL (ML) INTRAVENOUS
Status: DISPENSED
Start: 2019-04-29

## (undated) RX ORDER — FENTANYL CITRATE 50 UG/ML
INJECTION, SOLUTION INTRAMUSCULAR; INTRAVENOUS
Status: DISPENSED
Start: 2019-04-29